# Patient Record
Sex: MALE | Race: WHITE | NOT HISPANIC OR LATINO | Employment: FULL TIME | URBAN - METROPOLITAN AREA
[De-identification: names, ages, dates, MRNs, and addresses within clinical notes are randomized per-mention and may not be internally consistent; named-entity substitution may affect disease eponyms.]

---

## 2017-01-12 ENCOUNTER — ALLIED HEALTH/NURSE VISIT (OUTPATIENT)
Dept: NURSING | Facility: CLINIC | Age: 52
End: 2017-01-12
Payer: COMMERCIAL

## 2017-01-12 DIAGNOSIS — E53.8 VITAMIN B12 DEFICIENCY (NON ANEMIC): Primary | ICD-10-CM

## 2017-01-12 PROCEDURE — 96372 THER/PROPH/DIAG INJ SC/IM: CPT

## 2017-01-12 PROCEDURE — 99207 ZZC NO CHARGE NURSE ONLY: CPT

## 2017-01-19 ENCOUNTER — TRANSFERRED RECORDS (OUTPATIENT)
Dept: HEALTH INFORMATION MANAGEMENT | Facility: CLINIC | Age: 52
End: 2017-01-19

## 2017-02-07 ENCOUNTER — ALLIED HEALTH/NURSE VISIT (OUTPATIENT)
Dept: NURSING | Facility: CLINIC | Age: 52
End: 2017-02-07
Payer: COMMERCIAL

## 2017-02-07 DIAGNOSIS — E53.8 VITAMIN B12 DEFICIENCY (NON ANEMIC): Primary | ICD-10-CM

## 2017-02-07 PROCEDURE — 96372 THER/PROPH/DIAG INJ SC/IM: CPT

## 2017-02-07 PROCEDURE — 99207 ZZC NO CHARGE NURSE ONLY: CPT

## 2017-03-08 ENCOUNTER — ALLIED HEALTH/NURSE VISIT (OUTPATIENT)
Dept: NURSING | Facility: CLINIC | Age: 52
End: 2017-03-08
Payer: COMMERCIAL

## 2017-03-08 DIAGNOSIS — E53.8 VITAMIN B12 DEFICIENCY (NON ANEMIC): Primary | ICD-10-CM

## 2017-03-08 PROCEDURE — 96372 THER/PROPH/DIAG INJ SC/IM: CPT

## 2017-03-08 NOTE — MR AVS SNAPSHOT
After Visit Summary   3/8/2017    Thomas Gonzales    MRN: [id number]           Patient Information     Date Of Birth          1965        Visit Information        Provider Department      3/8/2017 4:30 PM EA NURSE AB [facility]        Today's Diagnoses     Vitamin B12 deficiency (non anemic)    -  1       Follow-ups after your visit        Your next 10 appointments already scheduled     Mar 08, 2017  4:30 PM CST   Nurse Only with EA NURSE AB   [facility]    [address] [place] [address]  [address]  [place] [address] [phone]            Mar 09, 2017  7:30 AM CST   Sonalit Long with Anni Sage MD   [facility]    [address] [place] [address]  [address]  [place] [id number] [phone]            Apr 05, 2017  4:30 PM CDT   Nurse Only with EA NURSE AB   [facility] [place] ([facility])    [address] [place] [address]  [address]  [place] [id number] [phone]              Who to contact     If you have questions or need follow up information about today's clinic visit or your schedule please contact [facility] directly at [phone].  Normal or non-critical lab and imaging results will be communicated to you by FlipGivehart, letter or phone within 4 business days after the clinic has received the results. If you do not hear from us within 7 days, please contact the clinic through FlipGivehart or phone. If you have a critical or abnormal lab result, we will notify you by phone as soon as possible.  Submit refill requests through VeedMe or call your pharmacy and they will forward the refill request to us. Please allow 3 business days for your refill to be completed.          Additional Information About Your Visit        FlipGivehart Information     VeedMe gives you secure access to your electronic health record. If you see a primary care provider, you can also send  messages to your care team and make appointments. If you have questions, please call your primary care clinic.  If you do not have a primary care provider, please call [phone] and they will assist you.        Care EveryWhere ID     This is your Care EveryWhere ID. This could be used by other organizations to access your [facility] medical records  [id number]         Blood Pressure from Last 3 Encounters:   11/09/16 108/60   08/22/16 124/60   07/18/16 104/62    Weight from Last 3 Encounters:   11/09/16 202 lb (91.6 kg)   08/22/16 222 lb 6.4 oz (100.9 kg)   07/18/16 219 lb 9.6 oz (99.6 kg)              We Performed the Following     INJECTION INTRAMUSCULAR OR SUB-Q     VITAMIN B12 INJ /1000MCG        Primary Care Provider Office Phone # Fax #    Anni Sage [phone]       [address] [place] [address]        Thank you!     Thank you for choosing [facility]  for your care. Our goal is always to provide you with excellent care. Hearing back from our patients is one way we can continue to improve our services. Please take a few minutes to complete the written survey that you may receive in the mail after your visit with us. Thank you!             Your Updated Medication List - Protect others around you: Learn how to safely use, store and throw away your medicines at www.disposemymeds.org.          This list is accurate as of: 3/8/17  4:20 PM.  Always use your most recent med list.                   Brand Name Dispense Instructions for use    ACE NOT PRESCRIBED (INTENTIONAL)     0 each    ACE Inhibitor not prescribed due to Symptomatic hypotension not due to excessive diuresis       aspirin 81 MG tablet      Take 81 mg by mouth daily       blood glucose monitoring test strip    no brand specified    3 Box    Use to test blood sugars 3-4 times daily or as directed Uses Contour Next test strips       cyanocobalamin 1000 MCG/ML injection     VITAMIN B12    0.9 mL    Inject 1 mL (1,000 mcg) into the muscle every 30 days       fluticasone 50 MCG/ACT spray    FLONASE     Spray 2 sprays into both nostrils daily       LANTUS SOLOSTAR 100 UNIT/ML injection   Generic drug:  insulin glargine      Inject 22 Units Subcutaneous At Bedtime       LEVAQUIN PO      Take 500 mg by mouth daily X 14 days       LIPITOR 40 MG tablet   Generic drug:  atorvastatin      Take 40 mg by mouth daily       melatonin 5 MG tablet      Take 5 mg by mouth nightly as needed for sleep       Multi-vitamin Tabs tablet      Take 1 tablet by mouth daily       NITROSTAT SL      Place 0.4 mg under the tongue       PLAVIX 75 MG tablet   Generic drug:  clopidogrel      Take 75 mg by mouth daily       PROBIOTIC DAILY PO      Take by mouth daily       vitamin D 2000 UNITS Caps      Take 2,000 Units by mouth 2 times daily

## 2017-03-09 ENCOUNTER — OFFICE VISIT (OUTPATIENT)
Dept: PEDIATRICS | Facility: CLINIC | Age: 52
End: 2017-03-09
Payer: COMMERCIAL

## 2017-03-09 VITALS
HEIGHT: 73 IN | DIASTOLIC BLOOD PRESSURE: 58 MMHG | SYSTOLIC BLOOD PRESSURE: 118 MMHG | HEART RATE: 79 BPM | OXYGEN SATURATION: 100 % | WEIGHT: 208.06 LBS | TEMPERATURE: 97.6 F | BODY MASS INDEX: 27.57 KG/M2 | RESPIRATION RATE: 16 BRPM

## 2017-03-09 DIAGNOSIS — Z79.4 TYPE 2 DIABETES MELLITUS WITH DIABETIC POLYNEUROPATHY, WITH LONG-TERM CURRENT USE OF INSULIN (H): ICD-10-CM

## 2017-03-09 DIAGNOSIS — F33.0 MAJOR DEPRESSIVE DISORDER, RECURRENT EPISODE, MILD (H): Primary | ICD-10-CM

## 2017-03-09 DIAGNOSIS — E11.42 TYPE 2 DIABETES MELLITUS WITH DIABETIC POLYNEUROPATHY, WITH LONG-TERM CURRENT USE OF INSULIN (H): ICD-10-CM

## 2017-03-09 DIAGNOSIS — K52.839 MICROSCOPIC COLITIS, UNSPECIFIED MICROSCOPIC COLITIS TYPE: ICD-10-CM

## 2017-03-09 LAB
CREAT UR-MCNC: 98 MG/DL
HBA1C MFR BLD: 6.2 % (ref 4.3–6)
MICROALBUMIN UR-MCNC: 36 MG/L
MICROALBUMIN/CREAT UR: 36.72 MG/G CR (ref 0–17)

## 2017-03-09 PROCEDURE — 83036 HEMOGLOBIN GLYCOSYLATED A1C: CPT | Performed by: INTERNAL MEDICINE

## 2017-03-09 PROCEDURE — 36415 COLL VENOUS BLD VENIPUNCTURE: CPT | Performed by: INTERNAL MEDICINE

## 2017-03-09 PROCEDURE — 99214 OFFICE O/P EST MOD 30 MIN: CPT | Performed by: INTERNAL MEDICINE

## 2017-03-09 PROCEDURE — 82043 UR ALBUMIN QUANTITATIVE: CPT | Performed by: INTERNAL MEDICINE

## 2017-03-09 NOTE — MR AVS SNAPSHOT
After Visit Summary   3/9/2017    Thomas Gonzales    MRN: [id number]           Patient Information     Date Of Birth          1965        Visit Information        Provider Department      3/9/2017 7:30 AM Anni Sage MD [facility]        Today's Diagnoses     Major depressive disorder, recurrent episode, mild (H)    -  1    Type 2 diabetes mellitus with diabetic polyneuropathy, with long-term current use of insulin (H)          Care Instructions    Depression:  1. Start fluoxetine 20mg daily. OK to increase to 40mg after 2 weeks if tolerating well. Let me know if there are any side effects.  2. Follow-up with me in 4-6 weeks.  3. We can always refer for therapy.    Diabetes:  1. Lantus refilled for 25 units.   2. Let me know if you start to have pre-meal blood sugars of >150 and we can restart Novolog.    Let me know if there is anything else that I can do!        Follow-ups after your visit        Your next 10 appointments already scheduled     Apr 05, 2017  4:30 PM CDT   Nurse Only with EA NURSE AB   [facility] ([facility])    [address] [place] [address]  [address]  [facility] [id number] [phone]              Who to contact     If you have questions or need follow up information about today's clinic visit or your schedule please contact [facility] directly at [phone].  Normal or non-critical lab and imaging results will be communicated to you by MyChart, letter or phone within 4 business days after the clinic has received the results. If you do not hear from us within 7 days, please contact the clinic through MyChart or phone. If you have a critical or abnormal lab result, we will notify you by phone as soon as possible.  Submit refill requests through Zoobe or call your pharmacy and they will forward the refill request to us. Please allow 3 business days for your refill to be completed.          Additional Information  "About Your Visit        Medical Direct Clubhart Information     Bizily gives you secure access to your electronic health record. If you see a primary care provider, you can also send messages to your care team and make appointments. If you have questions, please call your primary care clinic.  If you do not have a primary care provider, please call [phone] and they will assist you.        Care EveryWhere ID     This is your Care EveryWhere ID. This could be used by other organizations to access your [facility] medical records  [id number]        Your Vitals Were     Pulse Temperature Respirations Height Pulse Oximetry BMI (Body Mass Index)    79 97.6  F (36.4  C) (Tympanic) 16 6' 0.5\" (1.842 m) 100% 27.83 kg/m2       Blood Pressure from Last 3 Encounters:   03/09/17 118/58   11/09/16 108/60   08/22/16 124/60    Weight from Last 3 Encounters:   03/09/17 208 lb 1 oz (94.4 kg)   11/09/16 202 lb (91.6 kg)   08/22/16 222 lb 6.4 oz (100.9 kg)              We Performed the Following     Albumin Random Urine Quantitative     Hemoglobin A1c          Today's Medication Changes          These changes are accurate as of: 3/9/17  8:02 AM.  If you have any questions, ask your nurse or doctor.               Start taking these medicines.        Dose/Directions    FLUoxetine 20 MG capsule   Commonly known as:  PROzac   Used for:  Major depressive disorder, recurrent episode, mild (H)   Started by:  Anni Sage MD        Dose:  20 mg   Take 1 capsule (20 mg) by mouth daily   Quantity:  90 capsule   Refills:  1         These medicines have changed or have updated prescriptions.        Dose/Directions    insulin glargine 100 UNIT/ML injection   Commonly known as:  LANTUS SOLOSTAR   This may have changed:  how much to take   Used for:  Type 2 diabetes mellitus with diabetic polyneuropathy, with long-term current use of insulin (H)   Changed by:  Anni Sage MD        Dose:  25 Units   Inject 25 Units Subcutaneous At Bedtime   Quantity:  " 12 mL   Refills:  3            Where to get your medicines      These medications were sent to [id number] - [place] - [address]  [address] [facility]  [address] [place] [address]     Phone:  [phone]     FLUoxetine 20 MG capsule    insulin glargine 100 UNIT/ML injection                Primary Care Provider Office Phone # Fax #    Anni Sage [phone]       [facility] [address] [place] [address]        Thank you!     Thank you for choosing [facility]  for your care. Our goal is always to provide you with excellent care. Hearing back from our patients is one way we can continue to improve our services. Please take a few minutes to complete the written survey that you may receive in the mail after your visit with us. Thank you!             Your Updated Medication List - Protect others around you: Learn how to safely use, store and throw away your medicines at www.disposemymeds.org.          This list is accurate as of: 3/9/17  8:02 AM.  Always use your most recent med list.                   Brand Name Dispense Instructions for use    ACE NOT PRESCRIBED (INTENTIONAL)     0 each    ACE Inhibitor not prescribed due to Symptomatic hypotension not due to excessive diuresis       aspirin 81 MG tablet      Take 81 mg by mouth daily       blood glucose monitoring test strip    no brand specified    3 Box    Use to test blood sugars 3-4 times daily or as directed Uses Contour Next test strips       cyanocobalamin 1000 MCG/ML injection    VITAMIN B12    0.9 mL    Inject 1 mL (1,000 mcg) into the muscle every 30 days       FLUoxetine 20 MG capsule    PROzac    90 capsule    Take 1 capsule (20 mg) by mouth daily       fluticasone 50 MCG/ACT spray    FLONASE     Spray 2 sprays into both nostrils daily       insulin glargine 100 UNIT/ML injection    LANTUS SOLOSTAR    12 mL    Inject 25 Units Subcutaneous At  Bedtime       LEVAQUIN PO      Take 500 mg by mouth daily Reported on 3/9/2017       LIPITOR 40 MG tablet   Generic drug:  atorvastatin      Take 40 mg by mouth daily       melatonin 5 MG tablet      Take 5 mg by mouth nightly as needed for sleep       Multi-vitamin Tabs tablet      Take 1 tablet by mouth daily       NITROSTAT SL      Place 0.4 mg under the tongue Reported on 3/9/2017       PLAVIX 75 MG tablet   Generic drug:  clopidogrel      Take 75 mg by mouth daily Reported on 3/9/2017       PROBIOTIC DAILY PO      Take by mouth daily       vitamin D 2000 UNITS Caps      Take 2,000 Units by mouth 2 times daily

## 2017-03-09 NOTE — PROGRESS NOTES
SUBJECTIVE:                                                    Thomas Gonzales is a 51 year old male who presents to clinic today for the following health issues:      Medication Followup of Lantus    Taking Medication as prescribed: yes    Side Effects:  None    Medication Helping Symptoms:  yes     Abnormal Mood Symptoms      Duration: x1 year intermittent     Description:  Depression: YES  Anxiety: no   Panic attacks: no      Accompanying signs and symptoms: see PHQ-9 and TIGIST scores    History (similar episodes/previous evaluation): Yes     Precipitating or alleviating factors: None    Therapies tried and outcome: was on Prozac (Fluoxetine)     Pt would also like to discuss start going back on Novolog.     Pradip comes in for follow-up and to discuss the above issues. He reports that for a while he was doing relatively well and was down to one abdominal drain, but subsequently an abscess re-developed and he had to be rehospitalized for management, including antibiotics and drainage.     He has been seeing GI, and was started on steroids for microscopic colitis. His diarrhea is now much improved on this, but he is noticing that his blood sugars have been higher, and has in fact restarted Lantus on his own. He is taking 25 units daily, no Novolog, and reports that his blood sugars are typically in the 140s fasting and 100-120s pre-meal. He is wondering if he needs to start Novolog at this time.    Of note, he reports that with all of these recent health issues, he is having a harder time managing the emotional ups and downs of chronic illness. He is feeling more down recently. He was previously on a low dose of fluoxetine, is interested in restarting this. No thoughts of hurting himself or others at this time.     Problem list and histories reviewed & adjusted, as indicated.  Additional history: as documented    Patient Active Problem List   Diagnosis     Abdominal abscess (H)     Abnormal liver function tests  "    Episodic mood disorder (H)     Celiac disease     Coronary arteriosclerosis in native artery     Type 2 diabetes mellitus with diabetic nephropathy (H)     Mild nonproliferative diabetic retinopathy (H)     Stricture of duodenum     Steatosis of liver     Ischemic enteritis (H)     Malignant melanoma (H)     Mast cell disease     Microcytic anemia     Multiple-type hyperlipidemia     Caloric malnutrition (H)     Body mass index (BMI) greater than 30 in adult     Vertigo     MS (multiple sclerosis) (H)     Diabetic polyneuropathy associated with type 2 diabetes mellitus (H)     Vitamin B12 deficiency (non anemic)     Past Surgical History   Procedure Laterality Date     Castro en y bowel  1/8/2016     for small bowel ischemia     Cholecystectomy  1/8/2016     Appendectomy  1/8/2016     Cataract iol, rt/lt       Penile implant         Social History   Substance Use Topics     Smoking status: Never Smoker     Smokeless tobacco: Not on file     Alcohol use Yes      Comment: occ     Family History   Problem Relation Age of Onset     Arrhythmia Mother      bradycardia- pacemaker     Coronary Artery Disease Father            Reviewed and updated as needed this visit by clinical staff  Tobacco  Allergies  Meds  Med Hx  Fam Hx  Soc Hx        ROS:  Constitutional, HEENT, cardiovascular, pulmonary, gi, psych and endo systems are negative, except as otherwise noted.    OBJECTIVE:                                                    /58 (BP Location: Right arm, Patient Position: Chair, Cuff Size: Adult Regular)  Pulse 79  Temp 97.6  F (36.4  C) (Tympanic)  Resp 16  Ht 6' 0.5\" (1.842 m)  Wt 208 lb 1 oz (94.4 kg)  SpO2 100%  BMI 27.83 kg/m2  Body mass index is 27.83 kg/(m^2).  GENERAL: healthy, alert and no distress  PSYCH: mentation appears normal, affect normal/bright  Diabetic foot exam: DP reduced bilaterally, PT reduced bilaterally, reduced sensation up to mid calf bilaterally, slightly worse on L and small no " erythematous or warm healing wound on L dorsum of foot.     Diagnostic Test Results:  Results for orders placed or performed in visit on 03/09/17   Hemoglobin A1c   Result Value Ref Range    Hemoglobin A1C 6.2 (H) 4.3 - 6.0 %   Albumin Random Urine Quantitative   Result Value Ref Range    Creatinine Urine 98 mg/dL    Albumin Urine mg/L 36 mg/L    Albumin Urine mg/g Cr 36.72 (H) 0 - 17 mg/g Cr        ASSESSMENT/PLAN:                                                      1. Major depressive disorder, recurrent episode, mild (H)  Recently worsened with chronic illness, will restart SSRI at low dose. Follow-up in 4-6 weeks to see if dose increase needed. Side effects, including black box warning reviewed. Discussed that CBT may also be useful.   - FLUoxetine (PROZAC) 20 MG capsule; Take 1 capsule (20 mg) by mouth daily  Dispense: 90 capsule; Refill: 1    2. Type 2 diabetes mellitus with diabetic polyneuropathy, with long-term current use of insulin (H)  Has been well controlled recently with diet; however, with recent steroid use for microscopic colitis has seen increase in blood glucoses. Has already restarted Lantus with good control of home BGs; will refill at current dose today. No indication for starting mealtime insulin unless control worsens.   - insulin glargine (LANTUS SOLOSTAR) 100 UNIT/ML injection; Inject 25 Units Subcutaneous At Bedtime  Dispense: 12 mL; Refill: 3  - Hemoglobin A1c  - Albumin Random Urine Quantitative    3. Abdominal abscess (H)  Following with [facility] for management.     Microscopic colitis:  Managed at [facility], currently on steroids.       Patient Instructions   Depression:  1. Start fluoxetine 20mg daily. OK to increase to 40mg after 2 weeks if tolerating well. Let me know if there are any side effects.  2. Follow-up with me in 4-6 weeks.  3. We can always refer for therapy.    Diabetes:  1. Lantus refilled for 25 units.   2. Let me know if you start to have pre-meal blood sugars of >150 and we  can restart Novolog.    Let me know if there is anything else that I can do!      Anni Serrano MD  [facility] [place]

## 2017-03-09 NOTE — PATIENT INSTRUCTIONS
Depression:  1. Start fluoxetine 20mg daily. OK to increase to 40mg after 2 weeks if tolerating well. Let me know if there are any side effects.  2. Follow-up with me in 4-6 weeks.  3. We can always refer for therapy.    Diabetes:  1. Lantus refilled for 25 units.   2. Let me know if you start to have pre-meal blood sugars of >150 and we can restart Novolog.    Let me know if there is anything else that I can do!

## 2017-03-09 NOTE — NURSING NOTE
"No chief complaint on file.      Initial /58 (BP Location: Right arm, Patient Position: Chair, Cuff Size: Adult Regular)  Pulse 79  Temp 97.6  F (36.4  C) (Tympanic)  Resp 16  Ht 6' 0.5\" (1.842 m)  Wt 208 lb 1 oz (94.4 kg)  SpO2 100%  BMI 27.83 kg/m2 Estimated body mass index is 27.83 kg/(m^2) as calculated from the following:    Height as of this encounter: 6' 0.5\" (1.842 m).    Weight as of this encounter: 208 lb 1 oz (94.4 kg).  Medication Reconciliation: complete     Meg Ann MA 3/9/2017 7:36 AM    "

## 2017-03-10 ASSESSMENT — PATIENT HEALTH QUESTIONNAIRE - PHQ9: SUM OF ALL RESPONSES TO PHQ QUESTIONS 1-9: 8

## 2017-03-15 PROBLEM — F33.0 MAJOR DEPRESSIVE DISORDER, RECURRENT EPISODE, MILD (H): Status: ACTIVE | Noted: 2017-03-09

## 2017-03-15 PROBLEM — F33.0 MAJOR DEPRESSIVE DISORDER, RECURRENT EPISODE, MILD (H): Status: ACTIVE | Noted: 2017-03-15

## 2017-03-20 ENCOUNTER — TRANSFERRED RECORDS (OUTPATIENT)
Dept: HEALTH INFORMATION MANAGEMENT | Facility: CLINIC | Age: 52
End: 2017-03-20

## 2017-04-06 ENCOUNTER — OFFICE VISIT (OUTPATIENT)
Dept: PEDIATRICS | Facility: CLINIC | Age: 52
End: 2017-04-06
Payer: COMMERCIAL

## 2017-04-06 VITALS
HEART RATE: 74 BPM | OXYGEN SATURATION: 99 % | SYSTOLIC BLOOD PRESSURE: 122 MMHG | RESPIRATION RATE: 16 BRPM | HEIGHT: 73 IN | WEIGHT: 207 LBS | DIASTOLIC BLOOD PRESSURE: 50 MMHG | TEMPERATURE: 97.7 F | BODY MASS INDEX: 27.43 KG/M2

## 2017-04-06 DIAGNOSIS — K52.839 MICROSCOPIC COLITIS, UNSPECIFIED MICROSCOPIC COLITIS TYPE: ICD-10-CM

## 2017-04-06 DIAGNOSIS — G35 MS (MULTIPLE SCLEROSIS) (H): ICD-10-CM

## 2017-04-06 DIAGNOSIS — Z79.4 TYPE 2 DIABETES MELLITUS WITH DIABETIC NEPHROPATHY, WITH LONG-TERM CURRENT USE OF INSULIN (H): ICD-10-CM

## 2017-04-06 DIAGNOSIS — E53.8 VITAMIN B12 DEFICIENCY (NON ANEMIC): ICD-10-CM

## 2017-04-06 DIAGNOSIS — F33.0 MAJOR DEPRESSIVE DISORDER, RECURRENT EPISODE, MILD (H): ICD-10-CM

## 2017-04-06 DIAGNOSIS — E11.21 TYPE 2 DIABETES MELLITUS WITH DIABETIC NEPHROPATHY, WITH LONG-TERM CURRENT USE OF INSULIN (H): ICD-10-CM

## 2017-04-06 PROCEDURE — 99213 OFFICE O/P EST LOW 20 MIN: CPT | Mod: 25 | Performed by: INTERNAL MEDICINE

## 2017-04-06 PROCEDURE — 96372 THER/PROPH/DIAG INJ SC/IM: CPT | Performed by: INTERNAL MEDICINE

## 2017-04-06 NOTE — PATIENT INSTRUCTIONS
1. Continue Prozac at current dose.    2. OK to adjust insulin as you are. Let me know if you need any assistance.    Keep me posted about how things are going. Follow-up in about 6 months.

## 2017-04-06 NOTE — PROGRESS NOTES
SUBJECTIVE:                                                    Thomas Gonzales is a 51 year old male who presents to clinic today for the following health issues:    1. Depression: Doing well on fluoxetine at current dose. No side effects. Has been more active, working out 4-6 days per week. Feels pretty tired in the evenings but overall feeling well. Thinks his dose is appropriate.    2. Type 2 DM: Taking insulin about 1/2 of the nights, will take this if his blood sugars are >140-150 usually. GI is gradually reducing his steroids, hoping to me off in the next few months.     3. Deconditioning: Has been going to physical therapy twice per week, working on balance issues and leg strength. Planning for another 3-4 weeks. Has also been going to the gym 4-6 times per week, working with a .    4. Abdominal abscesses: Still following at [facility] for this, re-positioned drains earlier this week, thinking about taking one of them out in a couple of weeks.     5. MS: Not taking anything at this point. Follows with neurology at [facility], will see later this spring/summer.     Problem list and histories reviewed & adjusted, as indicated.  Additional history: as documented    Patient Active Problem List   Diagnosis     Abdominal abscess (H)     Abnormal liver function tests     Celiac disease     Coronary arteriosclerosis in native artery     Type 2 diabetes mellitus with diabetic nephropathy (H)     Mild nonproliferative diabetic retinopathy (H)     Stricture of duodenum     Steatosis of liver     Ischemic enteritis (H)     Malignant melanoma (H)     Mast cell disease     Microcytic anemia     Multiple-type hyperlipidemia     Caloric malnutrition (H)     Body mass index (BMI) greater than 30 in adult     Vertigo     MS (multiple sclerosis) (H)     Diabetic polyneuropathy associated with type 2 diabetes mellitus (H)     Vitamin B12 deficiency (non anemic)     Major depressive disorder, recurrent episode, mild (H)  "    Past Surgical History:   Procedure Laterality Date     APPENDECTOMY  1/8/2016     CATARACT IOL, RT/LT       CHOLECYSTECTOMY  1/8/2016     penile implant       LEON EN Y BOWEL  1/8/2016    for small bowel ischemia       Social History   Substance Use Topics     Smoking status: Never Smoker     Smokeless tobacco: Not on file     Alcohol use Yes      Comment: occ     Family History   Problem Relation Age of Onset     Arrhythmia Mother      bradycardia- pacemaker     Coronary Artery Disease Father            Reviewed and updated as needed this visit by clinical staff  Tobacco  Allergies  Meds  Soc Hx        ROS:  Constitutional, HEENT, cardiovascular, pulmonary, gi and psych systems are negative, except as otherwise noted.    OBJECTIVE:                                                    /50 (BP Location: Left arm, Patient Position: Left side, Cuff Size: Adult Small)  Pulse 74  Temp 97.7  F (36.5  C) (Oral)  Resp 16  Ht 6' 0.5\" (1.842 m)  Wt 207 lb (93.9 kg)  SpO2 99%  BMI 27.69 kg/m2  Body mass index is 27.69 kg/(m^2).  GENERAL: healthy, alert and no distress  PSYCH: mentation appears normal, affect normal/bright, overall much brighter than previous visits.     Diagnostic Test Results:  none      ASSESSMENT/PLAN:                                                      1. Vitamin B12 deficiency (non anemic)  Due for injection today  - VITAMIN B12 INJ /1000MCG  - INJECTION INTRAMUSCULAR OR SUB-Q    2. Abdominal abscess (H)  Stable, still has 2 drains in place. Following at [facility].     3. Type 2 diabetes mellitus with diabetic nephropathy, with long-term current use of insulin (H)  Well controlled on recent HgbA1c. Patient taking insulin somewhat irregularly, but is quite savvy about dosing. Anticipate that recent spike in blood sugars due to steroids with microscopic colitis, and anticipate with his recent increased activity he may actually be able to come back off insulin as his steroids taper down/off. "     4. MS (multiple sclerosis) (H)  Follows with Neuro at [facility], not on medication at this point.     5. Major depressive disorder, recurrent episode, mild (H)  Much improved on fluoxetine. Will continue with current dosing. No side effects.     6. Microscopic colitis, unspecified microscopic colitis type  Follows with GI at [facility], on gradual taper.       Patient Instructions   1. Continue Prozac at current dose.    2. OK to adjust insulin as you are. Let me know if you need any assistance.    Keep me posted about how things are going. Follow-up in about 6 months.       Anni Serrano MD  [facility]

## 2017-04-06 NOTE — MR AVS SNAPSHOT
After Visit Summary   4/6/2017    Thomas Gonzales    MRN: [id number]           Patient Information     Date Of Birth          1965        Visit Information        Provider Department      4/6/2017 7:30 AM Anni Sage MD [facility]        Care Instructions    1. Continue Prozac at current dose.    2. OK to adjust insulin as you are. Let me know if you need any assistance.    Keep me posted about how things are going. Follow-up in about 6 months.         Follow-ups after your visit        Your next 10 appointments already scheduled     May 04, 2017  4:30 PM CDT   Nurse Only with EA NURSE AB   [facility]    [address]  [address]  [place] [address] [phone]              Who to contact     If you have questions or need follow up information about today's clinic visit or your schedule please contact [facility] directly at [phone].  Normal or non-critical lab and imaging results will be communicated to you by Qustreethart, letter or phone within 4 business days after the clinic has received the results. If you do not hear from us within 7 days, please contact the clinic through Kromatidt or phone. If you have a critical or abnormal lab result, we will notify you by phone as soon as possible.  Submit refill requests through Qlika or call your pharmacy and they will forward the refill request to us. Please allow 3 business days for your refill to be completed.          Additional Information About Your Visit        Qustreethart Information     Qlika gives you secure access to your electronic health record. If you see a primary care provider, you can also send messages to your care team and make appointments. If you have questions, please call your primary care clinic.  If you do not have a primary care provider, please call [phone] and they will assist you.        Care EveryWhere ID     This is your Care  "EveryWhere ID. This could be used by other organizations to access your [facility] medical records  [id number]        Your Vitals Were     Pulse Temperature Respirations Height Pulse Oximetry BMI (Body Mass Index)    74 97.7  F (36.5  C) (Oral) 16 6' 0.5\" (1.842 m) 99% 27.69 kg/m2       Blood Pressure from Last 3 Encounters:   04/06/17 122/50   03/09/17 118/58   11/09/16 108/60    Weight from Last 3 Encounters:   04/06/17 207 lb (93.9 kg)   03/09/17 208 lb 1 oz (94.4 kg)   11/09/16 202 lb (91.6 kg)              Today, you had the following     No orders found for display       Primary Care Provider Office Phone # Fax #    Anni Sage [phone]       [address] [place] [address]        Thank you!     Thank you for choosing [facility]  for your care. Our goal is always to provide you with excellent care. Hearing back from our patients is one way we can continue to improve our services. Please take a few minutes to complete the written survey that you may receive in the mail after your visit with us. Thank you!             Your Updated Medication List - Protect others around you: Learn how to safely use, store and throw away your medicines at www.disposemymeds.org.          This list is accurate as of: 4/6/17  7:57 AM.  Always use your most recent med list.                   Brand Name Dispense Instructions for use    ACE NOT PRESCRIBED (INTENTIONAL)     0 each    ACE Inhibitor not prescribed due to Symptomatic hypotension not due to excessive diuresis       aspirin 81 MG tablet      Take 81 mg by mouth daily       blood glucose monitoring test strip    no brand specified    3 Box    Use to test blood sugars 3-4 times daily or as directed Uses Contour Next test strips       cyanocobalamin 1000 MCG/ML injection    VITAMIN B12    0.9 mL    Inject 1 mL (1,000 mcg) into the muscle every 30 days       FLUoxetine 20 MG capsule    PROzac    90 capsule "    Take 1 capsule (20 mg) by mouth daily       fluticasone 50 MCG/ACT spray    FLONASE     Spray 2 sprays into both nostrils daily       insulin glargine 100 UNIT/ML injection    LANTUS SOLOSTAR    12 mL    Inject 25 Units Subcutaneous At Bedtime       LEVAQUIN PO      Take 500 mg by mouth daily Reported on 3/9/2017       LIPITOR 40 MG tablet   Generic drug:  atorvastatin      Take 40 mg by mouth daily       melatonin 5 MG tablet      Take 5 mg by mouth nightly as needed for sleep       Multi-vitamin Tabs tablet      Take 1 tablet by mouth daily       NITROSTAT SL      Place 0.4 mg under the tongue Reported on 3/9/2017       PLAVIX 75 MG tablet   Generic drug:  clopidogrel      Take 75 mg by mouth daily Reported on 3/9/2017       PROBIOTIC DAILY PO      Take by mouth daily       vitamin D 2000 UNITS Caps      Take 2,000 Units by mouth 2 times daily

## 2017-04-09 PROBLEM — K52.839 MICROSCOPIC COLITIS, UNSPECIFIED MICROSCOPIC COLITIS TYPE: Status: ACTIVE | Noted: 2017-04-09

## 2017-05-04 ENCOUNTER — ALLIED HEALTH/NURSE VISIT (OUTPATIENT)
Dept: NURSING | Facility: CLINIC | Age: 52
End: 2017-05-04
Payer: COMMERCIAL

## 2017-05-04 DIAGNOSIS — E53.8 VITAMIN B12 DEFICIENCY (NON ANEMIC): Primary | ICD-10-CM

## 2017-05-04 PROCEDURE — 96372 THER/PROPH/DIAG INJ SC/IM: CPT

## 2017-05-04 PROCEDURE — 99207 ZZC NO CHARGE NURSE ONLY: CPT

## 2017-05-04 NOTE — NURSING NOTE
"Chief Complaint   Patient presents with     Allied Health Visit     vitamin B12       Initial There were no vitals taken for this visit. Estimated body mass index is 27.69 kg/(m^2) as calculated from the following:    Height as of 4/6/17: 6' 0.5\" (1.842 m).    Weight as of 4/6/17: 207 lb (93.9 kg).  Medication Reconciliation: unable or not appropriate to perform   Sun Rivera LPN    "

## 2017-05-04 NOTE — MR AVS SNAPSHOT
After Visit Summary   5/4/2017    Thomas Gonzales    MRN: [id number]           Patient Information     Date Of Birth          1965        Visit Information        Provider Department      5/4/2017 4:30 PM NATALYA NURSE AB [facility]        Today's Diagnoses     Vitamin B12 deficiency (non anemic)    -  1       Follow-ups after your visit        Who to contact     If you have questions or need follow up information about today's clinic visit or your schedule please contact [facility] directly at [phone].  Normal or non-critical lab and imaging results will be communicated to you by MyChart, letter or phone within 4 business days after the clinic has received the results. If you do not hear from us within 7 days, please contact the clinic through One Parts Billt or phone. If you have a critical or abnormal lab result, we will notify you by phone as soon as possible.  Submit refill requests through ChaoWIFI or call your pharmacy and they will forward the refill request to us. Please allow 3 business days for your refill to be completed.          Additional Information About Your Visit        MyChart Information     ChaoWIFI gives you secure access to your electronic health record. If you see a primary care provider, you can also send messages to your care team and make appointments. If you have questions, please call your primary care clinic.  If you do not have a primary care provider, please call [phone] and they will assist you.        Care EveryWhere ID     This is your Care EveryWhere ID. This could be used by other organizations to access your [place] medical records  [id number]         Blood Pressure from Last 3 Encounters:   04/06/17 122/50   03/09/17 118/58   11/09/16 108/60    Weight from Last 3 Encounters:   04/06/17 207 lb (93.9 kg)   03/09/17 208 lb 1 oz (94.4 kg)   11/09/16 202 lb (91.6 kg)              We Performed the Following     INJECTION INTRAMUSCULAR OR  SUB-Q     VITAMIN B12 INJ /1000MCG        Primary Care Provider Office Phone # Fax #    Anni Sage [phone]       [facility]  [address] [place] [address]        Thank you!     Thank you for choosing [facility]  for your care. Our goal is always to provide you with excellent care. Hearing back from our patients is one way we can continue to improve our services. Please take a few minutes to complete the written survey that you may receive in the mail after your visit with us. Thank you!             Your Updated Medication List - Protect others around you: Learn how to safely use, store and throw away your medicines at www.disposemymeds.org.          This list is accurate as of: 5/4/17  5:00 PM.  Always use your most recent med list.                   Brand Name Dispense Instructions for use    ACE NOT PRESCRIBED (INTENTIONAL)     0 each    ACE Inhibitor not prescribed due to Symptomatic hypotension not due to excessive diuresis       aspirin 81 MG tablet      Take 81 mg by mouth daily       blood glucose monitoring test strip    no brand specified    3 Box    Use to test blood sugars 3-4 times daily or as directed Uses Contour Next test strips       cyanocobalamin 1000 MCG/ML injection    VITAMIN B12    0.9 mL    Inject 1 mL (1,000 mcg) into the muscle every 30 days       FLUoxetine 20 MG capsule    PROzac    90 capsule    Take 1 capsule (20 mg) by mouth daily       fluticasone 50 MCG/ACT spray    FLONASE     Spray 2 sprays into both nostrils daily       insulin glargine 100 UNIT/ML injection    LANTUS SOLOSTAR    12 mL    Inject 25 Units Subcutaneous At Bedtime       LEVAQUIN PO      Take 500 mg by mouth daily Reported on 3/9/2017       LIPITOR 40 MG tablet   Generic drug:  atorvastatin      Take 40 mg by mouth daily       melatonin 5 MG tablet      Take 5 mg by mouth nightly as needed for sleep       Multi-vitamin Tabs tablet      Take 1 tablet by  mouth daily       NITROSTAT SL      Place 0.4 mg under the tongue Reported on 3/9/2017       PLAVIX 75 MG tablet   Generic drug:  clopidogrel      Take 75 mg by mouth daily Reported on 3/9/2017       PROBIOTIC DAILY PO      Take by mouth daily       vitamin D 2000 UNITS Caps      Take 2,000 Units by mouth 2 times daily

## 2017-05-15 ENCOUNTER — MYC MEDICAL ADVICE (OUTPATIENT)
Dept: PEDIATRICS | Facility: CLINIC | Age: 52
End: 2017-05-15

## 2017-05-15 DIAGNOSIS — M54.2 NECK PAIN: ICD-10-CM

## 2017-05-15 DIAGNOSIS — M25.511 RIGHT SHOULDER PAIN: Primary | ICD-10-CM

## 2017-05-15 NOTE — TELEPHONE ENCOUNTER
Pt sent MC message back that he would like to see ortho for his pain. Placed referral & notified pt.    Paco, RN  Triage Nurse

## 2017-05-15 NOTE — TELEPHONE ENCOUNTER
See MC message from pt. LOV was on 04/06/17. Huddled with randa Hanna to place referral for PT, pt has already tried PT in the past for the same pain, can offer ortho referral(no need for an OV with pcp to receive referral).    Sent MC message to pt. Will await for his response.     Paco, RN  Triage Nurse

## 2017-05-19 ENCOUNTER — MYC MEDICAL ADVICE (OUTPATIENT)
Dept: PEDIATRICS | Facility: CLINIC | Age: 52
End: 2017-05-19

## 2017-05-19 DIAGNOSIS — I25.10 CORONARY ARTERY DISEASE INVOLVING NATIVE HEART, ANGINA PRESENCE UNSPECIFIED, UNSPECIFIED VESSEL OR LESION TYPE: ICD-10-CM

## 2017-05-19 DIAGNOSIS — Z92.89 HISTORY OF HYPERBARIC OXYGEN THERAPY: Primary | ICD-10-CM

## 2017-05-19 NOTE — TELEPHONE ENCOUNTER
Please review the MC message from pt & advise. LOV on 04/06/17. Should I ask him to send the request through E-visit?    Tommie OROSCO, RN  Triage Nurse

## 2017-06-21 ENCOUNTER — MYC MEDICAL ADVICE (OUTPATIENT)
Dept: PEDIATRICS | Facility: CLINIC | Age: 52
End: 2017-06-21

## 2017-06-21 DIAGNOSIS — Z98.84 S/P BARIATRIC SURGERY: Primary | ICD-10-CM

## 2017-06-21 NOTE — TELEPHONE ENCOUNTER
Please review the MC message from pt & advise. Thanks. LOV was on 04/06/17, recommended f/u in 6 months.     Tommie OROSCO, RN  Triage Nurse

## 2017-06-23 ENCOUNTER — MYC MEDICAL ADVICE (OUTPATIENT)
Dept: PEDIATRICS | Facility: CLINIC | Age: 52
End: 2017-06-23

## 2017-06-23 DIAGNOSIS — I25.10 CORONARY ARTERIOSCLEROSIS IN NATIVE ARTERY: Primary | ICD-10-CM

## 2017-06-23 RX ORDER — ATORVASTATIN CALCIUM 20 MG/1
20 TABLET, FILM COATED ORAL DAILY
Qty: 90 TABLET | Refills: 1 | Status: SHIPPED | OUTPATIENT
Start: 2017-06-23 | End: 2017-12-11

## 2017-06-23 NOTE — TELEPHONE ENCOUNTER
Pended med. Please add dx & sign rx. Thanks.    Notes from  message yesterday:  I agree, you do need annual labs following your bariatric surgery. Typically the ones I check are a CMP -- includes magnesium and phosphorus -- and CBC (you didn't mention these but I know you have them checked regularly for other reasons), copper, zinc, iron studies, ferritin, folate, PTHI, Vitamin B1 (thiamine) Vitamin B12 and vitamin D. I've ordered these, minus the CMP since you have this done elsewhere, along with your fasting lipids.     Typically I do not check selenium, vitamin A or vitamin C but I can order these if you would like.     I also think that it would probably be fine to decrease your atorvastatin to 20mg daily, but I probably wouldn't go lower than that. I would recommend checking your lipids 3 months after decreasing the dose of your statin just to make sure we didn't go too low. Does that sound reasonable to you? Just let me know where you would like me to send the new prescription to.     Paco, RN  Triage Nurse

## 2017-07-03 ENCOUNTER — OFFICE VISIT (OUTPATIENT)
Dept: PODIATRY | Facility: CLINIC | Age: 52
End: 2017-07-03
Payer: COMMERCIAL

## 2017-07-03 ENCOUNTER — OFFICE VISIT (OUTPATIENT)
Dept: PEDIATRICS | Facility: CLINIC | Age: 52
End: 2017-07-03
Payer: COMMERCIAL

## 2017-07-03 VITALS
HEART RATE: 71 BPM | BODY MASS INDEX: 26.65 KG/M2 | DIASTOLIC BLOOD PRESSURE: 60 MMHG | SYSTOLIC BLOOD PRESSURE: 110 MMHG | OXYGEN SATURATION: 100 % | TEMPERATURE: 97.9 F | HEIGHT: 73 IN | WEIGHT: 201.06 LBS

## 2017-07-03 VITALS
BODY MASS INDEX: 26.64 KG/M2 | HEIGHT: 73 IN | SYSTOLIC BLOOD PRESSURE: 110 MMHG | WEIGHT: 201 LBS | DIASTOLIC BLOOD PRESSURE: 60 MMHG

## 2017-07-03 DIAGNOSIS — S90.229A SUBUNGUAL HEMATOMA OF TOE, UNSPECIFIED LATERALITY, INITIAL ENCOUNTER: ICD-10-CM

## 2017-07-03 DIAGNOSIS — E11.42 PERIPHERAL SENSORY NEUROPATHY DUE TO TYPE 2 DIABETES MELLITUS (H): ICD-10-CM

## 2017-07-03 DIAGNOSIS — E11.21 TYPE 2 DIABETES MELLITUS WITH DIABETIC NEPHROPATHY (H): ICD-10-CM

## 2017-07-03 DIAGNOSIS — M79.89 LEFT LEG SWELLING: Primary | ICD-10-CM

## 2017-07-03 DIAGNOSIS — R60.0 EDEMA OF LEFT LOWER EXTREMITY: Primary | ICD-10-CM

## 2017-07-03 DIAGNOSIS — T14.8XXA SKIN ABRASION: ICD-10-CM

## 2017-07-03 DIAGNOSIS — Z98.84 S/P BARIATRIC SURGERY: ICD-10-CM

## 2017-07-03 PROBLEM — Z87.19 HISTORY OF ISCHEMIC BOWEL DISEASE: Status: ACTIVE | Noted: 2017-07-03

## 2017-07-03 PROBLEM — Z85.820 HISTORY OF MELANOMA: Status: ACTIVE | Noted: 2017-07-03

## 2017-07-03 LAB
CHOLEST SERPL-MCNC: 110 MG/DL
ERYTHROCYTE [DISTWIDTH] IN BLOOD BY AUTOMATED COUNT: 14.9 % (ref 10–15)
FERRITIN SERPL-MCNC: 193 NG/ML (ref 26–388)
FOLATE SERPL-MCNC: 57.2 NG/ML
HCT VFR BLD AUTO: 35.4 % (ref 40–53)
HDLC SERPL-MCNC: 61 MG/DL
HGB BLD-MCNC: 11.7 G/DL (ref 13.3–17.7)
IRON SATN MFR SERPL: 34 % (ref 15–46)
IRON SERPL-MCNC: 100 UG/DL (ref 35–180)
LDLC SERPL CALC-MCNC: 40 MG/DL
MAGNESIUM SERPL-MCNC: 1.9 MG/DL (ref 1.6–2.3)
MCH RBC QN AUTO: 29.3 PG (ref 26.5–33)
MCHC RBC AUTO-ENTMCNC: 33.1 G/DL (ref 31.5–36.5)
MCV RBC AUTO: 89 FL (ref 78–100)
NONHDLC SERPL-MCNC: 49 MG/DL
PHOSPHATE SERPL-MCNC: 2.5 MG/DL (ref 2.5–4.5)
PLATELET # BLD AUTO: 151 10E9/L (ref 150–450)
PTH-INTACT SERPL-MCNC: 81 PG/ML (ref 12–72)
RBC # BLD AUTO: 4 10E12/L (ref 4.4–5.9)
TIBC SERPL-MCNC: 292 UG/DL (ref 240–430)
TRIGL SERPL-MCNC: 44 MG/DL
VIT B12 SERPL-MCNC: 1283 PG/ML (ref 193–986)
WBC # BLD AUTO: 4.8 10E9/L (ref 4–11)

## 2017-07-03 PROCEDURE — 84630 ASSAY OF ZINC: CPT | Mod: 90 | Performed by: INTERNAL MEDICINE

## 2017-07-03 PROCEDURE — 84100 ASSAY OF PHOSPHORUS: CPT | Performed by: INTERNAL MEDICINE

## 2017-07-03 PROCEDURE — 82746 ASSAY OF FOLIC ACID SERUM: CPT | Performed by: INTERNAL MEDICINE

## 2017-07-03 PROCEDURE — 84425 ASSAY OF VITAMIN B-1: CPT | Mod: 90 | Performed by: INTERNAL MEDICINE

## 2017-07-03 PROCEDURE — 83970 ASSAY OF PARATHORMONE: CPT | Performed by: INTERNAL MEDICINE

## 2017-07-03 PROCEDURE — 82043 UR ALBUMIN QUANTITATIVE: CPT | Performed by: INTERNAL MEDICINE

## 2017-07-03 PROCEDURE — 82607 VITAMIN B-12: CPT | Performed by: INTERNAL MEDICINE

## 2017-07-03 PROCEDURE — 85027 COMPLETE CBC AUTOMATED: CPT | Performed by: INTERNAL MEDICINE

## 2017-07-03 PROCEDURE — 82525 ASSAY OF COPPER: CPT | Mod: 90 | Performed by: INTERNAL MEDICINE

## 2017-07-03 PROCEDURE — 82306 VITAMIN D 25 HYDROXY: CPT | Performed by: INTERNAL MEDICINE

## 2017-07-03 PROCEDURE — 99213 OFFICE O/P EST LOW 20 MIN: CPT | Performed by: INTERNAL MEDICINE

## 2017-07-03 PROCEDURE — 99203 OFFICE O/P NEW LOW 30 MIN: CPT | Performed by: PODIATRIST

## 2017-07-03 PROCEDURE — 83735 ASSAY OF MAGNESIUM: CPT | Performed by: INTERNAL MEDICINE

## 2017-07-03 PROCEDURE — 80061 LIPID PANEL: CPT | Performed by: INTERNAL MEDICINE

## 2017-07-03 PROCEDURE — 83540 ASSAY OF IRON: CPT | Performed by: INTERNAL MEDICINE

## 2017-07-03 PROCEDURE — 36415 COLL VENOUS BLD VENIPUNCTURE: CPT | Performed by: INTERNAL MEDICINE

## 2017-07-03 PROCEDURE — 99000 SPECIMEN HANDLING OFFICE-LAB: CPT | Performed by: INTERNAL MEDICINE

## 2017-07-03 PROCEDURE — 82728 ASSAY OF FERRITIN: CPT | Performed by: INTERNAL MEDICINE

## 2017-07-03 PROCEDURE — 83550 IRON BINDING TEST: CPT | Performed by: INTERNAL MEDICINE

## 2017-07-03 NOTE — Clinical Note
"Good morning  I saw Pradip on Monday for left lower extremity swelling and DMII foot check.  We reviewed our \"Diabetic Dos and Don'ts\" and instructed him on therapies for edema.  He'll follow up in 6 months of sooner with acute issues.  Thanks  Kadeem "

## 2017-07-03 NOTE — PROGRESS NOTES
"  SUBJECTIVE:                                                    Thomas Gonzales is a 51 year old male who presents to clinic today for the following health issues:      Musculoskeletal problem/pain      Duration: x1 week     Description  Location: L. Ankle     Intensity:  Moderate - severe     Accompanying signs and symptoms: swelling    History  Previous similar problem: NO   Previous evaluation:  NO     Precipitating or alleviating factors:  Trauma or overuse: \" Pt tripped x2 within last x2 weeks\"  Aggravating factors include: none    Therapies tried and outcome: elevating feet       Pradip comes in for evaluation for L lower leg swelling. He reports that this has been ongoing for just over a week now, and his leg was swollen all the way up to the knee. Following the onset of swelling, he actually tripped and scraped his L shin, but this occurred after the swelling started.    Interestingly, he was at [place] last week and underwent an US of the LLE which showed no blood clots, but he was told to follow-up with his PCP if symptoms were not improved. He denies any redness, warmth or shortness of breath. The R leg is not swollen. Swelling is improving over the past couple of days but not back to normal. He reports that the scrape is healing well, especially with recently starting hyperbaric oxygen treatments.     No other injuries that he can recall, he is active and mobile. Of note, he reports that his fistula is draining minimally and his surgeons at [place] are optimistic that this will close on its own.     Problem list and histories reviewed & adjusted, as indicated.  Additional history: as documented    Patient Active Problem List   Diagnosis     Abdominal abscess (H)     Abnormal liver function tests     Celiac disease     Coronary arteriosclerosis in native artery     Type 2 diabetes mellitus with diabetic nephropathy (H)     Mild nonproliferative diabetic retinopathy (H)     Stricture of duodenum     Steatosis " "of liver     Ischemic enteritis (H)     Malignant melanoma (H)     Mast cell disease     Microcytic anemia     Multiple-type hyperlipidemia     Caloric malnutrition (H)     Body mass index (BMI) greater than 30 in adult     Vertigo     MS (multiple sclerosis) (H)     Diabetic polyneuropathy associated with type 2 diabetes mellitus (H)     Vitamin B12 deficiency (non anemic)     Major depressive disorder, recurrent episode, mild (H)     Microscopic colitis, unspecified microscopic colitis type     Past Surgical History:   Procedure Laterality Date     APPENDECTOMY  1/8/2016     CATARACT IOL, RT/LT       CHOLECYSTECTOMY  1/8/2016     penile implant       LEON EN Y BOWEL  1/8/2016    for small bowel ischemia       Social History   Substance Use Topics     Smoking status: Never Smoker     Smokeless tobacco: Not on file     Alcohol use Yes      Comment: occ     Family History   Problem Relation Age of Onset     Arrhythmia Mother      bradycardia- pacemaker     Coronary Artery Disease Father            Reviewed and updated as needed this visit by clinical staff  Tobacco  Allergies  Meds  Med Hx  Fam Hx  Soc Hx        ROS:  Constitutional, cardiovascular, pulmonary, MSK systems are negative, except as otherwise noted.    OBJECTIVE:     /60 (BP Location: Right arm, Patient Position: Chair, Cuff Size: Adult Regular)  Pulse 71  Temp 97.9  F (36.6  C) (Oral)  Ht 6' 0.5\" (1.842 m)  Wt 201 lb 1 oz (91.2 kg)  SpO2 100%  BMI 26.89 kg/m2  Body mass index is 26.89 kg/(m^2).  GENERAL: healthy, alert and no distress  MS: L lower extremity with 1+ pitting edema at ankle extending to mid calf. No warmth, redness or pain. Two small superficial abrasions, one 3cm diameter, the other 1cmx2.5cm over L shin, no drainage and minimal surrounding erythema/granulation tissue    Diagnostic Test Results:  none     ASSESSMENT/PLAN:       1. Edema of left lower extremity  Unclear etiology. Recent US at [facility] negative for DVT, " which is quite reassuring. Patient has had regular lab work-up at [place], and has frequent abdominal imaging making compressive mass unlikely. No prior surgeries on L leg. Unclear what triggered swelling initially, but recent abrasion likely has made resolution of this slower. Reassuring that LLE edema is improving, and given patient's hx with symptomatic hypotension, reasonable to start with conservative measures including compressive device or elevation for now. Further work-up/repeat US indicated if swelling worsens or fails to resolve.     2. Skin abrasion  Appears to be healing well, no evidence of wound infection. Discussed that if he develops drainage, warmth or tenderness to return for cultures and abx (he has extensive hospital hx). Continue topical antimicrobials and wound dressings.       Patient Instructions   I'm not entirely sure what the swelling is due to, but I'm reassured that this is unlikely to be a blood clot (with your normal ultrasound last week), and it looks like that skin abrasion is healing well.    At this point, I think it's fine to try to elevate your Left foot to see if this helps. Wearing a compression stocking may also help.    If the swelling worsens, or the abrasion starts to look infected, please come back to be seen because we may need to re-image or consider antibiotics at that time.       Anni Serrano MD  [facility]

## 2017-07-03 NOTE — MR AVS SNAPSHOT
"              After Visit Summary   7/3/2017    Thomas Gonzales    MRN: [id number]           Patient Information     Date Of Birth          1965        Visit Information        Provider Department      7/3/2017 1:45 PM Kadeem Mohan DPM [facility]        Care Instructions      DR. MOHAN'S CLINIC LOCATIONS:   MONDAY - [place] TUESDAY - [place]   [address] [facility] [address]   [place] [address] [place] [address]   [phone]       THURSDAY AM - [place] THURSDAY PM - [facility]   [address]   [place] [address] [place], [place] [address] [phone]       FRIDAY AM - [place] SCHEDULE SURGERY: [phone] [address] APPOINTMENTS: [phone]   [place] [address] AFTER HOURS: [phone] FAX NUMBER: [phone]     Follow Up: 6 months    DIABETES AND YOUR FEET    Diabetes can result in several problems in the feet including ulcers (open sores) and amputations. Two of the most important reasons why people develop foot problems when they have diabetes is : 1. Neuropathy (loss of feeling)  2. Vascular disease (loss or decrease of blood flow).    Neuropathy is a term used to describe a loss of nerve function.  Patients with diabetes are at risk of developing neuropathy if their sugars continue to run high and are above the normal value. One theory for neuropathy is that the \"extra\" sugar in the body enters the nerves and is broken down. These by-products build up in the nerve causing it to swell and impairing nerve function. Often times, this can be prevented by controlling your sugars, dieting and exercise.    When a person develops neuropathy, they usually begin to feel numbness or tingling in their feet and sometime in their legs.  Other symptoms may include painful burning or hot feet, tingling or feeling like insects or ants are crawling on your feet or legs.  If the " diabetes is sever and the sugars run high for long periods of time, neuropathy can also occur in the hands.    Vascular disease is a term used to describe a loss or decrease in circulation (blood flow). There is a problem in getting blood and oxygen to areas that need it. Similar to neuropathy, sugars can build up in the walls of the arteries (blood vessels) and cause them to become swollen, thickened and hardened. This decreases the amount of blood that can go to an area that needs it. Though this is common in the legs of diabetic patients, it can also affect other arteries (blood vessels) in the body such as in the heart and eyes.    In the legs, vascular disease usually results in cramping. Patients who develop leg cramps after walking the same distance every time (i.e. One block, half a mile, ect.) need to let their doctors know so that their circulation may be checked. Cramps causing severe pain in the feet and/or legs while sleeping and the cramps go away when you stand or hang your legs off the side of the bed, may also be a sign of poor blood circulation.  Occasional cramping in cold weather or on rare occasions with activity may not be due to poor circulation, but you should inform your doctor.    PREVENTION OF THESE DISEASES  The key to prevention is good blood sugar control. Poor blood sugar control is a big reason many of these problems start. Physical activity (exercise) is a very good way to help decrease your blood sugars. Exercise can lower your blood sugar, blood pressure, and cholesterol. It also reduces your risk for heart disease and stroke, relieves stress, and strengthens your heart, muscles and bones.  In addition, regular activity helps insulin work better, improves your blood circulation, and keeps your joints flexible. If you're trying to lose weight, a combination of exercise and wise food choices can help you reach your target weight and maintain it.      PAIN MANAGEMENT  1.Blood Sugar  Control - Most important  2. Medications such as:  Amytriptylline, duloxetine, gabapentin, lyrica, tramadol  3. Nutritional therapy:  Vitamin B6 (100mg daily), Vitamin B12 (75mcg daily), Vitamin D 2000 IU daily), Alpha-Lipoic Acid (600-1800mg daily), Acetyl-L-Carnitine (500-1000mg TID, L-methyl folate (1500mcg daily)    ** Metformin can block Vitamin B6 and B12 so it is important to supplement**    FOOT CARE RECOMMENDATIONS   1. Wash your feet with lukewarm water and a mild soap and then dry them thoroughly, especially between the toes.     2. Examine your feet daily looking for cuts, corns, blisters, cracks, ect, especially after wearing new shoes. Make sure to look between your toes. If you cannot see the bottom of your feet, set a mirror on the floor and hold your foot over it, or ask a spouse, friend or family member to examine your feet for you. Contact your doctor immediately if new problems are noted or if sores are not healing.     3. Immediately apply moisturizer to the tops and bottoms of your feet, avoiding areas between the toes. Hand lotion (Intesive Care, Monique, Eucerin, Neutrogena, Curel, ect) is sufficient unless your doctor prescribes a medicated lotion. Apply sunscreen to your feet when going swimming outside.     4. Use clean comfortable shoes, wear white socks (if you have any bleeding or drainage, you will see it on white socks). Socks should not have thick seams or cut off the circulation around the leg. Break in new shoes slowly and rotate with older shoes until broken in. Check the inside of your shoes with your hand to look for areas of irritation or objects that may have fallen into your shoes.       5. Keep slippers by the side of your bed for use during the night.     6.  Shoes should be fitted by a professional and should not cause areas of irritation.  Check your feet regularly when wearing a new pair of shoes and replace them as needed.     7.  Talk to your doctor about proper  exercise. Exercise and stretching stimulate blood flow to your feet and maintain proper glucose levels.     8.  Monitor your blood glucose level as instructed by your doctor. Notify your doctor immediately if your blood sugar is abnormally high or low.    9. Cut your nails straight across, but then gently round any sharp edges with a cardboard nail file. If you have neuropathy, peripheral vascular disease or cannot see that well to trim your own toenails contact [facility] ([phone]) or [facility] ([phone]).      THINGS TO AVOID DOING   1.  Do not soak your feet if you have an open sore. Use only lukewarm water and always check the temperature with your hand as hot water can easily burn your feet.       2.  Never use a hot water bottle or heating pad on your feet. Also do not apply cold compresses to your feet. With decreased sensation, you could burn or freeze your feet.       3.  Do not apply any of these to your feet:    -  Over the counter medicine for corns or warts    -  Harsh chemicals like boric acid    -  Do not self-treat corns, cuts, blisters or infections. Always consult your doctor.       4.  Do not wear sandals, slippers or walk barefoot, especially on hot sand or concrete or other harsh surfaces.     5.  If you smoke, stop!!!            Body Mass Index (BMI)  Many things can cause foot and ankle problems. Foot structure, activity level, foot mechanics and injuries are common causes of pain.  One very important issue that often goes unmentioned, is body weight.  Extra weight can cause increased stress on muscles, ligaments, bones and tendons.  Sometimes just a few extra pounds is all it takes to put one over her/his threshold. Without reducing that stress, it can be difficult to alleviate pain. Some people are uncomfortable addressing this issue, but we feel it is important for you to think about it. As Foot &  Ankle specialists, our job is addressing the lower extremity problem and possible  "causes. Regarding extra body weight, we encourage patients to discuss diet and weight management plans with their primary care doctors. It is this team approach that gives you the best opportunity for pain relief and getting you back on your feet.            Follow-ups after your visit        Who to contact     If you have questions or need follow up information about today's clinic visit or your schedule please contact [facility] directly at [phone].  Normal or non-critical lab and imaging results will be communicated to you by [employer], letter or phone within 4 business days after the clinic has received the results. If you do not hear from us within 7 days, please contact the clinic through [employer] or phone. If you have a critical or abnormal lab result, we will notify you by phone as soon as possible.  Submit refill requests through [employer] or call your pharmacy and they will forward the refill request to us. Please allow 3 business days for your refill to be completed.          Additional Information About Your Visit        [employer] Information     [employer] gives you secure access to your electronic health record. If you see a primary care provider, you can also send messages to your care team and make appointments. If you have questions, please call your primary care clinic.  If you do not have a primary care provider, please call [phone] and they will assist you.        Care EveryWhere ID     This is your Care EveryWhere ID. This could be used by other organizations to access your [place] medical records  [id number]        Your Vitals Were     Height BMI (Body Mass Index)                6' 0.5\" (1.842 m) 26.89 kg/m2           Blood Pressure from Last 3 Encounters:   07/03/17 110/60   07/03/17 110/60   04/06/17 122/50    Weight from Last 3 Encounters:   07/03/17 201 lb (91.2 kg)   07/03/17 201 lb 1 oz (91.2 kg)   04/06/17 207 lb (93.9 kg)              Today, you had the following     No " orders found for display       Primary Care Provider Office Phone # Fax #    Anni Sage [phone]       [facility] [address] [place] [address] [place] [address]        Equal Access to Services     ABAD VALDOVINOS : Hadii aad ku boo Soellyali, waaxda luqadaha, qaybta kaalmada adeegyada, isael jensenn kolton lockhart laIsiahloulou de guzman. So [facility] [phone].    ATENCIÓN: Si habla español, tiene a marino disposición servicios gratuitos de asistencia lingüística. Llame al [phone].    We comply with applicable federal civil rights laws and [place] laws. We do not discriminate on the basis of race, color, national origin, age, disability sex, sexual orientation or gender identity.            Thank you!     Thank you for [facility]  for your [facility]. Our goal is always to provide you with excellent care. Hearing back from our patients is one way we can continue to improve our services. Please take a few minutes to complete the written survey that you may receive in the mail after your visit with us. Thank you!             Your Updated Medication List - Protect others around you: Learn how to safely use, store and throw away your medicines at www.disposemymeds.org.          This list is accurate as of: 7/3/17  2:10 PM.  Always use your most recent med list.                   Brand Name Dispense Instructions for use Diagnosis    ACE NOT PRESCRIBED (INTENTIONAL)     0 each    ACE Inhibitor not prescribed due to Symptomatic hypotension not due to excessive diuresis    Coronary arteriosclerosis in native artery       ACE/ARB NOT PRESCRIBED (INTENTIONAL)      Please choose reason not prescribed, below    Coronary artery disease involving native heart, angina presence unspecified, unspecified vessel or lesion type       aspirin 81 MG tablet      Take 81 mg by mouth daily        blood glucose monitoring test strip    no brand specified    3 Box    Use to test blood sugars 3-4 times  daily or as directed Uses Contour Next test strips    Type 2 diabetes mellitus with diabetic nephropathy (H)       cyanocobalamin 1000 MCG/ML injection    VITAMIN B12    0.9 mL    Inject 1 mL (1,000 mcg) into the muscle every 30 days    Vitamin B12 deficiency (non anemic)       FLUoxetine 20 MG capsule    PROzac    90 capsule    Take 1 capsule (20 mg) by mouth daily    Major depressive disorder, recurrent episode, mild (H)       fluticasone 50 MCG/ACT spray    FLONASE     Spray 2 sprays into both nostrils daily        insulin glargine 100 UNIT/ML injection    LANTUS SOLOSTAR    12 mL    Inject 25 Units Subcutaneous At Bedtime    Type 2 diabetes mellitus with diabetic polyneuropathy, with long-term current use of insulin (H)       * LIPITOR 40 MG tablet   Generic drug:  atorvastatin      Take 40 mg by mouth daily        * atorvastatin 20 MG tablet    LIPITOR    90 tablet    Take 1 tablet (20 mg) by mouth daily    Coronary arteriosclerosis in native artery       melatonin 5 MG tablet      Take 5 mg by mouth nightly as needed for sleep        Multi-vitamin Tabs tablet      Take 1 tablet by mouth daily        NITROSTAT SL      Place 0.4 mg under the tongue Reported on 3/9/2017        PROBIOTIC DAILY PO      Take by mouth daily        vitamin D 2000 UNITS Caps      Take 2,000 Units by mouth 2 times daily        * Notice:  This list has 2 medication(s) that are the same as other medications prescribed for you. Read the directions carefully, and ask your doctor or other care provider to review them with you.

## 2017-07-03 NOTE — MR AVS SNAPSHOT
After Visit Summary   7/3/2017    Thomas Gonzales    MRN: [id number]           Patient Information     Date Of Birth          1965        Visit Information        Provider Department      7/3/2017 10:50 AM Anni Sage MD [facility] [place]        Care Instructions    I'm not entirely sure what the swelling is due to, but I'm reassured that this is unlikely to be a blood clot (with your normal ultrasound last week), and it looks like that skin abrasion is healing well.    At this point, I think it's fine to try to elevate your Left foot to see if this helps. Wearing a compression stocking may also help.    If the swelling worsens, or the abrasion starts to look infected, please come back to be seen because we may need to re-image or consider antibiotics at that time.           Follow-ups after your visit        Your next 10 appointments already scheduled     Jul 03, 2017  1:15 PM CDT   Lab visit with [facility]   [facility]    [address] [place] [address]  [address]  [place] [address] [phone]           Please do not eat 10-12 hours before your appointment if you are coming in fasting for labs on lipids, cholesterol, or glucose (sugar). Does not apply to pregnant women.  Water with medications is okay. Do not drink coffee or other fluids.  If you have concerns about taking  your medications, please send a message by clicking on Secure Messaging, Message Your Care Team.            Jul 03, 2017  1:45 PM CDT   New Visit with Kadeem Carrion DPM   [facility] [place] [facility]    [address] [place] [address]  [address]  [facility] [id number] [phone]              Who to contact     If you have questions or need follow up information about today's clinic visit or your schedule please contact [facility] [place] directly at [phone].  Normal or non-critical lab and imaging results will be communicated to you by  "MyChart, letter or phone within 4 business days after the clinic has received the results. If you do not hear from us within 7 days, please contact the clinic through meevlhart or phone. If you have a critical or abnormal lab result, we will notify you by phone as soon as possible.  Submit refill requests through [employer] or call your pharmacy and they will forward the refill request to us. Please allow 3 business days for your refill to be completed.          Additional Information About Your Visit        [employer]hart Information     [employer] gives you secure access to your electronic health record. If you see a primary care provider, you can also send messages to your care team and make appointments. If you have questions, please call your primary care clinic.  If you do not have a primary care provider, please call [phone] and they will assist you.        Care EveryWhere ID     This is your Care EveryWhere ID. This could be used by other organizations to access your [place] medical records  [id number]        Your Vitals Were     Pulse Temperature Height Pulse Oximetry BMI (Body Mass Index)       71 97.9  F (36.6  C) (Oral) 6' 0.5\" (1.842 m) 100% 26.89 kg/m2        Blood Pressure from Last 3 Encounters:   07/03/17 110/60   04/06/17 122/50   03/09/17 118/58    Weight from Last 3 Encounters:   07/03/17 201 lb 1 oz (91.2 kg)   04/06/17 207 lb (93.9 kg)   03/09/17 208 lb 1 oz (94.4 kg)              Today, you had the following     No orders found for display       Primary Care Provider Office Phone # Fax #    Anni Sage [phone]       [facility] [address] [place] [address]        Equal Access to Services     [facility] AH: Hadii azra Hernandez, waaxda luqadaha, qaybta kaalmada adrianada, isael de guzman. So [facility] [phone].    ATENCIÓN: Si habla español, tiene a marino disposición servicios gratuitos de asistencia lingüística. Llame " al [phone].    We comply with applicable federal civil rights laws and [place] laws. We do not discriminate on the basis of race, color, national origin, age, disability sex, sexual orientation or gender identity.            Thank you!     Thank you for choosing [facility]  for your care. Our goal is always to provide you with excellent care. Hearing back from our patients is one way we can continue to improve our services. Please take a few minutes to complete the written survey that you may receive in the mail after your visit with us. Thank you!             Your Updated Medication List - Protect others around you: Learn how to safely use, store and throw away your medicines at www.disposemymeds.org.          This list is accurate as of: 7/3/17 11:19 AM.  Always use your most recent med list.                   Brand Name Dispense Instructions for use Diagnosis    ACE NOT PRESCRIBED (INTENTIONAL)     0 each    ACE Inhibitor not prescribed due to Symptomatic hypotension not due to excessive diuresis    Coronary arteriosclerosis in native artery       ACE/ARB NOT PRESCRIBED (INTENTIONAL)      Please choose reason not prescribed, below    Coronary artery disease involving native heart, angina presence unspecified, unspecified vessel or lesion type       aspirin 81 MG tablet      Take 81 mg by mouth daily        blood glucose monitoring test strip    no brand specified    3 Box    Use to test blood sugars 3-4 times daily or as directed Uses Contour Next test strips    Type 2 diabetes mellitus with diabetic nephropathy (H)       cyanocobalamin 1000 MCG/ML injection    VITAMIN B12    0.9 mL    Inject 1 mL (1,000 mcg) into the muscle every 30 days    Vitamin B12 deficiency (non anemic)       FLUoxetine 20 MG capsule    PROzac    90 capsule    Take 1 capsule (20 mg) by mouth daily    Major depressive disorder, recurrent episode, mild (H)       fluticasone 50 MCG/ACT spray    FLONASE     Spray 2 sprays  into both nostrils daily        insulin glargine 100 UNIT/ML injection    LANTUS SOLOSTAR    12 mL    Inject 25 Units Subcutaneous At Bedtime    Type 2 diabetes mellitus with diabetic polyneuropathy, with long-term current use of insulin (H)       * LIPITOR 40 MG tablet   Generic drug:  atorvastatin      Take 40 mg by mouth daily        * atorvastatin 20 MG tablet    LIPITOR    90 tablet    Take 1 tablet (20 mg) by mouth daily    Coronary arteriosclerosis in native artery       melatonin 5 MG tablet      Take 5 mg by mouth nightly as needed for sleep        Multi-vitamin Tabs tablet      Take 1 tablet by mouth daily        NITROSTAT SL      Place 0.4 mg under the tongue Reported on 3/9/2017        PROBIOTIC DAILY PO      Take by mouth daily        vitamin D 2000 UNITS Caps      Take 2,000 Units by mouth 2 times daily        * Notice:  This list has 2 medication(s) that are the same as other medications prescribed for you. Read the directions carefully, and ask your doctor or other care provider to review them with you.

## 2017-07-03 NOTE — PATIENT INSTRUCTIONS
"  DR. MOHAN'S CLINIC LOCATIONS:   MONDAY - [place]  TUESDAY - [place]   [address] [facility] [address] [facility] [address]   [place]IN[place] Babcock [address] [place] [address]   [phone]       THURSDAY AM - [place] THURSDAY PM - [facility]   [address]   [place] [address] [place] [address]   [phone]       FRIDAY AM - [place] SCHEDULE SURGERY: [phone] [address] APPOINTMENTS: [phone]   [place] [address] AFTER HOURS: [phone] FAX NUMBER: [phone]     Follow Up: 6 months    DIABETES AND YOUR FEET    Diabetes can result in several problems in the feet including ulcers (open sores) and amputations. Two of the most important reasons why people develop foot problems when they have diabetes is : 1. Neuropathy (loss of feeling)  2. Vascular disease (loss or decrease of blood flow).    Neuropathy is a term used to describe a loss of nerve function.  Patients with diabetes are at risk of developing neuropathy if their sugars continue to run high and are above the normal value. One theory for neuropathy is that the \"extra\" sugar in the body enters the nerves and is broken down. These by-products build up in the nerve causing it to swell and impairing nerve function. Often times, this can be prevented by controlling your sugars, dieting and exercise.    When a person develops neuropathy, they usually begin to feel numbness or tingling in their feet and sometime in their legs.  Other symptoms may include painful burning or hot feet, tingling or feeling like insects or ants are crawling on your feet or legs.  If the diabetes is sever and the sugars run high for long periods of time, neuropathy can also occur in the hands.    Vascular disease is a term used to describe a loss or decrease in circulation (blood flow). There is a problem in getting blood and oxygen to areas that need it. Similar to neuropathy, sugars can " build up in the walls of the arteries (blood vessels) and cause them to become swollen, thickened and hardened. This decreases the amount of blood that can go to an area that needs it. Though this is common in the legs of diabetic patients, it can also affect other arteries (blood vessels) in the body such as in the heart and eyes.    In the legs, vascular disease usually results in cramping. Patients who develop leg cramps after walking the same distance every time (i.e. One block, half a mile, ect.) need to let their doctors know so that their circulation may be checked. Cramps causing severe pain in the feet and/or legs while sleeping and the cramps go away when you stand or hang your legs off the side of the bed, may also be a sign of poor blood circulation.  Occasional cramping in cold weather or on rare occasions with activity may not be due to poor circulation, but you should inform your doctor.    PREVENTION OF THESE DISEASES  The key to prevention is good blood sugar control. Poor blood sugar control is a big reason many of these problems start. Physical activity (exercise) is a very good way to help decrease your blood sugars. Exercise can lower your blood sugar, blood pressure, and cholesterol. It also reduces your risk for heart disease and stroke, relieves stress, and strengthens your heart, muscles and bones.  In addition, regular activity helps insulin work better, improves your blood circulation, and keeps your joints flexible. If you're trying to lose weight, a combination of exercise and wise food choices can help you reach your target weight and maintain it.      PAIN MANAGEMENT  1.Blood Sugar Control - Most important  2. Medications such as:  Amytriptylline, duloxetine, gabapentin, lyrica, tramadol  3. Nutritional therapy:  Vitamin B6 (100mg daily), Vitamin B12 (75mcg daily), Vitamin D 2000 IU daily), Alpha-Lipoic Acid (600-1800mg daily), Acetyl-L-Carnitine (500-1000mg TID, L-methyl folate  (1500mcg daily)    ** Metformin can block Vitamin B6 and B12 so it is important to supplement**    FOOT CARE RECOMMENDATIONS   1. Wash your feet with lukewarm water and a mild soap and then dry them thoroughly, especially between the toes.     2. Examine your feet daily looking for cuts, corns, blisters, cracks, ect, especially after wearing new shoes. Make sure to look between your toes. If you cannot see the bottom of your feet, set a mirror on the floor and hold your foot over it, or ask a spouse, friend or family member to examine your feet for you. Contact your doctor immediately if new problems are noted or if sores are not healing.     3. Immediately apply moisturizer to the tops and bottoms of your feet, avoiding areas between the toes. Hand lotion (Intesive Care, Monique, Eucerin, Neutrogena, Curel, ect) is sufficient unless your doctor prescribes a medicated lotion. Apply sunscreen to your feet when going swimming outside.     4. Use clean comfortable shoes, wear white socks (if you have any bleeding or drainage, you will see it on white socks). Socks should not have thick seams or cut off the circulation around the leg. Break in new shoes slowly and rotate with older shoes until broken in. Check the inside of your shoes with your hand to look for areas of irritation or objects that may have fallen into your shoes.       5. Keep slippers by the side of your bed for use during the night.     6.  Shoes should be fitted by a professional and should not cause areas of irritation.  Check your feet regularly when wearing a new pair of shoes and replace them as needed.     7.  Talk to your doctor about proper exercise. Exercise and stretching stimulate blood flow to your feet and maintain proper glucose levels.     8.  Monitor your blood glucose level as instructed by your doctor. Notify your doctor immediately if your blood sugar is abnormally high or low.    9. Cut your nails straight across, but then gently  round any sharp edges with a cardboard nail file. If you have neuropathy, peripheral vascular disease or cannot see that well to trim your own toenails contact [facility] ([phone]) or [facility] ([phone]).      THINGS TO AVOID DOING   1.  Do not soak your feet if you have an open sore. Use only lukewarm water and always check the temperature with your hand as hot water can easily burn your feet.       2.  Never use a hot water bottle or heating pad on your feet. Also do not apply cold compresses to your feet. With decreased sensation, you could burn or freeze your feet.       3.  Do not apply any of these to your feet:    -  Over the counter medicine for corns or warts    -  Harsh chemicals like boric acid    -  Do not self-treat corns, cuts, blisters or infections. Always consult your doctor.       4.  Do not wear sandals, slippers or walk barefoot, especially on hot sand or concrete or other harsh surfaces.     5.  If you smoke, stop!!!            Body Mass Index (BMI)  Many things can cause foot and ankle problems. Foot structure, activity level, foot mechanics and injuries are common causes of pain.  One very important issue that often goes unmentioned, is body weight.  Extra weight can cause increased stress on muscles, ligaments, bones and tendons.  Sometimes just a few extra pounds is all it takes to put one over her/his threshold. Without reducing that stress, it can be difficult to alleviate pain. Some people are uncomfortable addressing this issue, but we feel it is important for you to think about it. As Foot &  Ankle specialists, our job is addressing the lower extremity problem and possible causes. Regarding extra body weight, we encourage patients to discuss diet and weight management plans with their primary care doctors. It is this team approach that gives you the best opportunity for pain relief and getting you back on your feet.

## 2017-07-03 NOTE — PROGRESS NOTES
"Foot & Ankle Surgery  July 3, 2017    CC: diabetic foot check    I was asked to see Thomas Gonzales regarding the chief complaint by:  none    HPI:  Pt is a 51 year old male who presents with above complaint.  Diabetic foot check, last A1c was 6.2.  Multiple areas on his feet he has questions about.  He also has swelling left lower extremity, had a US duplex scan at [facility] that was neg for clot.  Pain 4/10 daily, \"shooting pain\" outside of left foot without injury near 5th met base.    ROS:   Pos for CC.  The patient denies current nausea, vomiting, chills, fevers, belly pain, calf pain, chest pain or SOB.  Complete remainder of ROS is otherwise neg.    VITALS:    Vitals:    07/03/17 1355   BP: 110/60   Weight: 201 lb (91.2 kg)   Height: 6' 0.5\" (1.842 m)       PMH:  No past medical history on file.    SXHX:    Past Surgical History:   Procedure Laterality Date     APPENDECTOMY  1/8/2016     CATARACT IOL, RT/LT       CHOLECYSTECTOMY  1/8/2016     penile implant       LEON EN Y BOWEL  1/8/2016    for small bowel ischemia        MEDS:    Current Outpatient Prescriptions   Medication     atorvastatin (LIPITOR) 20 MG tablet     ACE/ARB NOT PRESCRIBED, INTENTIONAL,     insulin glargine (LANTUS SOLOSTAR) 100 UNIT/ML injection     FLUoxetine (PROZAC) 20 MG capsule     Probiotic Product (PROBIOTIC DAILY PO)     blood glucose monitoring (NO BRAND SPECIFIED) test strip     ACE NOT PRESCRIBED, INTENTIONAL,     cyanocobalamin (VITAMIN B12) 1000 MCG/ML injection     aspirin 81 MG tablet     multivitamin, therapeutic with minerals (MULTI-VITAMIN) TABS     melatonin 5 MG tablet     Cholecalciferol (VITAMIN D) 2000 UNITS CAPS     Nitroglycerin (NITROSTAT SL)     fluticasone (FLONASE) 50 MCG/ACT nasal spray     atorvastatin (LIPITOR) 40 MG tablet     No current facility-administered medications for this visit.        ALL:     Allergies   Allergen Reactions     Shellfish-Derived Products Anaphylaxis     Adhesive Tape      Paper " tape is okay     Gluten Meal      Celiac     Reglan [Metoclopramide] Hives       FMH:    Family History   Problem Relation Age of Onset     Arrhythmia Mother      bradycardia- pacemaker     Coronary Artery Disease Father        SocHx:    Social History     Social History     Marital status:      Spouse name: N/A     Number of children: N/A     Years of education: N/A     Occupational History     Not on file.     Social History Main Topics     Smoking status: Never Smoker     Smokeless tobacco: Not on file     Alcohol use Yes      Comment: occ     Drug use: No     Sexual activity: Yes     Partners: Female     Other Topics Concern     Not on file     Social History Narrative           EXAMINATION:  Gen:   No apparent distress  Neuro:   A&Ox3, no deficits  Psych:    Answering questions appropriately for age and situation with normal affect  Head:    NCAT  Eye:    Visual scanning without deficit  Ear:    Response to auditory stimuli wnl  Lung:    Non-labored breathing on RA noted  Abd:    NTND per patient report  Lymph:    Mild edema left lower extremity   Vasc:    Pulses palpable, CFT minimally delayed  Neuro:    Light touch sensation greatly diminished distally R>L; SW5.07 absent L foot to midfoot, and right lower extremity to distal 1/3 of the lower leg  Derm:    Subacute subungual hematoma 2nd toe bilateral; no paronychia or SOI and no skin pigment changes.  Drying blood blister plantar-lateral L5th met base, tender, but no SOI  MSK:    ROM, strength wnl without limitation, no pain on palpation noted.  Calf:    Neg for redness, swelling or tenderness    Assessment:  51 year old male with DMII, neuropathy; subungual hematoma 2nd toenail bilateral; drying blood blister 5th met base L foot      Plan:  Discussed etiologies and options  1.  DMII, neuropathy  -Regarding the patient's diabetes, we dispensed and discussed proper diabetic foot care.  This includes closely monitoring their blood sugars, closely  monitoring their blood pressures, and evaluating their feet at least once per day.  We also discussed potential problems associated with barefoot/sock ambulation and soaking their feet.       2.  Subungual hematoma 2nd toenail bilateral   -monitor, consider clipping/avulsion should this loosen or become infected.      3.  Drying blood blister L 5th met base  -comfortable accommodative shoe gear  -consider gel insert for padding to the area  -consider DMII shoes as next option    4.  Left lower extremity edema  -tensogrip compression sleeve  -compression stockings for long-term management  -elevate at/above hip level 30m TID       Follow up:  6 mo or sooner with acute issues      Patient's medical history was reviewed today    Body mass index is 26.89 kg/(m^2).  Weight management plan: Patient was referred to their PCP to discuss a diet and exercise plan.        Kadeem Carrion DPM   Podiatric Foot & Ankle Surgeon  [facility]  [phone]

## 2017-07-03 NOTE — NURSING NOTE
"Chief Complaint   Patient presents with     Musculoskeletal Problem       Initial /60 (BP Location: Right arm, Patient Position: Chair, Cuff Size: Adult Regular)  Pulse 71  Temp 97.9  F (36.6  C) (Oral)  Ht 6' 0.5\" (1.842 m)  Wt 201 lb 1 oz (91.2 kg)  SpO2 100%  BMI 26.89 kg/m2 Estimated body mass index is 26.89 kg/(m^2) as calculated from the following:    Height as of this encounter: 6' 0.5\" (1.842 m).    Weight as of this encounter: 201 lb 1 oz (91.2 kg).  Medication Reconciliation: complete     Meg Ann MA 7/3/2017 10:49 AM    "

## 2017-07-03 NOTE — PATIENT INSTRUCTIONS
I'm not entirely sure what the swelling is due to, but I'm reassured that this is unlikely to be a blood clot (with your normal ultrasound last week), and it looks like that skin abrasion is healing well.    At this point, I think it's fine to try to elevate your Left foot to see if this helps. Wearing a compression stocking may also help.    If the swelling worsens, or the abrasion starts to look infected, please come back to be seen because we may need to re-image or consider antibiotics at that time.

## 2017-07-03 NOTE — NURSING NOTE
"Chief Complaint   Patient presents with     Foot Problems     DM foot check, L ankle is swelling x 6 months       Initial /60  Ht 6' 0.5\" (1.842 m)  Wt 201 lb (91.2 kg)  BMI 26.89 kg/m2 Estimated body mass index is 26.89 kg/(m^2) as calculated from the following:    Height as of this encounter: 6' 0.5\" (1.842 m).    Weight as of this encounter: 201 lb (91.2 kg).  Medication Reconciliation: complete  "

## 2017-07-04 LAB
CREAT UR-MCNC: 88 MG/DL
MICROALBUMIN UR-MCNC: 65 MG/L
MICROALBUMIN/CREAT UR: 73.89 MG/G CR (ref 0–17)

## 2017-07-05 LAB
COPPER SERPL-MCNC: 123 UG/DL
DEPRECATED CALCIDIOL+CALCIFEROL SERPL-MC: 24 UG/L (ref 20–75)
ZINC SERPL-MCNC: 68 UG/ML

## 2017-07-06 ENCOUNTER — MYC MEDICAL ADVICE (OUTPATIENT)
Dept: PEDIATRICS | Facility: CLINIC | Age: 52
End: 2017-07-06

## 2017-07-06 LAB — VIT B1 BLD-MCNC: 109 UG/DL

## 2017-07-15 ENCOUNTER — TRANSFERRED RECORDS (OUTPATIENT)
Dept: HEALTH INFORMATION MANAGEMENT | Facility: CLINIC | Age: 52
End: 2017-07-15

## 2017-08-29 DIAGNOSIS — F33.0 MAJOR DEPRESSIVE DISORDER, RECURRENT EPISODE, MILD (H): ICD-10-CM

## 2017-08-29 NOTE — TELEPHONE ENCOUNTER
Medication is being filled for 1 time refill only due to: need PHQ9.    [facility]    Odette Turcios RN

## 2017-08-29 NOTE — TELEPHONE ENCOUNTER
FLUoxetine (PROZAC) 20 MG capsule  Last Written Prescription Date: 3/9/17  Last Fill Quantity: 90, # refills: 1  Last Office Visit with G primary care provider:  7/3/17        Last PHQ-9 score on record=   PHQ-9 SCORE 3/9/2017   Total Score 8

## 2017-12-11 DIAGNOSIS — I25.10 CORONARY ARTERIOSCLEROSIS IN NATIVE ARTERY: ICD-10-CM

## 2017-12-13 RX ORDER — ATORVASTATIN CALCIUM 20 MG/1
TABLET, FILM COATED ORAL
Qty: 90 TABLET | Refills: 1 | Status: SHIPPED | OUTPATIENT
Start: 2017-12-13 | End: 2018-01-22

## 2017-12-13 NOTE — TELEPHONE ENCOUNTER
Atorvastatin       Next 5 appointments (look out 90 days)     Jan 22, 2018  2:45 PM CST   Return Visit with Benita Cox MD   [facility]    [address] [place] [address] [phone]                   Lab Results   Component Value Date    CHOL 110 07/03/2017     Lab Results   Component Value Date    HDL 61 07/03/2017     Lab Results   Component Value Date    LDL 40 07/03/2017     Lab Results   Component Value Date    TRIG 44 07/03/2017     No results found for: CHOLHDLRATIO      Prescription approved per [facility].    Marietta MCFADDEN RN, BSN, PHN  [facility] RN

## 2017-12-15 ENCOUNTER — TRANSFERRED RECORDS (OUTPATIENT)
Dept: HEALTH INFORMATION MANAGEMENT | Facility: CLINIC | Age: 52
End: 2017-12-15

## 2018-01-05 ENCOUNTER — MYC MEDICAL ADVICE (OUTPATIENT)
Dept: PEDIATRICS | Facility: CLINIC | Age: 53
End: 2018-01-05

## 2018-01-05 DIAGNOSIS — I25.10 CORONARY ARTERIOSCLEROSIS IN NATIVE ARTERY: Primary | ICD-10-CM

## 2018-01-05 NOTE — TELEPHONE ENCOUNTER
Please review the [facility] message from pt. LOV on 0703/17. Discontinued fluoxetine from med list as per pt's request.     Pended aspirin 81 mg tabs, please review & sign if appropriate.     Tommie OROSCO RN  Triage Nurse

## 2018-01-16 ENCOUNTER — TRANSFERRED RECORDS (OUTPATIENT)
Dept: HEALTH INFORMATION MANAGEMENT | Facility: CLINIC | Age: 53
End: 2018-01-16

## 2018-01-18 ENCOUNTER — TRANSFERRED RECORDS (OUTPATIENT)
Dept: HEALTH INFORMATION MANAGEMENT | Facility: CLINIC | Age: 53
End: 2018-01-18

## 2018-01-22 ENCOUNTER — OFFICE VISIT (OUTPATIENT)
Dept: CARDIOLOGY | Facility: CLINIC | Age: 53
End: 2018-01-22
Payer: COMMERCIAL

## 2018-01-22 VITALS
HEIGHT: 73 IN | DIASTOLIC BLOOD PRESSURE: 68 MMHG | BODY MASS INDEX: 26.9 KG/M2 | WEIGHT: 203 LBS | SYSTOLIC BLOOD PRESSURE: 115 MMHG | HEART RATE: 76 BPM

## 2018-01-22 DIAGNOSIS — I25.10 CORONARY ARTERIOSCLEROSIS IN NATIVE ARTERY: ICD-10-CM

## 2018-01-22 PROCEDURE — 99214 OFFICE O/P EST MOD 30 MIN: CPT | Performed by: INTERNAL MEDICINE

## 2018-01-22 RX ORDER — ATORVASTATIN CALCIUM 40 MG/1
40 TABLET, FILM COATED ORAL DAILY
Qty: 90 TABLET | Refills: 3 | Status: SHIPPED | OUTPATIENT
Start: 2018-01-22 | End: 2018-10-11

## 2018-01-22 NOTE — MR AVS SNAPSHOT
After Visit Summary   1/22/2018    Thomas Gonzales    MRN: [id number]           Patient Information     Date Of Birth          1965        Visit Information        Provider Department      1/22/2018 2:45 PM Benita Cox MD [facility]        Today's Diagnoses     Coronary arteriosclerosis in native artery           Follow-ups after your visit        Additional Services     CARDIOLOGY EVAL ADULT REFERRAL                 Your next 10 appointments already scheduled     Jan 29, 2018  3:50 PM [facility] Physical Adult with Anni Sage MD   [facility]    [address] [place] [address]  [address]  [facility] [address]   [phone]              Future tests that were ordered for you today     Open Future Orders        Priority Expected Expires Ordered    Lipid Profile Routine 3/29/2018 1/22/2019 1/22/2018    CARDIOLOGY EVAL ADULT REFERRAL Routine 1/22/2019 1/22/2019 1/22/2018            Who to contact     If you have questions or need follow up information about today's clinic visit or your schedule please contact [facility] directly at [phone].  Normal or non-critical lab and imaging results will be communicated to you by MyChart, letter or phone within 4 business days after the clinic has received the results. If you do not hear from us within 7 days, please contact the clinic through meebeehart or phone. If you have a critical or abnormal lab result, we will notify you by phone as soon as possible.  Submit refill requests through [employer] or call your pharmacy and they will forward the refill request to us. Please allow 3 business days for your refill to be completed.          Additional Information About Your Visit        MyChart Information     [employer] gives you secure access to your electronic health record. If you see a primary care provider, you can also send  "messages to your care team and make appointments. If you have questions, please call your primary care clinic.  If you do not have a primary care provider, please call [phone] and they will assist you.        Care EveryWhere ID     This is your Care EveryWhere ID. This could be used by other organizations to access your [facility] medical records  [id number]        Your Vitals Were     Pulse Height BMI (Body Mass Index)             76 1.842 m (6' 0.5\") 27.15 kg/m2          Blood Pressure from Last 3 Encounters:   01/22/18 115/68   07/03/17 110/60   07/03/17 110/60    Weight from Last 3 Encounters:   01/22/18 92.1 kg (203 lb)   07/03/17 91.2 kg (201 lb)   07/03/17 91.2 kg (201 lb 1 oz)              We Performed the Following     CARDIOLOGY EVAL ADULT REFERRAL          Today's Medication Changes          These changes are accurate as of: 1/22/18  3:47 PM.  If you have any questions, ask your nurse or doctor.               These medicines have changed or have updated prescriptions.        Dose/Directions    atorvastatin 40 MG tablet   Commonly known as:  LIPITOR   This may have changed:  See the new instructions.   Used for:  Coronary arteriosclerosis in native artery   Changed by:  Benita Cox MD        Dose:  40 mg   Take 1 tablet (40 mg) by mouth daily   Quantity:  90 tablet   Refills:  3            Where to get your medicines      These medications were sent to [facility] [id number] [place] [address] [address] [address] [address] [facility] [address]     Phone:  [phone]     atorvastatin 40 MG tablet                Primary Care Provider Office Phone # Fax #    Anni Sage [phone] [address] [place] [address]        Equal Access to Services     ABAD VALDOVINOS AH: Lanie sorianoo Mary, waaxda luqadaha, qaybta kaalmada mumtaz, isael de guzman. So wasagar " [phone].    ATENCIÓN: Si junie barcenas, tiene a marino disposición servicios gratuitos de asistencia lingüística. Alejandro baird [phone].    We comply with applicable federal civil rights laws and [place] laws. We do not discriminate on the basis of race, color, national origin, age, disability, sex, sexual orientation, or gender identity.            Thank you!     Thank you for choosing [facility]  for your care. Our goal is always to provide you with excellent care. Hearing back from our patients is one way we can continue to improve our services. Please take a few minutes to complete the written survey that you may receive in the mail after your visit with us. Thank you!             Your Updated Medication List - Protect others around you: Learn how to safely use, store and throw away your medicines at www.disposemymeds.org.          This list is accurate as of: 1/22/18  3:47 PM.  Always use your most recent med list.                   Brand Name Dispense Instructions for use Diagnosis    ACE NOT PRESCRIBED (INTENTIONAL)     0 each    ACE Inhibitor not prescribed due to Symptomatic hypotension not due to excessive diuresis    Coronary arteriosclerosis in native artery       ACE/ARB/ARNI NOT PRESCRIBED (INTENTIONAL)      Please choose reason not prescribed, below    Coronary artery disease involving native heart, angina presence unspecified, unspecified vessel or lesion type       aspirin 81 MG tablet     90 tablet    Take 1 tablet (81 mg) by mouth daily    Coronary arteriosclerosis in native artery       atorvastatin 40 MG tablet    LIPITOR    90 tablet    Take 1 tablet (40 mg) by mouth daily    Coronary arteriosclerosis in native artery       blood glucose monitoring test strip    no brand specified    3 Box    Use to test blood sugars 3-4 times daily or as directed Uses Contour Next test strips    Type 2 diabetes mellitus with diabetic nephropathy (H)        cyanocobalamin 1000 MCG/ML injection    VITAMIN B12    0.9 mL    Inject 1 mL (1,000 mcg) into the muscle every 30 days    Vitamin B12 deficiency (non anemic)       fluticasone 50 MCG/ACT spray    FLONASE     Spray 2 sprays into both nostrils daily        insulin glargine 100 UNIT/ML injection    LANTUS SOLOSTAR    12 mL    Inject 25 Units Subcutaneous At Bedtime    Type 2 diabetes mellitus with diabetic polyneuropathy, with long-term current use of insulin (H)       melatonin 5 MG tablet      Take 5 mg by mouth nightly as needed for sleep        Multi-vitamin Tabs tablet      Take 1 tablet by mouth daily        NITROSTAT SL      Place 0.4 mg under the tongue Reported on 3/9/2017        PROBIOTIC DAILY PO      Take by mouth daily        vitamin D 2000 UNITS Caps      Take 2,000 Units by mouth 2 times daily

## 2018-01-22 NOTE — PROGRESS NOTES
Service Date: 01/22/2018      REASON FOR VISIT:  Followup visit.      HISTORY OF PRESENT ILLNESS:  Mr. Gonzales is a very pleasant, 52-year-old gentleman who comes in routine followup.  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  These were reportedly felt to represent myocarditis in the setting of acute renal insufficiency and small bowel enteritis.  In the setting of a new right bundle branch block, cardiac MRI was done that demonstrated an inferolateral scar consistent with prior infarct, and cardiac catheterization was ultimately undertaken describing diffuse multivessel disease with a chronic total occlusion of the left circumflex artery.      He underwent angiography and stenting with POBA of the inferolateral branch and ostial obtuse marginal and SAMIA placed into the distal circumflex artery.      Mr. Gonzales has never had chest pain or shortness of breath nor has he had any limitation prior to his presentation.        His course was very complicated, and a month later he returned to the hospital with intestinal ischemia felt related to plaque disruption and hypotension following his earlier hospitalization and underwent a modified Castro-en-Y resection and appendectomy.  He continued to have episodes of hypotension afterwards and was found to have 2 intraabdominal abscesses, for which he was placed on ciprofloxacin; however, with persistent symptoms he presented to [facility], where an opening was found in the Castro-en-Y closure with persistent intraabdominal bowel leaks.      Since that time, he has been doing wonderfully well.  He states that he has been working out without limitation.  He has no complaints of chest pain or shortness of breath, lightheadedness, presyncope, syncope, PND, orthopnea, palpitations or pedal edema.      I did note that his dose of  Lipitor had been cut to 20 mg by his Primary physician.  He states it was not related to any sort of intolerance; however, they simply felt that perhaps he did not need as high a dose.  I have discussed with the patient that I would prefer a high-intensity statin in the setting of his ongoing risk factors and known coronary artery disease, and he does verbalize understanding and agrees to increase to 40 mg.      He is also not on an ACE inhibitor, and there is a note from 2016 from his Primary physician that it is related to intolerance with hypotension.  This may have been in the setting of his ongoing bowel leaks and sepsis, and I do not know if he would feel comfortable trying to reinitiate it.  It would be recommended for reducing the rate of progression to proteinuria in the setting of his diabetes as well as for reduction in adverse cardiovascular outcomes.      ASSESSMENT AND PLAN:  A pleasant, 52-year-old gentleman with type 1 diabetes and comorbidities and known coronary artery disease as outlined above.  He is doing very well on his current medical regimen, and we will continue it with the exception of increasing the Lipitor as mentioned.  I have also ordered a fasting lipid panel for about 9 weeks out.  We have discussed the risk of myositis and symptoms for which to be aware, and he will let us know if he does have any concerns.      He does need followup labs, but states he is seeing Dr. Sage in a week's time.  I would be curious to see followup liver function tests, as his ALT was elevated in 2016; however, this may be related to his ongoing abdominal complaints.      I will defer to Dr. Sage whether she would consider retrial of a very low-dose ACE inhibitor or whether the hypotension that was being provoked may have been independent of his sepsis.      We will see him back here in a year's time or sooner as needed.  It is a pleasure being involved in his care.      Total time is 30 minutes,  25 in coordination of care and counseling.      cc:   Anni Sage MD   [address] [address]         NICK ABDI MD             D: 2018   T: 2018   MT: shirlene      Name:     ZACHARY SAN   MRN:      [id number]        Account:      [id number]   :      1965           Service Date: 2018      Document: [id number]

## 2018-01-22 NOTE — LETTER
1/22/2018      Anni Serrano MD  [address] [place] [address]      RE: Thomas BRIGGS Christian       Dear Colleague,    I had the pleasure of seeing Thomas Gonzales in the [facility].    Service Date: 01/22/2018      REASON FOR VISIT:  Followup visit.      HISTORY OF PRESENT ILLNESS:  Mr. Gonzales is a very pleasant, 52-year-old gentleman who comes in routine followup.  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  These were reportedly felt to represent myocarditis in the setting of acute renal insufficiency and small bowel enteritis.  In the setting of a new right bundle branch block, cardiac MRI was done that demonstrated an inferolateral scar consistent with prior infarct, and cardiac catheterization was ultimately undertaken describing diffuse multivessel disease with a chronic total occlusion of the left circumflex artery.      He underwent angiography and stenting with POBA of the inferolateral branch and ostial obtuse marginal and SAMIA placed into the distal circumflex artery.      Mr. Gonzales has never had chest pain or shortness of breath nor has he had any limitation prior to his presentation.        His course was very complicated, and a month later he returned to the hospital with intestinal ischemia felt related to plaque disruption and hypotension following his earlier hospitalization and underwent a modified Castro-en-Y resection and appendectomy.  He continued to have episodes of hypotension afterwards and was found to have 2 intraabdominal abscesses, for which he was placed on ciprofloxacin; however, with persistent symptoms he presented to [facility], where an opening was found in the Casrto-en-Y closure with persistent intraabdominal bowel leaks.      Since that time, he has been doing wonderfully well.   He states that he has been working out without limitation.  He has no complaints of chest pain or shortness of breath, lightheadedness, presyncope, syncope, PND, orthopnea, palpitations or pedal edema.      I did note that his dose of Lipitor had been cut to 20 mg by his Primary physician.  He states it was not related to any sort of intolerance; however, they simply felt that perhaps he did not need as high a dose.  I have discussed with the patient that I would prefer a high-intensity statin in the setting of his ongoing risk factors and known coronary artery disease, and he does verbalize understanding and agrees to increase to 40 mg.      He is also not on an ACE inhibitor, and there is a note from 2016 from his Primary physician that it is related to intolerance with hypotension.  This may have been in the setting of his ongoing bowel leaks and sepsis, and I do not know if he would feel comfortable trying to reinitiate it.  It would be recommended for reducing the rate of progression to proteinuria in the setting of his diabetes as well as for reduction in adverse cardiovascular outcomes.      ASSESSMENT AND PLAN:  A pleasant, 52-year-old gentleman with type 1 diabetes and comorbidities and known coronary artery disease as outlined above.  He is doing very well on his current medical regimen, and we will continue it with the exception of increasing the Lipitor as mentioned.  I have also ordered a fasting lipid panel for about 9 weeks out.  We have discussed the risk of myositis and symptoms for which to be aware, and he will let us know if he does have any concerns.      He does need followup labs, but states he is seeing Dr. Sage in a week's time.  I would be curious to see followup liver function tests, as his ALT was elevated in 2016; however, this may be related to his ongoing abdominal complaints.      I will defer to Dr. Sage whether she would consider retrial of a very low-dose ACE inhibitor or  whether the hypotension that was being provoked may have been independent of his sepsis.      We will see him back here in a year's time or sooner as needed.  It is a pleasure being involved in his care.      Total time is 30 minutes, 25 in coordination of care and counseling.      cc:   Anni Sage MD   [address] [address]         NICK ABDI MD             D: 2018   T: 2018   MT: shirlene      Name:     ZACHARY SAN   MRN:      [id number]        Account:      [id number]   :      1965           Service Date: 2018      Document: [id number]         Outpatient Encounter Prescriptions as of 2018   Medication Sig Dispense Refill     atorvastatin (LIPITOR) 40 MG tablet Take 1 tablet (40 mg) by mouth daily 90 tablet 3     aspirin 81 MG tablet Take 1 tablet (81 mg) by mouth daily 90 tablet 3     ACE/ARB NOT PRESCRIBED, INTENTIONAL, Please choose reason not prescribed, below       insulin glargine (LANTUS SOLOSTAR) 100 UNIT/ML injection Inject 25 Units Subcutaneous At Bedtime (Patient not taking: Reported on 2018) 12 mL 3     Probiotic Product (PROBIOTIC DAILY PO) Take by mouth daily       blood glucose monitoring (NO BRAND SPECIFIED) test strip Use to test blood sugars 3-4 times daily or as directed  Uses Contour Next test strips 3 Box 1     ACE NOT PRESCRIBED, INTENTIONAL, ACE Inhibitor not prescribed due to Symptomatic hypotension not due to excessive diuresis 0 each 0     cyanocobalamin (VITAMIN B12) 1000 MCG/ML injection Inject 1 mL (1,000 mcg) into the muscle every 30 days (Patient not taking: Reported on 2018) 0.9 mL 12     multivitamin, therapeutic with minerals (MULTI-VITAMIN) TABS Take 1 tablet by mouth daily       melatonin 5 MG tablet Take 5 mg by mouth nightly as needed for sleep       Cholecalciferol (VITAMIN D) 2000 UNITS CAPS Take 2,000 Units by mouth 2 times daily       Nitroglycerin  (NITROSTAT SL) Place 0.4 mg under the tongue Reported on 3/9/2017       fluticasone (FLONASE) 50 MCG/ACT nasal spray Spray 2 sprays into both nostrils daily       [DISCONTINUED] atorvastatin (LIPITOR) 20 MG tablet TAKE 1 TABLET(20 MG) BY MOUTH DAILY 90 tablet 1     [DISCONTINUED] atorvastatin (LIPITOR) 40 MG tablet Take 40 mg by mouth daily       No facility-administered encounter medications on file as of 1/22/2018.        Again, thank you for allowing me to participate in the care of your patient.      Sincerely,    Benita Cox MD     [facility]

## 2018-01-22 NOTE — PROGRESS NOTES
DIAGNOSES:      Encounter Diagnosis   Name Primary?     Coronary arteriosclerosis in native artery          HPI:  See [id number]        MEDICATIONS:    Current Outpatient Prescriptions   Medication Sig Dispense Refill     atorvastatin (LIPITOR) 40 MG tablet Take 1 tablet (40 mg) by mouth daily 90 tablet 3     aspirin 81 MG tablet Take 1 tablet (81 mg) by mouth daily 90 tablet 3     ACE/ARB NOT PRESCRIBED, INTENTIONAL, Please choose reason not prescribed, below       insulin glargine (LANTUS SOLOSTAR) 100 UNIT/ML injection Inject 25 Units Subcutaneous At Bedtime 12 mL 3     Probiotic Product (PROBIOTIC DAILY PO) Take by mouth daily       blood glucose monitoring (NO BRAND SPECIFIED) test strip Use to test blood sugars 3-4 times daily or as directed  Uses Contour Next test strips 3 Box 1     ACE NOT PRESCRIBED, INTENTIONAL, ACE Inhibitor not prescribed due to Symptomatic hypotension not due to excessive diuresis 0 each 0     cyanocobalamin (VITAMIN B12) 1000 MCG/ML injection Inject 1 mL (1,000 mcg) into the muscle every 30 days 0.9 mL 12     multivitamin, therapeutic with minerals (MULTI-VITAMIN) TABS Take 1 tablet by mouth daily       melatonin 5 MG tablet Take 5 mg by mouth nightly as needed for sleep       Cholecalciferol (VITAMIN D) 2000 UNITS CAPS Take 2,000 Units by mouth 2 times daily       Nitroglycerin (NITROSTAT SL) Place 0.4 mg under the tongue Reported on 3/9/2017       fluticasone (FLONASE) 50 MCG/ACT nasal spray Spray 2 sprays into both nostrils daily       [DISCONTINUED] atorvastatin (LIPITOR) 20 MG tablet TAKE 1 TABLET(20 MG) BY MOUTH DAILY 90 tablet 1     [DISCONTINUED] atorvastatin (LIPITOR) 40 MG tablet Take 40 mg by mouth daily           ALLERGIES     Allergies   Allergen Reactions     Shellfish-Derived Products Anaphylaxis     Adhesive Tape      Paper tape is okay     Gluten Meal      Celiac     Reglan [Metoclopramide] Hives       PAST MEDICAL HISTORY:  No past medical history on  "file.    PAST SURGICAL HISTORY:  Past Surgical History:   Procedure Laterality Date     APPENDECTOMY  1/8/2016     CATARACT IOL, RT/LT       CHOLECYSTECTOMY  1/8/2016     penile implant       LEON EN Y BOWEL  1/8/2016    for small bowel ischemia       FAMILY HISTORY:  Family History   Problem Relation Age of Onset     Arrhythmia Mother      bradycardia- pacemaker     Coronary Artery Disease Father        SOCIAL HISTORY:  Social History     Social History     Marital status:      Spouse name: N/A     Number of children: N/A     Years of education: N/A     Social History Main Topics     Smoking status: Never Smoker     Smokeless tobacco: None     Alcohol use Yes      Comment: occ     Drug use: No     Sexual activity: Yes     Partners: Female     Other Topics Concern     None     Social History Narrative       Review of Systems:  Skin:  Negative     Eyes:  Positive for glasses  ENT:  Negative    Respiratory:  Negative    Cardiovascular:  Negative    Gastroenterology: Negative    Genitourinary:  not assessed    Musculoskeletal:  Negative    Neurologic:  Negative    Psychiatric:  Negative    Heme/Lymph/Imm:  Negative    Endocrine:  Positive for diabetes    Physical Exam:  Vitals: /68  Pulse 76  Ht 1.842 m (6' 0.5\")  Wt 92.1 kg (203 lb)  BMI 27.15 kg/m2    Constitutional:  cooperative, alert and oriented, well developed, well nourished, in no acute distress        Skin:  warm and dry to the touch, no apparent skin lesions or masses noted        Head:  normocephalic, no masses or lesions        Eyes:  pupils equal and round, conjunctivae and lids unremarkable, sclera white, no xanthalasma, EOMS intact, no nystagmus        ENT:  no pallor or cyanosis, dentition good        Neck:  carotid pulses are full and equal bilaterally;JVP normal;no carotid bruit        Chest:  normal breath sounds, clear to auscultation, normal A-P diameter, normal symmetry, normal respiratory excursion, no use of accessory muscles   "      Cardiac: regular rhythm, normal S1/S2, no S3 or S4, apical impulse not displaced, no murmurs, gallops or rubs                  Abdomen:  abdomen soft;BS normoactive   2 drains remaining-    Vascular: pulses full and equal                                        Extremities and Back:  no edema;no deformities, clubbing, cyanosis, erythema observed              Neurological:  no gross motor deficits          ASSESSMENT AND PLAN:    See dictation    ORDERS AT TODAY'S VISIT:    Orders Placed This Encounter   Procedures     Lipid Profile     CARDIOLOGY EVAL ADULT REFERRAL           CC  Benita Cox MD  [address]  [place] [address]

## 2018-01-24 ENCOUNTER — TRANSFERRED RECORDS (OUTPATIENT)
Dept: HEALTH INFORMATION MANAGEMENT | Facility: CLINIC | Age: 53
End: 2018-01-24

## 2018-01-26 ENCOUNTER — TRANSFERRED RECORDS (OUTPATIENT)
Dept: HEALTH INFORMATION MANAGEMENT | Facility: CLINIC | Age: 53
End: 2018-01-26

## 2018-01-29 ENCOUNTER — OFFICE VISIT (OUTPATIENT)
Dept: PEDIATRICS | Facility: CLINIC | Age: 53
End: 2018-01-29
Payer: COMMERCIAL

## 2018-01-29 VITALS
TEMPERATURE: 96.4 F | HEIGHT: 73 IN | SYSTOLIC BLOOD PRESSURE: 118 MMHG | OXYGEN SATURATION: 100 % | HEART RATE: 74 BPM | DIASTOLIC BLOOD PRESSURE: 60 MMHG | BODY MASS INDEX: 27.18 KG/M2 | WEIGHT: 205.06 LBS

## 2018-01-29 DIAGNOSIS — I25.10 CORONARY ARTERIOSCLEROSIS IN NATIVE ARTERY: ICD-10-CM

## 2018-01-29 DIAGNOSIS — Z79.4 TYPE 2 DIABETES MELLITUS WITH DIABETIC NEPHROPATHY, WITH LONG-TERM CURRENT USE OF INSULIN (H): ICD-10-CM

## 2018-01-29 DIAGNOSIS — R79.89 ABNORMAL LIVER FUNCTION TESTS: ICD-10-CM

## 2018-01-29 DIAGNOSIS — G35 MS (MULTIPLE SCLEROSIS) (H): ICD-10-CM

## 2018-01-29 DIAGNOSIS — F33.0 MAJOR DEPRESSIVE DISORDER, RECURRENT EPISODE, MILD (H): ICD-10-CM

## 2018-01-29 DIAGNOSIS — K52.839 MICROSCOPIC COLITIS, UNSPECIFIED MICROSCOPIC COLITIS TYPE: ICD-10-CM

## 2018-01-29 DIAGNOSIS — Z00.00 ROUTINE GENERAL MEDICAL EXAMINATION AT A HEALTH CARE FACILITY: Primary | ICD-10-CM

## 2018-01-29 DIAGNOSIS — E11.21 TYPE 2 DIABETES MELLITUS WITH DIABETIC NEPHROPATHY, WITH LONG-TERM CURRENT USE OF INSULIN (H): ICD-10-CM

## 2018-01-29 PROCEDURE — 99396 PREV VISIT EST AGE 40-64: CPT | Performed by: INTERNAL MEDICINE

## 2018-01-29 NOTE — PROGRESS NOTES
"SUBJECTIVE:   CC: Thomas Gonzales is an 52 year old male who presents for preventative health visit.     Physical   Annual:     Getting at least 3 servings of Calcium per day::  Yes    Bi-annual eye exam::  Yes    Dental care twice a year::  Yes    Sleep apnea or symptoms of sleep apnea::  None    Diet::  Gluten-free/reduced and Other    Frequency of exercise::  4-5 days/week    Duration of exercise::  30-45 minutes    Taking medications regularly::  Yes    Medication side effects::  Not applicable    Additional concerns today::  No            Concerns  Abdominal abscesses: At this time, appear well treated. Has now had drains out for some time, no recurrences. Otherwise doing well. Following every 3-6 months at [facility].    CAD: Recently established with Cardiology, atorvastatin increased to 40mg. Seems to be tolerating well and without issues. Discussed possible retrial of his ACE-I; we had previously been hesitant due to hx of acute onset hypotension, although this had been more associated with his abscesses. Does note that he will feel lightheaded when standing, and orthostatic BP in his neuro clinic was 80s/60s when standing.     Multiple sclerosis: Told be neurology that this was \"quiescent\" and doesn't need further management at this time, will re-evaluate if symptoms recur.    Loose stools and gas: Following with [facility] for this, off steroids for microscopic colitis. 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     DM: Has been following blood sugars, which have been stable. Off insulin recently.    Mood: Well managed, off fluoxetine.         Today's PHQ-2 Score:   PHQ-2 ( 1999 [employer]) 3/9/2017   Q1: Little interest or pleasure in doing things 0   Q2: Feeling down, depressed or hopeless 1   PHQ-2 Score 1   Q1: Little interest or pleasure in doing things -   Q2: Feeling down, depressed or hopeless -   PHQ-2 Score -       Abuse: Current or Past(Physical, " Sexual or Emotional)- No  Do you feel safe in your environment - Yes    Social History   Substance Use Topics     Smoking status: Never Smoker     Smokeless tobacco: Not on file     Alcohol use Yes      Comment: occ     No flowsheet data found.    Last PSA: No results found for: PSA    Reviewed orders with patient. Reviewed health maintenance and updated orders accordingly - Yes  Patient Active Problem List   Diagnosis     Abnormal liver function tests     Celiac disease     Coronary arteriosclerosis in native artery     Type 2 diabetes mellitus with diabetic nephropathy (H)     Mild nonproliferative diabetic retinopathy (H)     Stricture of duodenum     Steatosis of liver     Mast cell disease     Microcytic anemia     Multiple-type hyperlipidemia     Body mass index (BMI) greater than 30 in adult     Vertigo     MS (multiple sclerosis) (H)     Diabetic polyneuropathy associated with type 2 diabetes mellitus (H)     Vitamin B12 deficiency (non anemic)     Major depressive disorder, recurrent episode, mild (H)     Microscopic colitis, unspecified microscopic colitis type     History of ischemic bowel disease     History of melanoma     History of abdominal abscess     Past Surgical History:   Procedure Laterality Date     APPENDECTOMY  1/8/2016     CATARACT IOL, RT/LT       CHOLECYSTECTOMY  1/8/2016     penile implant       LEON EN Y BOWEL  1/8/2016    for small bowel ischemia       Social History   Substance Use Topics     Smoking status: Never Smoker     Smokeless tobacco: Never Used     Alcohol use Yes      Comment: occ     Family History   Problem Relation Age of Onset     Arrhythmia Mother      bradycardia- pacemaker     Coronary Artery Disease Father            Reviewed and updated as needed this visit by clinical staff  Tobacco  Allergies  Med Hx  Fam Hx  Soc Hx            Review of Systems  C: NEGATIVE for fever, chills, change in weight  I: NEGATIVE for worrisome rashes, moles or lesions  E: NEGATIVE  "for vision changes or irritation  ENT: NEGATIVE for ear, mouth and throat problems  R: NEGATIVE for significant cough or SOB  CV: NEGATIVE for chest pain, palpitations or peripheral edema  GI: see above   male: negative for dysuria, hematuria, decreased urinary stream, erectile dysfunction, urethral discharge  M: NEGATIVE for significant arthralgias or myalgia  NEURO: see above  PSYCHIATRIC: see above    OBJECTIVE:   /60 (BP Location: Right arm, Patient Position: Chair, Cuff Size: Adult Large)  Pulse 74  Temp 96.4  F (35.8  C) (Tympanic)  Ht 6' 0.5\" (1.842 m)  Wt 205 lb 1 oz (93 kg)  SpO2 100%  BMI 27.43 kg/m2    Physical Exam  GENERAL: healthy, alert and no distress  EYES: Eyes grossly normal to inspection, PERRL and conjunctivae and sclerae normal  HENT: ear canals and TM's normal, nose and mouth without ulcers or lesions  NECK: no adenopathy, no asymmetry, masses, or scars and thyroid normal to palpation  RESP: lungs clear to auscultation - no rales, rhonchi or wheezes  CV: regular rate and rhythm, normal S1 S2, no S3 or S4, no murmur, click or rub, no peripheral edema and peripheral pulses strong  ABDOMEN: multiple surgical incision sites well healed.  MS: no gross musculoskeletal defects noted, no edema  SKIN: no suspicious lesions or rashes  NEURO: Normal strength and tone, mentation intact and speech normal  PSYCH: mentation appears normal, affect normal/bright    ASSESSMENT/PLAN:   1. Routine general medical examination at a health care facility  Counseling as below.     2. Type 2 diabetes mellitus with diabetic nephropathy, with long-term current use of insulin (H)  Previously well controlled, due for A1c today.   - Comprehensive metabolic panel (BMP + Alb, Alk Phos, ALT, AST, Total. Bili, TP); Future  - Hemoglobin A1c; Future    3. Abnormal liver function tests  Will recheck labs today. May be improving as abdominal abscesses are resolved.   - Comprehensive metabolic panel (BMP + Alb, Alk " "Phos, ALT, AST, Total. Bili, TP); Future    4. MS (multiple sclerosis) (H)  Follows with Neurology at [facility], per report this is now \"quiescent\".     5. Microscopic colitis, unspecified microscopic colitis type  Off steroids, still with gas and fructose/lactose intolerance. Follow up scheduled with GI.     6. Coronary arteriosclerosis in native artery  Seeing Cards, agree with increase in dose of statin. At this point, given his recent issue with orthostasis and lower standing BPs, will hold off on starting ACE-I at this time, but will plan to revisit in next 6 months or so.     7. Major depressive disorder, recurrent episode, mild (H)  Well managed, currently off medications. Has previously been more situational, and improved now that he is overall during well from a medical standpoint.       COUNSELING:   Reviewed preventive health counseling, as reflected in patient instructions  Special attention given to:        Regular exercise       Healthy diet/nutrition       Colon cancer screening       Prostate cancer screening         reports that he has never smoked. He does not have any smokeless tobacco history on file.    Estimated body mass index is 27.43 kg/(m^2) as calculated from the following:    Height as of this encounter: 6' 0.5\" (1.842 m).    Weight as of this encounter: 205 lb 1 oz (93 kg).   Weight management plan: Discussed healthy diet and exercise guidelines and patient will follow up in 12 months in clinic to re-evaluate.    Counseling Resources:  ATP IV Guidelines  Pooled Cohorts Equation Calculator  FRAX Risk Assessment  ICSI Preventive Guidelines  Dietary Guidelines for Americans, 2010  USDA's MyPlate  ASA Prophylaxis  Lung CA Screening    Anni Serrano MD  [facility]  Answers for HPI/ROS submitted by the patient on 1/29/2018   PHQ-2 Score: 0    "

## 2018-01-29 NOTE — MR AVS SNAPSHOT
After Visit Summary   1/29/2018    Thomas Gonzales    MRN: [id number]           Patient Information     Date Of Birth          1965        Visit Information        Provider Department      1/29/2018 3:50 PM Anni Sage MD [facility]        Today's Diagnoses     Routine general medical examination at a health care facility    -  1    Type 2 diabetes mellitus with diabetic nephropathy, with long-term current use of insulin (H)        Abnormal liver function tests          Care Instructions    We will check your liver enzymes and your hemoglobin A1c.    If everything looks good, plan to follow-up with me in 6 months!    Preventive Health Recommendations  Male Ages 50 - 64    Yearly exam:             See your health care provider every year in order to  o   Review health changes.   o   Discuss preventive care.    o   Review your medicines if your doctor has prescribed any.     Have a cholesterol test every 5 years, or more frequently if you are at risk for high cholesterol/heart disease.     Have a diabetes test (fasting glucose) every three years. If you are at risk for diabetes, you should have this test more often.     Have a colonoscopy at age 50, or have a yearly FIT test (stool test). These exams will check for colon cancer.      Talk with your health care provider about whether or not a prostate cancer screening test (PSA) is right for you.    You should be tested each year for STDs (sexually transmitted diseases), if you re at risk.     Shots: Get a flu shot each year. Get a tetanus shot every 10 years.     Nutrition:    Eat at least 5 servings of fruits and vegetables daily.     Eat whole-grain bread, whole-wheat pasta and brown rice instead of white grains and rice.     Talk to your provider about Calcium and Vitamin D.     Lifestyle    Exercise for at least 150 minutes a week (30 minutes a day, 5 days a week). This will help you control your weight and prevent disease.  "    Limit alcohol to one drink per day.     No smoking.     Wear sunscreen to prevent skin cancer.     See your dentist every six months for an exam and cleaning.     See your eye doctor every 1 to 2 years.            Follow-ups after your visit        Who to contact     If you have questions or need follow up information about today's clinic visit or your schedule please contact [facility] directly at [phone].  Normal or non-critical lab and imaging results will be communicated to you by Geostellarhart, letter or phone within 4 business days after the clinic has received the results. If you do not hear from us within 7 days, please contact the clinic through Get10t or phone. If you have a critical or abnormal lab result, we will notify you by phone as soon as possible.  Submit refill requests through FID3 or call your pharmacy and they will forward the refill request to us. Please allow 3 business days for your refill to be completed.          Additional Information About Your Visit        GeostellarharJDF Information     FID3 gives you secure access to your electronic health record. If you see a primary care provider, you can also send messages to your care team and make appointments. If you have questions, please call your primary care clinic.  If you do not have a primary care provider, please call [phone] and they will assist you.        Care EveryWhere ID     This is your Care EveryWhere ID. This could be used by other organizations to access your [place] medical records  [id number]        Your Vitals Were     Pulse Temperature Height Pulse Oximetry BMI (Body Mass Index)       74 96.4  F (35.8  C) (Tympanic) 6' 0.5\" (1.842 m) 100% 27.43 kg/m2        Blood Pressure from Last 3 Encounters:   01/29/18 118/60   01/22/18 115/68   07/03/17 110/60    Weight from Last 3 Encounters:   01/29/18 205 lb 1 oz (93 kg)   01/22/18 203 lb (92.1 kg)   07/03/17 201 lb (91.2 kg)              We Performed the " Following     Comprehensive metabolic panel (BMP + Alb, Alk Phos, ALT, AST, Total. Bili, TP)     Hemoglobin A1c        Primary Care Provider Office Phone # Fax #    Anni Sage [phone] [address] [place] [address]        Equal Access to Services     [facility]: Hadii aad ku hadasho Soomaali, waaxda luqadaha, qaybta kaalmada adeegyada, waxay idiin hayaan adeeg kharash la'aan ah. So [facility] [phone].    ATENCIÓN: Si habla español, tiene a marino disposición servicios gratuitos de asistencia lingüística. Llame al [phone].    We comply with applicable federal civil rights laws and [place] laws. We do not discriminate on the basis of race, color, national origin, age, disability, sex, sexual orientation, or gender identity.            Thank you!     Thank you for choosing [facility]  for your care. Our goal is always to provide you with excellent care. Hearing back from our patients is one way we can continue to improve our services. Please take a few minutes to complete the written survey that you may receive in the mail after your visit with us. Thank you!             Your Updated Medication List - Protect others around you: Learn how to safely use, store and throw away your medicines at www.disposemymeds.org.          This list is accurate as of 1/29/18  4:17 PM.  Always use your most recent med list.                   Brand Name Dispense Instructions for use Diagnosis    ACE NOT PRESCRIBED (INTENTIONAL)     0 each    ACE Inhibitor not prescribed due to Symptomatic hypotension not due to excessive diuresis    Coronary arteriosclerosis in native artery       ACE/ARB/ARNI NOT PRESCRIBED (INTENTIONAL)      Please choose reason not prescribed, below    Coronary artery disease involving native heart, angina presence unspecified, unspecified vessel or lesion type       aspirin 81 MG tablet     90 tablet    Take 1 tablet (81 mg) by mouth daily    Coronary  arteriosclerosis in native artery       atorvastatin 40 MG tablet    LIPITOR    90 tablet    Take 1 tablet (40 mg) by mouth daily    Coronary arteriosclerosis in native artery       blood glucose monitoring test strip    no brand specified    3 Box    Use to test blood sugars 3-4 times daily or as directed Uses Contour Next test strips    Type 2 diabetes mellitus with diabetic nephropathy (H)       cyanocobalamin 1000 MCG/ML injection    VITAMIN B12    0.9 mL    Inject 1 mL (1,000 mcg) into the muscle every 30 days    Vitamin B12 deficiency (non anemic)       fluticasone 50 MCG/ACT spray    FLONASE     Spray 2 sprays into both nostrils daily        insulin glargine 100 UNIT/ML injection    LANTUS SOLOSTAR    12 mL    Inject 25 Units Subcutaneous At Bedtime    Type 2 diabetes mellitus with diabetic polyneuropathy, with long-term current use of insulin (H)       melatonin 5 MG tablet      Take 5 mg by mouth nightly as needed for sleep        Multi-vitamin Tabs tablet      Take 1 tablet by mouth daily        NITROSTAT SL      Place 0.4 mg under the tongue Reported on 3/9/2017        PROBIOTIC DAILY PO      Take by mouth daily        vitamin D 2000 UNITS Caps      Take 2,000 Units by mouth 2 times daily

## 2018-01-29 NOTE — NURSING NOTE
"Chief Complaint   Patient presents with     Physical       Initial /60 (BP Location: Right arm, Patient Position: Chair, Cuff Size: Adult Large)  Pulse 74  Temp 96.4  F (35.8  C) (Tympanic)  Ht 6' 0.5\" (1.842 m)  Wt 205 lb 1 oz (93 kg)  SpO2 100%  BMI 27.43 kg/m2 Estimated body mass index is 27.43 kg/(m^2) as calculated from the following:    Height as of this encounter: 6' 0.5\" (1.842 m).    Weight as of this encounter: 205 lb 1 oz (93 kg).  Medication Reconciliation: complete     Meg Ann MA 1/29/2018 3:50 PM    "

## 2018-01-29 NOTE — PATIENT INSTRUCTIONS
We will check your liver enzymes and your hemoglobin A1c.    If everything looks good, plan to follow-up with me in 6 months!    Preventive Health Recommendations  Male Ages 50   64    Yearly exam:             See your health care provider every year in order to  o   Review health changes.   o   Discuss preventive care.    o   Review your medicines if your doctor has prescribed any.     Have a cholesterol test every 5 years, or more frequently if you are at risk for high cholesterol/heart disease.     Have a diabetes test (fasting glucose) every three years. If you are at risk for diabetes, you should have this test more often.     Have a colonoscopy at age 50, or have a yearly FIT test (stool test). These exams will check for colon cancer.      Talk with your health care provider about whether or not a prostate cancer screening test (PSA) is right for you.    You should be tested each year for STDs (sexually transmitted diseases), if you re at risk.     Shots: Get a flu shot each year. Get a tetanus shot every 10 years.     Nutrition:    Eat at least 5 servings of fruits and vegetables daily.     Eat whole-grain bread, whole-wheat pasta and brown rice instead of white grains and rice.     Talk to your provider about Calcium and Vitamin D.     Lifestyle    Exercise for at least 150 minutes a week (30 minutes a day, 5 days a week). This will help you control your weight and prevent disease.     Limit alcohol to one drink per day.     No smoking.     Wear sunscreen to prevent skin cancer.     See your dentist every six months for an exam and cleaning.     See your eye doctor every 1 to 2 years.

## 2018-01-31 PROBLEM — Z87.898 HISTORY OF ABDOMINAL ABSCESS: Status: ACTIVE | Noted: 2018-01-31

## 2018-03-05 ENCOUNTER — OFFICE VISIT (OUTPATIENT)
Dept: PODIATRY | Facility: CLINIC | Age: 53
End: 2018-03-05
Payer: COMMERCIAL

## 2018-03-05 VITALS — WEIGHT: 205 LBS | BODY MASS INDEX: 27.17 KG/M2 | HEIGHT: 73 IN | RESPIRATION RATE: 16 BRPM

## 2018-03-05 DIAGNOSIS — E11.40 TYPE 2 DIABETES MELLITUS WITH SENSORY NEUROPATHY (H): Primary | ICD-10-CM

## 2018-03-05 PROCEDURE — 99213 OFFICE O/P EST LOW 20 MIN: CPT | Performed by: PODIATRIST

## 2018-03-05 NOTE — PROGRESS NOTES
"Foot & Ankle Surgery   March 5, 2018    S:  Pt is seen today for evaluation of diabetic foot check. BS this morning was 89.  He's due for a new pair of DMII orthotics, not sure if his PCP put the order in. He has an appointment with [facility], just needs the order.  Neuropathy present without change.  He's been using a compression sleeve and elevating for edema control.  He has some discomfort at the plantar 5th met base bilateral.  .    Vitals:    03/05/18 0908   Resp: 16   Weight: 205 lb (93 kg)   Height: 6' 0.5\" (1.842 m)   '      ROS - Pos for CC.  Patient denies current nausea, vomiting, chills, fevers, belly pain, calf pain, chest pain or SOB.  Complete remainder of ROS it otherwise neg.      PE:  Gen:   No apparent distress  Eye:    Visual scanning without deficit  Ear:    Response to auditory stimuli wnl  Lung:    Non-labored breathing on RA noted  Abd:    NTND per patient report  Lymph:    Mild bilateral lower extremity edema R>L  Vasc:    Pulses palpable, CFT minimally delayed  Neuro:    SW5.07 absent foot bilateral, absent lower 1/2 of right leg, greatly diminished at left ankle  Derm:    Slight callusing L 5th toe, peeled off without underlying wound. Slight callusing plantar 5th met head bilateral.  Dried blood blister x 2 plantar L 5th met base, x 1 plantar R 5th met base without SOI, drainage or open wound  MSK:    Prominent 5th met base and 5th met head bilateral   Calf:    Neg for redness or tenderness    Assessment:  52 year old male with DMII with sensory neuropathy      Plan:  Discussed etiologies, anatomy and options  1.  DMII with sensory neuropathy  -Orthotic referral for extra-depth orthotics  -monitor callusing and dried blood blisters. No indication for wound cares or PO abx at this time.  Reviewed SOI that would warrant repeat trip into clinic  -diabetic foot exam today    Follow up:  1 year or sooner with acute issues      Body mass index is 27.42 kg/(m^2).  Weight management plan: Patient " was referred to their PCP to discuss a diet and exercise plan.         Kadeem Carrion DPM   Podiatric Foot & Ankle Surgeon  [facility]  [phone]

## 2018-03-05 NOTE — MR AVS SNAPSHOT
After Visit Summary   3/5/2018    Thomas Gonzales    MRN: [id number]           Patient Information     Date Of Birth          1965        Visit Information        Provider Department      3/5/2018 9:15 AM Kadeem Carrion DPM [facility]        Today's Diagnoses     Type 2 diabetes mellitus with sensory neuropathy (H)    -  1       Follow-ups after your visit        Additional Services     ORTHOTICS REFERRAL       Please be aware that coverage of these services is subject to the terms and limitations of your health insurance plan.  Call member services at your health plan with any benefit or coverage questions.      Please bring the following to your appointment:    >>   Any x-rays, CTs or MRIs which have been performed.  Contact the facility where they were done to arrange for  prior to your scheduled appointment.  Any new CT, MRI or other procedures ordered by your specialist must be performed at a [place] facility or coordinated by your clinic's referral office.    >>   List of current medications   >>   This referral request   >>   Any documents/labs given to you for this referral    ==This Referral PRINTS in the [facility] (ORTHOTICS & PROSTHETICS) Central scheduling office ==     The [facility] Central Scheduling staff will contact patient to arrange appointments. Central Scheduling Phone #:  [place], [place]  [phone]     Orthotics: Foot Orthotics - extra-depth custom orthotics for DMII with sensory neuropathy and hammertoes.                  Follow-up notes from your care team     Return in about 1 year (around 3/5/2019).      Who to contact     If you have questions or need follow up information about today's clinic visit or your schedule please contact [facility] directly at [phone].  Normal or non-critical lab and imaging results will be communicated to you by MyChart, letter or phone within 4 business days after the  "clinic has received the results. If you do not hear from us within 7 days, please contact the clinic through ClickandBuy or phone. If you have a critical or abnormal lab result, we will notify you by phone as soon as possible.  Submit refill requests through ClickandBuy or call your pharmacy and they will forward the refill request to us. Please allow 3 business days for your refill to be completed.          Additional Information About Your Visit        DDVTECHharAcopio Information     ClickandBuy gives you secure access to your electronic health record. If you see a primary care provider, you can also send messages to your care team and make appointments. If you have questions, please call your primary care clinic.  If you do not have a primary care provider, please call [phone] and they will assist you.        Care EveryWhere ID     This is your Care EveryWhere ID. This could be used by other organizations to access your [place] medical records  [id number]        Your Vitals Were     Respirations Height BMI (Body Mass Index)             16 6' 0.5\" (1.842 m) 27.42 kg/m2          Blood Pressure from Last 3 Encounters:   01/29/18 118/60   01/22/18 115/68   07/03/17 110/60    Weight from Last 3 Encounters:   03/05/18 205 lb (93 kg)   01/29/18 205 lb 1 oz (93 kg)   01/22/18 203 lb (92.1 kg)              We Performed the Following     ORTHOTICS REFERRAL        Primary Care Provider Office Phone # Fax #    Anni Sage [phone] [address] [place] [address]        Equal Access to Services     [facility]: Hadii aad ku hadasho Soomaali, waaxda luqadaha, qaybta kaalmada adeegyada, isael way'loulou de guzman. So [facility] [phone].    ATENCIÓN: Si habla español, tiene a marino disposición servicios gratuitos de asistencia lingüística. Llame al [phone].    We comply with applicable federal civil rights laws and [place] laws. We do not discriminate on the basis of race, " color, national origin, age, disability, sex, sexual orientation, or gender identity.            Thank you!     Thank you for choosing [facility]  for your care. Our goal is always to provide you with excellent care. Hearing back from our patients is one way we can continue to improve our services. Please take a few minutes to complete the written survey that you may receive in the mail after your visit with us. Thank you!             Your Updated Medication List - Protect others around you: Learn how to safely use, store and throw away your medicines at www.disposemymeds.org.          This list is accurate as of 3/5/18 11:35 AM.  Always use your most recent med list.                   Brand Name Dispense Instructions for use Diagnosis    ACE NOT PRESCRIBED (INTENTIONAL)     0 each    ACE Inhibitor not prescribed due to Symptomatic hypotension not due to excessive diuresis    Coronary arteriosclerosis in native artery       ACE/ARB/ARNI NOT PRESCRIBED (INTENTIONAL)      Please choose reason not prescribed, below    Coronary artery disease involving native heart, angina presence unspecified, unspecified vessel or lesion type       aspirin 81 MG tablet     90 tablet    Take 1 tablet (81 mg) by mouth daily    Coronary arteriosclerosis in native artery       atorvastatin 40 MG tablet    LIPITOR    90 tablet    Take 1 tablet (40 mg) by mouth daily    Coronary arteriosclerosis in native artery       blood glucose monitoring test strip    no brand specified    3 Box    Use to test blood sugars 3-4 times daily or as directed Uses Contour Next test strips    Type 2 diabetes mellitus with diabetic nephropathy (H)       cyanocobalamin 1000 MCG/ML injection    VITAMIN B12    0.9 mL    Inject 1 mL (1,000 mcg) into the muscle every 30 days    Vitamin B12 deficiency (non anemic)       fluticasone 50 MCG/ACT spray    FLONASE     Spray 2 sprays into both nostrils daily        insulin glargine 100 UNIT/ML injection     LANTUS SOLOSTAR    12 mL    Inject 25 Units Subcutaneous At Bedtime    Type 2 diabetes mellitus with diabetic polyneuropathy, with long-term current use of insulin (H)       melatonin 5 MG tablet      Take 5 mg by mouth nightly as needed for sleep        Multi-vitamin Tabs tablet      Take 1 tablet by mouth daily        NITROSTAT SL      Place 0.4 mg under the tongue Reported on 3/9/2017        order for DME     1 each    Equipment being ordered: custom orthotic inserts for shoes    Diabetic polyneuropathy associated with type 2 diabetes mellitus (H)       PROBIOTIC DAILY PO      Take by mouth daily        vitamin D 2000 UNITS Caps      Take 2,000 Units by mouth 2 times daily

## 2018-03-05 NOTE — NURSING NOTE
"Chief Complaint   Patient presents with     RECHECK     DM foot check, some pain in the feet       Initial Resp 16  Ht 6' 0.5\" (1.842 m)  Wt 205 lb (93 kg)  BMI 27.42 kg/m2 Estimated body mass index is 27.42 kg/(m^2) as calculated from the following:    Height as of this encounter: 6' 0.5\" (1.842 m).    Weight as of this encounter: 205 lb (93 kg).  Medication Reconciliation: complete    Delmi Retana CMA ([facility])  Podiatry / Foot & Ankle Surgery  [facility]    "

## 2018-04-10 DIAGNOSIS — I25.10 CORONARY ARTERIOSCLEROSIS IN NATIVE ARTERY: ICD-10-CM

## 2018-04-10 LAB
CHOLEST SERPL-MCNC: 106 MG/DL
HDLC SERPL-MCNC: 38 MG/DL
LDLC SERPL CALC-MCNC: 59 MG/DL
NONHDLC SERPL-MCNC: 68 MG/DL
TRIGL SERPL-MCNC: 47 MG/DL

## 2018-04-10 PROCEDURE — 36415 COLL VENOUS BLD VENIPUNCTURE: CPT | Performed by: INTERNAL MEDICINE

## 2018-04-10 PROCEDURE — 80061 LIPID PANEL: CPT | Performed by: INTERNAL MEDICINE

## 2018-04-16 ENCOUNTER — TRANSFERRED RECORDS (OUTPATIENT)
Dept: HEALTH INFORMATION MANAGEMENT | Facility: CLINIC | Age: 53
End: 2018-04-16

## 2018-04-24 ENCOUNTER — HOSPITAL ENCOUNTER (OUTPATIENT)
Dept: BONE DENSITY | Facility: CLINIC | Age: 53
Discharge: HOME | End: 2018-04-24
Payer: COMMERCIAL

## 2018-04-24 DIAGNOSIS — K90.0 CELIAC DISEASE: ICD-10-CM

## 2018-04-24 PROCEDURE — 77080 DXA BONE DENSITY AXIAL: CPT

## 2018-04-26 ENCOUNTER — TELEPHONE (OUTPATIENT)
Dept: PEDIATRICS | Facility: CLINIC | Age: 53
End: 2018-04-26

## 2018-04-26 ENCOUNTER — TRANSFERRED RECORDS (OUTPATIENT)
Dept: HEALTH INFORMATION MANAGEMENT | Facility: CLINIC | Age: 53
End: 2018-04-26

## 2018-04-26 NOTE — TELEPHONE ENCOUNTER
Patient called, to request blood work, regarding A1C, Vitamin deficiency, etc.    Due, to recent test Osteopenia.     Please order necessary blood work, etc.

## 2018-04-26 NOTE — TELEPHONE ENCOUNTER
Please call to clarify which labs patient would like and why. He does have CMP and HgbA1c ordered as future labs, as these were not drawn at his last visit.    He is not due for annual vitamin labs for gastric bypass until July 2018.    Anni Sage MD  Internal Medicine-Pediatrics

## 2018-04-26 NOTE — TELEPHONE ENCOUNTER
Patient just asking about the CMP and A1C.  No further questions.  Will call back if any other questions or concerns.  LITA Joshua RN

## 2018-05-01 DIAGNOSIS — E11.21 TYPE 2 DIABETES MELLITUS WITH DIABETIC NEPHROPATHY, WITH LONG-TERM CURRENT USE OF INSULIN (H): ICD-10-CM

## 2018-05-01 DIAGNOSIS — Z79.4 TYPE 2 DIABETES MELLITUS WITH DIABETIC NEPHROPATHY, WITH LONG-TERM CURRENT USE OF INSULIN (H): ICD-10-CM

## 2018-05-01 DIAGNOSIS — R79.89 ABNORMAL LIVER FUNCTION TESTS: ICD-10-CM

## 2018-05-01 LAB
ALBUMIN SERPL-MCNC: 3.4 G/DL (ref 3.4–5)
ALP SERPL-CCNC: 130 U/L (ref 40–150)
ALT SERPL W P-5'-P-CCNC: 67 U/L (ref 0–70)
ANION GAP SERPL CALCULATED.3IONS-SCNC: 6 MMOL/L (ref 3–14)
AST SERPL W P-5'-P-CCNC: 74 U/L (ref 0–45)
BILIRUB SERPL-MCNC: 0.7 MG/DL (ref 0.2–1.3)
BUN SERPL-MCNC: 18 MG/DL (ref 7–30)
CALCIUM SERPL-MCNC: 8.9 MG/DL (ref 8.5–10.1)
CHLORIDE SERPL-SCNC: 113 MMOL/L (ref 94–109)
CO2 SERPL-SCNC: 27 MMOL/L (ref 20–32)
CREAT SERPL-MCNC: 0.84 MG/DL (ref 0.66–1.25)
GFR SERPL CREATININE-BSD FRML MDRD: >90 ML/MIN/1.7M2
GLUCOSE SERPL-MCNC: 121 MG/DL (ref 70–99)
HBA1C MFR BLD: 5 % (ref 0–5.6)
POTASSIUM SERPL-SCNC: 4.7 MMOL/L (ref 3.4–5.3)
PROT SERPL-MCNC: 6.3 G/DL (ref 6.8–8.8)
SODIUM SERPL-SCNC: 146 MMOL/L (ref 133–144)

## 2018-05-01 PROCEDURE — 36415 COLL VENOUS BLD VENIPUNCTURE: CPT | Performed by: INTERNAL MEDICINE

## 2018-05-01 PROCEDURE — 80053 COMPREHEN METABOLIC PANEL: CPT | Performed by: INTERNAL MEDICINE

## 2018-05-01 PROCEDURE — 83036 HEMOGLOBIN GLYCOSYLATED A1C: CPT | Performed by: INTERNAL MEDICINE

## 2018-08-01 DIAGNOSIS — M25.531 RIGHT WRIST PAIN: Primary | ICD-10-CM

## 2018-08-02 ENCOUNTER — RADIANT APPOINTMENT (OUTPATIENT)
Dept: GENERAL RADIOLOGY | Facility: CLINIC | Age: 53
End: 2018-08-02
Payer: COMMERCIAL

## 2018-08-02 ENCOUNTER — OFFICE VISIT (OUTPATIENT)
Dept: ORTHOPEDICS | Facility: CLINIC | Age: 53
End: 2018-08-02
Payer: COMMERCIAL

## 2018-08-02 VITALS — RESPIRATION RATE: 16 BRPM | DIASTOLIC BLOOD PRESSURE: 65 MMHG | SYSTOLIC BLOOD PRESSURE: 134 MMHG | HEART RATE: 65 BPM

## 2018-08-02 DIAGNOSIS — M65.4 RADIAL STYLOID TENOSYNOVITIS: Primary | ICD-10-CM

## 2018-08-02 DIAGNOSIS — M25.531 RIGHT WRIST PAIN: ICD-10-CM

## 2018-08-02 NOTE — PROGRESS NOTES
Sports Medicine Clinic Visit    PCP: Anni Sage Christian is a 52 year old male who is seen  as self referral presenting with right wrist and thumb pain. The pt reports doing quite a bit of yard work over the past month.    Injury: None    Location of Pain: right wrist and thumb  Duration of Pain: 1 month  Rating of Pain: 5/10 at rest; 7/10 at worst  Pain is better with: Nothing  Pain is worse with: Active wrist extension and flexion, and active thumb flexion and extenstion  Additional Features: Swelling  Treatment so far consists of: Relax, tylenol  Prior History of related problems: MS, torn ligaments in left hand     There were no vitals taken for this visit.       PMH:  Active problem list:  Patient Active Problem List   Diagnosis     Abnormal liver function tests     Celiac disease     Coronary arteriosclerosis in native artery     Type 2 diabetes mellitus with diabetic nephropathy (H)     Mild nonproliferative diabetic retinopathy (H)     Stricture of duodenum     Steatosis of liver     Mast cell disease     Microcytic anemia     Multiple-type hyperlipidemia     Body mass index (BMI) greater than 30 in adult     Vertigo     MS (multiple sclerosis) (H)     Diabetic polyneuropathy associated with type 2 diabetes mellitus (H)     Vitamin B12 deficiency (non anemic)     Major depressive disorder, recurrent episode, mild (H)     Microscopic colitis, unspecified microscopic colitis type     History of ischemic bowel disease     History of melanoma     History of abdominal abscess       FH:  Family History   Problem Relation Age of Onset     Arrhythmia Mother      bradycardia- pacemaker     Coronary Artery Disease Father        SH:  Social History     Social History     Marital status:      Spouse name: N/A     Number of children: N/A     Years of education: N/A     Occupational History     Not on file.     Social History Main Topics     Smoking status: Never Smoker     Smokeless tobacco:  Never Used     Alcohol use Yes      Comment: occ     Drug use: No     Sexual activity: Yes     Partners: Female     Other Topics Concern     Not on file     Social History Narrative       MEDS:  See EMR, reviewed  ALL:  See EMR, reviewed    REVIEW OF SYSTEMS:  CONSTITUTIONAL:NEGATIVE for fever, chills, change in weight  INTEGUMENTARY/SKIN: NEGATIVE for worrisome rashes, moles or lesions  EYES: NEGATIVE for vision changes or irritation  ENT/MOUTH: NEGATIVE for ear, mouth and throat problems  RESP:NEGATIVE for significant cough or SOB  BREAST: NEGATIVE for masses, tenderness or discharge  CV: NEGATIVE for chest pain, palpitations or peripheral edema  GI: NEGATIVE for nausea, abdominal pain, heartburn, or change in bowel habits  :NEGATIVE for frequency, dysuria, or hematuria  :NEGATIVE for frequency, dysuria, or hematuria  NEURO: NEGATIVE for weakness, dizziness or paresthesias  ENDOCRINE: NEGATIVE for temperature intolerance, skin/hair changes  HEME/ALLERGY/IMMUNE: NEGATIVE for bleeding problems  PSYCHIATRIC: NEGATIVE for changes in mood or affect          Subjective: First dorsal compartment right-sided wrist discomfort after pulling weeds one month ago.  Left hand.  Job mainly spent at the computer.    Objective: Tender in the first dorsal compartment and a Finkelstein's test is positive.  There is no effusion at the wrist.  Nontender over the distal radius or distal ulna.  Nontender over the lunate or scaphoid.  Grasp strength is normal.  Capillary refill is normal distal pulses and sensation are intact appropriate in conversation and affect.    X-ray of the wrist shows no significant DJD.  There are some very small cystic changes at the distal ulna and the distal radius.  Good maintenance of the joint space.    Assessment de Quervain's tenosynovitis right wrist    Plan: Thumb spica brace for intermittent use over the next 3 weeks.  He can remove it for sleep and he can remove it for typing.  Localized ice  massage in the first dorsal compartment.  Avoid excessive gripping and grasping.  He knows a cortisone injection could be considered if the problem is not improved.  He will avoid nonsteroidal anti-inflammatories for this problem.  Follow-up if not improved.

## 2018-08-02 NOTE — MR AVS SNAPSHOT
After Visit Summary   8/2/2018    Thomas Gonzales    MRN: [id number]           Patient Information     Date Of Birth          1965        Visit Information        Provider Department      8/2/2018 7:00 AM Geovani Avilez MD [facility] Sports Medicine        Today's Diagnoses     Radial styloid tenosynovitis    -  1       Follow-ups after your visit        Who to contact     Please call your clinic at [phone] to:    Ask questions about your health    Make or cancel appointments    Discuss your medicines    Learn about your test results    Speak to your doctor            Additional Information About Your Visit        MyChart Information     [employer] gives you secure access to your electronic health record. If you see a primary care provider, you can also send messages to your care team and make appointments. If you have questions, please call your primary care clinic.  If you do not have a primary care provider, please call [phone] and they will assist you.      [employer] is an electronic gateway that provides easy, online access to your medical records. With [employer], you can request a clinic appointment, read your test results, renew a prescription or communicate with your care team.     To access your existing account, please contact your [facility] or call [phone] for assistance.        Care EveryWhere ID     This is your Care EveryWhere ID. This could be used by other organizations to access your [facility] medical records  [id number]        Your Vitals Were     Pulse Respirations                65 16           Blood Pressure from Last 3 Encounters:   08/02/18 134/65   01/29/18 118/60   01/22/18 115/68    Weight from Last 3 Encounters:   03/05/18 93 kg (205 lb)   01/29/18 93 kg (205 lb 1 oz)   01/22/18 92.1 kg (203 lb)              Today, you had the following     No orders found for display       Primary Care Provider Office Phone # Fax #     Anni Sage [phone] [address] [place] [address]        Equal Access to Services     ABAD VALDOVINOS : Hadii aad ku hadorquideaerik Hernandez, wamichaelda avelino, deyvita edithninakarla paristorriekarla, waxyancy corryin hayaan wellingtonjaki lockhart laezequielcathie gab. So [facility] [phone].    ATENCIÓN: Si habla español, tiene a marino disposición servicios gratuitos de asistencia lingüística. Llame al [phone].    We comply with applicable federal civil rights laws and [place] laws. We do not discriminate on the basis of race, color, national origin, age, disability, sex, sexual orientation, or gender identity.            Thank you!     Thank you for choosing [facility]  for your care. Our goal is always to provide you with excellent care. Hearing back from our patients is one way we can continue to improve our services. Please take a few minutes to complete the written survey that you may receive in the mail after your visit with us. Thank you!             Your Updated Medication List - Protect others around you: Learn how to safely use, store and throw away your medicines at www.disposemymeds.org.          This list is accurate as of 8/2/18 11:59 PM.  Always use your most recent med list.                   Brand Name Dispense Instructions for use Diagnosis    ACE NOT PRESCRIBED (INTENTIONAL)     0 each    ACE Inhibitor not prescribed due to Symptomatic hypotension not due to excessive diuresis    Coronary arteriosclerosis in native artery       ACE/ARB/ARNI NOT PRESCRIBED (INTENTIONAL)      Please choose reason not prescribed, below    Coronary artery disease involving native heart, angina presence unspecified, unspecified vessel or lesion type       aspirin 81 MG tablet     90 tablet    Take 1 tablet (81 mg) by mouth daily    Coronary arteriosclerosis in native artery       atorvastatin 40 MG tablet    LIPITOR    90 tablet    Take 1 tablet (40 mg) by mouth daily    Coronary arteriosclerosis in native  artery       blood glucose monitoring test strip    no brand specified    3 Box    Use to test blood sugars 3-4 times daily or as directed Uses Contour Next test strips    Type 2 diabetes mellitus with diabetic nephropathy (H)       cyanocobalamin 1000 MCG/ML injection    VITAMIN B12    0.9 mL    Inject 1 mL (1,000 mcg) into the muscle every 30 days    Vitamin B12 deficiency (non anemic)       fluticasone 50 MCG/ACT spray    FLONASE     Spray 2 sprays into both nostrils daily        insulin glargine 100 UNIT/ML injection    LANTUS SOLOSTAR    12 mL    Inject 25 Units Subcutaneous At Bedtime    Type 2 diabetes mellitus with diabetic polyneuropathy, with long-term current use of insulin (H)       melatonin 5 MG tablet      Take 5 mg by mouth nightly as needed for sleep        Multi-vitamin Tabs tablet      Take 1 tablet by mouth daily        NITROSTAT SL      Place 0.4 mg under the tongue Reported on 3/9/2017        order for DME     1 each    Equipment being ordered: custom orthotic inserts for shoes    Diabetic polyneuropathy associated with type 2 diabetes mellitus (H)       PROBIOTIC DAILY PO      Take by mouth daily        vitamin D 2000 units Caps      Take 2,000 Units by mouth 2 times daily

## 2018-08-02 NOTE — LETTER
8/2/2018      RE: Thomas Gonzales  [address] [place] [address]       Sports Medicine Clinic Visit    PCP: Anni Sage    Thomas Gonzales is a 52 year old male who is seen  as self referral presenting with right wrist and thumb pain. The pt reports doing quite a bit of yard work over the past month.    Injury: None    Location of Pain: right wrist and thumb  Duration of Pain: 1 month  Rating of Pain: 5/10 at rest; 7/10 at worst  Pain is better with: Nothing  Pain is worse with: Active wrist extension and flexion, and active thumb flexion and extenstion  Additional Features: Swelling  Treatment so far consists of: Relax, tylenol  Prior History of related problems: MS, torn ligaments in left hand     There were no vitals taken for this visit.       PMH:  Active problem list:  Patient Active Problem List   Diagnosis     Abnormal liver function tests     Celiac disease     Coronary arteriosclerosis in native artery     Type 2 diabetes mellitus with diabetic nephropathy (H)     Mild nonproliferative diabetic retinopathy (H)     Stricture of duodenum     Steatosis of liver     Mast cell disease     Microcytic anemia     Multiple-type hyperlipidemia     Body mass index (BMI) greater than 30 in adult     Vertigo     MS (multiple sclerosis) (H)     Diabetic polyneuropathy associated with type 2 diabetes mellitus (H)     Vitamin B12 deficiency (non anemic)     Major depressive disorder, recurrent episode, mild (H)     Microscopic colitis, unspecified microscopic colitis type     History of ischemic bowel disease     History of melanoma     History of abdominal abscess       FH:  Family History   Problem Relation Age of Onset     Arrhythmia Mother      bradycardia- pacemaker     Coronary Artery Disease Father        SH:  Social History     Social History     Marital status:      Spouse name: N/A     Number of children: N/A     Years of education: N/A     Occupational History     Not on file.      Social History Main Topics     Smoking status: Never Smoker     Smokeless tobacco: Never Used     Alcohol use Yes      Comment: occ     Drug use: No     Sexual activity: Yes     Partners: Female     Other Topics Concern     Not on file     Social History Narrative       MEDS:  See EMR, reviewed  ALL:  See EMR, reviewed    REVIEW OF SYSTEMS:  CONSTITUTIONAL:NEGATIVE for fever, chills, change in weight  INTEGUMENTARY/SKIN: NEGATIVE for worrisome rashes, moles or lesions  EYES: NEGATIVE for vision changes or irritation  ENT/MOUTH: NEGATIVE for ear, mouth and throat problems  RESP:NEGATIVE for significant cough or SOB  BREAST: NEGATIVE for masses, tenderness or discharge  CV: NEGATIVE for chest pain, palpitations or peripheral edema  GI: NEGATIVE for nausea, abdominal pain, heartburn, or change in bowel habits  :NEGATIVE for frequency, dysuria, or hematuria  :NEGATIVE for frequency, dysuria, or hematuria  NEURO: NEGATIVE for weakness, dizziness or paresthesias  ENDOCRINE: NEGATIVE for temperature intolerance, skin/hair changes  HEME/ALLERGY/IMMUNE: NEGATIVE for bleeding problems  PSYCHIATRIC: NEGATIVE for changes in mood or affect          Subjective: First dorsal compartment right-sided wrist discomfort after pulling weeds one month ago.  Left hand.  Job mainly spent at the computer.    Objective: Tender in the first dorsal compartment and a Finkelstein's test is positive.  There is no effusion at the wrist.  Nontender over the distal radius or distal ulna.  Nontender over the lunate or scaphoid.  Grasp strength is normal.  Capillary refill is normal distal pulses and sensation are intact appropriate in conversation and affect.    X-ray of the wrist shows no significant DJD.  There are some very small cystic changes at the distal ulna and the distal radius.  Good maintenance of the joint space.    Assessment de Quervain's tenosynovitis right wrist    Plan: Thumb spica brace for intermittent use over the next 3  weeks.  He can remove it for sleep and he can remove it for typing.  Localized ice massage in the first dorsal compartment.  Avoid excessive gripping and grasping.  He knows a cortisone injection could be considered if the problem is not improved.  He will avoid nonsteroidal anti-inflammatories for this problem.  Follow-up if not improved.    Geovani Avilez MD

## 2018-10-11 DIAGNOSIS — I25.10 CORONARY ARTERIOSCLEROSIS IN NATIVE ARTERY: ICD-10-CM

## 2018-10-12 NOTE — TELEPHONE ENCOUNTER
"Requested Prescriptions   Pending Prescriptions Disp Refills     aspirin 81 MG tablet  Last Written Prescription Date:  01/08/2018  Last Fill Quantity: 90,  # refills: 3   Last office visit: 1/29/2018 with prescribing provider:  DELMER   Future Office Visit:     90 tablet 3     Sig: Take 1 tablet (81 mg) by mouth daily    Analgesics (Non-Narcotic Tylenol and ASA Only) Passed    10/11/2018  4:44 PM       Passed - Recent (12 mo) or future (30 days) visit within the authorizing provider's specialty    Patient had office visit in the last 12 months or has a visit in the next 30 days with authorizing provider or within the authorizing provider's specialty.  See \"Patient Info\" tab in inbasket, or \"Choose Columns\" in Meds & Orders section of the refill encounter.           Passed - Patient is age 20 years or older    If ASA is flagged for ages under 20 years old. Forward to provider for confirmation Ryes Syndrome is not a concern.              atorvastatin (LIPITOR) 40 MG tablet  Last Written Prescription Date:  01/22/2018  Last Fill Quantity: 90,  # refills: 3   Last office visit: 1/29/2018 with prescribing provider:  DELMER   Future Office Visit:     90 tablet 3     Sig: Take 1 tablet (40 mg) by mouth daily    Statins Protocol Passed    10/11/2018  4:44 PM       Passed - LDL on file in past 12 months    Recent Labs   Lab Test  04/10/18   0743   LDL  59            Passed - No abnormal creatine kinase in past 12 months    No lab results found.            Passed - Recent (12 mo) or future (30 days) visit within the authorizing provider's specialty    Patient had office visit in the last 12 months or has a visit in the next 30 days with authorizing provider or within the authorizing provider's specialty.  See \"Patient Info\" tab in inbasket, or \"Choose Columns\" in Meds & Orders section of the refill encounter.           Passed - Patient is age 18 or older          "

## 2018-10-15 RX ORDER — ATORVASTATIN CALCIUM 40 MG/1
40 TABLET, FILM COATED ORAL DAILY
Qty: 90 TABLET | Refills: 1 | Status: SHIPPED | OUTPATIENT
Start: 2018-10-15 | End: 2019-03-26

## 2018-10-15 NOTE — TELEPHONE ENCOUNTER
Prescription approved per [facility] Refill Protocol.  Tianna Ferrer RN  Message handled by Nurse Triage.

## 2019-01-18 ENCOUNTER — TRANSFERRED RECORDS (OUTPATIENT)
Dept: HEALTH INFORMATION MANAGEMENT | Facility: CLINIC | Age: 54
End: 2019-01-18

## 2019-01-31 NOTE — PROGRESS NOTES
"  SUBJECTIVE:   Thomas Gonzales is a 53 year old male who presents to clinic today for the following health issues:      Depression and Anxiety Follow-Up    Status since last visit: { :659345::\"No change\"}    Other associated symptoms:{ :416620::\"None\"}    Complicating factors:     Significant life event: { :303965::\"No\"}     Current substance abuse: { :087607::\"None\"}    PHQ 3/9/2017   PHQ-9 Total Score 8   Q9: Suicide Ideation Not at all     No flowsheet data found.  {PROVIDER ONLY Complete follow-up questions for patients who report suicide ideation  (Optional):201722}  PHQ-9  English  PHQ-9   Any Language  TIGIST-7  Suicide Assessment Five-step Evaluation and Treatment (SAFE-T)    Amount of exercise or physical activity: {Exercise frequency days per week:170699}    Problems taking medications regularly: {Med Problems:188367::\"No\"}    Medication side effects: {CHRONIC MED SIDE EFFECTS:788952::\"none\"}    Diet: { :556930}        {additional problems for provider to add:650106}    Problem list and histories reviewed & adjusted, as indicated.  Additional history: {NONE - AS DOCUMENTED:180674::\"as documented\"}    {HIST REVIEW/ LINKS 2:542227}    Reviewed and updated as needed this visit by clinical staff       Reviewed and updated as needed this visit by Provider         {PROVIDER CHARTING PREFERENCE:313849}  "

## 2019-02-01 ENCOUNTER — OFFICE VISIT (OUTPATIENT)
Dept: PEDIATRICS | Facility: CLINIC | Age: 54
End: 2019-02-01
Payer: COMMERCIAL

## 2019-02-01 VITALS
DIASTOLIC BLOOD PRESSURE: 68 MMHG | SYSTOLIC BLOOD PRESSURE: 128 MMHG | WEIGHT: 209.7 LBS | BODY MASS INDEX: 27.79 KG/M2 | TEMPERATURE: 97.3 F | HEART RATE: 66 BPM | HEIGHT: 73 IN | OXYGEN SATURATION: 99 %

## 2019-02-01 DIAGNOSIS — K76.0 STEATOSIS OF LIVER: ICD-10-CM

## 2019-02-01 DIAGNOSIS — F33.0 MAJOR DEPRESSIVE DISORDER, RECURRENT EPISODE, MILD (H): ICD-10-CM

## 2019-02-01 DIAGNOSIS — K90.0 CELIAC DISEASE: ICD-10-CM

## 2019-02-01 DIAGNOSIS — H65.92 OME (OTITIS MEDIA WITH EFFUSION), LEFT: ICD-10-CM

## 2019-02-01 DIAGNOSIS — G35 MS (MULTIPLE SCLEROSIS) (H): ICD-10-CM

## 2019-02-01 DIAGNOSIS — Z02.9 ADMINISTRATIVE ENCOUNTER: Primary | ICD-10-CM

## 2019-02-01 DIAGNOSIS — E11.40 TYPE 2 DIABETES MELLITUS WITH SENSORY NEUROPATHY (H): ICD-10-CM

## 2019-02-01 DIAGNOSIS — I95.1 ORTHOSTATIC HYPOTENSION: ICD-10-CM

## 2019-02-01 DIAGNOSIS — E53.8 VITAMIN B12 DEFICIENCY (NON ANEMIC): ICD-10-CM

## 2019-02-01 LAB
ERYTHROCYTE [DISTWIDTH] IN BLOOD BY AUTOMATED COUNT: 15.1 % (ref 10–15)
HBA1C MFR BLD: 5.4 % (ref 0–5.6)
HCT VFR BLD AUTO: 35.6 % (ref 40–53)
HGB BLD-MCNC: 11.9 G/DL (ref 13.3–17.7)
MCH RBC QN AUTO: 28.8 PG (ref 26.5–33)
MCHC RBC AUTO-ENTMCNC: 33.4 G/DL (ref 31.5–36.5)
MCV RBC AUTO: 86 FL (ref 78–100)
PLATELET # BLD AUTO: 135 10E9/L (ref 150–450)
RBC # BLD AUTO: 4.13 10E12/L (ref 4.4–5.9)
VIT B12 SERPL-MCNC: 992 PG/ML (ref 193–986)
WBC # BLD AUTO: 4.1 10E9/L (ref 4–11)

## 2019-02-01 PROCEDURE — 85027 COMPLETE CBC AUTOMATED: CPT | Performed by: INTERNAL MEDICINE

## 2019-02-01 PROCEDURE — 36415 COLL VENOUS BLD VENIPUNCTURE: CPT | Performed by: INTERNAL MEDICINE

## 2019-02-01 PROCEDURE — 82607 VITAMIN B-12: CPT | Performed by: INTERNAL MEDICINE

## 2019-02-01 PROCEDURE — 82306 VITAMIN D 25 HYDROXY: CPT | Performed by: INTERNAL MEDICINE

## 2019-02-01 PROCEDURE — 99214 OFFICE O/P EST MOD 30 MIN: CPT | Performed by: INTERNAL MEDICINE

## 2019-02-01 PROCEDURE — 80053 COMPREHEN METABOLIC PANEL: CPT | Performed by: INTERNAL MEDICINE

## 2019-02-01 PROCEDURE — 83036 HEMOGLOBIN GLYCOSYLATED A1C: CPT | Performed by: INTERNAL MEDICINE

## 2019-02-01 ASSESSMENT — ANXIETY QUESTIONNAIRES
6. BECOMING EASILY ANNOYED OR IRRITABLE: SEVERAL DAYS
5. BEING SO RESTLESS THAT IT IS HARD TO SIT STILL: NOT AT ALL
IF YOU CHECKED OFF ANY PROBLEMS ON THIS QUESTIONNAIRE, HOW DIFFICULT HAVE THESE PROBLEMS MADE IT FOR YOU TO DO YOUR WORK, TAKE CARE OF THINGS AT HOME, OR GET ALONG WITH OTHER PEOPLE: NOT DIFFICULT AT ALL
1. FEELING NERVOUS, ANXIOUS, OR ON EDGE: SEVERAL DAYS
2. NOT BEING ABLE TO STOP OR CONTROL WORRYING: SEVERAL DAYS
GAD7 TOTAL SCORE: 4
3. WORRYING TOO MUCH ABOUT DIFFERENT THINGS: NOT AT ALL
7. FEELING AFRAID AS IF SOMETHING AWFUL MIGHT HAPPEN: SEVERAL DAYS

## 2019-02-01 ASSESSMENT — MIFFLIN-ST. JEOR: SCORE: 1847.13

## 2019-02-01 ASSESSMENT — PATIENT HEALTH QUESTIONNAIRE - PHQ9
SUM OF ALL RESPONSES TO PHQ QUESTIONS 1-9: 1
5. POOR APPETITE OR OVEREATING: NOT AT ALL

## 2019-02-01 NOTE — PATIENT INSTRUCTIONS
Completed paper work  See below for info regarding middle ear effusion and eustachian tube dysfunction - try nasal flonase  Will get basic labs today including A1C - I will contact you with results  Follow-up with Dr. Sage for physical in 6 months    Patient Education     Earache, No Infection (Adult)  Earaches can happen without an infection. This occurs when air and fluid build up behind the eardrum causing a feeling of fullness and discomfort and reduced hearing. This is called otitis media with effusion (OME) or serous otitis media. It means there is fluid in the middle ear. It is not the same as acute otitis media, which is typically from infection.  OME can happen when you have a cold if congestion blocks the passage that drains the middle ear. This passage is called the eustachian tube. OME may also occur with nasal allergies or after a bacterial middle ear infection.    The pain or discomfort may come and go. You may hear clicking or popping sounds when you chew or swallow. You may feel that your balance is off. Or you may hear ringing in the ear.  It often takes from several weeks up to 3 months for the fluid to clear on its own. Oral pain relievers and ear drops help if there is pain. Decongestants and antihistamines sometimes help. Antibiotics don't help since there is no infection. Your doctor may prescribe a nasal spray to help reduce swelling in the nose and eustachian tube. This can allow the ear to drain.  If your OME doesn't improve after 3 months, surgery may be used to drain the fluid and insert a small tube in the eardrum to allow continued drainage.  Because the middle ear fluid can become infected, it is important to watch for signs of an ear infection which may develop later. These signs include increased ear pain, fever, or drainage from the ear.  Home care  The following guidelines will help you care for yourself at home:    You may use over-the-counter medicine as directed to control  pain, unless another medicine was prescribed. If you have chronic liver or kidney disease or ever had a stomach ulcer or GI bleeding, talk with your doctor before using these medicines. Aspirin should never be used in anyone under 18 years of age who is ill with a fever. It may cause severe liver damage.    You may use over-the-counter decongestants such as phenylephrine or pseudoephedrine. But they are not always helpful. Don't use nasal spray decongestants more than 3 days. Longer use can make congestion worse. Prescription nasal sprays from your doctor don't typically have those restrictions.    Antihistamines may help if you are also having allergy symptoms.    You may use medicines such as guaifenesin to thin mucus and promote drainage.  Follow-up care  Follow up with your healthcare provider or as advised if you are not feeling better after 3 days.  When to seek medical advice  Call your healthcare provider right away if any of the following occur:    Your ear pain gets worse or does not start to improve     Fever of 100.4 F (38 C) or higher, or as directed by your healthcare provider    Fluid or blood draining from the ear    Headache or sinus pain    Stiff neck    Unusual drowsiness or confusion  Date Last Reviewed: 10/1/2016    7265-4319 [employer] [address] [place] [address], [place] [address]. All rights reserved. This information is not intended as a substitute for professional medical care. Always follow your healthcare professional's instructions.

## 2019-02-01 NOTE — LETTER
My Depression Action Plan  Name: Thomas Gonzales   Date of Birth 1965  Date: 2/1/2019    My doctor: Anni Sage   My clinic: [address]  [address]  [facility] [address] [phone]          GREEN    ZONE   Good Control    What it looks like:     Things are going generally well. You have normal up s and down s. You may even feel depressed from time to time, but bad moods usually last less than a day.   What you need to do:  1. Continue to care for yourself (see self care plan)  2. Check your depression survival kit and update it as needed  3. Follow your physician s recommendations including any medication.  4. Do not stop taking medication unless you consult with your physician first.           YELLOW         ZONE Getting Worse    What it looks like:     Depression is starting to interfere with your life.     It may be hard to get out of bed; you may be starting to isolate yourself from others.    Symptoms of depression are starting to last most all day and this has happened for several days.     You may have suicidal thoughts but they are not constant.   What you need to do:     1. Call your care team, your response to treatment will improve if you keep your care team informed of your progress. Yellow periods are signs an adjustment may need to be made.     2. Continue your self-care, even if you have to fake it!    3. Talk to someone in your support network    4. Open up your depression survival kit           RED    ZONE Medical Alert - Get Help    What it looks like:     Depression is seriously interfering with your life.     You may experience these or other symptoms: You can t get out of bed most days, can t work or engage in other necessary activities, you have trouble taking care of basic hygiene, or basic responsibilities, thoughts of suicide or death that will not go away, self-injurious behavior.     What you need to do:  1. Call your  care team and request a same-day appointment. If they are not available (weekends or after hours) call your local crisis line, emergency room or 911.            Depression Self Care Plan / Survival Kit    Self-Care for Depression  Here s the deal. Your body and mind are really not as separate as most people think.  What you do and think affects how you feel and how you feel influences what you do and think. This means if you do things that people who feel good do, it will help you feel better.  Sometimes this is all it takes.  There is also a place for medication and therapy depending on how severe your depression is, so be sure to consult with your medical provider and/ or Behavioral Health Consultant if your symptoms are worsening or not improving.     In order to better manage my stress, I will:    Exercise  Get some form of exercise, every day. This will help reduce pain and release endorphins, the  feel good  chemicals in your brain. This is almost as good as taking antidepressants!  This is not the same as joining a gym and then never going! (they count on that by the way ) It can be as simple as just going for a walk or doing some gardening, anything that will get you moving.      Hygiene   Maintain good hygiene (Get out of bed in the morning, Make your bed, Brush your teeth, Take a shower, and Get dressed like you were going to work, even if you are unemployed).  If your clothes don't fit try to get ones that do.    Diet  I will strive to eat foods that are good for me, drink plenty of water, and avoid excessive sugar, caffeine, alcohol, and other mood-altering substances.  Some foods that are helpful in depression are: complex carbohydrates, B vitamins, flaxseed, fish or fish oil, fresh fruits and vegetables.    Psychotherapy  I agree to participate in Individual Therapy (if recommended).    Medication  If prescribed medications, I agree to take them.  Missing doses can result in serious side effects.  I  understand that drinking alcohol, or other illicit drug use, may cause potential side effects.  I will not stop my medication abruptly without first discussing it with my provider.    Staying Connected With Others  I will stay in touch with my friends, family members, and my primary care provider/team.    Use your imagination  Be creative.  We all have a creative side; it doesn t matter if it s oil painting, sand castles, or mud pies! This will also kick up the endorphins.    Witness Beauty  (AKA stop and smell the roses) Take a look outside, even in mid-winter. Notice colors, textures. Watch the squirrels and birds.     Service to others  Be of service to others.  There is always someone else in need.  By helping others we can  get out of ourselves  and remember the really important things.  This also provides opportunities for practicing all the other parts of the program.    Humor  Laugh and be silly!  Adjust your TV habits for less news and crime-drama and more comedy.    Control your stress  Try breathing deep, massage therapy, biofeedback, and meditation. Find time to relax each day.     My support system    Clinic Contact:  Phone number:    Contact 1:  Phone number:    Contact 2:  Phone number:    Jew/:  Phone number:    Therapist:  Phone number:    Local crisis center:    Phone number:    Other community support:  Phone number:

## 2019-02-01 NOTE — PROGRESS NOTES
SUBJECTIVE:   Thomas Gonzales is a 53 year old male who presents to clinic today for the following health issues:    Patient is here today to go over forms for disabled parking, for MS and orthostatic hypotension      #GI  Long and complicated GI history that started with intestinal ischemia s/p cardiac angiogram s/p Run-Y surgery complicated by strictures and multiple abdominal abscesses with prolonged recovery course.  Now with celiac disease, lactose, and fructose intolerance as well as intestinal absorption issues.  Symptoms have been stable - no abdominal pains.  Occasional diarrhea, but overall good.  Is on a specialized diet and following this closely.    # Left ear fullness after URI around New Years - has been like this ever since.  Feels like it wants to pop, but never does.  No itching.    #Diabetes - diet controlled after significant weight loss after GI surgeries    #MS - Quiescent - no neurological symptoms since last visit    #History of orthostatic hypotension - stable, no syncope episodes    Problem list and histories reviewed & adjusted, as indicated.  Additional history: as documented    Patient Active Problem List   Diagnosis     Abnormal liver function tests     Celiac disease     Coronary arteriosclerosis in native artery     Type 2 diabetes mellitus with diabetic nephropathy (H)     Mild nonproliferative diabetic retinopathy (H)     Stricture of duodenum     Steatosis of liver     Mast cell disease     Microcytic anemia     Multiple-type hyperlipidemia     Body mass index (BMI) greater than 30 in adult     Vertigo     MS (multiple sclerosis) (H)     Diabetic polyneuropathy associated with type 2 diabetes mellitus (H)     Vitamin B12 deficiency (non anemic)     Major depressive disorder, recurrent episode, mild (H)     Microscopic colitis, unspecified microscopic colitis type     History of ischemic bowel disease     History of melanoma     History of abdominal abscess     Past Surgical  History:   Procedure Laterality Date     APPENDECTOMY  1/8/2016     CATARACT IOL, RT/LT       CHOLECYSTECTOMY  1/8/2016     penile implant       LEON EN Y BOWEL  1/8/2016    for small bowel ischemia       Social History     Tobacco Use     Smoking status: Never Smoker     Smokeless tobacco: Never Used   Substance Use Topics     Alcohol use: Yes     Comment: occ     Family History   Problem Relation Age of Onset     Arrhythmia Mother         bradycardia- pacemaker     Coronary Artery Disease Father          Current Outpatient Medications   Medication Sig Dispense Refill     ACE NOT PRESCRIBED, INTENTIONAL, ACE Inhibitor not prescribed due to Symptomatic hypotension not due to excessive diuresis 0 each 0     ACE/ARB NOT PRESCRIBED, INTENTIONAL, Please choose reason not prescribed, below       aspirin 81 MG tablet Take 1 tablet (81 mg) by mouth daily 90 tablet 1     atorvastatin (LIPITOR) 40 MG tablet Take 1 tablet (40 mg) by mouth daily 90 tablet 1     Cholecalciferol (VITAMIN D) 2000 UNITS CAPS Take 2,000 Units by mouth 2 times daily       cyanocobalamin (VITAMIN B12) 1000 MCG/ML injection Inject 1 mL (1,000 mcg) into the muscle every 30 days 0.9 mL 12     fluticasone (FLONASE) 50 MCG/ACT nasal spray Spray 2 sprays into both nostrils daily       multivitamin, therapeutic with minerals (MULTI-VITAMIN) TABS Take 1 tablet by mouth daily       order for DME Equipment being ordered: custom orthotic inserts for shoes 1 each 1     Probiotic Product (PROBIOTIC DAILY PO) Take by mouth daily       Allergies   Allergen Reactions     Shellfish-Derived Products Anaphylaxis     Adhesive Tape      Paper tape is okay     Fructose      Gluten Meal      Celiac     Lactose      Reglan [Metoclopramide] Hives       Reviewed and updated as needed this visit by clinical staff       Reviewed and updated as needed this visit by Provider         ROS:  All other systems on a 10-point review are negative, unless otherwise noted in  "HPI      OBJECTIVE:     /68   Pulse 66   Temp 97.3  F (36.3  C) (Tympanic)   Ht 1.842 m (6' 0.5\")   Wt 95.1 kg (209 lb 11.2 oz)   SpO2 99%   BMI 28.05 kg/m    Body mass index is 28.05 kg/m .  GENERAL: healthy, alert and no distress  EYES: Eyes grossly normal to inspection, PERRL and conjunctivae and sclerae normal  HENT: normal cephalic/atraumatic, right ear: normal: no effusions, no erythema, normal landmarks, left ear: TM immobile on insufflation, clear effusion and erythematous, nose and mouth without ulcers or lesions, oropharynx clear and oral mucous membranes moist  NECK: no adenopathy, no asymmetry, masses, or scars and thyroid normal to palpation  RESP: lungs clear to auscultation - no rales, rhonchi or wheezes  CV: regular rate and rhythm, normal S1 S2, no S3 or S4, no murmur, click or rub, no peripheral edema and peripheral pulses strong  ABDOMEN: soft, nontender, no hepatosplenomegaly, no masses and bowel sounds normal  MS: no gross musculoskeletal defects noted, no edema    Diagnostic Test Results:  none     ASSESSMENT/PLAN:         1. Administrative encounter  Filled out parking disability form today for orthostatic hypotension and MS.    2. Celiac disease  Clinically stable and has been following strict diet.  Will check basic labs as well as vitamin deficiencies.  - CBC with platelets  - Vitamin D deficiency screening  - Vitamin B12    3. Steatosis of liver  Will recheck CMP.  - Comprehensive metabolic panel (BMP + Alb, Alk Phos, ALT, AST, Total. Bili, TP)    4. Vitamin B12 deficiency (non anemic)  Discontinued vitamin B injections - will recheck levels today due to absorption issues.    5. MS (multiple sclerosis) (H)  Quiescent, but does fatigue easily.    6. Type 2 diabetes mellitus with sensory neuropathy (H)  Stable, due for Q6 A1C    7. Major depressive disorder, recurrent episode, mild (H)  Stable, managed without medications.    9. OME (otitis media with effusion), left  Provided " educational materials and recommend nasal steroid spray.  Reassurance and expectant management.  See PI.        Patient Instructions   Completed paper work  See below for info regarding middle ear effusion and eustachian tube dysfunction - try nasal flonase  Will get basic labs today including A1C - I will contact you with results  Follow-up with Dr. Sage for physical in 6 months    Patient Education     Earache, No Infection (Adult)  Earaches can happen without an infection. This occurs when air and fluid build up behind the eardrum causing a feeling of fullness and discomfort and reduced hearing. This is called otitis media with effusion (OME) or serous otitis media. It means there is fluid in the middle ear. It is not the same as acute otitis media, which is typically from infection.  OME can happen when you have a cold if congestion blocks the passage that drains the middle ear. This passage is called the eustachian tube. OME may also occur with nasal allergies or after a bacterial middle ear infection.    The pain or discomfort may come and go. You may hear clicking or popping sounds when you chew or swallow. You may feel that your balance is off. Or you may hear ringing in the ear.  It often takes from several weeks up to 3 months for the fluid to clear on its own. Oral pain relievers and ear drops help if there is pain. Decongestants and antihistamines sometimes help. Antibiotics don't help since there is no infection. Your doctor may prescribe a nasal spray to help reduce swelling in the nose and eustachian tube. This can allow the ear to drain.  If your OME doesn't improve after 3 months, surgery may be used to drain the fluid and insert a small tube in the eardrum to allow continued drainage.  Because the middle ear fluid can become infected, it is important to watch for signs of an ear infection which may develop later. These signs include increased ear pain, fever, or drainage from the ear.  Home  care  The following guidelines will help you care for yourself at home:    You may use over-the-counter medicine as directed to control pain, unless another medicine was prescribed. If you have chronic liver or kidney disease or ever had a stomach ulcer or GI bleeding, talk with your doctor before using these medicines. Aspirin should never be used in anyone under 18 years of age who is ill with a fever. It may cause severe liver damage.    You may use over-the-counter decongestants such as phenylephrine or pseudoephedrine. But they are not always helpful. Don't use nasal spray decongestants more than 3 days. Longer use can make congestion worse. Prescription nasal sprays from your doctor don't typically have those restrictions.    Antihistamines may help if you are also having allergy symptoms.    You may use medicines such as guaifenesin to thin mucus and promote drainage.  Follow-up care  Follow up with your healthcare provider or as advised if you are not feeling better after 3 days.  When to seek medical advice  Call your healthcare provider right away if any of the following occur:    Your ear pain gets worse or does not start to improve     Fever of 100.4 F (38 C) or higher, or as directed by your healthcare provider    Fluid or blood draining from the ear    Headache or sinus pain    Stiff neck    Unusual drowsiness or confusion  Date Last Reviewed: 10/1/2016    1271-6453 [employer]. [address] [place] [address], [place] [address]. All rights reserved. This information is not intended as a substitute for professional medical care. Always follow your healthcare professional's instructions.               Joey Palumbo MD  [facility]

## 2019-02-02 LAB
ALBUMIN SERPL-MCNC: 3.2 G/DL (ref 3.4–5)
ALP SERPL-CCNC: 151 U/L (ref 40–150)
ALT SERPL W P-5'-P-CCNC: 82 U/L (ref 0–70)
ANION GAP SERPL CALCULATED.3IONS-SCNC: 4 MMOL/L (ref 3–14)
AST SERPL W P-5'-P-CCNC: 61 U/L (ref 0–45)
BILIRUB SERPL-MCNC: 0.5 MG/DL (ref 0.2–1.3)
BUN SERPL-MCNC: 17 MG/DL (ref 7–30)
CALCIUM SERPL-MCNC: 8.8 MG/DL (ref 8.5–10.1)
CHLORIDE SERPL-SCNC: 111 MMOL/L (ref 94–109)
CO2 SERPL-SCNC: 28 MMOL/L (ref 20–32)
CREAT SERPL-MCNC: 0.87 MG/DL (ref 0.66–1.25)
GFR SERPL CREATININE-BSD FRML MDRD: >90 ML/MIN/{1.73_M2}
GLUCOSE SERPL-MCNC: 209 MG/DL (ref 70–99)
POTASSIUM SERPL-SCNC: 4.8 MMOL/L (ref 3.4–5.3)
PROT SERPL-MCNC: 6.5 G/DL (ref 6.8–8.8)
SODIUM SERPL-SCNC: 143 MMOL/L (ref 133–144)

## 2019-02-02 ASSESSMENT — ANXIETY QUESTIONNAIRES: GAD7 TOTAL SCORE: 4

## 2019-02-04 LAB — DEPRECATED CALCIDIOL+CALCIFEROL SERPL-MC: 20 UG/L (ref 20–75)

## 2019-04-22 ENCOUNTER — TRANSFERRED RECORDS (OUTPATIENT)
Dept: HEALTH INFORMATION MANAGEMENT | Facility: CLINIC | Age: 54
End: 2019-04-22

## 2019-09-09 NOTE — NURSING NOTE
"Chief Complaint   Patient presents with     Allied Health Visit     Vitamin B12       Initial There were no vitals taken for this visit. Estimated body mass index is 27.01 kg/(m^2) as calculated from the following:    Height as of 11/9/16: 6' 0.5\" (1.842 m).    Weight as of 11/9/16: 202 lb (91.627 kg).  BP completed using cuff size: NA (Not Taken)  Sun Rivera LPN    " stretcher

## 2019-09-16 ENCOUNTER — TELEPHONE (OUTPATIENT)
Dept: PEDIATRICS | Facility: CLINIC | Age: 54
End: 2019-09-16

## 2019-09-16 NOTE — TELEPHONE ENCOUNTER
Panel Management Review      Patient has the following on his problem list:     Depression / Dysthymia review    Measure:  Needs PHQ-9 score of 4 or less during index window.  Administer PHQ-9 and if score is 5 or more, send encounter to provider for next steps.    5 - 7 month window range:     PHQ-9 SCORE 3/9/2017 2/1/2019   PHQ-9 Total Score 8 1       If PHQ-9 recheck is 5 or more, route to provider for next steps.    Patient is due for:  PHQ9    Diabetes    ASA: Passed    Last A1C  Lab Results   Component Value Date    A1C 5.4 02/01/2019    A1C 5.0 05/01/2018    A1C 6.2 03/09/2017    A1C 5.3 08/22/2016     A1C tested: Passed    Last LDL:    Lab Results   Component Value Date    CHOL 106 04/10/2018     Lab Results   Component Value Date    HDL 38 04/10/2018     Lab Results   Component Value Date    LDL 59 04/10/2018     Lab Results   Component Value Date    TRIG 47 04/10/2018     No results found for: CHOLHDLRATIO  Lab Results   Component Value Date    NHDL 68 04/10/2018       Is the patient on a Statin? YES             Is the patient on Aspirin? YES    Medications     HMG CoA Reductase Inhibitors     atorvastatin (LIPITOR) 40 MG tablet       Salicylates     aspirin (ASA) 81 MG EC tablet             Last three blood pressure readings:  BP Readings from Last 3 Encounters:   02/01/19 128/68   08/02/18 134/65   01/29/18 118/60       Date of last diabetes office visit: 01/19/2018     Tobacco History:     History   Smoking Status     Never Smoker   Smokeless Tobacco     Never Used           Composite cancer screening  Chart review shows that this patient is due/due soon for the following None  Summary:    Patient is due/failing the following:   Diabetic eye exam, diabetic foot exam, A1C, LDL, PHQ9 and PHYSICAL    Action needed:   Patient needs office visit for physical and diabetic f/u.    Type of outreach:    Sent [employer] message.    Questions for provider review:    None                                                                                                                                     ALDAIR SANCHEZ MA on 9/16/2019 at 2:39 PM

## 2019-10-02 ENCOUNTER — HEALTH MAINTENANCE LETTER (OUTPATIENT)
Age: 54
End: 2019-10-02

## 2020-06-22 ENCOUNTER — OFFICE VISIT (OUTPATIENT)
Dept: PEDIATRICS | Facility: CLINIC | Age: 55
End: 2020-06-22
Payer: COMMERCIAL

## 2020-06-22 VITALS
SYSTOLIC BLOOD PRESSURE: 120 MMHG | HEIGHT: 73 IN | HEART RATE: 60 BPM | DIASTOLIC BLOOD PRESSURE: 62 MMHG | WEIGHT: 231.6 LBS | BODY MASS INDEX: 30.69 KG/M2 | RESPIRATION RATE: 16 BRPM | TEMPERATURE: 97.4 F

## 2020-06-22 DIAGNOSIS — E53.8 VITAMIN B12 DEFICIENCY (NON ANEMIC): ICD-10-CM

## 2020-06-22 DIAGNOSIS — H43.11 VITREOUS HEMORRHAGE OF RIGHT EYE (H): ICD-10-CM

## 2020-06-22 DIAGNOSIS — Z01.818 PREOP GENERAL PHYSICAL EXAM: Primary | ICD-10-CM

## 2020-06-22 DIAGNOSIS — Z98.84 S/P BARIATRIC SURGERY: ICD-10-CM

## 2020-06-22 DIAGNOSIS — E11.40 TYPE 2 DIABETES MELLITUS WITH SENSORY NEUROPATHY (H): ICD-10-CM

## 2020-06-22 LAB
ERYTHROCYTE [DISTWIDTH] IN BLOOD BY AUTOMATED COUNT: 13.7 % (ref 10–15)
HBA1C MFR BLD: 5.5 % (ref 0–5.6)
HCT VFR BLD AUTO: 40.6 % (ref 40–53)
HGB BLD-MCNC: 13.8 G/DL (ref 13.3–17.7)
MCH RBC QN AUTO: 28.4 PG (ref 26.5–33)
MCHC RBC AUTO-ENTMCNC: 34 G/DL (ref 31.5–36.5)
MCV RBC AUTO: 84 FL (ref 78–100)
PLATELET # BLD AUTO: 148 10E9/L (ref 150–450)
RBC # BLD AUTO: 4.86 10E12/L (ref 4.4–5.9)
VIT B12 SERPL-MCNC: 869 PG/ML (ref 193–986)
WBC # BLD AUTO: 4.2 10E9/L (ref 4–11)

## 2020-06-22 PROCEDURE — 82306 VITAMIN D 25 HYDROXY: CPT | Performed by: INTERNAL MEDICINE

## 2020-06-22 PROCEDURE — 93000 ELECTROCARDIOGRAM COMPLETE: CPT | Performed by: INTERNAL MEDICINE

## 2020-06-22 PROCEDURE — 80061 LIPID PANEL: CPT | Performed by: INTERNAL MEDICINE

## 2020-06-22 PROCEDURE — 36415 COLL VENOUS BLD VENIPUNCTURE: CPT | Performed by: INTERNAL MEDICINE

## 2020-06-22 PROCEDURE — 83036 HEMOGLOBIN GLYCOSYLATED A1C: CPT | Performed by: INTERNAL MEDICINE

## 2020-06-22 PROCEDURE — 84443 ASSAY THYROID STIM HORMONE: CPT | Performed by: INTERNAL MEDICINE

## 2020-06-22 PROCEDURE — 84630 ASSAY OF ZINC: CPT | Mod: 90 | Performed by: INTERNAL MEDICINE

## 2020-06-22 PROCEDURE — 84425 ASSAY OF VITAMIN B-1: CPT | Mod: 90 | Performed by: INTERNAL MEDICINE

## 2020-06-22 PROCEDURE — 83735 ASSAY OF MAGNESIUM: CPT | Performed by: INTERNAL MEDICINE

## 2020-06-22 PROCEDURE — 99214 OFFICE O/P EST MOD 30 MIN: CPT | Performed by: INTERNAL MEDICINE

## 2020-06-22 PROCEDURE — 99000 SPECIMEN HANDLING OFFICE-LAB: CPT | Performed by: INTERNAL MEDICINE

## 2020-06-22 PROCEDURE — 80053 COMPREHEN METABOLIC PANEL: CPT | Performed by: INTERNAL MEDICINE

## 2020-06-22 PROCEDURE — 84207 ASSAY OF VITAMIN B-6: CPT | Mod: 90 | Performed by: INTERNAL MEDICINE

## 2020-06-22 PROCEDURE — 85027 COMPLETE CBC AUTOMATED: CPT | Performed by: INTERNAL MEDICINE

## 2020-06-22 PROCEDURE — 84100 ASSAY OF PHOSPHORUS: CPT | Performed by: INTERNAL MEDICINE

## 2020-06-22 PROCEDURE — 82728 ASSAY OF FERRITIN: CPT | Performed by: INTERNAL MEDICINE

## 2020-06-22 PROCEDURE — 82607 VITAMIN B-12: CPT | Performed by: INTERNAL MEDICINE

## 2020-06-22 PROCEDURE — 82043 UR ALBUMIN QUANTITATIVE: CPT | Performed by: INTERNAL MEDICINE

## 2020-06-22 ASSESSMENT — MIFFLIN-ST. JEOR: SCORE: 1941.47

## 2020-06-22 NOTE — PATIENT INSTRUCTIONS
Labs today for diabetes follow-up and bariatric surgery follow-up.     Before Your Surgery      Call your surgeon if there is any change in your health. This includes signs of a cold or flu (such as a sore throat, runny nose, cough, rash or fever).    Do not smoke, drink alcohol or take over the counter medicine (unless your surgeon or primary care doctor tells you to) for the 24 hours before and after surgery.    If you take prescribed drugs: Follow your doctor s orders about which medicines to take and which to stop until after surgery.    Eating and drinking prior to surgery: follow the instructions from your surgeon    Take a shower or bath the night before surgery. Use the soap your surgeon gave you to gently clean your skin. If you do not have soap from your surgeon, use your regular soap. Do not shave or scrub the surgery site.  Wear clean pajamas and have clean sheets on your bed.

## 2020-06-23 LAB
ALBUMIN SERPL-MCNC: 3.6 G/DL (ref 3.4–5)
ALP SERPL-CCNC: 132 U/L (ref 40–150)
ALT SERPL W P-5'-P-CCNC: 80 U/L (ref 0–70)
ANION GAP SERPL CALCULATED.3IONS-SCNC: 3 MMOL/L (ref 3–14)
AST SERPL W P-5'-P-CCNC: 57 U/L (ref 0–45)
BILIRUB SERPL-MCNC: 0.9 MG/DL (ref 0.2–1.3)
BUN SERPL-MCNC: 22 MG/DL (ref 7–30)
CALCIUM SERPL-MCNC: 8.9 MG/DL (ref 8.5–10.1)
CHLORIDE SERPL-SCNC: 111 MMOL/L (ref 94–109)
CHOLEST SERPL-MCNC: 112 MG/DL
CO2 SERPL-SCNC: 28 MMOL/L (ref 20–32)
CREAT SERPL-MCNC: 1 MG/DL (ref 0.66–1.25)
CREAT UR-MCNC: 188 MG/DL
DEPRECATED CALCIDIOL+CALCIFEROL SERPL-MC: 20 UG/L (ref 20–75)
FERRITIN SERPL-MCNC: 70 NG/ML (ref 26–388)
GFR SERPL CREATININE-BSD FRML MDRD: 85 ML/MIN/{1.73_M2}
GLUCOSE SERPL-MCNC: 129 MG/DL (ref 70–99)
HDLC SERPL-MCNC: 47 MG/DL
LDLC SERPL CALC-MCNC: 55 MG/DL
MAGNESIUM SERPL-MCNC: 1.9 MG/DL (ref 1.6–2.3)
MICROALBUMIN UR-MCNC: 305 MG/L
MICROALBUMIN/CREAT UR: 162.23 MG/G CR (ref 0–17)
NONHDLC SERPL-MCNC: 65 MG/DL
PHOSPHATE SERPL-MCNC: 3.7 MG/DL (ref 2.5–4.5)
POTASSIUM SERPL-SCNC: 4.9 MMOL/L (ref 3.4–5.3)
PROT SERPL-MCNC: 7.3 G/DL (ref 6.8–8.8)
SODIUM SERPL-SCNC: 142 MMOL/L (ref 133–144)
TRIGL SERPL-MCNC: 48 MG/DL
TSH SERPL DL<=0.005 MIU/L-ACNC: 1.84 MU/L (ref 0.4–4)

## 2020-06-24 LAB
VIT B1 BLD-MCNC: 89 NMOL/L (ref 70–180)
VIT B6 SERPL-MCNC: 221.7 NMOL/L (ref 20–125)
ZINC SERPL-MCNC: 74.7 UG/DL (ref 60–120)

## 2020-10-27 ENCOUNTER — TELEPHONE (OUTPATIENT)
Dept: PEDIATRICS | Facility: CLINIC | Age: 55
End: 2020-10-27

## 2020-10-27 NOTE — TELEPHONE ENCOUNTER
Patient Quality Outreach      Summary:    Patient is due/failing the following:   Eye Exam    Type of outreach:    Sent [employer] message.    Questions for provider review:    None                                                                                   **Start Working phrase here:**       Patient has the following on his problem list/HM:     Diabetes    Last A1C:  Lab Results   Component Value Date    A1C 5.5 06/22/2020    A1C 5.4 02/01/2019       Last LDL:    Lab Results   Component Value Date    LDL 55 06/22/2020       Is the patient on a Statin? Yes          Is the patient on Aspirin? Yes    Medications     HMG CoA Reductase Inhibitors     atorvastatin (LIPITOR) 40 MG tablet       Salicylates     ASPIRIN ADULT LOW STRENGTH 81 MG EC tablet             Last three blood pressure readings:  BP Readings from Last 3 Encounters:   06/22/20 120/62   02/01/19 128/68   08/02/18 134/65            Tobacco Use      Smoking status: Never Smoker      Smokeless tobacco: Never Used                                                      Erika Del Castillo LPN       Chart routed to Care Team.

## 2020-12-08 ENCOUNTER — TRANSFERRED RECORDS (OUTPATIENT)
Dept: HEALTH INFORMATION MANAGEMENT | Facility: CLINIC | Age: 55
End: 2020-12-08

## 2021-01-15 ENCOUNTER — HEALTH MAINTENANCE LETTER (OUTPATIENT)
Age: 56
End: 2021-01-15

## 2021-01-16 ENCOUNTER — OFFICE VISIT (OUTPATIENT)
Dept: URGENT CARE | Facility: URGENT CARE | Age: 56
End: 2021-01-16
Payer: COMMERCIAL

## 2021-01-16 ENCOUNTER — ANCILLARY PROCEDURE (OUTPATIENT)
Dept: GENERAL RADIOLOGY | Facility: CLINIC | Age: 56
End: 2021-01-16
Attending: FAMILY MEDICINE
Payer: COMMERCIAL

## 2021-01-16 ENCOUNTER — APPOINTMENT (OUTPATIENT)
Dept: CT IMAGING | Facility: CLINIC | Age: 56
DRG: 638 | End: 2021-01-16
Attending: PHYSICIAN ASSISTANT
Payer: COMMERCIAL

## 2021-01-16 ENCOUNTER — HOSPITAL ENCOUNTER (INPATIENT)
Facility: CLINIC | Age: 56
LOS: 5 days | Discharge: HOME | DRG: 638 | End: 2021-01-21
Attending: INTERNAL MEDICINE | Admitting: INTERNAL MEDICINE
Payer: COMMERCIAL

## 2021-01-16 VITALS
SYSTOLIC BLOOD PRESSURE: 120 MMHG | DIASTOLIC BLOOD PRESSURE: 58 MMHG | TEMPERATURE: 98.4 F | HEART RATE: 82 BPM | OXYGEN SATURATION: 100 %

## 2021-01-16 DIAGNOSIS — M86.9 OSTEOMYELITIS OF RIGHT FOOT, UNSPECIFIED TYPE (H): Primary | ICD-10-CM

## 2021-01-16 DIAGNOSIS — L08.9 INFECTION OF TOE: ICD-10-CM

## 2021-01-16 DIAGNOSIS — Z11.52 ENCOUNTER FOR SCREENING LABORATORY TESTING FOR SEVERE ACUTE RESPIRATORY SYNDROME CORONAVIRUS 2 (SARS-COV-2): ICD-10-CM

## 2021-01-16 DIAGNOSIS — L97.529 DIABETIC ULCER OF TOE OF LEFT FOOT ASSOCIATED WITH TYPE 2 DIABETES MELLITUS, UNSPECIFIED ULCER STAGE (H): ICD-10-CM

## 2021-01-16 DIAGNOSIS — L03.116 CELLULITIS OF LEFT LOWER EXTREMITY: ICD-10-CM

## 2021-01-16 DIAGNOSIS — L03.115 CELLULITIS OF RIGHT FOOT: ICD-10-CM

## 2021-01-16 DIAGNOSIS — M86.271 SUBACUTE OSTEOMYELITIS OF RIGHT FOOT (H): Primary | ICD-10-CM

## 2021-01-16 DIAGNOSIS — E11.621 DIABETIC ULCER OF TOE OF LEFT FOOT ASSOCIATED WITH TYPE 2 DIABETES MELLITUS, UNSPECIFIED ULCER STAGE (H): ICD-10-CM

## 2021-01-16 LAB
ABO + RH BLD: NORMAL
ABO + RH BLD: NORMAL
ALBUMIN SERPL-MCNC: 3.4 G/DL (ref 3.4–5)
ALP SERPL-CCNC: 117 U/L (ref 40–150)
ALT SERPL W P-5'-P-CCNC: 50 U/L (ref 0–70)
ANION GAP SERPL CALCULATED.3IONS-SCNC: 6 MMOL/L (ref 3–14)
AST SERPL W P-5'-P-CCNC: 36 U/L (ref 0–45)
BASOPHILS # BLD AUTO: 0 10E9/L (ref 0–0.2)
BASOPHILS NFR BLD AUTO: 0.4 %
BILIRUB SERPL-MCNC: 0.6 MG/DL (ref 0.2–1.3)
BLD GP AB SCN SERPL QL: NORMAL
BLOOD BANK CMNT PATIENT-IMP: NORMAL
BUN SERPL-MCNC: 28 MG/DL (ref 7–30)
CALCIUM SERPL-MCNC: 8.6 MG/DL (ref 8.5–10.1)
CHLORIDE SERPL-SCNC: 116 MMOL/L (ref 94–109)
CO2 SERPL-SCNC: 21 MMOL/L (ref 20–32)
CREAT SERPL-MCNC: 1.1 MG/DL (ref 0.66–1.25)
CRP SERPL-MCNC: 67 MG/L (ref 0–8)
DIFFERENTIAL METHOD BLD: ABNORMAL
EOSINOPHIL # BLD AUTO: 0.1 10E9/L (ref 0–0.7)
EOSINOPHIL NFR BLD AUTO: 1 %
ERYTHROCYTE [DISTWIDTH] IN BLOOD BY AUTOMATED COUNT: 13.2 % (ref 10–15)
ERYTHROCYTE [SEDIMENTATION RATE] IN BLOOD BY WESTERGREN METHOD: 14 MM/H (ref 0–20)
GFR SERPL CREATININE-BSD FRML MDRD: 76 ML/MIN/{1.73_M2}
GLUCOSE SERPL-MCNC: 121 MG/DL (ref 70–99)
HCT VFR BLD AUTO: 36.2 % (ref 40–53)
HGB BLD-MCNC: 11.9 G/DL (ref 13.3–17.7)
IMM GRANULOCYTES # BLD: 0 10E9/L (ref 0–0.4)
IMM GRANULOCYTES NFR BLD: 0.1 %
INR PPP: 1.18 (ref 0.86–1.14)
LABORATORY COMMENT REPORT: NORMAL
LACTATE BLD-SCNC: 0.8 MMOL/L (ref 0.7–2)
LYMPHOCYTES # BLD AUTO: 1 10E9/L (ref 0.8–5.3)
LYMPHOCYTES NFR BLD AUTO: 13.9 %
MCH RBC QN AUTO: 27.7 PG (ref 26.5–33)
MCHC RBC AUTO-ENTMCNC: 32.9 G/DL (ref 31.5–36.5)
MCV RBC AUTO: 84 FL (ref 78–100)
MONOCYTES # BLD AUTO: 0.4 10E9/L (ref 0–1.3)
MONOCYTES NFR BLD AUTO: 5.5 %
NEUTROPHILS # BLD AUTO: 5.5 10E9/L (ref 1.6–8.3)
NEUTROPHILS NFR BLD AUTO: 79.1 %
NRBC # BLD AUTO: 0 10*3/UL
NRBC BLD AUTO-RTO: 0 /100
PLATELET # BLD AUTO: 138 10E9/L (ref 150–450)
POTASSIUM SERPL-SCNC: 4 MMOL/L (ref 3.4–5.3)
PROCALCITONIN SERPL-MCNC: <0.05 NG/ML
PROT SERPL-MCNC: 7 G/DL (ref 6.8–8.8)
RBC # BLD AUTO: 4.3 10E12/L (ref 4.4–5.9)
SARS-COV-2 RNA RESP QL NAA+PROBE: NEGATIVE
SODIUM SERPL-SCNC: 142 MMOL/L (ref 133–144)
SPECIMEN EXP DATE BLD: NORMAL
SPECIMEN SOURCE: NORMAL
WBC # BLD AUTO: 6.9 10E9/L (ref 4–11)

## 2021-01-16 PROCEDURE — 85610 PROTHROMBIN TIME: CPT | Performed by: INTERNAL MEDICINE

## 2021-01-16 PROCEDURE — 73702 CT LWR EXTREMITY W/O&W/DYE: CPT | Mod: 26 | Performed by: RADIOLOGY

## 2021-01-16 PROCEDURE — U0003 INFECTIOUS AGENT DETECTION BY NUCLEIC ACID (DNA OR RNA); SEVERE ACUTE RESPIRATORY SYNDROME CORONAVIRUS 2 (SARS-COV-2) (CORONAVIRUS DISEASE [COVID-19]), AMPLIFIED PROBE TECHNIQUE, MAKING USE OF HIGH THROUGHPUT TECHNOLOGIES AS DESCRIBED BY CMS-2020-01-R: HCPCS | Performed by: INTERNAL MEDICINE

## 2021-01-16 PROCEDURE — C9803 HOPD COVID-19 SPEC COLLECT: HCPCS

## 2021-01-16 PROCEDURE — 85025 COMPLETE CBC W/AUTO DIFF WBC: CPT | Performed by: PHYSICIAN ASSISTANT

## 2021-01-16 PROCEDURE — 87186 SC STD MICRODIL/AGAR DIL: CPT | Performed by: INTERNAL MEDICINE

## 2021-01-16 PROCEDURE — 73660 X-RAY EXAM OF TOE(S): CPT | Mod: RT | Performed by: RADIOLOGY

## 2021-01-16 PROCEDURE — U0005 INFEC AGEN DETEC AMPLI PROBE: HCPCS | Performed by: INTERNAL MEDICINE

## 2021-01-16 PROCEDURE — 86140 C-REACTIVE PROTEIN: CPT | Performed by: INTERNAL MEDICINE

## 2021-01-16 PROCEDURE — 96367 TX/PROPH/DG ADDL SEQ IV INF: CPT

## 2021-01-16 PROCEDURE — 83605 ASSAY OF LACTIC ACID: CPT | Performed by: INTERNAL MEDICINE

## 2021-01-16 PROCEDURE — 250N000011 HC RX IP 250 OP 636: Performed by: INTERNAL MEDICINE

## 2021-01-16 PROCEDURE — 86901 BLOOD TYPING SEROLOGIC RH(D): CPT | Performed by: INTERNAL MEDICINE

## 2021-01-16 PROCEDURE — 99223 1ST HOSP IP/OBS HIGH 75: CPT | Mod: AI | Performed by: INTERNAL MEDICINE

## 2021-01-16 PROCEDURE — 87070 CULTURE OTHR SPECIMN AEROBIC: CPT | Performed by: INTERNAL MEDICINE

## 2021-01-16 PROCEDURE — 73702 CT LWR EXTREMITY W/O&W/DYE: CPT | Mod: RT

## 2021-01-16 PROCEDURE — 96365 THER/PROPH/DIAG IV INF INIT: CPT

## 2021-01-16 PROCEDURE — 85025 COMPLETE CBC W/AUTO DIFF WBC: CPT | Performed by: INTERNAL MEDICINE

## 2021-01-16 PROCEDURE — 99285 EMERGENCY DEPT VISIT HI MDM: CPT | Performed by: INTERNAL MEDICINE

## 2021-01-16 PROCEDURE — 86850 RBC ANTIBODY SCREEN: CPT | Performed by: INTERNAL MEDICINE

## 2021-01-16 PROCEDURE — 85652 RBC SED RATE AUTOMATED: CPT | Performed by: INTERNAL MEDICINE

## 2021-01-16 PROCEDURE — 99285 EMERGENCY DEPT VISIT HI MDM: CPT | Mod: 25

## 2021-01-16 PROCEDURE — 87077 CULTURE AEROBIC IDENTIFY: CPT | Performed by: INTERNAL MEDICINE

## 2021-01-16 PROCEDURE — 258N000003 HC RX IP 258 OP 636: Performed by: INTERNAL MEDICINE

## 2021-01-16 PROCEDURE — 120N000011 HC R&B TRANSPLANT UMMC

## 2021-01-16 PROCEDURE — 86900 BLOOD TYPING SEROLOGIC ABO: CPT | Performed by: INTERNAL MEDICINE

## 2021-01-16 PROCEDURE — 99207 PR APP CREDIT; MD BILLING SHARED VISIT: CPT | Performed by: PHYSICIAN ASSISTANT

## 2021-01-16 PROCEDURE — 250N000011 HC RX IP 250 OP 636: Performed by: STUDENT IN AN ORGANIZED HEALTH CARE EDUCATION/TRAINING PROGRAM

## 2021-01-16 PROCEDURE — 80053 COMPREHEN METABOLIC PANEL: CPT | Performed by: INTERNAL MEDICINE

## 2021-01-16 PROCEDURE — 99214 OFFICE O/P EST MOD 30 MIN: CPT | Performed by: FAMILY MEDICINE

## 2021-01-16 PROCEDURE — 84145 PROCALCITONIN (PCT): CPT | Performed by: INTERNAL MEDICINE

## 2021-01-16 PROCEDURE — 87040 BLOOD CULTURE FOR BACTERIA: CPT | Performed by: INTERNAL MEDICINE

## 2021-01-16 RX ORDER — ASPIRIN 81 MG/1
81 TABLET, CHEWABLE ORAL DAILY
Status: DISCONTINUED | OUTPATIENT
Start: 2021-01-17 | End: 2021-01-21 | Stop reason: HOSPADM

## 2021-01-16 RX ORDER — PIPERACILLIN SODIUM, TAZOBACTAM SODIUM 3; .375 G/15ML; G/15ML
3.38 INJECTION, POWDER, LYOPHILIZED, FOR SOLUTION INTRAVENOUS ONCE
Status: COMPLETED | OUTPATIENT
Start: 2021-01-16 | End: 2021-01-16

## 2021-01-16 RX ORDER — ATORVASTATIN CALCIUM 40 MG/1
40 TABLET, FILM COATED ORAL DAILY
Status: DISCONTINUED | OUTPATIENT
Start: 2021-01-17 | End: 2021-01-21 | Stop reason: HOSPADM

## 2021-01-16 RX ORDER — IOPAMIDOL 755 MG/ML
135 INJECTION, SOLUTION INTRAVASCULAR ONCE
Status: COMPLETED | OUTPATIENT
Start: 2021-01-16 | End: 2021-01-16

## 2021-01-16 RX ORDER — AMOXICILLIN 250 MG
1 CAPSULE ORAL 2 TIMES DAILY
Status: DISCONTINUED | OUTPATIENT
Start: 2021-01-16 | End: 2021-01-16

## 2021-01-16 RX ORDER — NICOTINE POLACRILEX 4 MG
15-30 LOZENGE BUCCAL
Status: DISCONTINUED | OUTPATIENT
Start: 2021-01-16 | End: 2021-01-21 | Stop reason: HOSPADM

## 2021-01-16 RX ORDER — ONDANSETRON 2 MG/ML
4 INJECTION INTRAMUSCULAR; INTRAVENOUS EVERY 6 HOURS PRN
Status: DISCONTINUED | OUTPATIENT
Start: 2021-01-16 | End: 2021-01-21 | Stop reason: HOSPADM

## 2021-01-16 RX ORDER — DEXTROSE MONOHYDRATE 25 G/50ML
25-50 INJECTION, SOLUTION INTRAVENOUS
Status: DISCONTINUED | OUTPATIENT
Start: 2021-01-16 | End: 2021-01-21 | Stop reason: HOSPADM

## 2021-01-16 RX ORDER — LIDOCAINE 40 MG/G
CREAM TOPICAL
Status: DISCONTINUED | OUTPATIENT
Start: 2021-01-16 | End: 2021-01-21 | Stop reason: HOSPADM

## 2021-01-16 RX ORDER — AMOXICILLIN 250 MG
2 CAPSULE ORAL 2 TIMES DAILY
Status: DISCONTINUED | OUTPATIENT
Start: 2021-01-16 | End: 2021-01-16

## 2021-01-16 RX ORDER — POLYETHYLENE GLYCOL 3350 17 G/17G
17 POWDER, FOR SOLUTION ORAL DAILY PRN
Status: DISCONTINUED | OUTPATIENT
Start: 2021-01-16 | End: 2021-01-21 | Stop reason: HOSPADM

## 2021-01-16 RX ORDER — ACETAMINOPHEN 325 MG/1
650 TABLET ORAL EVERY 4 HOURS PRN
Status: DISCONTINUED | OUTPATIENT
Start: 2021-01-16 | End: 2021-01-21 | Stop reason: HOSPADM

## 2021-01-16 RX ORDER — ONDANSETRON 4 MG/1
4 TABLET, ORALLY DISINTEGRATING ORAL EVERY 6 HOURS PRN
Status: DISCONTINUED | OUTPATIENT
Start: 2021-01-16 | End: 2021-01-21 | Stop reason: HOSPADM

## 2021-01-16 RX ADMIN — IOPAMIDOL 135 ML: 755 INJECTION, SOLUTION INTRAVENOUS at 21:14

## 2021-01-16 RX ADMIN — PIPERACILLIN SODIUM AND TAZOBACTAM SODIUM 3.38 G: 3; .375 INJECTION, POWDER, LYOPHILIZED, FOR SOLUTION INTRAVENOUS at 16:01

## 2021-01-16 RX ADMIN — VANCOMYCIN HYDROCHLORIDE 2000 MG: 1 INJECTION, POWDER, LYOPHILIZED, FOR SOLUTION INTRAVENOUS at 17:20

## 2021-01-16 ASSESSMENT — ENCOUNTER SYMPTOMS
NECK STIFFNESS: 0
FEVER: 0
CONFUSION: 0
ABDOMINAL PAIN: 0
HEADACHES: 0
DIFFICULTY URINATING: 0
ARTHRALGIAS: 0
WOUND: 1
COLOR CHANGE: 1
EYE REDNESS: 0
SHORTNESS OF BREATH: 0

## 2021-01-16 ASSESSMENT — ACTIVITIES OF DAILY LIVING (ADL): ADLS_ACUITY_SCORE: 17

## 2021-01-16 ASSESSMENT — MIFFLIN-ST. JEOR
SCORE: 1903.13
SCORE: 1979.33

## 2021-01-16 NOTE — H&P
[facility]     History and Physical - Hospitalist Service, Gold Night       Date of Admission:  1/16/2021    Assessment & Plan   Thomas Gonzales is a 55 year old male admitted on 1/16/2021. He has a past medical history of CAD s/p stenting, HLD, MS, DM type II c/b peripheral neuropathy, intestinal ischemia, gastric bypass c/b strictures and multiple abdominal abscesses, and celiac disease who is admitted with right great toe cellulitis and possible osteomyelitis.    Right Great Toe Cellulitis with possible Osteomyelitis. Injured right big toe 6 weeks PTA (kicked a wall). Developed localized wound, now with new onset of redness x 24 hours PTA. Evaluated at urgent care where XR revealed possible early osteomyelitis. Subsequently presented to ED for further evaluation. WBC normal at 6.9, CRP 67.0, ESR 14. Started on IV vancomycin and zosyn for broad coverage. Patient remains afebrile and hemodynamically stable.  - Continue IV vancomycin, switch zosyn to cefepime  - CT of toe ordered (patient unable to have MRI due to penile prosthesis)  - Orthopedics consulted, appreciate assistance (did not discuss)  - Follow wound culture, BCx  - Trend CBC, BMP, CRP, ESR    DM Type II c/b Peripheral Neuropathy. HgbA1C 5.5% on 6/2020. No longer on medication, diet controlled.  - Spot check BG  - Hypoglycemia protocol    Chronic Normocytic Anemia. Baseline Hgb ~11-12. Hx of vitamin B12 deficiency 2/2 prior gastric bypass. No s/s of bleeding on exam.  - Trend CBC    Hx of CAD, HLD. S/p stenting of left circumflex in 2015.  - Continue ASA 81 mg QD  - Continue atorvastatin 40 mg every day    MS. Felt to be mild. Symptoms primarily consist of fatigue, temperature dysregulation.    Diet: Moderate CHO diet  DVT Prophylaxis: Pneumatic Compression Devices  Martínez Catheter: not present  Code Status: DNR/DNI         Disposition Plan   Expected discharge: 2 - 3 days, recommended to prior living  "arrangement once antibiotic plan established and ortho evaluation completed.  Entered: Edwige Love PA-C 01/16/2021, 6:05 PM     The patient's care was discussed with the Attending Physician, Dr. Park.    Edwige Love PA-C  [facility]   Contact information available via [facility] Paging/Directory  Please see sign in/sign out for up to date coverage information    ______________________________________________________________________    Chief Complaint   Right Toe Wound    History is obtained from the patient and EMR.    History of Present Illness   Thomas Gonzales is a 55 year old male admitted on 1/16/2021. He has a past medical history of CAD s/p stenting, HLD, MS, DM type II c/b peripheral neuropathy, celiac disease, and gastric bypass c/b vitamin B12 deficiency who is admitted with right toe osteomyelitis.    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 He subsequently developed a \"blood blister\" on his right big toe which gradually crusted over and \"fell off\". He has been soaking his toe in warm water and applying neosporin daily. He has not had any pain at any point, although notes he has chronic neuropathy in both feet with minimal sensation in his toes. This morning he noted increased redness around his toe which was new. He also notes his toenail has become loose. Presented to urgent care where x-ray of right toe revealed cortical ill definition of great toe distal phalangeal tuft likely representing early osteomyelitis. Recommended patient present to ED for further evaluation. Labs in ED note normal WBC, elevated CRP of 67, ESR 14. Patient was started on IV vancomycin and zosyn and admitted for further management.     Currently patient is feeling well, denies any symptoms. Appetite has been normal, no nausea/vomiting. Struggles with chronic loose stools related to " prior gastric bypass. Has some chronic lower extremity edema, more prominent on the right. Otherwise no fevers, chills, shortness of breath, chest pain, abdominal pain, urinary symptoms.    Review of Systems    The 10 point Review of Systems is negative other than noted in the HPI.    Past Medical History    I have reviewed this patient's medical history and updated it with pertinent information if needed.   Past Medical History:   Diagnosis Date     CAD (coronary artery disease)     S/p stenting of left circumflex     Diabetes mellitus (H)      Multiple sclerosis (H)      Peripheral neuropathy        Past Surgical History   I have reviewed this patient's surgical history and updated it with pertinent information if needed.  Past Surgical History:   Procedure Laterality Date     APPENDECTOMY  1/8/2016     CATARACT IOL, RT/LT       CHOLECYSTECTOMY  1/8/2016     penile implant       LEON EN Y BOWEL  1/8/2016    for small bowel ischemia     Social History   I have reviewed this patient's social history and updated it with pertinent information if needed.  Social History     Tobacco Use     Smoking status: Never Smoker     Smokeless tobacco: Never Used   Substance Use Topics     Alcohol use: Yes     Comment: occ     Drug use: No     Family History   I have reviewed this patient's family history and updated it with pertinent information if needed.  Family History   Problem Relation Age of Onset     Arrhythmia Mother         bradycardia- pacemaker     Coronary Artery Disease Father      Prior to Admission Medications   Prior to Admission Medications   Prescriptions Last Dose Informant Patient Reported? Taking?   ACE NOT PRESCRIBED, INTENTIONAL,   No No   Sig: ACE Inhibitor not prescribed due to Symptomatic hypotension not due to excessive diuresis   ACE/ARB NOT PRESCRIBED, INTENTIONAL,   No No   Sig: Please choose reason not prescribed, below   ASPIRIN ADULT LOW STRENGTH 81 MG EC tablet   No No   Sig: Take 1 tablet (81  mg) by mouth daily   Cholecalciferol (VITAMIN D) 2000 UNITS CAPS   Yes No   Sig: Take 2,000 Units by mouth 2 times daily   Probiotic Product (PROBIOTIC DAILY PO)   Yes No   Sig: Take by mouth daily   atorvastatin (LIPITOR) 40 MG tablet   No No   Sig: Take 1 tablet (40 mg) by mouth daily   bismuth subsalicylate 262 MG TABS   Yes No   Sig: Take 4 tablets by mouth   multivitamin, therapeutic with minerals (MULTI-VITAMIN) TABS   Yes No   Sig: Take 1 tablet by mouth daily   order for DME   No No   Sig: Equipment being ordered: custom orthotic inserts for shoes      Facility-Administered Medications: None     Allergies   Allergies   Allergen Reactions     Shellfish-Derived Products Anaphylaxis     Adhesive Tape      Paper tape is okay     Fructose      Gluten Meal      Celiac     Lactose      Reglan [Metoclopramide] Hives     Physical Exam   Vital Signs: Temp: 99.5  F (37.5  C) Temp src: Oral BP: 136/80 Pulse: 84   Resp: 16 SpO2: 98 % O2 Device: None (Room air)    Weight: 219 lbs 0 oz    General Appearance: Well appearing male sitting in bed, in no apparent distress.  Eyes: Anicteric sclera. Conjunctiva clear.  HEENT: NC/AT. Moist mucous membranes.  Respiratory: Respiratory effort normal on room air. Lungs CTAB without rales, rhonchi, or wheezing.  Cardiovascular: RRR, S1/S2. No murmurs evident.  GI: Abdomen soft, non-distended, non-tender. Bowel sounds normoactive.  Skin: Circular nickel-sized ulceration along plantar aspect of right big toe with moderate surrounding erythema extending proximally towards ankle. No active drainage from wound, no tenderness to palpation. See photo below.  Musculoskeletal/Extremities:  1-2+ pitting edema in RLE, 1+ pitting edema in LLE.  Neurologic: A&O x 3. Passive movement of all extremities, no focal deficits.  Psychiatric: Pleasant affect. Responds appropriately to all questions.      Photo from urgent care earlier today      Data   Data reviewed today: I reviewed all medications, new  labs and imaging results over the last 24 hours. I personally reviewed no images or EKG's today.    Recent Labs   Lab 01/16/21  1713 01/16/21  1544   WBC 6.9  --    HGB 11.9*  --    MCV 84  --    *  --    INR  --  1.18*   NA  --  142   POTASSIUM  --  4.0   CHLORIDE  --  116*   CO2  --  21   BUN  --  28   CR  --  1.10   ANIONGAP  --  6   DIONICIO  --  8.6   GLC  --  121*   ALBUMIN  --  3.4   PROTTOTAL  --  7.0   BILITOTAL  --  0.6   ALKPHOS  --  117   ALT  --  50   AST  --  36

## 2021-01-16 NOTE — PHARMACY-VANCOMYCIN DOSING SERVICE
Pharmacy Vancomycin Initial Note  Date of Service 2021  Patient's  1965  55 year old, male    Indication: Skin and Soft Tissue Infection w/concern for osteo    Current estimated CrCl = Estimated Creatinine Clearance: 96.6 mL/min (based on SCr of 1.1 mg/dL).    Creatinine for last 3 days  2021:  3:44 PM Creatinine 1.10 mg/dL    Recent Vancomycin Level(s) for last 3 days  No results found for requested labs within last 72 hours.      Vancomycin IV Administrations (past 72 hours)      No vancomycin orders with administrations in past 72 hours.                Nephrotoxins and other renal medications (From now, onward)    Start     Dose/Rate Route Frequency Ordered Stop    21 0500  vancomycin (VANCOCIN) 1,750 mg in sodium chloride 0.9 % 500 mL intermittent infusion      1,750 mg  over 2 Hours Intravenous EVERY 12 HOURS 21 1631      21 1555  vancomycin (VANCOCIN) 2,000 mg in sodium chloride 0.9 % 500 mL intermittent infusion      2,000 mg  over 2 Hours Intravenous ONCE 21 1553            Contrast Orders - past 72 hours (72h ago, onward)    None                Plan:  1.  Give vancomycin 2000mg IV x1 now, then start vancomycin 1750mg IV q12 hours  2.  Goal Trough Level: 15-20 mg/L   3.  Pharmacy will check trough levels as appropriate in 1-3 Days.    4. Serum creatinine levels will be ordered daily for the first week of therapy and at least twice weekly for subsequent weeks.    5. Spring method utilized to dose vancomycin therapy: Method 2    Juanpablo Conway, PharmD, BCPS

## 2021-01-16 NOTE — ED PROVIDER NOTES
[place] EMERGENCY DEPARTMENT ([facility])  January 16, 2021  History     Chief Complaint   Patient presents with     Toe Pain     HPI  Thomas Gonzales is a 55 year old male with a PMH of MS, DM2, celiac disease, duodenal stricture, diabetic nephropathy, diabetic retinopathy S/P vitreoretinal surgery, HLD, coronary arteriosclerosis, mast cell disease, microcytic anemia, obesity S/P Castro-en-Y gastric bypass, h/o ischemic bowel disease, S/P cholecystectomy, S/P appendectomy and recently diagnosed osteomyelitis of right foot who presents to the ED today complaining of a toe injury. Patient reports he kicked a wall about 6 weeks ago when he got a blood blister on the tip of his right big toe.  He states that over the past 6 weeks the skin has dried up and fallen off.  He states that he has been soaking the foot and using Neosporin.  He states that it is more red, swollen, and painful and before, and that the toenail is now loose.  Patient states he does not feel sick, and denies fever, abnormal blood sugars, or any recent antibiotic use.  He was seen previously at [facility] where he had an x-ray taken which is outlined below.    XR TOE RIGHT G/E 2 VIEWS 1/16/2021 2:11 PM   HISTORY: right tow swollen, erythematous; Infection of toe  IMPRESSION: Cortical ill definition of the great toe distal phalangeal  tuft. This could represent early osteomyelitis, particularly if there  is an adjacent wound or sinus. Small calcific fragment in the medial  proximal corner of the great toe proximal phalanx which may represent  age-indeterminate fracture. Similar finding is noted in the medial  aspect of the distal phalangeal base as well suspicious for  age-indeterminate fracture. Suspected erosion at the lateral base of  the proximal phalanx without associated joint space narrowing. Gout  could have this appearance. Forefoot small vessel others carotid  calcification is noted.    I have reviewed the  Medications, Allergies, Past Medical and Surgical History, and Social History in the [employer] system.  PAST MEDICAL HISTORY: History reviewed. No pertinent past medical history.    PAST SURGICAL HISTORY:   Past Surgical History:   Procedure Laterality Date     APPENDECTOMY  1/8/2016     CATARACT IOL, RT/LT       CHOLECYSTECTOMY  1/8/2016     penile implant       LEON EN Y BOWEL  1/8/2016    for small bowel ischemia       Past medical history, past surgical history, medications, and allergies were reviewed with the patient. Additional pertinent items: None    FAMILY HISTORY:   Family History   Problem Relation Age of Onset     Arrhythmia Mother         bradycardia- pacemaker     Coronary Artery Disease Father        SOCIAL HISTORY:   Social History     Tobacco Use     Smoking status: Never Smoker     Smokeless tobacco: Never Used   Substance Use Topics     Alcohol use: Yes     Comment: occ     Social history was reviewed with the patient. Additional pertinent items: None      Patient's Medications   New Prescriptions    No medications on file   Previous Medications    ACE NOT PRESCRIBED, INTENTIONAL,    ACE Inhibitor not prescribed due to Symptomatic hypotension not due to excessive diuresis    ACE/ARB NOT PRESCRIBED, INTENTIONAL,    Please choose reason not prescribed, below    ASPIRIN ADULT LOW STRENGTH 81 MG EC TABLET    Take 1 tablet (81 mg) by mouth daily    ATORVASTATIN (LIPITOR) 40 MG TABLET    Take 1 tablet (40 mg) by mouth daily    BISMUTH SUBSALICYLATE 262 MG TABS    Take 4 tablets by mouth    CHOLECALCIFEROL (VITAMIN D) 2000 UNITS CAPS    Take 2,000 Units by mouth 2 times daily    MULTIVITAMIN, THERAPEUTIC WITH MINERALS (MULTI-VITAMIN) TABS    Take 1 tablet by mouth daily    ORDER FOR DME    Equipment being ordered: custom orthotic inserts for shoes    PROBIOTIC PRODUCT (PROBIOTIC DAILY PO)    Take by mouth daily   Modified Medications    No medications on file   Discontinued Medications    No medications on  "file          Allergies   Allergen Reactions     Shellfish-Derived Products Anaphylaxis     Adhesive Tape      Paper tape is okay     Fructose      Gluten Meal      Celiac     Lactose      Reglan [Metoclopramide] Hives        Review of Systems   Constitutional: Negative for fever.   HENT: Negative for congestion.    Eyes: Negative for redness.   Respiratory: Negative for shortness of breath.    Cardiovascular: Negative for chest pain.   Gastrointestinal: Negative for abdominal pain.   Genitourinary: Negative for difficulty urinating.   Musculoskeletal: Negative for arthralgias and neck stiffness.   Skin: Positive for color change (Right great toe redness and swelling) and wound (Right great toe).   Neurological: Negative for headaches.   Psychiatric/Behavioral: Negative for confusion.   All other systems reviewed and are negative.    A complete review of systems was performed with pertinent positives and negatives noted in the HPI, and all other systems negative.    Physical Exam   BP: 136/80  Pulse: 84  Temp: 99.5  F (37.5  C)  Resp: 16  Height: 188 cm (6' 2\")  Weight: 99.3 kg (219 lb)  SpO2: 98 %      Physical Exam  Constitutional:       General: He is not in acute distress.     Appearance: He is not diaphoretic.   HENT:      Head: Atraumatic.   Eyes:      General: No scleral icterus.     Pupils: Pupils are equal, round, and reactive to light.   Neck:      Musculoskeletal: Neck supple.   Cardiovascular:      Rate and Rhythm: Normal rate and regular rhythm.      Heart sounds: Normal heart sounds. No murmur. No friction rub. No gallop.    Pulmonary:      Effort: Pulmonary effort is normal. No respiratory distress.      Breath sounds: Normal breath sounds. No stridor. No wheezing, rhonchi or rales.   Chest:      Chest wall: No tenderness.   Abdominal:      General: Abdomen is flat. Bowel sounds are normal. There is no distension.      Palpations: Abdomen is soft. There is no mass.      Tenderness: There is no " abdominal tenderness. There is no right CVA tenderness, left CVA tenderness, guarding or rebound.      Hernia: No hernia is present.   Musculoskeletal:      Left foot: Decreased range of motion. Normal capillary refill. Tenderness and swelling present. No bony tenderness, crepitus, deformity or laceration.        Feet:    Skin:     General: Skin is warm.      Findings: No rash.   Neurological:      General: No focal deficit present.         ED Course   3:39 PM  The patient was seen and examined by Xiao Hale MD in Room VTA.        Procedures            Results for orders placed or performed during the hospital encounter of 01/16/21 (from the past 24 hour(s))   Comprehensive metabolic panel   Result Value Ref Range    Sodium 142 133 - 144 mmol/L    Potassium 4.0 3.4 - 5.3 mmol/L    Chloride 116 (H) 94 - 109 mmol/L    Carbon Dioxide 21 20 - 32 mmol/L    Anion Gap 6 3 - 14 mmol/L    Glucose 121 (H) 70 - 99 mg/dL    Urea Nitrogen 28 7 - 30 mg/dL    Creatinine 1.10 0.66 - 1.25 mg/dL    GFR Estimate 76 >60 mL/min/[1.73_m2]    GFR Estimate If Black 88 >60 mL/min/[1.73_m2]    Calcium 8.6 8.5 - 10.1 mg/dL    Bilirubin Total 0.6 0.2 - 1.3 mg/dL    Albumin 3.4 3.4 - 5.0 g/dL    Protein Total 7.0 6.8 - 8.8 g/dL    Alkaline Phosphatase 117 40 - 150 U/L    ALT 50 0 - 70 U/L    AST 36 0 - 45 U/L   Lactic acid whole blood   Result Value Ref Range    Lactic Acid 0.8 0.7 - 2.0 mmol/L   INR   Result Value Ref Range    INR 1.18 (H) 0.86 - 1.14   ABO/Rh type and screen   Result Value Ref Range    ABO PENDING     Antibody Screen PENDING     Test Valid Only At          [facility]    Specimen Expires 01/19/2021    CRP inflammation   Result Value Ref Range    CRP Inflammation 67.0 (H) 0.0 - 8.0 mg/L   Erythrocyte sedimentation rate auto   Result Value Ref Range    Sed Rate 14 0 - 20 mm/h     Medications   vancomycin (VANCOCIN) 2,000 mg in sodium chloride 0.9 % 500 mL intermittent infusion  (has no administration in time range)   vancomycin (VANCOCIN) 1,750 mg in sodium chloride 0.9 % 500 mL intermittent infusion (has no administration in time range)   piperacillin-tazobactam (ZOSYN) 3.375 g vial to attach to  mL bag (0 g Intravenous Stopped 1/16/21 8160)             Assessments & Plan (with Medical Decision Making)    Acute cellulitis with diabetic foot ulcer, XR at [facility] suspicious for osteomyelitis, sent to ED, no fever or suggestive for sepsis, wound and blood cx done-start zosyn and vanco, D/W Medicine-admit. Labs still pending.    DM-apparently glu at home not too bad.         I have reviewed the nursing notes.    I have reviewed the findings, diagnosis, plan and need for follow up with the patient.    New Prescriptions    No medications on file       Final diagnoses:   Diabetic ulcer of toe of left foot associated with type 2 diabetes mellitus, unspecified ulcer stage (H)   Cellulitis of left lower extremity   I, Cristobal Lopez, am serving as a trained medical scribe to document services personally performed by Xiao Hale Md, MD, based on the provider's statements to me.     IXiao Md, MD, was physically present and have reviewed and verified the accuracy of this note documented by Cristobal Lopez.      1/16/2021   [facility] EMERGENCY DEPARTMENT     Xiao Hale MD  01/16/21 4211

## 2021-01-16 NOTE — PROGRESS NOTES
Assessment & Plan       ICD-10-CM    1. Osteomyelitis of right foot, unspecified type (H)  M86.9    2. Infection of toe  L08.9 XR Toe Right G/E 2 Views   3. 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  Past medical history significant for type 2 diabetes mellitus with neuropathy.  Physical examination remarkable for swelling, erythematous, warmth right great toe and forefoot skin along with ulcerative ulcer involving right great toe plantar surface.  X-ray findings consistent with right foot proximal and distal phalangeal fracture along with distal toe bone erosion, awaiting formal report.  Differentials discussed in detail including right foot/toe cellulitis, cannot rule out osteomyelitis completely.  Needs further evaluation to delineate a specific diagnosis considering risk factors including type 2 diabetes mellitus with neuropathy.  Suggested to go ER for further evaluation and management.  Patient understood and in agreement with above plan.  All questions answered.  - XR Toe Right G/E 2 Views; Future    Cellulitis of right foot  As above       30 minutes spent on the date of the encounter doing chart review, review of test results, interpretation of tests, patient visit and documentation       Isaias Fierro MD  [facility] Guzman is a 55 year old who presents to clinic today for the following health issues     HPI       Concern -   Onset: 2 months  Description: Right big toe- Notice swelling and pus. 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 pt is just treating from home only  Intensity: moderate  Progression of Symptoms:  worsening  Accompanying Signs & Symptoms: no fever, chills, cough, sob  Previous history of similar problem: DM type II  Therapies  tried and outcome: neosporin       Review of Systems   Constitutional, HEENT, cardiovascular, pulmonary, gi and gu systems are negative, except as otherwise noted.      Objective    /58   Pulse 82   Temp 98.4  F (36.9  C) (Tympanic)   SpO2 100%   There is no height or weight on file to calculate BMI.  Physical Exam   GENERAL: alert and no distress  NECK: no adenopathy, no asymmetry, masses  RESP: lungs clear to auscultation - no rales, rhonchi or wheezes  CV: regular rate and rhythm, normal S1 S2, no S3 or S4, no murmur, click or rub, no peripheral edema and peripheral pulses strong  MS: right foot, great toe erythematous, tender and warmth on palpation, open ulcerative wound involving right great toe plantar surface as shown below, pedal pulses 3+, sensation to touch and pressure intact  SKIN: as above  NEURO: Normal strength and tone, mentation intact and speech normal                ----- Ambulatory Services Attestations for Billing on Time -----

## 2021-01-16 NOTE — ED NOTES
[facility]    ED Nurse to Floor Handoff     Thomas Gonzales is a 55 year old male who speaks English and lives with family members,  in a home  They arrived in the ED by car from home    ED Chief Complaint: Toe Pain    ED Dx;   Final diagnoses:   Diabetic ulcer of toe of left foot associated with type 2 diabetes mellitus, unspecified ulcer stage (H)   Cellulitis of left lower extremity         Needed?: No    Allergies:   Allergies   Allergen Reactions     Shellfish-Derived Products Anaphylaxis     Adhesive Tape      Paper tape is okay     Fructose      Gluten Meal      Celiac     Lactose      Reglan [Metoclopramide] Hives   .  Past Medical Hx: No past medical history on file.   Baseline Mental status: WDL  Current Mental Status changes: at basesline    Infection present or suspected this encounter: yes skin/wound/contact  Sepsis suspected: No  Isolation type: No active isolations  Patient tested for COVID 19 prior to admission: YES     Activity level - Baseline/Home:  Independent  Activity Level - Current:   Independent    Bariatric equipment needed?: No    In the ED these meds were given:   Medications   piperacillin-tazobactam (ZOSYN) 3.375 g vial to attach to  mL bag (3.375 g Intravenous New Bag 1/16/21 1601)   vancomycin (VANCOCIN) 2,000 mg in sodium chloride 0.9 % 500 mL intermittent infusion (has no administration in time range)   vancomycin place clancy - receiving intermittent dosing (has no administration in time range)       Drips running?  No    Home pump  No    Current LDAs  Peripheral IV 01/16/21 Right Lower forearm (Active)   Site Assessment WDL 01/16/21 1543   Number of days: 0       Labs results: Labs Ordered and Resulted from Time of ED Arrival Up to the Time of Departure from the ED - No data to display    Imaging Studies:   Recent Results (from the past 24 hour(s))   XR Toe Right G/E 2 Views    Narrative    XR TOE RIGHT G/E 2 VIEWS  "1/16/2021 2:11 PM     HISTORY: right tow swollen, erythematous; Infection of toe      Impression    IMPRESSION: Cortical ill definition of the great toe distal phalangeal  tuft. This could represent early osteomyelitis, particularly if there  is an adjacent wound or sinus. Small calcific fragment in the medial  proximal corner of the great toe proximal phalanx which may represent  age-indeterminate fracture. Similar finding is noted in the medial  aspect of the distal phalangeal base as well suspicious for  age-indeterminate fracture. Suspected erosion at the lateral base of  the proximal phalanx without associated joint space narrowing. Gout  could have this appearance. Forefoot small vessel others carotid  calcification is noted.    HALEY NASCIMENTO MD       Recent vital signs:   /80   Pulse 84   Temp 99.5  F (37.5  C) (Oral)   Resp 16   Ht 1.88 m (6' 2\")   Wt 99.3 kg (219 lb)   SpO2 98%   BMI 28.12 kg/m      [place] Coma Scale Score: 15 (01/16/21 1522)       Cardiac Rhythm: Normal Sinus  Pt needs tele? See epic orders  Skin/wound Issues: wound on right foot great toe    Code Status: not on file    Pain control: good    Nausea control: good    Abnormal labs/tests/findings requiring intervention: see epic    Family present during ED course? No   Family Comments/Social Situation comments: n/a    Tasks needing completion: None    Benita Iglesias RN  [facility]  [phone] [place] [facility]  [phone] [facility] ED      "

## 2021-01-16 NOTE — ED TRIAGE NOTES
Triage Assessment & Note:    There were no vitals taken for this visit.    Patient presents with: PT c/o right great toe injury 6 weeks ago now toe is red with drainage and pt is concerned for infection. PT ambulating without difficulty.     Home Treatments/Remedies: Neosporin     Febrile / Afebrile? Afebrile     Duration of C/o: 6 weeks     Louis Yin RN  January 16, 2021

## 2021-01-17 LAB
ANION GAP SERPL CALCULATED.3IONS-SCNC: 8 MMOL/L (ref 3–14)
BUN SERPL-MCNC: 18 MG/DL (ref 7–30)
CALCIUM SERPL-MCNC: 8.5 MG/DL (ref 8.5–10.1)
CHLORIDE SERPL-SCNC: 114 MMOL/L (ref 94–109)
CO2 SERPL-SCNC: 23 MMOL/L (ref 20–32)
CREAT SERPL-MCNC: 0.86 MG/DL (ref 0.66–1.25)
CRP SERPL-MCNC: 51 MG/L (ref 0–8)
ERYTHROCYTE [DISTWIDTH] IN BLOOD BY AUTOMATED COUNT: 13.3 % (ref 10–15)
ERYTHROCYTE [SEDIMENTATION RATE] IN BLOOD BY WESTERGREN METHOD: 16 MM/H (ref 0–20)
GFR SERPL CREATININE-BSD FRML MDRD: >90 ML/MIN/{1.73_M2}
GLUCOSE BLDC GLUCOMTR-MCNC: 99 MG/DL (ref 70–99)
GLUCOSE SERPL-MCNC: 101 MG/DL (ref 70–99)
HBA1C MFR BLD: 5.7 % (ref 0–5.6)
HCT VFR BLD AUTO: 35 % (ref 40–53)
HGB BLD-MCNC: 11.5 G/DL (ref 13.3–17.7)
MCH RBC QN AUTO: 27.3 PG (ref 26.5–33)
MCHC RBC AUTO-ENTMCNC: 32.9 G/DL (ref 31.5–36.5)
MCV RBC AUTO: 83 FL (ref 78–100)
PLATELET # BLD AUTO: 130 10E9/L (ref 150–450)
POTASSIUM SERPL-SCNC: 3.6 MMOL/L (ref 3.4–5.3)
RBC # BLD AUTO: 4.21 10E12/L (ref 4.4–5.9)
SODIUM SERPL-SCNC: 144 MMOL/L (ref 133–144)
WBC # BLD AUTO: 4.9 10E9/L (ref 4–11)

## 2021-01-17 PROCEDURE — 36415 COLL VENOUS BLD VENIPUNCTURE: CPT | Performed by: PHYSICIAN ASSISTANT

## 2021-01-17 PROCEDURE — 250N000011 HC RX IP 250 OP 636: Performed by: INTERNAL MEDICINE

## 2021-01-17 PROCEDURE — 258N000003 HC RX IP 258 OP 636: Performed by: PHYSICIAN ASSISTANT

## 2021-01-17 PROCEDURE — 250N000013 HC RX MED GY IP 250 OP 250 PS 637: Performed by: INTERNAL MEDICINE

## 2021-01-17 PROCEDURE — 82306 VITAMIN D 25 HYDROXY: CPT | Performed by: STUDENT IN AN ORGANIZED HEALTH CARE EDUCATION/TRAINING PROGRAM

## 2021-01-17 PROCEDURE — 250N000011 HC RX IP 250 OP 636: Performed by: PHYSICIAN ASSISTANT

## 2021-01-17 PROCEDURE — 85652 RBC SED RATE AUTOMATED: CPT | Performed by: PHYSICIAN ASSISTANT

## 2021-01-17 PROCEDURE — 86140 C-REACTIVE PROTEIN: CPT | Performed by: PHYSICIAN ASSISTANT

## 2021-01-17 PROCEDURE — 999N001017 HC STATISTIC GLUCOSE BY METER IP

## 2021-01-17 PROCEDURE — 250N000013 HC RX MED GY IP 250 OP 250 PS 637: Performed by: PHYSICIAN ASSISTANT

## 2021-01-17 PROCEDURE — 83036 HEMOGLOBIN GLYCOSYLATED A1C: CPT | Performed by: STUDENT IN AN ORGANIZED HEALTH CARE EDUCATION/TRAINING PROGRAM

## 2021-01-17 PROCEDURE — 85027 COMPLETE CBC AUTOMATED: CPT | Performed by: PHYSICIAN ASSISTANT

## 2021-01-17 PROCEDURE — 120N000011 HC R&B TRANSPLANT UMMC

## 2021-01-17 PROCEDURE — 36415 COLL VENOUS BLD VENIPUNCTURE: CPT | Performed by: STUDENT IN AN ORGANIZED HEALTH CARE EDUCATION/TRAINING PROGRAM

## 2021-01-17 PROCEDURE — 80048 BASIC METABOLIC PNL TOTAL CA: CPT | Performed by: PHYSICIAN ASSISTANT

## 2021-01-17 PROCEDURE — 99233 SBSQ HOSP IP/OBS HIGH 50: CPT | Performed by: INTERNAL MEDICINE

## 2021-01-17 PROCEDURE — 84134 ASSAY OF PREALBUMIN: CPT | Performed by: STUDENT IN AN ORGANIZED HEALTH CARE EDUCATION/TRAINING PROGRAM

## 2021-01-17 RX ORDER — MULTIPLE VITAMINS W/ MINERALS TAB 9MG-400MCG
1 TAB ORAL DAILY
Status: DISCONTINUED | OUTPATIENT
Start: 2021-01-17 | End: 2021-01-21 | Stop reason: HOSPADM

## 2021-01-17 RX ADMIN — ATORVASTATIN CALCIUM 40 MG: 40 TABLET, FILM COATED ORAL at 09:04

## 2021-01-17 RX ADMIN — MULTIPLE VITAMINS W/ MINERALS TAB 1 TABLET: TAB at 17:11

## 2021-01-17 RX ADMIN — VANCOMYCIN HYDROCHLORIDE 1750 MG: 10 INJECTION, POWDER, LYOPHILIZED, FOR SOLUTION INTRAVENOUS at 18:20

## 2021-01-17 RX ADMIN — CEFEPIME HYDROCHLORIDE 2 G: 2 INJECTION, POWDER, FOR SOLUTION INTRAVENOUS at 13:20

## 2021-01-17 RX ADMIN — ASPIRIN 81 MG CHEWABLE TABLET 81 MG: 81 TABLET CHEWABLE at 09:06

## 2021-01-17 RX ADMIN — VANCOMYCIN HYDROCHLORIDE 1750 MG: 10 INJECTION, POWDER, LYOPHILIZED, FOR SOLUTION INTRAVENOUS at 04:35

## 2021-01-17 RX ADMIN — CEFEPIME HYDROCHLORIDE 2 G: 2 INJECTION, POWDER, FOR SOLUTION INTRAVENOUS at 00:38

## 2021-01-17 ASSESSMENT — ACTIVITIES OF DAILY LIVING (ADL)
ADLS_ACUITY_SCORE: 16
ADLS_ACUITY_SCORE: 15
ADLS_ACUITY_SCORE: 16

## 2021-01-17 NOTE — PROGRESS NOTES
CLINICAL NUTRITION SERVICES - ASSESSMENT NOTE     Nutrition Prescription    RECOMMENDATIONS FOR MDs/PROVIDERS TO ORDER:  -Would recommend checking fat-soluble vitamin labs: ADEK and Zinc in the setting of potential malabsorption, given h/o chronic diarrhea and h/o gastric bypass surgery.   - If pt remains hospitalized > 72 hrs, recommend obtaining calorie counts to help quantify po adequacy  .  Malnutrition Status:    Unable to determine due to unable to complete all parameters of nutrition assessment at this time.      Recommendations already ordered by Registered Dietitian (RD):  - Entered prn order for ONS   - Order daily MVI with mineral supplement  - Change diet to High Consistent CHO diet     Future/Additional Recommendations:  - Monitor po tolerance/intakes, trial of ONS, wt status and need for further diet education/intervention.     REASON FOR ASSESSMENT  Thomas Gonzales is a/an 55 year old male assessed by the dietitian for Provider Order -  evaluate for malnutrition and recommend as appropriate.    NUTRITION HISTORY  Per H&P, reported that has had a normal appetite, no c/o N/V, but struggles with chronic loose stools r/t prior gastric bypass.  Per visit with pt this afternoon, reports he follows a gluten free and fodmap diet and is consuming 3 meals per day (Breakfast consists of a [place] granola bar, Lunch consists of a sandwich with ham or turkey on a gluten free bread and dinner varies). Also snacks on dark chocolates.     CURRENT NUTRITION ORDERS  Diet: Moderate Consistent Carbohydrate (mapped to 1600 to 1900 calories, 4-6 carb choices per meal) which falls short of meeting pt's estimated nutritional needs.  Intake/Tolerance: Good appetite reported and consuming 100% of his meals.    LABS  Hemoglobin A1C:  5.7 (today); within acceptable limits for BS management  Vitamin B1: 89 (WNL) on 6/22/20  Vitamin B12:  869 WNL on 6/22/20  Vitamin B6:  221.7 (High) on 6/22/20  Vitamin D 20 (low end  "normal range) on 6/22/20  Zinc: 74.7 kg (WNL) on 6/22/20    MEDICATIONS  Medications reviewed    GI:  No stool output recorded yet this admission    ANTHROPOMETRICS  Height: 188 cm (6' 2\")  Most Recent Weight: 107 kg (235 lb 12.8 oz) and initial wt of 99.3 kg (219 lbs - lowest wt this admission on 1/16, although question if reported wt per pt)  IBW: 86 kg  BMI: Overweight BMI 25-29.9  Weight History: Pt reports his UBW is around 220 lbs (100 kg). Commented that he has gained wt over past couple months d/t inactivity.  Wt Readings from Last 10 Encounters:   01/16/21 107 kg (235 lb 12.8 oz)   06/22/20 105.1 kg (231 lb 9.6 oz)   02/01/19 95.1 kg (209 lb 11.2 oz)   03/05/18 93 kg (205 lb)   01/29/18 93 kg (205 lb 1 oz)   01/22/18 92.1 kg (203 lb)   07/03/17 91.2 kg (201 lb)   07/03/17 91.2 kg (201 lb 1 oz)   04/06/17 93.9 kg (207 lb)   03/09/17 94.4 kg (208 lb 1 oz)     Dosing Weight: 99 kg (based on lowest wt of 99.3 kg on 1/17)    ASSESSED NUTRITION NEEDS  Estimated Energy Needs:2475- 2970 kcals/day (25 - 30 kcals/kg)  Justification: Maintenance  Estimated Protein Needs: 120-150 grams protein/day (1.2 - 1.5 grams of pro/kg)  Justification: Wound healing  Estimated Fluid Needs: (1 mL/kcal)   Justification: Maintenance    PHYSICAL FINDINGS  See malnutrition section below.  Non-healing wound - right great toe ulcer    MALNUTRITION (Limited d/t pt wearing face mask and street clothing)  % Intake: No decreased intake noted  % Weight Loss: None noted  Subcutaneous Fat Loss: Unable to assess  Muscle Loss: Temporal: Mild  Fluid Accumulation/Edema: Does not meet criteria  Malnutrition Diagnosis: Unable to determine due to unable to complete all parameters of nutrition assessment at this time.    NUTRITION DIAGNOSIS  Increased nutrient needs (protein-calorie) related to concern for malabsorption seconday to h/o chronic diarrhea, gastric bypass surgery and non healing wound as evidenced by current diet order falls short of " meeting pt's estimated nutritional needs and consult received regarding concern for malnutrition.    INTERVENTIONS  Implementation  Nutrition Education: Provided education on good sources of high protein foods, including ONS and a list of the protein content of foods on the hospital menu to use as a guide when ordering meals.  Medical food supplement therapy - provided samples of Beneprotein powder and Fiber packets to trial for acceptance.    Goals  1. Patient to consume >75% of nutritionally adequate meal trays TID, or the equivalent with supplements/snacks  2. Consumes at least 1-2 high protein foods at each meal, plus utilizes Beneprotein powder at each meal as well.     Monitoring/Evaluation  Progress toward goals will be monitored and evaluated per protocol.    Meka Boyle RD,LD  [facility] pager [phone]

## 2021-01-17 NOTE — PLAN OF CARE
"BP (!) 150/71 (BP Location: Right arm)   Pulse 78   Temp 98.5  F (36.9  C) (Oral)   Resp 16   Ht 1.88 m (6' 2\")   Wt 107 kg (235 lb 12.8 oz)   SpO2 96%   BMI 30.27 kg/m      Patient hypertensive on RA, afebrile. No complaints of pain or nausea. Tolerating moderate CHO diet with good appetite. PIV infusing IV antibiotics. Voids spontaneously w/o difficulty. No BM overnight. Right great toe wound. Patient is up independently. Potential plan for MRI of right toe wound.   Will continue with POC and notify MD with changes or concerns.    "

## 2021-01-17 NOTE — PROGRESS NOTES
"  Physician Attestation   I, Dwight Grullon MD, was present with the medical student who participated in the service and in the documentation of the note.  I have verified the history and personally performed the physical exam and medical decision making.  I agree with the assessment and plan of care as documented in the note.      I personally reviewed vital signs, medications, labs and imaging.    {Key findings; - history reviewed. Worsening toe infection  BP (!) 145/68   Pulse 76   Temp 97.8  F (36.6  C)   Resp 16   Ht 1.88 m (6' 2\")   Wt 107 kg (235 lb 12.8 oz)   SpO2 98%   BMI 30.27 kg/m    Gen- pleasant lying in bed  HEENT- NAD, SEVERINO  Neck- supple, no JVD elevation, no thyromegaly  CVS- I+II+ no m/r/g  RS- CTABS  Abdo- soft, no tenderness . No g/r/r  Ext- trace edema   CNS- no focal signs           CT reviewed    Imp: Diabetic toe infection with osteo   - Ct IV Abx for now. A/w C/s and than may consider ID review  - appreciate ortho review. TAMANNA, TCO2 as o/p and podiatry review    Dwight Grullon MD, MD  Date of Service (when I saw the patient): 01/17/21    [facility]     Progress Note - Gold 8 Service        Date of Admission:  1/16/2021    Assessment & Plan       Thomas Gonzales is a 55 year old male admitted on 1/16/2021. He has a past medical history of CAD s/p stenting, HLD, MS, DM type II c/b peripheral neuropathy, intestinal ischemia, gastric bypass c/b strictures and multiple abdominal abscesses, and celiac disease who is admitted with right great toe cellulitis and possible osteomyelitis.    Right Great Toe Cellulitis with possible Osteomyelitis. Injured right big toe 6 weeks PTA (kicked a wall down while doing home renovation). Started on IV vancomycin and zosyn (1/16) in the ED. Patient remains afebrile and hemodynamically stable. Pt has sensation and movement in toe. Erythematous to base of great toe without drainage. CT imaging (as MRI can't " be done due to penile prosthesis) 1/16 showing osteomyelitis in distil phalanx or R great toe as well as nondisplaced intra-articular fracture of distil phalayx R great toe. Wound culture showing probable staph aureus (1/16). Other blood cultures negative as of 1/17.  -Orthopedic consult recs:   - No surgical plans (rec local wound care)   - Vit D, prealbumin, AbA1C   - ABIs, TCO2   - Follow up in outpt podiatry clinic  -Consult Nutrition and ID  - Continue IV vancomycin (1750 mg IV q12h), switch zosyn to cefepime (2g IV q12h)   - Follow wound culture, BCx  - Trend CBC, BMP, CRP     DM Type II c/b Peripheral Neuropathy.  No longer on medication, diet controlled. A1C: 5.7% (1/17). Pt follows with podiatry for outpatient DM peripheral neuropathy care.  - Spot check BG  - Hypoglycemia protocol     Chronic Normocytic Anemia. Baseline Hgb ~11-12. Hx of vitamin B12 deficiency 2/2 prior gastric bypass. No s/s of bleeding on exam. Asymptomatic.   - Trend CBC     Hx of CAD, HLD. S/p stenting of left circumflex in 2015. Asymptomatic.  - Continue ASA 81 mg QD  - Continue atorvastatin 40 mg every day     MS. Felt to be mild. Symptoms primarily consist of fatigue, temperature dysregulation.          Diet: Moderate Consistent CHO Diet    DVT Prophylaxis: Pneumatic Compression Devices  Martínez Catheter: not present  Code Status: No CPR- Do NOT Intubate           Disposition Plan   Expected discharge: 2 - 3 days, recommended to prior living arrangement once antibiotic plan established and pending cultures.  Entered: Andrea Durant 01/17/2021, 1:52 PM       The patient's care was discussed with the Attending Physician, Dr. Grullon.    Andrea Durant  Medical Student  [address]   Please see sign in/sign out for up to date coverage information  ______________________________________________________________________    Interval History   Pt doing very well this morning  with decreased pain at his great R toe. Denies any fever, increased in the R distil leg/ foot/ toe swelling, nausea, vomiting, chest pain, SOB, fever, or chills. He does have some bloating and daily diarrhea, but this is normal for him as a result of extensive GI surgery history.     Data reviewed today: I reviewed all medications, new labs and imaging results over the last 24 hours. I personally reviewed the CT. image(s) showing as noted.    Physical Exam   Vital Signs: Temp: 97.8  F (36.6  C) Temp src: Oral BP: (!) 145/68 Pulse: 76   Resp: 16 SpO2: 98 % O2 Device: None (Room air)    Weight: 235 lbs 12.8 oz  Constitutional: awake, alert, cooperative, no apparent distress, and appears stated age  Eyes: Lids and lashes normal, pupils equal, round and reactive to light, extra ocular muscles intact, sclera clear, conjunctiva normal  Respiratory: No increased work of breathing, good air exchange, clear to auscultation bilaterally, no crackles or wheezing  Cardiovascular: Normal apical impulse, regular rate and rhythm, normal S1 and S2, no S3 or S4, and no murmur noted  Skin: Erythema and swelling of great right toe to base of toe. Toenail intact, but loose. No drainage from 1 cm diameter callous-covered wound on tip of toe. Moderately tender  Musculoskeletal: Movable toes and feet- able to walk. Moderately limited movement of great R toe DP joint, but no other joints affected. Increased edema of R foot up to distil R knee without erythema (with exception of R toe as mentioned above)  Neurologic: Awake, alert, oriented to name, place and time.  Cranial nerves II-XII are grossly intact. Movement of extremities grossly intact. Sensory is intact.  Neuropsychiatric: General: normal, calm and normal eye contact. conversational    Data     Blood culture x2 (1/16): No growth to date  Wound culture (1/16): moderate growth of probable staph aureus    CT Right Great Toe (1/16/21)  IMPRESSION:  1.  Ill-defined cortical destruction  of the distal tuft of the distal phalanx of the right great toe most compatible with osteomyelitis given underlying clinical history.  2.  No other areas suspicious for osteomyelitis. If there are other areas suspicious for osteomyelitis consider MRI which is a much more sensitive evaluation  3.  Marked soft tissue swelling involving the right great toe with diffuse subcutaneous edema elsewhere throughout the right foot. No evidence for peripherally enhancing fluid collection or gas within the soft tissues.  4.  Oblique nondisplaced intra-articular fracture involving the plantar aspect of the proximal portion of the distal phalanx right great toe.  5.  Symmetric marginal erosions involving the articular margin of the proximal phalanx of the right first MTP joint suspicious for gout.    Component      Latest Ref Rng & Units 1/17/2021 1/17/2021           5:53 AM  7:21 AM   Sodium      133 - 144 mmol/L 144    Potassium      3.4 - 5.3 mmol/L 3.6    Chloride      94 - 109 mmol/L 114 (H)    Carbon Dioxide      20 - 32 mmol/L 23    Anion Gap      3 - 14 mmol/L 8    Glucose      70 - 99 mg/dL 101 (H) 99   Urea Nitrogen      7 - 30 mg/dL 18    Creatinine      0.66 - 1.25 mg/dL 0.86    GFR Estimate      >60 mL/min/1.73:m2 >90    GFR Estimate If Black      >60 mL/min/1.73:m2 >90    Calcium      8.5 - 10.1 mg/dL 8.5    WBC      4.0 - 11.0 10e9/L 4.9    RBC Count      4.4 - 5.9 10e12/L 4.21 (L)    Hemoglobin      13.3 - 17.7 g/dL 11.5 (L)    Hematocrit      40.0 - 53.0 % 35.0 (L)    MCV      78 - 100 fl 83    MCH      26.5 - 33.0 pg 27.3    MCHC      31.5 - 36.5 g/dL 32.9    RDW      10.0 - 15.0 % 13.3    Platelet Count      150 - 450 10e9/L 130 (L)    CRP Inflammation      0.0 - 8.0 mg/L 51.0 (H)    Sed Rate      0 - 20 mm/h 16

## 2021-01-17 NOTE — CONSULTS
[facility] Orthopedic Consultation    Thomas Gonzales MRN# [id number]   Age: 55 year old YOB: 1965   Date of Admission:  1/16/2021    Reason for consult: Diabetic foot wound/osteomyelitis   Requesting physician: Dwight Grullon MD          Impression and Recommendation:   Impression:  Thomas Gonzales is a 55 year old male with PMH CAD s/p stenting, HLD, MS, intestinal ischemia, gastric bypass c/b strictures and multiple abdominal abscesses, celiac disease, and DM2 with peripheral neuropathy who has been admitted for evaulation and management of a new diabetic ulcer (Celis stage 1) involving the left great toe. Patient is currently not septic with 0/4 SIRS.     Recomendations:  - No plan for surgical intervention, recommend local wound cares  - Labs: Vitamin D, prealbumin, HbA1c  - Vascular: ABIs, TCO2  - Consults: Nutrition, Infectious disease  - Follow-up in podiatry clinic outpatient    Activity: WBAT RLE in postop shoe. ROM as tolerated.   Splint: Postop shoe for ambulation  Wound Cares/Dressing: Per WOCN team. Daily dressing changes and wound flushing by nursing. Keep wound clean/dry.   DVT PPx: Per primary  Antibiotic: Recommend broad spectrum IV antibiotic for infected ulcer; once clinical picture improves, transition to oral antibiotics.   Labs: Follow inflammatory labs; repeat WBC, CRP q 48 hours until trending down. Obtain baseline current A1C to evaluate glucose management. Albumin/pre-albumin for eval for protein deficiency. Vitamin D deficiency lab.   Imaging: Plain films and CT of R foot obtained. No need for further imaging.  Vascular workup: Recommend ABIs, TCO2. If < 0.8, recommend vascular consultation to eval for potential benefit of lower extremity revascularization.   PT/OT: Work on strengthening, gait training, mobility, and ADLs  Consults: Consider infectious disease consult to aid in antibiotic selection/duration. Vascular surgery as above. Recommend nutrition  consult to eval for nutritional deficiency and consideration of protein/vitamin/mineral supplementation to improve wound healing potential.   Dispo: Per Primary team. No current plans for OR.  Follow Up: Outpatient follow up with Podiatry team in 1-2 weeks after discharge         Chief Complaint:   Right great toe ulcer         History of Present Illness:   This patient is a 55 year old male with PMH CAD s/p stenting, HLD, MS, intestinal ischemia, gastric bypass c/b strictures and multiple abdominal abscesses, celiac disease and DMII with peripheral neuropathy who presents with new right great foot ulcer after he kicked a wall approximate 6 weeks ago and sustained a blister on the distal aspect of the right great toe.  Patient endorsed pain in the toe at that time.  His toe was actually feeling better, but in the last few days he endorsed redness and increased pain in the great toe.  The erythema extended to the foot and he presented to the hospital.  He denies any drainage from the wound.  He does have some pain with ambulation and tenderness to palpation distally.  He denies fever, chills, malaise, chest pain, shortness of breath or cough.    Glycemic Control:  Patient reports good compliance with DM medications. Most recent Hgb A1C on 6/2020 was 5.5.    Wound History:  No previous ulcers in the right foot.  The present ulcer was secondary to an acute trauma sustained approximately 6 weeks ago.     History obtained from patient interview and chart review.        Past Medical History:     Past Medical History:   Diagnosis Date     CAD (coronary artery disease)     S/p stenting of left circumflex     Diabetes mellitus (H)      Multiple sclerosis (H)      Peripheral neuropathy              Past Surgical History:     Past Surgical History:   Procedure Laterality Date     APPENDECTOMY  1/8/2016     CATARACT IOL, RT/LT       CHOLECYSTECTOMY  1/8/2016     penile implant       LEON EN Y BOWEL  1/8/2016    for small bowel  ischemia             Social History:     Social History     Socioeconomic History     Marital status:      Spouse name: None     Number of children: None     Years of education: None     Highest education level: None   Occupational History     None   Social Needs     Financial resource strain: None     Food insecurity     Worry: None     Inability: None     Transportation needs     Medical: None     Non-medical: None   Tobacco Use     Smoking status: Never Smoker     Smokeless tobacco: Never Used   Substance and Sexual Activity     Alcohol use: Yes     Comment: occ     Drug use: No     Sexual activity: Yes     Partners: Female   Lifestyle     Physical activity     Days per week: None     Minutes per session: None     Stress: None   Relationships     Social connections     Talks on phone: None     Gets together: None     Attends [employer] service: None     Active member of club or organization: None     Attends meetings of clubs or organizations: None     Relationship status: None     Intimate partner violence     Fear of current or ex partner: None     Emotionally abused: None     Physically abused: None     Forced sexual activity: None   Other Topics Concern     Parent/sibling w/ CABG, MI or angioplasty before 65F 55M? Not Asked   Social History Narrative     None             Family History:   No family history of anesthesia, bleeding or clotting complications.           Allergies:     Allergies   Allergen Reactions     Shellfish-Derived Products Anaphylaxis     Adhesive Tape      Paper tape is okay     Fructose      Gluten Meal      Celiac     Lactose      Reglan [Metoclopramide] Hives             Medications:   Medication reviewed with patient and in chart.  Anticoagulation: ASA 81 mg  Antibiotics: Vanc/Zosyn          Review of Systems:   10 point review of systems negative other than stated in the HPI.          Physical Exam:     BP (!) 145/68   Pulse 76   Temp 97.8  F (36.6  C)   Resp 16   Ht 1.88 m  "(6' 2\")   Wt 107 kg (235 lb 12.8 oz)   SpO2 98%   BMI 30.27 kg/m    General: awake, alert, cooperative, no apparent distress, appears stated age  HEENT: normocephalic, atraumatic, PERRL, EOMI, no scleral icterus, MMM  Respiratory: breathing non-labored, no wheezing  Cardiovascular: peripheral pulses 2+ and symmetric, capillary refill < 2sec, skin wwp  Skin: no rashes or lesions  Neurological: A&Ox3, CN II-XII grossly intact  Musculoskeletal:  RLE: No gross deformity.  Diffuse swelling about the forefoot.  There is a foot ulcer located right great toe the distal and medial aspect superficially approximately 8 mm x 8 mm. Would classify as Celis 1. No purulent drainage.  Erythema about the great toe extending into the forefoot. No fluctuance or expressible purulence/drainage.  Probing wound reveals no significant cavities or tracts. Negative probe-to-bone. Sensation diminished in stocking pattern distribution in BLE which normal sensation return at level of the mid leg. 5/5 SLR. Fires TA/Gastroc/EHL/FHL with 5/5 strength. Dorsalis pedis and posterior tibial arteries are palpable.          Imaging:   Review of XR R foot films from 1/16/2021 demonstrate decreased density at the distal aspect of the distal phalanx of the right great toe consistent with prior injury versus possible osteomyelitis.  Evidence of prior intra-articular fracture at the plantar medial aspect of the distal phalanx with out significant callus formation.    CT of the right foot from 1/16/2021 demonstrates intra-articular fracture in the plantar aspect of the distal phalanx without significant displacement in appropriate position.  Similar fracture on the medial aspect of the proximal phalanx MTP joint.  Fortifying cortical structure into most distal aspect of distal phalanx with overlying soft tissue swelling consistent with possible prior injury versus osteomyelitis.    Unable to obtain MRI 2/2 penile prosthesis          Laboratory date: "   CBC:  Lab Results   Component Value Date    WBC 4.9 01/17/2021    HGB 11.5 (L) 01/17/2021     (L) 01/17/2021       BMP:  Lab Results   Component Value Date     01/17/2021    POTASSIUM 3.6 01/17/2021    CHLORIDE 114 (H) 01/17/2021    CO2 23 01/17/2021    BUN 18 01/17/2021    CR 0.86 01/17/2021    ANIONGAP 8 01/17/2021    DIONICIO 8.5 01/17/2021     (H) 01/17/2021       Inflammatory Markers:  Lab Results   Component Value Date    WBC 4.9 01/17/2021    CRP 51.0 (H) 01/17/2021    SED 16 01/17/2021       Cultures:  Recent Labs   Lab 01/16/21  1713 01/16/21  1544   CULT No growth after 8 hours No growth after 10 hours  PENDING       Primo Boggs MD   Orthopedic Surgery Resident, PGY-4  [phone]    Attestation:  This patient will be discussed with Dr Fulton

## 2021-01-17 NOTE — PLAN OF CARE
"BP (!) 145/68   Pulse 76   Temp 97.8  F (36.6  C)   Resp 16   Ht 1.88 m (6' 2\")   Wt 107 kg (235 lb 12.8 oz)   SpO2 98%   BMI 30.27 kg/m      0730 - 1930  Reason for admission: Right toe cellulitis.  Neuro: A&Ox4.   Cardiac: SR. VSS.   Respiratory: Sating 98% on RA.  GI/: Adequate urine output.   Diet/appetite: Tolerating regular diet. Eating well.  Activity:  Independent up to chair and bathroom.  Pain: Yani pain.  Skin: No new deficits noted.  LDA's:PIV   Plan: Continue with POC. Notify primary team with changes.  "

## 2021-01-18 ENCOUNTER — APPOINTMENT (OUTPATIENT)
Dept: ULTRASOUND IMAGING | Facility: CLINIC | Age: 56
DRG: 638 | End: 2021-01-18
Attending: INTERNAL MEDICINE
Payer: COMMERCIAL

## 2021-01-18 LAB
ANION GAP SERPL CALCULATED.3IONS-SCNC: 3 MMOL/L (ref 3–14)
BUN SERPL-MCNC: 13 MG/DL (ref 7–30)
CALCIUM SERPL-MCNC: 8.7 MG/DL (ref 8.5–10.1)
CHLORIDE SERPL-SCNC: 117 MMOL/L (ref 94–109)
CO2 SERPL-SCNC: 24 MMOL/L (ref 20–32)
CREAT SERPL-MCNC: 0.8 MG/DL (ref 0.66–1.25)
CRP SERPL-MCNC: 26 MG/L (ref 0–8)
DEPRECATED CALCIDIOL+CALCIFEROL SERPL-MC: 11 UG/L (ref 20–75)
ERYTHROCYTE [DISTWIDTH] IN BLOOD BY AUTOMATED COUNT: 13.1 % (ref 10–15)
GFR SERPL CREATININE-BSD FRML MDRD: >90 ML/MIN/{1.73_M2}
GLUCOSE BLDC GLUCOMTR-MCNC: 109 MG/DL (ref 70–99)
GLUCOSE BLDC GLUCOMTR-MCNC: 113 MG/DL (ref 70–99)
GLUCOSE SERPL-MCNC: 130 MG/DL (ref 70–99)
HCT VFR BLD AUTO: 35.7 % (ref 40–53)
HGB BLD-MCNC: 11.9 G/DL (ref 13.3–17.7)
MCH RBC QN AUTO: 28.1 PG (ref 26.5–33)
MCHC RBC AUTO-ENTMCNC: 33.3 G/DL (ref 31.5–36.5)
MCV RBC AUTO: 84 FL (ref 78–100)
PLATELET # BLD AUTO: 139 10E9/L (ref 150–450)
POTASSIUM SERPL-SCNC: 4.4 MMOL/L (ref 3.4–5.3)
PREALB SERPL IA-MCNC: 12 MG/DL (ref 15–45)
RBC # BLD AUTO: 4.24 10E12/L (ref 4.4–5.9)
SODIUM SERPL-SCNC: 144 MMOL/L (ref 133–144)
VANCOMYCIN SERPL-MCNC: 17.9 MG/L
WBC # BLD AUTO: 4.6 10E9/L (ref 4–11)

## 2021-01-18 PROCEDURE — 36415 COLL VENOUS BLD VENIPUNCTURE: CPT | Performed by: INTERNAL MEDICINE

## 2021-01-18 PROCEDURE — 93922 UPR/L XTREMITY ART 2 LEVELS: CPT

## 2021-01-18 PROCEDURE — 120N000011 HC R&B TRANSPLANT UMMC

## 2021-01-18 PROCEDURE — 250N000011 HC RX IP 250 OP 636: Performed by: INTERNAL MEDICINE

## 2021-01-18 PROCEDURE — 250N000013 HC RX MED GY IP 250 OP 250 PS 637: Performed by: INTERNAL MEDICINE

## 2021-01-18 PROCEDURE — 99222 1ST HOSP IP/OBS MODERATE 55: CPT | Performed by: INTERNAL MEDICINE

## 2021-01-18 PROCEDURE — G0463 HOSPITAL OUTPT CLINIC VISIT: HCPCS

## 2021-01-18 PROCEDURE — 258N000003 HC RX IP 258 OP 636: Performed by: PHYSICIAN ASSISTANT

## 2021-01-18 PROCEDURE — 250N000013 HC RX MED GY IP 250 OP 250 PS 637: Performed by: PHYSICIAN ASSISTANT

## 2021-01-18 PROCEDURE — 250N000011 HC RX IP 250 OP 636: Performed by: PHYSICIAN ASSISTANT

## 2021-01-18 PROCEDURE — 80048 BASIC METABOLIC PNL TOTAL CA: CPT | Performed by: INTERNAL MEDICINE

## 2021-01-18 PROCEDURE — 80202 ASSAY OF VANCOMYCIN: CPT | Performed by: INTERNAL MEDICINE

## 2021-01-18 PROCEDURE — 86140 C-REACTIVE PROTEIN: CPT | Performed by: INTERNAL MEDICINE

## 2021-01-18 PROCEDURE — 93922 UPR/L XTREMITY ART 2 LEVELS: CPT | Mod: 26 | Performed by: RADIOLOGY

## 2021-01-18 PROCEDURE — 85027 COMPLETE CBC AUTOMATED: CPT | Performed by: INTERNAL MEDICINE

## 2021-01-18 PROCEDURE — 99233 SBSQ HOSP IP/OBS HIGH 50: CPT | Performed by: INTERNAL MEDICINE

## 2021-01-18 PROCEDURE — 999N001017 HC STATISTIC GLUCOSE BY METER IP

## 2021-01-18 RX ORDER — BISMUTH SUBSALICYLATE 262 MG/1
262 TABLET, CHEWABLE ORAL
Status: DISCONTINUED | OUTPATIENT
Start: 2021-01-18 | End: 2021-01-18

## 2021-01-18 RX ADMIN — VANCOMYCIN HYDROCHLORIDE 1750 MG: 10 INJECTION, POWDER, LYOPHILIZED, FOR SOLUTION INTRAVENOUS at 05:18

## 2021-01-18 RX ADMIN — CEFEPIME HYDROCHLORIDE 2 G: 2 INJECTION, POWDER, FOR SOLUTION INTRAVENOUS at 01:26

## 2021-01-18 RX ADMIN — MULTIPLE VITAMINS W/ MINERALS TAB 1 TABLET: TAB at 08:19

## 2021-01-18 RX ADMIN — CEFEPIME HYDROCHLORIDE 2 G: 2 INJECTION, POWDER, FOR SOLUTION INTRAVENOUS at 12:27

## 2021-01-18 RX ADMIN — ASPIRIN 81 MG CHEWABLE TABLET 81 MG: 81 TABLET CHEWABLE at 08:19

## 2021-01-18 RX ADMIN — VANCOMYCIN HYDROCHLORIDE 1750 MG: 10 INJECTION, POWDER, LYOPHILIZED, FOR SOLUTION INTRAVENOUS at 18:23

## 2021-01-18 RX ADMIN — ATORVASTATIN CALCIUM 40 MG: 40 TABLET, FILM COATED ORAL at 08:19

## 2021-01-18 ASSESSMENT — ACTIVITIES OF DAILY LIVING (ADL)
ADLS_ACUITY_SCORE: 16

## 2021-01-18 NOTE — PROGRESS NOTES
Brief Orthopedic Note    Discussed patient with podiatry staff, who recommended outpatient follow up in podiatry clinic. Reviewed TAMANNA data, which demonstrates peripheral vascular disease (Right TAMANNA: Non compressible, TBI: 0.72; and Left TAMANNA: Non compressible, TBI: 0.85). Recommend vascular surgery consult given vascular disease. Recommend against aggressive debridement or surgical biopsy due to patient's poor healing potential with PVD and diabetes. May consider IR guided biopsy if bone biopsy desired. Continue with local wound cares per WOCN.    Orthopedics will continue to follow peripherally at this time.     Primo Boggs MD  Orthopaedic Surgery, PGY4  [phone]

## 2021-01-18 NOTE — CONSULTS
"[facility]SULT[facility]     Patient:  Thomas Gonzales   Date of birth 1965, Medical record number [id number]  Date of Visit:  01/18/2021  Date of Admission: 1/16/2021  Consult Requester:Dwight Grullon MD          Assessment and Recommendations:   ASSESSMENT:  1. Diabetic foot ulcer   2. Osteomyelitis of right great toe  3. Cellulitis  4. Type II DM with peripheral neuropathy  5. S/p Castro-en-Y gastric bypass      DISCUSSION:   Thomas Gonzales is a 55 year old male with history significant for CAD s/p stenting, HLD, MS, type II DM c/b peripheral neuropathy, celiac disease, Castro-en-Y gastric bypass c/b strictures and intra-abdominal abscess (2016), who initially presented to urgent care on 1/16/21 with right great toe ulceration and CT c/w osteomyelitis. Reported injuring toe about 6 weeks ago, developed a \"blood blister\" which later unroofed, noted purulent drainage starting around 2-3 days before presentation, then significant erythema and swelling on day of presentation. No fluid collection on imaging, though was non-contrast. Started on vancomycin and Zosyn, then changed to vancomycin and cefepime. Superficial wound cultures with S.aureus x2 strains and S.epi. Unclear utility of this sample as it was a superficial swab and these organisms are part of normal skin augusto. Recommend obtaining deep tissue or bone culture in order to narrow antibiotics as anticipate that patient will need 6 weeks of therapy for acute osteomyelitis. For now, will follow susceptibilities on superficial cultures to determine if coverage for resistant gram positive organisms is needed.       RECOMMENDATION:  1. Continue Vancomycin  2. Continue Cefepime  3. Follow superficial cx's for susceptibilities  4. Recommend obtaining deeper tissue or bone culture if able to help narrow abx    Thank you for this consult. ID will continue to follow.     Patient was discussed with Dr. Mejias.     Mis Alejo, " JUSTIN  Infectious Disease  Pager # [phone]  ________________________________________________________________    Consult Question: osteomyelitis.  Admission Diagnosis: Cellulitis of left lower extremity [L03.116]  Diabetic ulcer of toe of left foot associated with type 2 diabetes mellitus, unspecified ulcer stage (H) [E11.621, L97.529]         History of Present Illness:     Thomas Gonzales is a 55 year old male with history significant for CAD s/p stenting, HLD, MS, type II DM c/b peripheral neuropathy, celiac disease, Castro-en-Y gastric bypass c/b strictures and intra-abdominal abscess (2016), who initially presented to urgent care on 1/16/21 with right great toe ulceration and CT c/w osteomyelitis.    Mr. Gonzales reports that he was working on home renovation projects about 6 weeks ago and stubbed his toe kicking down a wall. He then developed a blood blister and was taking care to keep the area clean, using daily foot soaks, and applying neosporin to the site. During the last week he noted some pus draining from the wound. On Saturday 1/16 the toe became more red and swollen with swelling extending to dorsum of the foot. Redness has essentially resolved and swelling is gradually improving. He has not had any systemic symptoms, though does note some fatigue, which he attributes to being back at work full time after the holidays. No previous injuries or ulcerations to right foot. Denies fever, chills, rigors, sweats, nausea, vomiting, weakness, diarrhea.    Antimicrobials:  Current:  - Cefepime (start 1/17)  - Vancomycin (start 1/16)    Previous:  -Zosyn (1/16)         Review of Systems:   CONSTITUTIONAL:  No fevers or chills  EYES: negative for icterus  ENT:  negative for sore throat  RESPIRATORY:  negative for cough and dyspnea  CARDIOVASCULAR:  negative for chest pain, dyspnea  GASTROINTESTINAL:  negative for nausea, vomiting, diarrhea  GENITOURINARY:  negative for urinary symptoms  MUSCULOSKELETAL:  No myalgias,  arthralgias  INTEGUMENT:  Redness and swelling of right great toe. negative for rash and pruritus  NEURO:  Negative for weakness         Past Medical History:     Past Medical History:   Diagnosis Date     CAD (coronary artery disease)     S/p stenting of left circumflex     Diabetes mellitus (H)      Multiple sclerosis (H)      Peripheral neuropathy             Past Surgical History:     Past Surgical History:   Procedure Laterality Date     APPENDECTOMY  1/8/2016     CATARACT IOL, RT/LT       CHOLECYSTECTOMY  1/8/2016     penile implant       LEON EN Y BOWEL  1/8/2016    for small bowel ischemia            Family History:   Reviewed and non-contributory.   Family History   Problem Relation Age of Onset     Arrhythmia Mother         bradycardia- pacemaker     Coronary Artery Disease Father             Social History:     Social History     Tobacco Use     Smoking status: Never Smoker     Smokeless tobacco: Never Used   Substance Use Topics     Alcohol use: Yes     Comment: occ     History   Sexual Activity     Sexual activity: Yes     Partners: Female            Current Medications:       aspirin  81 mg Oral Daily     atorvastatin  40 mg Oral Daily     bismuth subsalicylate  262 mg Oral 4x Daily AC & HS     ceFEPIme (MAXIPIME) IV  2 g Intravenous Q12H     multivitamin w/minerals  1 tablet Oral Daily     sodium chloride (PF)  3 mL Intracatheter Q8H     vancomycin (VANCOCIN) IV  1,750 mg Intravenous Q12H            Allergies:     Allergies   Allergen Reactions     Shellfish-Derived Products Anaphylaxis     Adhesive Tape      Paper tape is okay     Fructose      Gluten Meal      Celiac     Lactose      Reglan [Metoclopramide] Hives            Physical Exam:   Vitals were reviewed  Patient Vitals for the past 24 hrs:   BP Temp Temp src Pulse Resp SpO2   01/18/21 0752 139/75 98  F (36.7  C) Oral 62 16 96 %   01/17/21 2317 (!) 163/80 98.6  F (37  C) Oral 64 16 97 %   01/17/21 1615 (!) 150/74 98.4  F (36.9  C) Oral 77 16  98 %       Physical Examination:  GENERAL:  Awake, alert, oriented, and in NAD. Sitting up in bed eating lunch.  HEENT:  Head is normocephalic, atraumatic   EYES:  Eyes have anicteric sclerae without conjunctival injection.    ENT:  Oropharynx is moist without exudates or ulcers.   NECK:  Supple.   LUNGS:  Clear to auscultation bilateral.   CARDIOVASCULAR:  Regular rate, +S1/S2, with no murmurs, gallops or rubs.  ABDOMEN:  nondistended, +bowel sounds  SKIN:  No acute rashes.  Line(s) are in place without any surrounding erythema or exudate.  EXTREMITIES: RLE with 1-2+ nonpitting edema of foot, LLE no edema. R great toe with resolved erythema, ulceration on distal toe- see WOC RN photos 1/18/2021.   NEUROLOGIC:  Grossly nonfocal. Active x4 extremities         Laboratory Data:     Inflammatory Markers    Recent Labs   Lab Test 01/18/21  0546 01/17/21  0553 01/16/21  1544   SED  --  16 14   CRP 26.0* 51.0* 67.0*       Hematology Studies    Recent Labs   Lab Test 01/18/21  0546 01/17/21  0553 01/16/21  1713 06/22/20  1106 02/01/19  1402 07/03/17  1126   WBC 4.6 4.9 6.9 4.2 4.1 4.8   ANEU  --   --  5.5  --   --   --    AEOS  --   --  0.1  --   --   --    HGB 11.9* 11.5* 11.9* 13.8 11.9* 11.7*   MCV 84 83 84 84 86 89   * 130* 138* 148* 135* 151       Metabolic Studies     Recent Labs   Lab Test 01/18/21  0546 01/17/21  0553 01/16/21  1544 06/22/20  1106 02/01/19  1402    144 142 142 143   POTASSIUM 4.4 3.6 4.0 4.9 4.8   CHLORIDE 117* 114* 116* 111* 111*   CO2 24 23 21 28 28   BUN 13 18 28 22 17   CR 0.80 0.86 1.10 1.00 0.87   GFRESTIMATED >90 >90 76 85 >90       Hepatic Studies    Recent Labs   Lab Test 01/16/21  1544 06/22/20  1106 02/01/19  1402 05/01/18  0819 11/21/16 08/22/16  0820   BILITOTAL 0.6 0.9 0.5 0.7  --  0.5   ALKPHOS 117 132 151* 130  --  280*   ALBUMIN 3.4 3.6 3.2* 3.4  --  3.2*   AST 36 57* 61* 74*  --  159*   ALT 50 80* 82* 67 88* 180*       Microbiology:  Culture Micro   Date Value Ref  Range Status   01/16/2021 No growth after 2 days  Preliminary   01/16/2021 Moderate growth  Staphylococcus aureus   (A)  Preliminary   01/16/2021 (A)  Preliminary    Moderate growth  Strain 2  Staphylococcus aureus  Susceptibility testing in progress     01/16/2021 (A)  Preliminary    Moderate growth  Staphylococcus epidermidis  Susceptibility testing not routinely done     01/16/2021 Culture in progress  Preliminary   01/16/2021 No growth after 2 days  Preliminary       Urine Studies  No lab results found.    Vancomycin Levels  No lab results found.    Invalid input(s): VANCO    Hepatitis B Testing No lab results found.  Hepatitis C Testing   No results found for: HCVAB, HQTG, HCGENO, HCPCR, HQTRNA, HEPRNA  Respiratory Virus Testing    No results found for: RS, FLUAG          Imaging:     CT Toe right w/o contrast (1/16/21)  IMPRESSION:  1.  Ill-defined cortical destruction of the distal tuft of the distal phalanx of the right great toe most compatible with osteomyelitis given underlying clinical history.  2.  No other areas suspicious for osteomyelitis. If there are other areas suspicious for osteomyelitis consider MRI which is a much more sensitive evaluation.  3.  Marked soft tissue swelling involving the right great toe with diffuse subcutaneous edema elsewhere throughout the right foot. No evidence for peripherally enhancing fluid collection or gas within the soft tissues.  4.  Oblique nondisplaced intra-articular fracture involving the plantar aspect of the proximal portion of the distal phalanx right great toe.  5.  Symmetric marginal erosions involving the articular margin of the proximal phalanx of the right first MTP joint suspicious for gout.  6.  Remainder of findings as detailed above.

## 2021-01-18 NOTE — PROGRESS NOTES
Physician Attestation   I, Dwight Grullon MD, was present with the medical student who participated in the service and in the documentation of the note.  I have verified the history and personally performed the physical exam and medical decision making.  I agree with the assessment and plan of care as documented in the note.      I personally reviewed vital signs, medications and labs.    {Key findings; feels better, redness is decreasing    BMP  Recent Labs   Lab 01/18/21  0546 01/17/21  0553 01/16/21  1544    144 142   POTASSIUM 4.4 3.6 4.0   CHLORIDE 117* 114* 116*   DIONICIO 8.7 8.5 8.6   CO2 24 23 21   BUN 13 18 28   CR 0.80 0.86 1.10   * 101* 121*     CBC  Recent Labs   Lab 01/18/21  0546 01/17/21  0553 01/16/21  1713   WBC 4.6 4.9 6.9   RBC 4.24* 4.21* 4.30*   HGB 11.9* 11.5* 11.9*   HCT 35.7* 35.0* 36.2*   MCV 84 83 84   MCH 28.1 27.3 27.7   MCHC 33.3 32.9 32.9   RDW 13.1 13.3 13.2   * 130* 138*     INR  Recent Labs   Lab 01/16/21  1544   INR 1.18*     LFTs  Recent Labs   Lab 01/16/21  1544   ALKPHOS 117   AST 36   ALT 50   BILITOTAL 0.6   PROTTOTAL 7.0   ALBUMIN 3.4      Inflammatory Markers    Recent Labs   Lab Test 01/18/21  0546 01/17/21  0553 01/16/21  1544   SED  --  16 14   CRP 26.0* 51.0* 67.0*     A/P: Agree with the note. Ct IV Abx as per ID and follow c/s.   - follow TAMANNA (TCO2 as o/p). Podiatry follow up    Dwight Grullon MD, MD  Date of Service (when I saw the patient): 01/18/21    [facility]     Progress Note - Gold 8 Service        Date of Admission:  1/16/2021    Assessment & Plan       Thomas Gonzales is a 55 year old male admitted on 1/16/2021. He has a past medical history of CAD s/p stenting, HLD, MS, DM type II c/b peripheral neuropathy, intestinal ischemia, gastric bypass c/b strictures and multiple abdominal abscesses, and celiac disease who is admitted with right great toe cellulitis and possible osteomyelitis.    Right  Great Toe Cellulitis with possible Osteomyelitis. Injured right big toe 6 weeks PTA (kicked a wall down while doing home renovation). Started on IV vancomycin and zosyn (1/16) in the ED. CT imaging (as MRI can't be done due to penile prosthesis) 1/16 showing osteomyelitis in distil phalanx or R great toe as well as nondisplaced intra-articular fracture of distil phalayx R great toe. Wound culture showing staph aureus (sensitivities pending) and staph epidermidis (1/18). Other blood cultures negative as of 1/18. US TAMANNA doppler came back showing noncompressible, bilateral ABIs and normal TBI (results of TBI can be false due to noncompressible arteries).  -Orthopedic consult recs:   - No surgical plans (rec local wound care)   - Follow up in outpt podiatry clinic  -ID recs:   -continue vanc (1750 mg IV q12h) and cefepime (2g IV q12 h) (1/18 is day 3 of total abx)   - follow cxs   - deeper tissue or bone cx  - Follow wound culture, BCx  - Trend CBC, BMP, CRP     DM Type II c/b Peripheral Neuropathy.  No longer on medication, diet controlled. A1C: 5.7% (1/17). Pt follows with podiatry for outpatient DM peripheral neuropathy care. Gluc (1/18): 113-130  - Spot check BG   - Hypoglycemia protocol     Chronic Normocytic Anemia. Baseline Hgb ~11-12. Hx of vitamin B12 deficiency 2/2 prior gastric bypass. No s/s of bleeding on exam. Asymptomatic.   - Trend CBC     Hx of CAD, HLD. S/p stenting of left circumflex in 2015. Asymptomatic. TAMANNA on 1/18 showing noncompressible arteries bilaterally.  - Continue ASA 81 mg QD  - Continue atorvastatin 40 mg every day     MS. Felt to be mild. Symptoms primarily consist of fatigue, temperature dysregulation.    Vitamin D deficiency. 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 and has been taking a multivitamin at home. Vit D 3 yr ago: 24, 1 yr ago: 20. Vit D  (1/17): 11.       Diet: Moderate Consistent CHO Diet  Snacks/Supplements Adult: Other; per pt's request; With Meals    DVT Prophylaxis: Pneumatic Compression Devices  Martínez Catheter: not present  Code Status: No CPR- Do NOT Intubate           Disposition Plan   Expected discharge: 2 - 3 days, recommended to prior living arrangement once antibiotic plan established and pending cultures.  Entered: Andrea Durant 01/18/2021, 1:30 PM       The patient's care was discussed with the Attending Physician, Dr. Grullon.    Andrea Durant  Medical Student  [address]   Please see sign in/sign out for up to date coverage information  ______________________________________________________________________    Interval History   Pt doing very well this morning with decreased pain at his great R toe. Denies any fever, increased in the R distil leg/ foot/ toe swelling, nausea, vomiting, chest pain, SOB, fever, or chills.     He was able to have a bowel movement yesterday that was not diarrhea (no blood). Pt is unable to take the hospital bismuth subsalicylate (pepto bis) as it contains mannitol which causes more diarrhea. PTA he was taking Kaopectate.    Data reviewed today: I reviewed all medications, new labs and imaging results over the last 24 hours. I personally reviewed the US image(s) showing noncompressible vessels as noted below.    Physical Exam   Vital Signs: Temp: 98.2  F (36.8  C) Temp src: Oral BP: (!) 164/79 Pulse: 64   Resp: 16 SpO2: 99 % O2 Device: None (Room air)    Weight: 235 lbs 12.8 oz  Constitutional: awake, alert, cooperative, no apparent distress, and appears stated age  Eyes: Lids and lashes normal, pupils equal, round and reactive to light, extra ocular muscles intact, sclera clear, conjunctiva normal  Respiratory: No increased work of breathing, good air exchange, clear to auscultation bilaterally, no crackles or  wheezing  Cardiovascular: Normal apical impulse, regular rate and rhythm, normal S1 and S2, no S3 or S4, and no murmur noted  Skin: stable/ lessened erythema and swelling of great right toe to base of toe. Toenail intact. No drainage from 1 cm diameter callous-covered wound on tip of toe. Mildly tender. Wound care removed some of the dried skin in the area.  Musculoskeletal: Movable toes and feet- able to walk. Moderately limited movement of great R toe DP joint, but no other joints affected. Stable edema of R foot up to distil R knee without erythema (with exception of R toe as mentioned above)  Neurologic: Awake, alert, oriented to name, place and time.  Cranial nerves II-XII are grossly intact. Movement of extremities grossly intact. Sensory is intact.  Neuropsychiatric: General: normal, calm and normal eye contact. conversational    Data                                                                      IMPRESSION:  1. RIGHT:       A. Resting TAMANNA is non compressible.       B. Noncompressible arteries may falsely elevate the TBI. TBI is  normal, 0.72.  2. LEFT:       A. Resting TAMANNA is non compressible.       B. Noncompressible arteries may falsely elevate the TBI. TBI is  normal, 0.85.     Guidelines:  TAMANNA Diagnostic Criteria (Based on criteria published in Circulation  2011; 124: 3491-4591):    > 1.4: Non compressible    1.00 - 1.40: Normal    0.91 - 0.99: Borderline    At or below 0.90: Abnormal     TAMANNA Diagnostic Criteria (Based on ACC/AHA guideline 2008):    >/=1.3 - non compressible vessels    1.00  -1.29 - Normal    0.91 - 0.99 - Borderline    0.41 - 0.90 - Mild to moderate PAD    0.00 - 0.40 - Severe PAD    Blood culture x2 (collected 1/16): No growth to date  Wound culture (collected 1/16): moderate growth staph aureus and staph epidermidis    Component      Latest Ref Rng & Units 1/17/2021 1/18/2021   Sodium      133 - 144 mmol/L 144 144   Potassium      3.4 - 5.3 mmol/L 3.6 4.4   Chloride      94 -  109 mmol/L 114 (H) 117 (H)   Carbon Dioxide      20 - 32 mmol/L 23 24   Anion Gap      3 - 14 mmol/L 8 3   Glucose      70 - 99 mg/dL 101 (H) 130 (H)   Urea Nitrogen      7 - 30 mg/dL 18 13   Creatinine      0.66 - 1.25 mg/dL 0.86 0.80   GFR Estimate      >60 mL/min/1.73:m2 >90 >90   GFR Estimate If Black      >60 mL/min/1.73:m2 >90 >90   Calcium      8.5 - 10.1 mg/dL 8.5 8.7   WBC      4.0 - 11.0 10e9/L 4.9 4.6   RBC Count      4.4 - 5.9 10e12/L 4.21 (L) 4.24 (L)   Hemoglobin      13.3 - 17.7 g/dL 11.5 (L) 11.9 (L)   Hematocrit      40.0 - 53.0 % 35.0 (L) 35.7 (L)   MCV      78 - 100 fl 83 84   MCH      26.5 - 33.0 pg 27.3 28.1   MCHC      31.5 - 36.5 g/dL 32.9 33.3   RDW      10.0 - 15.0 % 13.3 13.1   Platelet Count      150 - 450 10e9/L 130 (L) 139 (L)   CRP Inflammation      0.0 - 8.0 mg/L 51.0 (H) 26.0 (H)   Vitamin D Deficiency screening      20 - 75 ug/L 11 (L)

## 2021-01-18 NOTE — CONSULTS
Care Management Initial Consult    General Information  Assessment completed with: Patient,    Type of CM/SW Visit: Offer D/C Planning    Primary Care Provider verified and updated as needed: Yes   Readmission within the last 30 days:        Reason for Consult: discharge planning  Advance Care Planning:            Communication Assessment  Patient's communication style: spoken language (English or Bilingual)    Hearing Difficulty or Deaf: no   Wear Glasses or Blind: yes    Cognitive  Cognitive/Neuro/Behavioral: WDL                      Living Environment:   People in home: spouse     Current living Arrangements: house      Able to return to prior arrangements:         Family/Social Support:  Care provided by: self  Provides care for: no one  Marital Status:   Wife          Description of Support System:           Current Resources:   Skilled Home Care Services:    Community Resources: None  Equipment currently used at home: none  Supplies currently used at home: None    Employment/Financial:  Employment Status:          Financial Concerns: insurance, none           Lifestyle & Psychosocial Needs:        Socioeconomic History     Marital status:      Spouse name: Not on file     Number of children: Not on file     Years of education: Not on file     Highest education level: Not on file     Tobacco Use     Smoking status: Never Smoker     Smokeless tobacco: Never Used   Substance and Sexual Activity     Alcohol use: Yes     Comment: occ     Drug use: No     Sexual activity: Yes     Partners: Female       Functional Status:  Prior to admission patient needed assistance:              Mental Health Status:          Chemical Dependency Status:                Values/Beliefs:  Spiritual, Cultural Beliefs, Yazidism Practices, Values that affect care:                 Additional Information:  This writer called and spoke with Thomas regarding discharge planning. Thomas is aware that he may need IV antibiotics  once cultures of his wound is completed. He has needed IV antibiotics in the past but does not recall the name of the agency he used. This writer explained the referral process to [facility] for benefit check. Thomas agrees to this plan.     [facility] will continue to follow for final plan of care.     Anni Gilmore RN  Float RN Care Coordinator  Pager [phone] (covering pager)     To get in touch with the Weekend & Holiday on call RN Care Coordinator:  Pager:  [phone] / Care Coordinator job code/pager [phone]

## 2021-01-18 NOTE — PLAN OF CARE
"2390-7450  BP (!) 163/80 (BP Location: Right arm)   Pulse 64   Temp 98.6  F (37  C) (Oral)   Resp 16   Ht 1.88 m (6' 2\")   Wt 107 kg (235 lb 12.8 oz)   SpO2 97%   BMI 30.27 kg/m      Neuro: A&Ox4. Very pleasant.   Cardiac: Afebrile. Hypertensive but does not meet parameter to notify provider.      Respiratory: Stable on RA. LS clear with diminished with bases.   Pain and nausea: Denied pain and  nausea.  GI/: Adequate urine output. LBM 1/17/21.  Diet/appetite: Moderate consistent carb diet.   Activity:  Independent up to chair and bathroom.  Skin: No new deficits noted.  LDA's: R PIV infusing Vanco currently.     Plan: Continue with POC. Notify primary team with changes.  "

## 2021-01-18 NOTE — CONSULTS
[facility] Nurse Inpatient Wound Assessment   Reason for consultation: Evaluate and treat  Right great toe wound    Assessment  Right great toe wound due to Diabetic Ulcer  Status: initial assessment    Treatment Plan  Right great toe wounds: Every other day   Cleanse with microklenz and pat dry  Apply dab of Iodosorb to wound bed  Cut small square of adaptic to fit size of wound.   Cover with small primipore.     Orders Written  Recommended provider order: None, at this time (ortho consulted and seen - rec podiatry outpatient at discharge)  [facility] Nurse follow-up plan:weekly  Nursing to notify the Provider(s) and re-consult the [facility] Nurse if wound(s) deteriorates or new skin concern.    Patient History  According to provider note(s):  Thomas Gonzales is a 55 year old male with PMH CAD s/p stenting, HLD, MS, intestinal ischemia, gastric bypass c/b strictures and multiple abdominal abscesses, celiac disease, and DM2 with peripheral neuropathy who has been admitted for evaulation and management of a new diabetic ulcer (Celis stage 1) involving the left great toe. Patient is currently not septic with 0/4 SIRS.    Objective Data  Containment of urine/stool: Continent of bladder and Continent of bowel    Active Diet Order  Orders Placed This Encounter      Moderate Consistent CHO Diet      Output:   I/O last 3 completed shifts:  In: 2800 [P.O.:2800]  Out: 1500 [Urine:1500]    Risk Assessment:   Sensory Perception: 3-->slightly limited  Moisture: 4-->rarely moist  Activity: 3-->walks occasionally  Mobility: 3-->slightly limited  Nutrition: 3-->adequate  Friction and Shear: 3-->no apparent problem  Peng Score: 19                          Labs:   Recent Labs   Lab 01/18/21  0546 01/17/21  0915 01/16/21  1544 01/16/21  1544   ALBUMIN  --   --   --  3.4   PREALB  --  12*  --   --    HGB 11.9*  --    < >  --    INR  --   --   --  1.18*   WBC 4.6  --    < >  --    A1C  --  5.7*  --   --    CRP 26.0*  --    < > 67.0*    < > = values  in this interval not displayed.       Physical Exam  Areas of skin assessed: focused Right great toe    Wound Location:  Right great toe      Date of last photo 1/18/21  Wound History: Pt kicked drywall which left bruise/wound and turned into diabetic foot ulcer.  He states he used foot soaks and neosporin at home until swelling and redness brought him into hospital.  Noted skin peeling around wound area revealing intact skin.  No drainage, no fluctuance    Wound Base: 100 % dry drainage     Palpation of the wound bed: firm      Drainage: none     Description of drainage: dried     Measurements (length x width x depth, in cm) 1.3 x 1.3 x 0 cm   Periwound skin: intact and dry/scaly      Color: normal and consistent with surrounding tissue      Temperature: normal   Odor: none  Pain: denies , none    Interventions  Visual inspection and assessment completed   Wound Care Rationale Provide selective debridement (autolysis) of nonviable tissue  and Provide protection   Wound Care: done per plan of care  Supplies: ordered: iodosorb  Current off-loading measures: Pillows  Current support surface: Standard  Atmos Air mattress  Education provided to: importance of repositioning, plan of care and wound progress  Discussed plan of care with Patient, Nurse and Physician    Yulissa Marr RN CWOCN

## 2021-01-19 LAB
ANION GAP SERPL CALCULATED.3IONS-SCNC: 4 MMOL/L (ref 3–14)
BUN SERPL-MCNC: 11 MG/DL (ref 7–30)
CALCIUM SERPL-MCNC: 8.3 MG/DL (ref 8.5–10.1)
CHLORIDE SERPL-SCNC: 115 MMOL/L (ref 94–109)
CO2 SERPL-SCNC: 24 MMOL/L (ref 20–32)
CREAT SERPL-MCNC: 0.77 MG/DL (ref 0.66–1.25)
CRP SERPL-MCNC: 15 MG/L (ref 0–8)
ERYTHROCYTE [DISTWIDTH] IN BLOOD BY AUTOMATED COUNT: 13 % (ref 10–15)
GFR SERPL CREATININE-BSD FRML MDRD: >90 ML/MIN/{1.73_M2}
GLUCOSE BLDC GLUCOMTR-MCNC: 105 MG/DL (ref 70–99)
GLUCOSE BLDC GLUCOMTR-MCNC: 91 MG/DL (ref 70–99)
GLUCOSE SERPL-MCNC: 142 MG/DL (ref 70–99)
HCT VFR BLD AUTO: 35.6 % (ref 40–53)
HGB BLD-MCNC: 11.7 G/DL (ref 13.3–17.7)
MCH RBC QN AUTO: 27.3 PG (ref 26.5–33)
MCHC RBC AUTO-ENTMCNC: 32.9 G/DL (ref 31.5–36.5)
MCV RBC AUTO: 83 FL (ref 78–100)
PLATELET # BLD AUTO: 140 10E9/L (ref 150–450)
POTASSIUM SERPL-SCNC: 4 MMOL/L (ref 3.4–5.3)
RBC # BLD AUTO: 4.29 10E12/L (ref 4.4–5.9)
SODIUM SERPL-SCNC: 143 MMOL/L (ref 133–144)
WBC # BLD AUTO: 4.9 10E9/L (ref 4–11)

## 2021-01-19 PROCEDURE — 99232 SBSQ HOSP IP/OBS MODERATE 35: CPT | Performed by: STUDENT IN AN ORGANIZED HEALTH CARE EDUCATION/TRAINING PROGRAM

## 2021-01-19 PROCEDURE — 250N000011 HC RX IP 250 OP 636: Performed by: PHYSICIAN ASSISTANT

## 2021-01-19 PROCEDURE — 250N000011 HC RX IP 250 OP 636: Performed by: INTERNAL MEDICINE

## 2021-01-19 PROCEDURE — 120N000011 HC R&B TRANSPLANT UMMC

## 2021-01-19 PROCEDURE — 80048 BASIC METABOLIC PNL TOTAL CA: CPT | Performed by: INTERNAL MEDICINE

## 2021-01-19 PROCEDURE — 250N000013 HC RX MED GY IP 250 OP 250 PS 637: Performed by: INTERNAL MEDICINE

## 2021-01-19 PROCEDURE — 86140 C-REACTIVE PROTEIN: CPT | Performed by: INTERNAL MEDICINE

## 2021-01-19 PROCEDURE — 36415 COLL VENOUS BLD VENIPUNCTURE: CPT | Performed by: INTERNAL MEDICINE

## 2021-01-19 PROCEDURE — 250N000013 HC RX MED GY IP 250 OP 250 PS 637: Performed by: STUDENT IN AN ORGANIZED HEALTH CARE EDUCATION/TRAINING PROGRAM

## 2021-01-19 PROCEDURE — 250N000013 HC RX MED GY IP 250 OP 250 PS 637: Performed by: PHYSICIAN ASSISTANT

## 2021-01-19 PROCEDURE — 258N000003 HC RX IP 258 OP 636: Performed by: PHYSICIAN ASSISTANT

## 2021-01-19 PROCEDURE — 99233 SBSQ HOSP IP/OBS HIGH 50: CPT | Mod: GC | Performed by: STUDENT IN AN ORGANIZED HEALTH CARE EDUCATION/TRAINING PROGRAM

## 2021-01-19 PROCEDURE — 999N001017 HC STATISTIC GLUCOSE BY METER IP

## 2021-01-19 PROCEDURE — 85027 COMPLETE CBC AUTOMATED: CPT | Performed by: INTERNAL MEDICINE

## 2021-01-19 RX ORDER — CHOLECALCIFEROL (VITAMIN D3) 1250 MCG
1250 CAPSULE ORAL
Status: DISCONTINUED | OUTPATIENT
Start: 2021-01-19 | End: 2021-01-21 | Stop reason: HOSPADM

## 2021-01-19 RX ORDER — SULFAMETHOXAZOLE/TRIMETHOPRIM 800-160 MG
1 TABLET ORAL 2 TIMES DAILY
Status: DISCONTINUED | OUTPATIENT
Start: 2021-01-19 | End: 2021-01-19

## 2021-01-19 RX ORDER — SULFAMETHOXAZOLE/TRIMETHOPRIM 800-160 MG
2 TABLET ORAL 2 TIMES DAILY
Status: DISCONTINUED | OUTPATIENT
Start: 2021-01-19 | End: 2021-01-21 | Stop reason: HOSPADM

## 2021-01-19 RX ADMIN — MULTIPLE VITAMINS W/ MINERALS TAB 1 TABLET: TAB at 08:02

## 2021-01-19 RX ADMIN — CIPROFLOXACIN HYDROCHLORIDE 750 MG: 500 TABLET, FILM COATED ORAL at 20:08

## 2021-01-19 RX ADMIN — VANCOMYCIN HYDROCHLORIDE 1750 MG: 10 INJECTION, POWDER, LYOPHILIZED, FOR SOLUTION INTRAVENOUS at 05:58

## 2021-01-19 RX ADMIN — ATORVASTATIN CALCIUM 40 MG: 40 TABLET, FILM COATED ORAL at 08:02

## 2021-01-19 RX ADMIN — SULFAMETHOXAZOLE AND TRIMETHOPRIM 2 TABLET: 800; 160 TABLET ORAL at 20:08

## 2021-01-19 RX ADMIN — ASPIRIN 81 MG CHEWABLE TABLET 81 MG: 81 TABLET CHEWABLE at 08:02

## 2021-01-19 RX ADMIN — CHOLECALCIFEROL CAP 1.25 MG (50000 UNIT) 1250 MCG: 1.25 CAP at 11:25

## 2021-01-19 RX ADMIN — CEFEPIME HYDROCHLORIDE 2 G: 2 INJECTION, POWDER, FOR SOLUTION INTRAVENOUS at 01:06

## 2021-01-19 ASSESSMENT — ACTIVITIES OF DAILY LIVING (ADL)
ADLS_ACUITY_SCORE: 16

## 2021-01-19 ASSESSMENT — MIFFLIN-ST. JEOR: SCORE: 1989.31

## 2021-01-19 NOTE — PLAN OF CARE
Shift: 11:00pm - 7:00am  Current condition: VSS on RA  Neuro: WDL  Cardio: WDL  Respiratory: WDL  GI/: Voiding spontaneously, Last BM 1/19  Skin: Rt Great toe red, swollen, and primapore dry and intact  Diet: Moderate CHO diet  Labs: No Labs  BG: BG 91 at 6am  LDA: Rt PIV at TKO  Mobility: Up ad tamia  Pain: Reports no pain  PRN medications: No PRN's given  Plan of Care: Will continue to monitor and notify the team of any changes.

## 2021-01-19 NOTE — PHARMACY-VANCOMYCIN DOSING SERVICE
Pharmacy Vancomycin Note  Date of Service 2021  Patient's  1965   55 year old, male    Indication: Skin and Soft Tissue Infection, possible osteo  Goal Trough Level: 15-20 mg/L  Day of Therapy: 3  Current Vancomycin regimen:  1750 mg IV q12h    Current estimated CrCl = Estimated Creatinine Clearance: 137.5 mL/min (based on SCr of 0.8 mg/dL).    Creatinine for last 3 days  2021:  3:44 PM Creatinine 1.10 mg/dL  2021:  5:53 AM Creatinine 0.86 mg/dL  2021:  5:46 AM Creatinine 0.80 mg/dL    Recent Vancomycin Levels (past 3 days)  2021:  5:14 PM Vancomycin Level 17.9 mg/L    Vancomycin IV Administrations (past 72 hours)                   vancomycin (VANCOCIN) 1,750 mg in sodium chloride 0.9 % 500 mL intermittent infusion (mg) 1,750 mg New Bag 21 1823     1,750 mg New Bag  0518     1,750 mg New Bag 21 1820     1,750 mg New Bag  0435    vancomycin (VANCOCIN) 2,000 mg in sodium chloride 0.9 % 500 mL intermittent infusion (mg) 2,000 mg New Bag 21 1720                Nephrotoxins and other renal medications (From now, onward)    Start     Dose/Rate Route Frequency Ordered Stop    21 0520  vancomycin (VANCOCIN) 1,750 mg in sodium chloride 0.9 % 500 mL intermittent infusion      1,750 mg  over 2 Hours Intravenous EVERY 12 HOURS 21 1631               Contrast Orders - past 72 hours (72h ago, onward)    Start     Dose/Rate Route Frequency Ordered Stop    21 2030  iopamidol (ISOVUE-370) solution 135 mL      135 mL Intravenous ONCE 21          Interpretation of levels and current regimen:  Trough level is  Therapeutic    Has serum creatinine changed > 50% in last 72 hours: No    Urine output:  good urine output    Renal Function: Stable    Plan:  1.  Continue Current Dose of vancomycin 1750mg IV q12h  2.  Pharmacy will check trough levels as appropriate in 3-5 Days.    3. Serum creatinine levels will be ordered daily for the first  week of therapy and at least twice weekly for subsequent weeks.      Jolynn Duvall RPH        .

## 2021-01-19 NOTE — PLAN OF CARE
"  BP (!) 147/76 (BP Location: Right arm)   Pulse 62   Temp 98.4  F (36.9  C) (Oral)   Resp 16   Ht 1.88 m (6' 2\")   Wt 107 kg (235 lb 12.8 oz)   SpO2 97%   BMI 30.27 kg/m      Time: 0865-3662.  Reason for admission: Right great toe cellulitis and possible osteomyelitis.     VS: VSS on RA with O2 sats in high 90s. Afebrile.   Activity: Up independently, steady on feet. Calls appropriately.   Neuros: A&Ox4. Neuros intact. Denies pain. RLE US completed.   Cardiac: HTN with -160s/70s. HR stable in 60-70s. Denies chest pain.   Respiratory: LS clear but diminished. Denies SOB or TAN. Stable on RA. No cough noted.   GI/: Voiding without difficulty into bedside urinal. No BM on this shift, LBM 1/17. +BS, +gas. Denies nausea or vomiting.   Diet: Moderate CHO diet, tolerating well. BG stable at 113, checks ordered for BID.   Skin: Right great toe wound, CDI, CA. WOC consulted, wound care completed, orders placed.   Lines: Right PIV infusing TKO with IV abx Vanco and Cefepime. Vanco level checked this evening.   Labs: WDL. Vanco level drawn, results pending.     New changes this shift/Plan: VSS on RA, afebrile. Up independently, A&Ox4, denies pain. Voiding well, -BM, tolerating diet, denies nausea. Right great toe dressing CDI, WOC completed wound cares and placed ordered. RLE US completed. Receiving IV abx Cefepime and Vanco via right PIV. Infectious disease consult placed as well as SW consult.   Will continue to monitor & follow POC.   "

## 2021-01-19 NOTE — PLAN OF CARE
0700 to 1530:VSS,pt. is alert and oriented x 4,up independently, denies pain and nausea,LS clear,BS+,reported adequate urinary output and BM this morning,right foot/right great toe swollen and toe is red covered with clean and dry dressing.pt. switched to oral antibiotics today, possible discharge home today.Will continue to monitor.

## 2021-01-19 NOTE — PROGRESS NOTES
"ID progress note:    RECOMMENDATION:  1. Stop Vanc  2. Stop Cefepime  3. Start Bactrim (double strength: 800/160 per tab - two tablets, twice a day)   4. Repeat CRP tomorrow    ASSESSMENT:  1. Diabetic foot ulcer   2. Osteomyelitis of right great toe  3. Cellulitis  4. Type II DM with peripheral neuropathy  5. S/p Castro-en-Y gastric bypass        DISCUSSION:   Thomas Gonzales is a 55 year old male with history significant for CAD s/p stenting, HLD, MS, type II DM c/b peripheral neuropathy, celiac disease, Castro-en-Y gastric bypass c/b strictures and intra-abdominal abscess (2016), who initially presented to urgent care on 1/16/21 with right great toe ulceration and CT c/w osteomyelitis. Reported injuring toe about 6 weeks ago, developed a \"blood blister\" which later unroofed, noted purulent drainage starting around 2-3 days before presentation, then significant erythema and swelling on day of presentation. No fluid collection on imaging, though was non-contrast. Started on vancomycin and Zosyn, then changed to vancomycin and cefepime. Superficial wound cultures with S.aureus x2 strains and S.epi. Unclear utility of this sample as it was a superficial swab and these organisms are part of normal skin augusto.     Unfortunately, deeper wound cultures were not able to be obtained, however patient did improve on regimen of vancomycin and cefepime, and given physiologic substrate would recommend transition to Bactrim for MRSA and gram-positive coverage as well as some gram-negative coverage (of note given patient's significant improvement without anaerobic coverage suspect anaerobic coverage not needed at this time). If patient continues to improve on Bactrim could likely discharge on it however if clinical worsening or deterioration would rebroaden back to vancomycin and cefepime.    Recommendations communicated to primary team.  The patient was seen and discussed with staff attending physician, Dr. Webb.    Andrea" "[facility]  Internal Medicine and Pediatrics, PGY-2  P: [id number]    -------------------------------------    Interval:    -Creatinine stable 0.77  -CRP downtrending from 67 on 1-16 to 15 on 1-19  -White blood cell count stable 4.9 from 4.6  -Thrombocytopenia stable at 140    -Afebrile with T-max of 37  C  -Hypertensive, with other vital signs stable    -Superficial cultures of the wound susceptibility still pending    Vital signs:  Temp: 98.4  F (36.9  C) Temp src: Oral BP: (!) 147/81 Pulse: 67   Resp: 18 SpO2: 97 % O2 Device: None (Room air)   Height: 188 cm (6' 2\") Weight: 108 kg (238 lb)  Estimated body mass index is 30.56 kg/m  as calculated from the following:    Height as of this encounter: 1.88 m (6' 2\").    Weight as of this encounter: 108 kg (238 lb).    Physical Exam  Constitutional:       General: He is not in acute distress.     Appearance: Normal appearance. He is not ill-appearing or diaphoretic.   HENT:      Head: Normocephalic and atraumatic.      Nose: No congestion or rhinorrhea.   Eyes:      General: No scleral icterus.     Conjunctiva/sclera: Conjunctivae normal.   Cardiovascular:      Rate and Rhythm: Normal rate and regular rhythm.      Heart sounds: No friction rub. No gallop.    Pulmonary:      Effort: Pulmonary effort is normal. No respiratory distress.      Breath sounds: No wheezing or rales.   Abdominal:      General: Abdomen is flat.      Palpations: Abdomen is soft.      Tenderness: There is no abdominal tenderness. There is no guarding.   Musculoskeletal:      Comments: Right lower extremity:  First digit of right foot with warmth and erythema extending proximally terminating just prior to MTP joint.  No fluctuance or induration appreciated.  Small bandage covering dated 1/18 over distal apical aspect of tarsal.  Nontender to palpation, however sensation to light touch greatly diminished in lower extremities.   Skin:     General: Skin is warm and dry.      Capillary Refill: Capillary refill " takes less than 2 seconds.   Neurological:      Mental Status: He is alert.   Psychiatric:         Mood and Affect: Mood normal.         Thought Content: Thought content normal.         Judgment: Judgment normal.       Micro  -1/16 superficial wound culture aerobic-staph aureus  -1/16 blood culture NGTD

## 2021-01-19 NOTE — PROGRESS NOTES
ID Interval update:    Superficial cultures now positive for Pseudomonas     Rec:  - Cont bactrim as prior  - Add on Cipro 750 mg Q12 hrs

## 2021-01-19 NOTE — CONSULTS
Patient is a 55 year old male with diabetic foot ulcer with osteomyelitis of the right great toe. ID team requesting IR bone biopsy to focus antimicrobial regimen.     IR declines bone biopsy, as chance of low-no benefit is high. Expect direct wound approach to result in contamination of biopsy tract with known microbes. Alternative tract through non-involved tissue has a high chance of low-no diagnostic yield.    Case discussed with ID team (Dr. Coughlin).    Coy Issa PA-C  Interventional Radiology  [phone] pgr.

## 2021-01-19 NOTE — PROGRESS NOTES
Care Management Follow Up    Length of Stay (days): 3    Expected Discharge Date: 01/19/21     Concerns to be Addressed: care coordination/care conferences, discharge planning     Patient plan of care discussed at interdisciplinary rounds: Yes    Anticipated Discharge Disposition: Home Infusion     Anticipated Discharge Services: None  Anticipated Discharge DME: None    Patient/family educated on Medicare website which has current facility and service quality ratings: yes  Education Provided on the Discharge Plan:    Patient/Family in Agreement with the Plan: yes    Referrals Placed by CM/SW: Internal Clinic Care Coordination, Home Infusion    Additional Information:  Final discharge plan pending final antibiotic plan.      [facility] benefit check completed: Patient has coverage for IV abx through his [employer] plan. Deductible and OOP does not apply so coverage will be at 100%.    1300:  Pt transitioned to oral antibiotics and will discharge home today.  No further needs noted.     Geraldine Longo RN

## 2021-01-19 NOTE — PROGRESS NOTES
[facility]     Progress Note - Gold 8 Service        Date of Admission:  1/16/2021    Assessment & Plan       Thomas Gonzales is a 55 year old male admitted on 1/16/2021. He has a past medical history of CAD s/p stenting, HLD, MS, DM type II c/b peripheral neuropathy, intestinal ischemia, gastric bypass c/b strictures and multiple abdominal abscesses, and celiac disease who is admitted with right great toe cellulitis and possible osteomyelitis. Evaluated by ortho and IR who felt a biopsy was not feasible. After discussion with ID plan to treat with bactrim and levofloxacin.     Right Great Toe Cellulitis with possible Osteomyelitis  Injured right big toe 6 weeks PTA (kicked a wall down while doing home renovation). Started on IV vancomycin and zosyn (1/16) in the ED. CT imaging (as MRI can't be done due to penile prosthesis) 1/16 showing osteomyelitis in distil phalanx or R great toe as well as nondisplaced intra-articular fracture of distil phalayx R great toe. Wound culture showing staph aureus (sensitivities pending), staph epidermidis, and probable pseudomonas (1/19). IR and ortho do not believe that this wound would be a good candidate for bone culture.  - Ortho consulted   - Follow up in outpt podiatry clinic  -ID recs:   -switch from vanc and cefepime to Bactrim DS x2 BID oral to continue on Discharge   - Add Cipro 750mg Q12h (for pseudomonas coverage)   - follow cxs  - Follow wound culture, BCx  - Trend CBC, BMP, CRP     DM Type II c/b Peripheral Neuropathy  No longer on medication, diet controlled. A1C: 5.7% (1/17). Pt follows with podiatry for outpatient DM peripheral neuropathy care. Gluc (1/19): 130s-40s  - Spot check BG   - Hypoglycemia protocol     Chronic Normocytic Anemia  Baseline Hgb ~11-12. Hx of vitamin B12 deficiency 2/2 prior gastric bypass. No s/s of bleeding on exam. Asymptomatic.   - Trend CBC     Hx of CAD, HLD  S/p stenting of left  circumflex in 2015. Asymptomatic. TAMANNA on 1/18 showing noncompressible arteries bilaterally.  - Continue ASA 81 mg QD  - Continue atorvastatin 40 mg every day     MS  Felt to be mild. Symptoms primarily consist of fatigue, temperature dysregulation.    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 and has been taking a multivitamin at home. Vit D 3 yr ago: 24, 1 yr ago: 20. Vit D (1/17): 11. Pt at risk for osteomalacia with low Vit D.   -50,000 U bolus q1week for 6 weeks       Diet: Moderate Consistent CHO Diet  Snacks/Supplements Adult: Other; per pt's request; With Meals    DVT Prophylaxis: Pneumatic Compression Devices  Martínez Catheter: not present  Code Status: No CPR- Do NOT Intubate           Disposition Plan   Expected discharge: Tomorrow, recommended to prior living arrangement once antibiotic plan established and pending cultures.  Entered: Tyler Agosto DO 01/19/2021, 6:40 PM       The patient's care was discussed with the Attending Physician, Dr. Agosto.    Tyler Agosto DO  [address]   Please see sign in/sign out for up to date coverage information  ______________________________________________________________________    Interval History   Pt doing very well this morning with decreased pain at his great R toe. Denies any fever, increased in the R distil leg/ foot/ toe swelling, nausea, vomiting, chest pain, SOB, fever, or chills.     He has no concerns and is excited to get home soon. Four point ROS completed and otherwise negative unless listed above     Data reviewed today: I reviewed all medications, new labs and imaging results over the last 24 hours. I personally reviewed the US image(s) showing noncompressible vessels as noted below.    Physical Exam   Vital Signs: Temp: 98.2  F (36.8   C) Temp src: Oral BP: (!) 148/83 Pulse: 79   Resp: 18 SpO2: 94 % O2 Device: None (Room air)    Weight: 238 lbs 0 oz  Constitutional: awake, alert, cooperative, no apparent distress, and appears stated age  Eyes: Lids and lashes normal, pupils equal, round and reactive to light, extra ocular muscles intact, sclera clear, conjunctiva normal  Respiratory: No increased work of breathing, good air exchange, clear to auscultation bilaterally, no crackles or wheezing  Cardiovascular: Normal apical impulse, regular rate and rhythm, normal S1 and S2, no S3 or S4, and no murmur noted  Skin: stable/ lessened erythema and swelling of great right toe to base of toe. Toenail intact. No drainage from 1 cm diameter callous-covered wound on tip of toe. Mildly tender.  Musculoskeletal: Movable toes and feet- able to walk. Moderately limited movement of great R toe DP joint, but no other joints affected. Stable edema of R foot up to distil R knee without erythema (with exception of R toe as mentioned above)  Neurologic: Awake, alert, oriented to name, place and time.  Cranial nerves II-XII are grossly intact. Movement of extremities grossly intact. Sensory is intact.  Neuropsychiatric: General: normal, calm and normal eye contact. conversational    Data       Blood culture x2 (collected 1/16): No growth to date  Wound culture (collected 1/16): moderate growth staph aureus and staph epidermidis and probably pseudomonas    Component      Latest Ref Rng & Units 1/18/2021 1/19/2021   Sodium      133 - 144 mmol/L 144 143   Potassium      3.4 - 5.3 mmol/L 4.4 4.0   Chloride      94 - 109 mmol/L 117 (H) 115 (H)   Carbon Dioxide      20 - 32 mmol/L 24 24   Anion Gap      3 - 14 mmol/L 3 4   Glucose      70 - 99 mg/dL 130 (H) 142 (H)   Urea Nitrogen      7 - 30 mg/dL 13 11   Creatinine      0.66 - 1.25 mg/dL 0.80 0.77   GFR Estimate      >60 mL/min/1.73:m2 >90 >90   GFR Estimate If Black      >60 mL/min/1.73:m2 >90 >90   Calcium      8.5 -  10.1 mg/dL 8.7 8.3 (L)   WBC      4.0 - 11.0 10e9/L 4.6 4.9   RBC Count      4.4 - 5.9 10e12/L 4.24 (L) 4.29 (L)   Hemoglobin      13.3 - 17.7 g/dL 11.9 (L) 11.7 (L)   Hematocrit      40.0 - 53.0 % 35.7 (L) 35.6 (L)   MCV      78 - 100 fl 84 83   MCH      26.5 - 33.0 pg 28.1 27.3   MCHC      31.5 - 36.5 g/dL 33.3 32.9   RDW      10.0 - 15.0 % 13.1 13.0   Platelet Count      150 - 450 10e9/L 139 (L) 140 (L)   CRP Inflammation      0.0 - 8.0 mg/L 26.0 (H) 15.0 (H)

## 2021-01-20 LAB
ANION GAP SERPL CALCULATED.3IONS-SCNC: 6 MMOL/L (ref 3–14)
BUN SERPL-MCNC: 17 MG/DL (ref 7–30)
CALCIUM SERPL-MCNC: 8.3 MG/DL (ref 8.5–10.1)
CHLORIDE SERPL-SCNC: 114 MMOL/L (ref 94–109)
CO2 SERPL-SCNC: 25 MMOL/L (ref 20–32)
CREAT SERPL-MCNC: 0.9 MG/DL (ref 0.66–1.25)
CRP SERPL-MCNC: 12 MG/L (ref 0–8)
ERYTHROCYTE [DISTWIDTH] IN BLOOD BY AUTOMATED COUNT: 12.9 % (ref 10–15)
GFR SERPL CREATININE-BSD FRML MDRD: >90 ML/MIN/{1.73_M2}
GLUCOSE SERPL-MCNC: 86 MG/DL (ref 70–99)
HCT VFR BLD AUTO: 32.8 % (ref 40–53)
HGB BLD-MCNC: 11.2 G/DL (ref 13.3–17.7)
MCH RBC QN AUTO: 27.8 PG (ref 26.5–33)
MCHC RBC AUTO-ENTMCNC: 34.1 G/DL (ref 31.5–36.5)
MCV RBC AUTO: 81 FL (ref 78–100)
PLATELET # BLD AUTO: 129 10E9/L (ref 150–450)
POTASSIUM SERPL-SCNC: 3.8 MMOL/L (ref 3.4–5.3)
RBC # BLD AUTO: 4.03 10E12/L (ref 4.4–5.9)
SODIUM SERPL-SCNC: 145 MMOL/L (ref 133–144)
WBC # BLD AUTO: 4.2 10E9/L (ref 4–11)

## 2021-01-20 PROCEDURE — 250N000013 HC RX MED GY IP 250 OP 250 PS 637: Performed by: INTERNAL MEDICINE

## 2021-01-20 PROCEDURE — 99207 PR CDG-MDM COMPONENT: MEETS MODERATE - UP CODED: CPT | Performed by: STUDENT IN AN ORGANIZED HEALTH CARE EDUCATION/TRAINING PROGRAM

## 2021-01-20 PROCEDURE — 85027 COMPLETE CBC AUTOMATED: CPT | Performed by: STUDENT IN AN ORGANIZED HEALTH CARE EDUCATION/TRAINING PROGRAM

## 2021-01-20 PROCEDURE — 99232 SBSQ HOSP IP/OBS MODERATE 35: CPT | Mod: GC | Performed by: STUDENT IN AN ORGANIZED HEALTH CARE EDUCATION/TRAINING PROGRAM

## 2021-01-20 PROCEDURE — 99232 SBSQ HOSP IP/OBS MODERATE 35: CPT | Performed by: STUDENT IN AN ORGANIZED HEALTH CARE EDUCATION/TRAINING PROGRAM

## 2021-01-20 PROCEDURE — 120N000011 HC R&B TRANSPLANT UMMC

## 2021-01-20 PROCEDURE — 80048 BASIC METABOLIC PNL TOTAL CA: CPT | Performed by: STUDENT IN AN ORGANIZED HEALTH CARE EDUCATION/TRAINING PROGRAM

## 2021-01-20 PROCEDURE — 250N000013 HC RX MED GY IP 250 OP 250 PS 637: Performed by: STUDENT IN AN ORGANIZED HEALTH CARE EDUCATION/TRAINING PROGRAM

## 2021-01-20 PROCEDURE — 86140 C-REACTIVE PROTEIN: CPT | Performed by: STUDENT IN AN ORGANIZED HEALTH CARE EDUCATION/TRAINING PROGRAM

## 2021-01-20 PROCEDURE — 36415 COLL VENOUS BLD VENIPUNCTURE: CPT | Performed by: STUDENT IN AN ORGANIZED HEALTH CARE EDUCATION/TRAINING PROGRAM

## 2021-01-20 PROCEDURE — 250N000013 HC RX MED GY IP 250 OP 250 PS 637: Performed by: PHYSICIAN ASSISTANT

## 2021-01-20 RX ADMIN — SULFAMETHOXAZOLE AND TRIMETHOPRIM 2 TABLET: 800; 160 TABLET ORAL at 08:12

## 2021-01-20 RX ADMIN — CIPROFLOXACIN HYDROCHLORIDE 750 MG: 500 TABLET, FILM COATED ORAL at 19:53

## 2021-01-20 RX ADMIN — SULFAMETHOXAZOLE AND TRIMETHOPRIM 2 TABLET: 800; 160 TABLET ORAL at 19:52

## 2021-01-20 RX ADMIN — MULTIPLE VITAMINS W/ MINERALS TAB 1 TABLET: TAB at 08:12

## 2021-01-20 RX ADMIN — CIPROFLOXACIN HYDROCHLORIDE 750 MG: 500 TABLET, FILM COATED ORAL at 08:12

## 2021-01-20 RX ADMIN — ATORVASTATIN CALCIUM 40 MG: 40 TABLET, FILM COATED ORAL at 08:12

## 2021-01-20 RX ADMIN — ASPIRIN 81 MG CHEWABLE TABLET 81 MG: 81 TABLET CHEWABLE at 08:12

## 2021-01-20 ASSESSMENT — ACTIVITIES OF DAILY LIVING (ADL)
ADLS_ACUITY_SCORE: 16

## 2021-01-20 ASSESSMENT — MIFFLIN-ST. JEOR: SCORE: 1993.85

## 2021-01-20 NOTE — PLAN OF CARE
Vitals: stable room air.   Neuro : oriented x 4.   Blood glucose: twice daily, morning labs and bedtime. No sliding scale   Pain/nausea: denies  Diet: CHO, good appetite.   Lines: PIV RUE saline locked.   : voiding well, not saving.   GI: x 1 today.   Drains: NA  Skin: RLE wound CDI, was changed yesterday, due changed tomorrow ( every other day).   Mobility: up ad tamia.   Plan : antibiotic was changed to bactrim, will monitor for improvement.   Continue to monitor.

## 2021-01-20 NOTE — PLAN OF CARE
"BP (!) 144/75 (BP Location: Right arm)   Pulse 64   Temp 98.1  F (36.7  C) (Oral)   Resp 16   Ht 1.88 m (6' 2\")   Wt 108.4 kg (239 lb)   SpO2 97%   BMI 30.69 kg/m    REASON FOR ADMIT: cellulitis of r. Big toe     VS: VSS on room air  NEURO: calm, cooperative and pleasant   BG/LABS: 2 sugar checks per day  Pain/Nausea: denies pain and nausea   Diet: consistent carb   LDA/IVFs: PIV saline locked   GI/: voids adequately and no BM overnight  Skin: R. Big toe wound dressing to be changed this AM   Mobility: up independently and ambulates in halls   Plan: potential discharge today     Will continue to monitor and update team with any changes    "

## 2021-01-20 NOTE — PROGRESS NOTES
[facility]     Progress Note - Gold 8 Service        Date of Admission:  1/16/2021    Assessment & Plan       Thomas Gonzales is a 55 year old male admitted on 1/16/2021. He has a past medical history of CAD s/p stenting, HLD, MS, DM type II c/b peripheral neuropathy, intestinal ischemia, gastric bypass c/b strictures and multiple abdominal abscesses, and celiac disease who is admitted with right great toe cellulitis and possible osteomyelitis. Evaluated by ortho and IR who felt a biopsy was not feasible. After discussion with ID plan to treat with bactrim and levofloxacin.     Right Great Toe Cellulitis with possible Osteomyelitis  Injured right big toe 6 weeks PTA (kicked a wall down while doing home renovation). Started on IV vancomycin and zosyn (1/16) in the ED. CT imaging (as MRI can't be done due to penile prosthesis) 1/16 showing osteomyelitis in distil phalanx or R great toe as well as nondisplaced intra-articular fracture of distil phalayx R great toe. Wound culture showing staph aureus (sensitivities pending), staph epidermidis, and probable pseudomonas along with Acinetobacter. IR and ortho do not believe that this wound would be a good candidate for bone culture. CRP continues to trend down and pt remains afebrile.  - Ortho consulted   - Follow up in outpt podiatry clinic  -ID recs:    - Bactrim DS x2 BID oral to continue on Discharge   - Add Cipro 750mg Q12h (for pseudomonas coverage)   - follow cxs   -Wait for sensitivities on newly grown acinetobacter  - Follow wound culture, BCx  - Trend CBC, BMP, CRP     DM Type II c/b Peripheral Neuropathy  No longer on medication, diet controlled. A1C: 5.7% (1/17). Pt follows with podiatry for outpatient DM peripheral neuropathy care. Gluc (1/19): 130s-40s  - Spot check BG   - Hypoglycemia protocol     Chronic Normocytic Anemia  Baseline Hgb ~11-12. Hx of vitamin B12 deficiency 2/2 prior gastric bypass. No s/s of  bleeding on exam. Asymptomatic.   - Trend CBC     Hx of CAD, HLD  S/p stenting of left circumflex in 2015. Asymptomatic. TAMANNA on 1/18 showing noncompressible arteries bilaterally.  - Continue ASA 81 mg QD  - Continue atorvastatin 40 mg every day     MS  Felt to be mild. Symptoms primarily consist of fatigue, temperature dysregulation.    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 and has been taking a multivitamin at home. Vit D 3 yr ago: 24, 1 yr ago: 20. Vit D (1/17): 11. Pt at risk for osteomalacia with low Vit D.   -50,000 U bolus q1week for 6 weeks       Diet: Moderate Consistent CHO Diet  Snacks/Supplements Adult: Other; per pt's request; With Meals    DVT Prophylaxis: Pneumatic Compression Devices  Martínez Catheter: not present  Code Status: No CPR- Do NOT Intubate           Disposition Plan   Expected discharge: Tomorrow, recommended to prior living arrangement once antibiotic plan established and pending cultures.  Entered: Andrea Durant 01/20/2021, 4:43 PM       The patient's care was discussed with the Attending Physician, Dr. Agosto.    DO Daniele Estrella [address] [place] [address]   Please see sign in/sign out for up to date coverage information  ______________________________________________________________________    Interval History   Pt doing very well this morning with decreased pain at his great R toe. Denies any fever, increased in the R distil leg/ foot/ toe swelling, nausea, vomiting, chest pain, SOB, fever, or chills.     He has no concerns and is excited to get home soon. Given new culture findings, he is agreeable to wait until sensitivities come back so that he can be on a good abx regimen given his history of medical complications.    Four point ROS completed and otherwise negative unless  listed above     Data reviewed today: I reviewed all medications, new labs and imaging results over the last 24 hours.    Physical Exam   Vital Signs: Temp: 97.9  F (36.6  C) Temp src: Oral BP: (!) 140/75 Pulse: 62   Resp: 18 SpO2: 96 % O2 Device: None (Room air)    Weight: 239 lbs 0 oz  Constitutional: awake, alert, cooperative, no apparent distress, and appears stated age  Eyes: Lids and lashes normal, pupils equal, round and reactive to light, extra ocular muscles intact, sclera clear, conjunctiva normal  Respiratory: No increased work of breathing, good air exchange, clear to auscultation bilaterally, no crackles or wheezing  Cardiovascular: Normal apical impulse, regular rate and rhythm, normal S1 and S2, no S3 or S4, and no murmur noted  Skin: stable/ lessened erythema and swelling of great right toe to base of toe. Toenail intact. No drainage from 1 cm diameter callous-covered wound on tip of toe. Mildly tender.  Musculoskeletal: Movable toes and feet- able to walk. Moderately limited movement of great R toe DP joint, but no other joints affected. Stable edema of R foot up to distil R knee without erythema (with exception of R toe as mentioned above)  Neurologic: Awake, alert, oriented to name, place and time.  Cranial nerves II-XII are grossly intact. Movement of extremities grossly intact. Sensory is intact.  Neuropsychiatric: General: normal, calm and normal eye contact. conversational    Data       Blood culture x2 (collected 1/16): No growth to date  Wound culture (collected 1/16): moderate growth staph aureus and staph epidermidis, pseudomonas, and acinetobacter      Component      Latest Ref Rng & Units 1/19/2021 1/20/2021   Sodium      133 - 144 mmol/L 143 145 (H)   Potassium      3.4 - 5.3 mmol/L 4.0 3.8   Chloride      94 - 109 mmol/L 115 (H) 114 (H)   Carbon Dioxide      20 - 32 mmol/L 24 25   Anion Gap      3 - 14 mmol/L 4 6   Glucose      70 - 99 mg/dL 142 (H) 86   Urea Nitrogen      7 - 30 mg/dL  11 17   Creatinine      0.66 - 1.25 mg/dL 0.77 0.90   GFR Estimate      >60 mL/min/1.73:m2 >90 >90   GFR Estimate If Black      >60 mL/min/1.73:m2 >90 >90   Calcium      8.5 - 10.1 mg/dL 8.3 (L) 8.3 (L)   WBC      4.0 - 11.0 10e9/L 4.9 4.2   RBC Count      4.4 - 5.9 10e12/L 4.29 (L) 4.03 (L)   Hemoglobin      13.3 - 17.7 g/dL 11.7 (L) 11.2 (L)   Hematocrit      40.0 - 53.0 % 35.6 (L) 32.8 (L)   MCV      78 - 100 fl 83 81   MCH      26.5 - 33.0 pg 27.3 27.8   MCHC      31.5 - 36.5 g/dL 32.9 34.1   RDW      10.0 - 15.0 % 13.0 12.9   Platelet Count      150 - 450 10e9/L 140 (L) 129 (L)   CRP Inflammation      0.0 - 8.0 mg/L 15.0 (H) 12.0 (H)

## 2021-01-20 NOTE — PLAN OF CARE
Vitals: stable room air.   Neuro : oriented x 4.   Blood glucose: twice daily, morning labs and bedtime. No sliding scale.T2DM.   Pain/nausea: denies  Diet: CHO, good appetite.   Lines: PIV RUE saline locked.   : voiding well, not saving.   GI: No BM, last BM 1/19.  Drains: NA  Skin: RLE wound CDI. changed today, due change 1/22. ( every other day).   Mobility: up ad tamia.   Plan : antibiotic was changed to bactrim and cipro PO, will monitor for improvement.   Continue to monitor.

## 2021-01-20 NOTE — PROGRESS NOTES
ID progress note:    Recommendations:  -Continue Bactrim 2 tablets double strength twice daily  -Continue ciprofloxacin 750 mg twice a day  -Follow PsA sensitivities  -Follow Acinetobacter sensitivities  -Anticipate a prolonged course of antibiotics (4 to 6 weeks)      Assessment:  -Osteomyelitis/ Diabetic foot wound, polymicrobial     Discussion:  With superficial wound culture only to guide antibiotic selection it is difficult to comment on the significance of Acinetobacter (non-Baumannii spp. subtype) which can be a colonizer.  Acinetobacter empiric therapy requires high-dose broad-spectrum antibiotics (for example: cefepime 2 g every 8 hours) which, of note, while this patient did receive cefepime, he was never dosed at that high of dosing.   Given his continued clinical improvement, as well as improvement in biochemical laboratory data, okay to await culture sensitivities at this time.  Hopeful culture will be sensitive to fluoroquinolones and could continue ciprofloxacin is already on. If not, would consider IV therapy.     The patient was seen and discussed with staff attending physician, Dr. Webb.  Andrea Coughlin  Internal Medicine and Pediatrics, PGY-2  P: [id number]      Attestation:    I have reviewed today's vital signs, medications, labs and imaging.  Floor time: 25 minutes, Face-to-face time: 10 minutes, Total time: 35 minutes    Thomas Gonzales was seen in the hospital by Claudia Webb MD on 01/20/2021, with Dr. Coughlin. I reviewed the history & exam. Assessment and plan were jointly made.  I agree with and have edited the note and plan of care.      Claudia Webb MD.  ID Staff  [id number]        Interval update  -Acinetobacter with light growth on superficial culture, sensitivities pending  -Pseudomonas with light growth on superficial culture, sensitivities in process  -CRP continues to downtrend from 15->12    Subjective:  No fever sweats or chills  Tolerating p.o. well  Continues to have  "formed stools  No foot pain  Walk around the unit multiple times yesterday  Thinks relatively stable appearance of right big toe at this time    Objective:  Vital signs:  Temp: 98.3  F (36.8  C) Temp src: Oral BP: 135/68 Pulse: 68   Resp: 18 SpO2: 99 % O2 Device: None (Room air)   Height: 188 cm (6' 2\") Weight: 108.4 kg (239 lb)      General: Well-nourished adult male resting on room air no apparent distress  HEENT: Normocephalic atraumatic, no scleral icterus, no conjunctivitis or injection, mucous membranes moist  Pulmonary: On room air, no increased work of breathing  Cardiovascular: Appears warm and well-perfused trace pedal edema  MSK: Right great toe with erythema extending to MTP nontender no fluctuance or induration appreciated, relatively stable appearance compared to examination on 1-19    Superficial Culture 1/16:  PsA  Staph A  Staph A  Staph Epi  Acinetobacter      "

## 2021-01-20 NOTE — PLAN OF CARE
"/68 (BP Location: Right arm)   Pulse 68   Temp 98.3  F (36.8  C) (Oral)   Resp 18   Ht 1.88 m (6' 2\")   Wt 108.4 kg (239 lb)   SpO2 99%   BMI 30.69 kg/m       REASON FOR ADMIT: cellulitis of r. Big toe     VS: AVSS on room air.   NEURO: A&O x4. Calm and cooperative.   BG/LABS: BG 86   Pain/Nausea: denies pain and nausea   Diet: consistent carb, denies nausea.   LDA/IVFs: PIV saline locked   GI/: voids adequately-not saving. No BM this shift.   Skin: R. Big toe wound dressing to be changed this AM  Plan: potential discharge today-awaiting for culture sensitivity   Mobility: up ad tamia and ambulates in halls     Will continue to monitor and update team with any changes    "

## 2021-01-21 ENCOUNTER — PATIENT OUTREACH (OUTPATIENT)
Dept: CARE COORDINATION | Facility: CLINIC | Age: 56
End: 2021-01-21

## 2021-01-21 VITALS
BODY MASS INDEX: 30.67 KG/M2 | HEIGHT: 74 IN | DIASTOLIC BLOOD PRESSURE: 76 MMHG | SYSTOLIC BLOOD PRESSURE: 138 MMHG | RESPIRATION RATE: 16 BRPM | WEIGHT: 239 LBS | HEART RATE: 62 BPM | OXYGEN SATURATION: 97 % | TEMPERATURE: 98 F

## 2021-01-21 LAB
BACTERIA SPEC CULT: ABNORMAL
Lab: ABNORMAL
SPECIMEN SOURCE: ABNORMAL

## 2021-01-21 PROCEDURE — 250N000013 HC RX MED GY IP 250 OP 250 PS 637: Performed by: STUDENT IN AN ORGANIZED HEALTH CARE EDUCATION/TRAINING PROGRAM

## 2021-01-21 PROCEDURE — 250N000013 HC RX MED GY IP 250 OP 250 PS 637: Performed by: INTERNAL MEDICINE

## 2021-01-21 PROCEDURE — 250N000013 HC RX MED GY IP 250 OP 250 PS 637: Performed by: PHYSICIAN ASSISTANT

## 2021-01-21 PROCEDURE — 99232 SBSQ HOSP IP/OBS MODERATE 35: CPT | Mod: GC | Performed by: STUDENT IN AN ORGANIZED HEALTH CARE EDUCATION/TRAINING PROGRAM

## 2021-01-21 PROCEDURE — 99239 HOSP IP/OBS DSCHRG MGMT >30: CPT | Performed by: STUDENT IN AN ORGANIZED HEALTH CARE EDUCATION/TRAINING PROGRAM

## 2021-01-21 RX ORDER — SULFAMETHOXAZOLE/TRIMETHOPRIM 800-160 MG
2 TABLET ORAL 2 TIMES DAILY
Qty: 84 TABLET | Refills: 1 | Status: SHIPPED | OUTPATIENT
Start: 2021-01-21 | End: 2021-03-05

## 2021-01-21 RX ORDER — CIPROFLOXACIN 750 MG/1
750 TABLET, FILM COATED ORAL EVERY 12 HOURS
Qty: 84 TABLET | Refills: 0 | Status: SHIPPED | OUTPATIENT
Start: 2021-01-21 | End: 2021-02-17

## 2021-01-21 RX ADMIN — SULFAMETHOXAZOLE AND TRIMETHOPRIM 2 TABLET: 800; 160 TABLET ORAL at 08:14

## 2021-01-21 RX ADMIN — ATORVASTATIN CALCIUM 40 MG: 40 TABLET, FILM COATED ORAL at 08:14

## 2021-01-21 RX ADMIN — MULTIPLE VITAMINS W/ MINERALS TAB 1 TABLET: TAB at 08:14

## 2021-01-21 RX ADMIN — CIPROFLOXACIN HYDROCHLORIDE 750 MG: 500 TABLET, FILM COATED ORAL at 08:14

## 2021-01-21 RX ADMIN — ASPIRIN 81 MG CHEWABLE TABLET 81 MG: 81 TABLET CHEWABLE at 08:14

## 2021-01-21 ASSESSMENT — ACTIVITIES OF DAILY LIVING (ADL)
ADLS_ACUITY_SCORE: 15

## 2021-01-21 NOTE — PROGRESS NOTES
"ID progress note:    RECOMMENDATION:  1. Continue Bactrim (double strength: 800/160 per tab- two tablets, PO, q12 hours) + ciprofloxacin (750mg, PO, q12 hours) to complete a 6 week course for osteomyelitis (End date: 2/27/21)  2. ID follow-up is scheduled for 2/17/21 with Dr. Lewis.     ASSESSMENT:  1. Diabetic foot ulcer   2. Osteomyelitis of right great toe  3. Cellulitis  4. Type II DM with peripheral neuropathy  5. S/p Castro-en-Y gastric bypass        DISCUSSION:   Thomas Gonzales is a 55 year old male with history significant for CAD s/p stenting, HLD, MS, type II DM c/b peripheral neuropathy, celiac disease, Castro-en-Y gastric bypass c/b strictures and intra-abdominal abscess (2016), who initially presented to urgent care on 1/16/21 with right great toe ulceration and CT c/w osteomyelitis. Reported injuring toe about 6 weeks ago, developed a \"blood blister\" which later unroofed, noted purulent drainage starting around 2-3 days before presentation, then significant erythema and swelling on day of presentation. No fluid collection on imaging, though was non-contrast. Started on vancomycin and Zosyn, then changed to vancomycin and cefepime. Superficial wound cultures with MSSA, S.epi, Pseudomonas (Pan-S to typical pseudomonas antibiotics including FQs) and Acinetobacter (Pan-S, S-TMP/SMX, S-Ciprofloxacin). Unclear utility of this sample as it was a superficial swab but will cover these organisms and general skin augusto with discharge antibiotic regimen.     Unfortunately, deeper wound cultures were not able to be obtained, We rrecommend transition to Bactrim for MRSA and gram-positive coverage plus ciprofloxacin for Pseudomonas and gram negative coverage. Given patient's significant improvement in CRP without anaerobic coverage suspect anaerobic coverage is not needed at this time.     Samuel Moya MD  ID Fellow, PGY-5  [phone]  -------------------------------------    Interval:     - Patient " "reports feeling \"great\" and ready to go home. No f/c. No new toe pain.    -Afebrile  -Hypertensive, with other vital signs stable    -Acinetobacter and PsA from wound swab are both S-ciprofloxacin    Vital signs:  Temp: 98  F (36.7  C) Temp src: Oral BP: 138/76 Pulse: 62   Resp: 16 SpO2: 97 % O2 Device: None (Room air)   Height: 188 cm (6' 2\") Weight: 108.4 kg (239 lb)  Estimated body mass index is 30.69 kg/m  as calculated from the following:    Height as of this encounter: 1.88 m (6' 2\").    Weight as of this encounter: 108.4 kg (239 lb).    Physical Exam  Constitutional:       General: He is not in acute distress.     Appearance: Normal appearance. He is not ill-appearing or diaphoretic.   HENT:      Head: Normocephalic and atraumatic.      Nose: No congestion or rhinorrhea.   Eyes:      General: No scleral icterus.     Conjunctiva/sclera: Conjunctivae normal.   Cardiovascular:      Rate and Rhythm: Normal rate and regular rhythm.      Heart sounds: No friction rub. No gallop.    Pulmonary:      Effort: Pulmonary effort is normal. No respiratory distress.      Breath sounds: No wheezing or rales.   Abdominal:      General: Abdomen is flat.      Palpations: Abdomen is soft.      Tenderness: There is no abdominal tenderness. There is no guarding.   Musculoskeletal:      Comments: Right lower extremity:  First digit of right foot with warmth and erythema extending proximally terminating just prior to MTP joint.  No fluctuance or induration appreciated.  Small bandage covering dated 1/18 over distal apical aspect of tarsal.  Nontender to palpation, however sensation to light touch greatly diminished in lower extremities.   Skin:     General: Skin is warm and dry.      Capillary Refill: Capillary refill takes less than 2 seconds.   Neurological:      Mental Status: He is alert.   Psychiatric:         Mood and Affect: Mood normal.         Thought Content: Thought content normal.         Judgment: Judgment normal. "       QTc = 404 (6/2020)    Micro  -1/16 superficial wound culture aerobic-staph aureus (MSSA), S. Epi, PsA (Pan-S to typical Pseudomonas ABX including S-cipro), Aceintobacter spp (non-baumannii) (Pan-S including S-cipro, S-TMP/SMX)  -1/16 blood culture NGTD

## 2021-01-21 NOTE — PLAN OF CARE
[id number] - [id number]    56 y/o M admitted on 1/16/20 w/right great toe cellulitis & suspected osteomyelitis. Past medical hx of DM2, CAD s/p stenting, hyperlipidemia, peripheral neuropathy, intestinal ischemia, gastric bypass complicated by strictures & abdominal abscesses, MS, celiac disease.      Vitals:Temp: 98.2  F (36.8  C) Temp src: Oral BP: 129/66 Pulse: 60   Resp: 18 SpO2: 96 % O2 Device: None (Room air)    Pain/Nausea: Denies both pain & nausea.   Diet: Moderate consistent carb.   BG orders:   LDA: PIV, saline locked.   GI: WDL.   : WDL.   Skin: UTV R great toe, dressing clean, dry, intact & changed in PM on 1/20/21.   Neuro: A&Ox4.   Mobility: Independent.   Plan: Discharge to home on PO antibiotics. F/u w/outpatient podiatry.     Of note this shift: Patient ambulated in sanchez several times. No changes to gait noted. He reported no pain.

## 2021-01-21 NOTE — DISCHARGE SUMMARY
[facility]   Hospitalist Discharge Summary      Date of Admission:  1/16/2021  Date of Discharge:  1/21/2021  Discharging Provider: Tyler Agosto DO  Discharge Team: Hospitalist Service, Gold 8    Discharge Diagnoses      Right Great Toe Cellulitis with possible Osteomyelitis  DM Type II c/b Peripheral Neuropathy  Chronic Normocytic Anemia  Hx of CAD, HLD  MS  Vitamin D deficiency    Follow-ups Needed After Discharge     Pt is to be seen for follow up on 1/28 and 2/17. These visits have been scheduled.      Unresulted Labs Ordered in the Past 30 Days of this Admission     Date and Time Order Name Status Description    1/16/2021 1714 Blood culture Preliminary     1/16/2021 1546 Blood Culture ONE site Preliminary       These results will be followed up by ID clinic    Discharge Disposition   Discharged to home  Condition at discharge: Stable    Hospital Course     Thomas Gonzales is a 55 year old male admitted on 1/16/2021. He has a past medical history of CAD s/p stenting, HLD, MS, DM type II c/b peripheral neuropathy, intestinal ischemia, gastric bypass c/b strictures and multiple abdominal abscesses, and celiac disease who is admitted with right great toe cellulitis and possible osteomyelitis. Evaluated by ortho and IR who felt a biopsy was not feasible. After discussion with ID plan to treat with bactrim and Ciprofloxacin.      Right Great Toe Cellulitis with possible Osteomyelitis  Injured right big toe 6 weeks PTA (kicked a wall down while doing home renovation). Started on IV vancomycin and zosyn (1/16) in the ED. CT imaging (as MRI can't be done due to penile prosthesis) 1/16 showing osteomyelitis in distil phalanx or R great toe as well as nondisplaced intra-articular fracture of distil phalayx R great toe. Wound culture showing staph aureus (sensitivities pending), staph epidermidis, and probable pseudomonas along with Acinetobacter (sensitive to current  flouroquinolones). IR and ortho do not believe that this wound would be a good candidate for bone culture. Pt remained afebrile for remainder of hospital stay and CRP continued to trend down. Expect about a 4-6 week course of abx as to be determined at outpatient follow ups.  - Ortho consulted              - Follow up in outpt podiatry clinic  -ID recs:               - Bactrim DS x2 BID oral to continue on Discharge              - Add Cipro 750mg Q12h (for pseudomonas coverage)              - follow cxs   -Follow up outpatient              - Follow wound culture, BCx     DM Type II c/b Peripheral Neuropathy  No longer on medication, diet controlled. A1C: 5.7% (1/17). Pt follows with podiatry for outpatient DM peripheral neuropathy care. Gluc (1/19): 130s-40s     Chronic Normocytic Anemia  Baseline Hgb ~11-12. Hx of vitamin B12 deficiency 2/2 prior gastric bypass. No s/s of bleeding on exam. Walking without assistance or symptoms multiple times per day.     Hx of CAD, HLD  S/p stenting of left circumflex in 2015. Asymptomatic. TAMANNA on 1/18 showing noncompressible arteries bilaterally.  - Continue ASA 81 mg QD  - Continue atorvastatin 40 mg every day     MS  Felt to be mild. Symptoms primarily consist of fatigue, temperature dysregulation. Managed outpatient.     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 and has been taking a multivitamin at home. Vit D 3 yr ago: 24, 1 yr ago: 20. Vit D (1/17): 11. Pt at risk for osteomalacia with low Vit D.   -50,000 U bolus q1week for 6 weeks    Consultations This Hospital Stay   PHARMACY TO DOSE [facility]  ORTHOPAEDIC SURGERY ADULT/PEDS IP CONSULT  NUTRITION SERVICES ADULT IP CONSULT  MEDICATION HISTORY IP PHARMACY CONSULT  WOUND OSTOMY CONTINENCE NURSE  IP CONSULT  INFECTIOUS DISEASE GENERAL ADULT IP CONSULT  CARE MANAGEMENT /  SOCIAL WORK IP CONSULT  INTERVENTIONAL RADIOLOGY ADULT/PEDS IP CONSULT  PHYSICAL THERAPY ADULT IP CONSULT  INFECTIOUS DISEASE GENERAL ADULT IP CONSULT    Code Status   No CPR- Do NOT Intubate    Time Spent on this Encounter   I, Tyler Agosto DO, personally saw the patient today and spent greater than 30 minutes discharging this patient.       Tyler Agosto DO  ANA LUISA [facility] [address] [address]  Phone: [phone]  ______________________________________________________________________    Physical Exam   Vital Signs: Temp: 98  F (36.7  C) Temp src: Oral BP: 138/76 Pulse: 62   Resp: 16 SpO2: 97 % O2 Device: None (Room air)    Weight: 239 lbs 0 oz  General Appearance: Conversational, alert/ oriented, stable  Respiratory: Clear lungs bilaterally to auscultation  Cardiovascular: Normal s1/2, normal r/r, without murmur or rub  GI:  Abdomen soft, nontender  Skin: R great toe wrapped well. Stable/ slightly lessened edema on R foot- nontender.  Other: Pt is conversational and without any movement difficulties.        Primary Care Physician   Anni Serrano    Discharge Orders      Reason for your hospital stay    You were in the hospital with osteomyelitis which is an infection of your bone     Follow Up and recommended labs and tests    Infectious disease will call you to schedule a follow up appointment     Activity    Your activity upon discharge: activity as tolerated     Discharge Instructions    You are taking bactrim for your infection two tabs twice a day   You are taking levofloxacin for your infection 1 tab twice a day   Please call ID clinic or your PCP if your toe begins to get worse     No CPR- Do NOT Intubate     Diet    Follow this diet upon discharge: Orders Placed This Encounter      Snacks/Supplements Adult: Other; per pt's request; With Meals      Moderate Consistent CHO Diet       Significant Results and Procedures   Most Recent 6 Bacteria Isolates From  Any Culture (See EPIC Reports for Culture Details):  Recent Labs   Lab Test 01/16/21  1713 01/16/21  1544   CULT No growth after 5 days Moderate growth  Staphylococcus aureus  *  Moderate growth  Strain 2  Staphylococcus aureus  *  Moderate growth  Staphylococcus epidermidis  Susceptibility testing not routinely done  *  Light growth  Acinetobacter species, not baumannii  Identification obtained by MALDI-TOF mass spectrometry research use only database. Test   characteristics determined and verified by the Infectious Diseases Diagnostic Laboratory   ([facility]) [place], [place].  *  Light growth  Pseudomonas aeruginosa  *  No growth after 5 days     Most Recent Hemoglobin A1c:  Recent Labs   Lab Test 01/17/21  0915   A1C 5.7*     Most Recent ESR & CRP:  Recent Labs   Lab Test 01/20/21  0550 01/17/21  0553 01/17/21  0553   SED  --   --  16   CRP 12.0*   < > 51.0*    < > = values in this interval not displayed.     Most Recent Anemia Panel:  Recent Labs   Lab Test 01/20/21  0550 06/22/20  1106 06/22/20  1106 07/03/17  1128 07/03/17  1128 07/03/17  1126   WBC 4.2   < > 4.2   < >  --  4.8   HGB 11.2*   < > 13.8   < >  --  11.7*   HCT 32.8*   < > 40.6   < >  --  35.4*   MCV 81   < > 84   < >  --  89   *   < > 148*   < >  --  151   IRON  --   --   --   --   --  100   IRONSAT  --   --   --   --   --  34   FEB  --   --   --   --   --  292   ADE  --   --  70  --   --  193   B12  --   --  869   < >  --  1,283*   FOLIC  --   --   --   --  57.2  --     < > = values in this interval not displayed.   ,   Results for orders placed or performed during the hospital encounter of 01/16/21   CT Toe Right w/o & w Contrast    Narrative    EXAM: CT TOE RIGHT W/O AND W CONTRAST  LOCATION: [facility]  DATE/TIME: 1/16/2021 8:39 PM    INDICATION: Right great toe wound for 6 weeks with x-rays concerning for possible osteomyelitis. History of injury to the right toe with increasing right great toe redness, swelling and  pain.  COMPARISON: Right great toe radiographs 01/16/2021.  TECHNIQUE: Noncontrast. Axial, sagittal and coronal thin-section reconstruction. Dose reduction techniques were used.   CONTRAST: 135 mL [id number]-[id number].    FINDINGS:   Osseous structures/joint space: Ill-defined cortical destruction of the distal tuft of the distal phalanx of the right great toe most compatible with osteomyelitis (series 4, image 36 and series 5, image 29). No other areas suspicious for osteomyelitis.   Oblique intra-articular nondisplaced fracture involving the plantar aspect of the lateral portion of the distal phalanx of the right great toe (series 5, image 24). Symmetric appearing erosive process involving the articular margins of the proximal   phalanx of the right great toe at the right first MTP joint where there are areas of ill-defined small calcific densities with small erosions along the margin suspicious for the possibility of underlying gout. Sesamoids intact. Metatarsals intact.   Remaining visualized phalanges intact.    Muscles/soft tissues: Diffuse soft tissue thickening and edema throughout the right great toe. Postcontrast imaging demonstrates no evidence for peripherally enhancing fluid collection or abscess. No significant soft tissue gas. Marked subcutaneous edema   involving the visualized aspects of the right and left lower extremities.      Impression    IMPRESSION:  1.  Ill-defined cortical destruction of the distal tuft of the distal phalanx of the right great toe most compatible with osteomyelitis given underlying clinical history.    2.  No other areas suspicious for osteomyelitis. If there are other areas suspicious for osteomyelitis consider MRI which is a much more sensitive evaluation.    3.  Marked soft tissue swelling involving the right great toe with diffuse subcutaneous edema elsewhere throughout the right foot. No evidence for peripherally enhancing fluid collection or gas within the soft tissues.    4.   Oblique nondisplaced intra-articular fracture involving the plantar aspect of the proximal portion of the distal phalanx right great toe.    5.  Symmetric marginal erosions involving the articular margin of the proximal phalanx of the right first MTP joint suspicious for gout.    6.  Remainder of findings as detailed above.     US TAMANNA Doppler No Exercise    Narrative    RESTING TAMANNA, TBI, AND FIRST DIGIT PPG 1/18/2021 11:41 AM    CLINICAL HISTORY: RIGHT toe diabetic ulcer. Evaluate TAMANNA.     COMPARISONS: None available    REFERRING PROVIDER: INO ESCALONA    TECHNIQUE: Bilateral TAMANNA, TBI, and first digit PPG obtained.    FINDINGS:  RIGHT:       Brachial: 114 mmHg       Ankle (PT): > 254 mmHg - non compressible        Ankle (DP): > 254 mmHg - non compressible        Digit: 104 mmHg         TAMANNA: Non compressible       TBI: 0.72         1st Digit PPG: Normal    LEFT:       Brachial: 114 mmHg       Ankle (PT): > 254 mmHg - non compressible        Ankle (DP): 229 mmHg        Digit: 123 mmHg         TAMANNA: Non compressible       TBI: 0.85         1st Digit PPG: Normal      Impression    IMPRESSION:  1. RIGHT:       A. Resting TAMANNA is non compressible.       B. Noncompressible arteries may falsely elevate the TBI. TBI is  normal, 0.72.    2. LEFT:       A. Resting TAMANNA is non compressible.       B. Noncompressible arteries may falsely elevate the TBI. TBI is  normal, 0.85.    Guidelines:    TAMANNA Diagnostic Criteria (Based on criteria published in Circulation  2011; 124: 1786-0180):    > 1.4: Non compressible    1.00 - 1.40: Normal    0.91 - 0.99: Borderline    At or below 0.90: Abnormal    TAMANNA Diagnostic Criteria (Based on ACC/AHA guideline 2008):    >/=1.3 - non compressible vessels    1.00  -1.29 - Normal    0.91 - 0.99 - Borderline    0.41 - 0.90 - Mild to moderate PAD    0.00 - 0.40 - Severe PAD    CLAUDIA MCMILLAN MD       Discharge Medications   Discharge Medication List as of 1/21/2021  9:49 AM      START taking these  medications    Details   ciprofloxacin (CIPRO) 750 MG tablet Take 1 tablet (750 mg) by mouth every 12 hours, Disp-84 tablet, R-0, E-Prescribe      sulfamethoxazole-trimethoprim (BACTRIM DS) 800-160 MG tablet Take 2 tablets by mouth 2 times daily, Disp-84 tablet, R-1, E-Prescribe         CONTINUE these medications which have NOT CHANGED    Details   !! ACE NOT PRESCRIBED, INTENTIONAL, Disp-0 each, R-0, No Print Out      !! ACE/ARB NOT PRESCRIBED, INTENTIONAL, Reason ACE/ARB was Not Prescribed: Symptomatic hypotension not due to excessive diuresisNo Print Out      ASPIRIN ADULT LOW STRENGTH 81 MG EC tablet Take 1 tablet (81 mg) by mouth daily, Disp-90 tablet, R-0, E-Prescribe      atorvastatin (LIPITOR) 40 MG tablet Take 1 tablet (40 mg) by mouth daily, Disp-90 tablet, R-0, E-Prescribe      bismuth subsalicylate 262 MG TABS Take 4-5 tablets by mouth daily , Historical      Cholecalciferol (VITAMIN D) 2000 UNITS CAPS Take 2,000 Units by mouth daily , Historical      multivitamin, therapeutic with minerals (MULTI-VITAMIN) TABS Take 1 tablet by mouth daily, Historical      order for DME Equipment being ordered: custom orthotic inserts for shoesDisp-1 each, R-1, Local Print      Probiotic Product (PROBIOTIC DAILY) CAPS Take 1 capsule by mouth daily , Historical       !! - Potential duplicate medications found. Please discuss with provider.        Allergies   Allergies   Allergen Reactions     Shellfish-Derived Products Anaphylaxis     Adhesive Tape      Paper tape is okay     Fructose      Gluten Meal      Celiac     Lactose      Reglan [Metoclopramide] Hives

## 2021-01-21 NOTE — PLAN OF CARE
"/76   Pulse 62   Temp 98  F (36.7  C)   Resp 16   Ht 1.88 m (6' 2\")   Wt 108.4 kg (239 lb)   SpO2 97%   BMI 30.69 kg/m    4900-3239  Pt left with orders to go home. PIV was removed. Vitally stable, AVS was given to patient and questions were answered.  "

## 2021-01-22 LAB
BACTERIA SPEC CULT: NO GROWTH
BACTERIA SPEC CULT: NO GROWTH
SPECIMEN SOURCE: NORMAL
SPECIMEN SOURCE: NORMAL

## 2021-01-22 NOTE — PROGRESS NOTES
[facility] Health: Post-Discharge Note  SITUATION                                                      Admission:    Admission Date: 01/16/21   Reason for Admission: Right Great Toe Cellulitis with possible Osteomyelitis  Discharge:   Discharge Date: 01/21/21  Discharge Diagnosis: Right Great Toe Cellulitis with possible Osteomyelitis    BACKGROUND                                                      Thomas Gonzales is a 55 year old male admitted on 1/16/2021. He has a past medical history of CAD s/p stenting, HLD, MS, DM type II c/b peripheral neuropathy, intestinal ischemia, gastric bypass c/b strictures and multiple abdominal abscesses, and celiac disease who is admitted with right great toe cellulitis and possible osteomyelitis. Evaluated by ortho and IR who felt a biopsy was not feasible. After discussion with ID plan to treat with bactrim and Ciprofloxacin.     ASSESSMENT      Discharge Assessment  Patient reports symptoms are: Improved  Does the patient have all of their medications?: Yes  Does patient know what their new medications are for?: Yes  Does patient have a follow-up appointment scheduled?: Yes  Does patient have any other questions or concerns?: No    Post-op  Did the patient have surgery or a procedure: No  Fever: No  Chills: No  Eating & Drinking: eating and drinking without complaints/concerns  Bowel Function: normal  Urinary Status: voiding without complaint/concerns      PLAN                                                      Outpatient Plan:    - Follow up in outpt podiatry clinic  -ID recs:               - Bactrim DS x2 BID oral to continue on Discharge              - Add Cipro 750mg Q12h (for pseudomonas coverage)              - follow cxs              -Follow up outpatient              - Follow wound culture, BCx    Future Appointments   Date Time Provider Department Center   1/28/2021  4:00 PM Norm West DPM [facility]   2/17/2021  1:30 PM Debbie  MD [facility]           Una Connolly

## 2021-01-28 ENCOUNTER — OFFICE VISIT (OUTPATIENT)
Dept: WOUND CARE | Facility: CLINIC | Age: 56
End: 2021-01-28
Payer: COMMERCIAL

## 2021-01-28 VITALS — WEIGHT: 239 LBS | BODY MASS INDEX: 30.67 KG/M2 | HEIGHT: 74 IN

## 2021-01-28 DIAGNOSIS — L97.512 RIGHT FOOT ULCER, WITH FAT LAYER EXPOSED (H): Primary | ICD-10-CM

## 2021-01-28 DIAGNOSIS — E11.42 DIABETIC POLYNEUROPATHY ASSOCIATED WITH TYPE 2 DIABETES MELLITUS (H): ICD-10-CM

## 2021-01-28 PROCEDURE — 99203 OFFICE O/P NEW LOW 30 MIN: CPT | Performed by: PODIATRIST

## 2021-01-28 ASSESSMENT — PAIN SCALES - GENERAL: PAINLEVEL: NO PAIN (0)

## 2021-01-28 ASSESSMENT — MIFFLIN-ST. JEOR: SCORE: 1993.85

## 2021-01-28 NOTE — NURSING NOTE
"Chief Complaint   Patient presents with     WOUND CARE     right great tow ulceration       Vitals:    01/28/21 1542   Weight: 239 lb   Height: 6' 2\"       Body mass index is 30.69 kg/m .    Rani Wiggins MA    "

## 2021-01-28 NOTE — PROGRESS NOTES
Date of Service: 1/28/2021    Chief Complaint:   Chief Complaint   Patient presents with     WOUND CARE     right great tow ulceration        HPI: Thomas is a 55 year old male who presents today for further evaluation of right foot wound with osteomyelitis. He was recently in [facility] for this toe. He relates that he kicked a wall about 2 months ago.  The wound started after this. Ont the day that we went to the ED, he noticed redness and swelling to the toe. During his time in house, bone bx was requested, but was not performed. He was discharged on Bactrim DS x 2 BID and Cipro 750 Q12. MRI cannot be performed 2/2 penile prosthesis. ABIs were performed while in house. He is a diabetic and has MS. He is not on MS meds. Relates no pain to the toe. He does think that the nail on the right hallux is going to fall off.     Review of Systems: No n/v/d/f/c/ns/sob/cp    PMH:   Past Medical History:   Diagnosis Date     CAD (coronary artery disease)     S/p stenting of left circumflex     Diabetes mellitus (H)      Multiple sclerosis (H)      Peripheral neuropathy        PSxH:   Past Surgical History:   Procedure Laterality Date     APPENDECTOMY  1/8/2016     CATARACT IOL, RT/LT       CHOLECYSTECTOMY  1/8/2016     penile implant       LEON EN Y BOWEL  1/8/2016    for small bowel ischemia       Allergies: Shellfish-derived products, Adhesive tape, Fructose, Gluten meal, Lactose, and Reglan [metoclopramide]    SH:   Social History     Socioeconomic History     Marital status:      Spouse name: Not on file     Number of children: Not on file     Years of education: Not on file     Highest education level: Not on file   Occupational History     Not on file   Social Needs     Financial resource strain: Not on file     Food insecurity     Worry: Not on file     Inability: Not on file     Transportation needs     Medical: Not on file     Non-medical: Not on file   Tobacco Use     Smoking status: Never Smoker     Smokeless  "tobacco: Never Used   Substance and Sexual Activity     Alcohol use: Yes     Comment: occ     Drug use: No     Sexual activity: Yes     Partners: Female   Lifestyle     Physical activity     Days per week: Not on file     Minutes per session: Not on file     Stress: Not on file   Relationships     Social connections     Talks on phone: Not on file     Gets together: Not on file     Attends Restorationist service: Not on file     Active member of club or organization: Not on file     Attends meetings of clubs or organizations: Not on file     Relationship status: Not on file     Intimate partner violence     Fear of current or ex partner: Not on file     Emotionally abused: Not on file     Physically abused: Not on file     Forced sexual activity: Not on file   Other Topics Concern     Parent/sibling w/ CABG, MI or angioplasty before 65F 55M? Not Asked   Social History Narrative     Not on file       FH:   Family History   Problem Relation Age of Onset     Arrhythmia Mother         bradycardia- pacemaker     Coronary Artery Disease Father        Objective:  Data Unavailable Data Unavailable Data Unavailable Data Unavailable 6' 2\" 239 lbs 0 oz  CRP Inflammation   Date Value Ref Range Status   01/20/2021 12.0 (H) 0.0 - 8.0 mg/L Final     Lab Results   Component Value Date    WBC 4.2 01/20/2021     Lab Results   Component Value Date    RBC 4.03 01/20/2021     Lab Results   Component Value Date    HGB 11.2 01/20/2021     Lab Results   Component Value Date    HCT 32.8 01/20/2021     No components found for: MCT  Lab Results   Component Value Date    MCV 81 01/20/2021     Lab Results   Component Value Date    MCH 27.8 01/20/2021     Lab Results   Component Value Date    MCHC 34.1 01/20/2021     Lab Results   Component Value Date    RDW 12.9 01/20/2021     Lab Results   Component Value Date     01/20/2021     Last Comprehensive Metabolic Panel:  Sodium   Date Value Ref Range Status   01/20/2021 145 (H) 133 - 144 mmol/L " Final     Potassium   Date Value Ref Range Status   01/20/2021 3.8 3.4 - 5.3 mmol/L Final     Chloride   Date Value Ref Range Status   01/20/2021 114 (H) 94 - 109 mmol/L Final     Carbon Dioxide   Date Value Ref Range Status   01/20/2021 25 20 - 32 mmol/L Final     Anion Gap   Date Value Ref Range Status   01/20/2021 6 3 - 14 mmol/L Final     Glucose   Date Value Ref Range Status   01/20/2021 86 70 - 99 mg/dL Final     Urea Nitrogen   Date Value Ref Range Status   01/20/2021 17 7 - 30 mg/dL Final     Creatinine   Date Value Ref Range Status   01/20/2021 0.90 0.66 - 1.25 mg/dL Final     GFR Estimate   Date Value Ref Range Status   01/20/2021 >90 >60 mL/min/[1.73_m2] Final     Comment:     Non  GFR Calc  Starting 12/18/2018, serum creatinine based estimated GFR (eGFR) will be   calculated using the Chronic Kidney Disease Epidemiology Collaboration   (CKD-EPI) equation.       Calcium   Date Value Ref Range Status   01/20/2021 8.3 (L) 8.5 - 10.1 mg/dL Final     Hemoglobin A1C   Date Value Ref Range Status   01/17/2021 5.7 (H) 0 - 5.6 % Final     Comment:     Normal <5.7% Prediabetes 5.7-6.4%  Diabetes 6.5% or higher - adopted from ADA   consensus guidelines.     06/22/2020 5.5 0 - 5.6 % Final     Comment:     Normal <5.7% Prediabetes 5.7-6.4%  Diabetes 6.5% or higher - adopted from ADA   consensus guidelines.     02/01/2019 5.4 0 - 5.6 % Final     Comment:     Normal <5.7% Prediabetes 5.7-6.4%  Diabetes 6.5% or higher - adopted from ADA   consensus guidelines.           PT pulses are not palpable 2/2 pitting edema Faint sound heard with doppler. DP pulses are 2/4 bilaterally. CRT is instant. Positive pedal hair.   Gross sensation is diminished bilaterally. Protective sensation is intact as demonstrated with a 5.07G monofilament.  Equinus is noted bilaterally. No pain with active or passive ROM of the ankle, MTJ, 1st ray, or halluces bilaterally,. Hallus malleus noted to the right. Right hallux nail is  thickened, dystrophic, and lytic at the distal 1/2.      A diabetic pessure wound is noted at right  distal hallux measuring 1.7cm x 1.7cm x 0.1cm.    Celis Classification: 2    Wound base: Yellow/Slough    Edges: slightly hyperkeratotic    Drainage: small/serous    Odor: no    Undermining: no    Bone Exposure: No    Clinical Signs of Infection: No    After obtaining patient consent, the wound was irrigated with copious amounts of saline.     Barriers to Healing: Neuropathy, hallux malleus.     Treatment Plan: Iodofoam and Optifoam. DH2 shoe.     Pt Ability to Follow Plan: Unknown.       TAMANNA IMPRESSION:  1. RIGHT:       A. Resting TAMANNA is non compressible.       B. Noncompressible arteries may falsely elevate the TBI. TBI is  normal, 0.72.     2. LEFT:       A. Resting TAMANNA is non compressible.       B. Noncompressible arteries may falsely elevate the TBI. TBI is  normal, 0.85.     Guidelines:     TAMANNA Diagnostic Criteria (Based on criteria published in Circulation  2011; 124: 8227-2569):    > 1.4: Non compressible    1.00 - 1.40: Normal    0.91 - 0.99: Borderline    At or below 0.90: Abnormal     TAMANNA Diagnostic Criteria (Based on ACC/AHA guideline 2008):    >/=1.3 - non compressible vessels    1.00  -1.29 - Normal    0.91 - 0.99 - Borderline    0.41 - 0.90 - Mild to moderate PAD    0.00 - 0.40 - Severe PAD     CLAUDIA MCMILLAN MD    IMPRESSION:  1.  Ill-defined cortical destruction of the distal tuft of the distal phalanx of the right great toe most compatible with osteomyelitis given underlying clinical history.     2.  No other areas suspicious for osteomyelitis. If there are other areas suspicious for osteomyelitis consider MRI which is a much more sensitive evaluation.     3.  Marked soft tissue swelling involving the right great toe with diffuse subcutaneous edema elsewhere throughout the right foot. No evidence for peripherally enhancing fluid collection or gas within the soft tissues.     4.  Oblique nondisplaced  intra-articular fracture involving the plantar aspect of the proximal portion of the distal phalanx right great toe.     5.  Symmetric marginal erosions involving the articular margin of the proximal phalanx of the right first MTP joint suspicious for gout.     6.  Remainder of findings as detailed above.       Assessment: Pradip is a 54 YO with diabetes and neuropathy. He has an ulceration on the right distal hallux. He is currently on 6 weeks of abx. I discussed with him that we will need to offload the ulcer, along with good dressing changes. I discussed with him possible sequela that could occur from the ulceration, including amputation if we cannot get good source control.     Plan:  - Pt seen and evaluated.  - Previous XR and CT images were reviewed.  - Reviewed hospital course.   - Start dressings as above.   - We did not have a large DH2 shoe for him. I wrote an order for him to get one at the orthotics lab.   - Cont abx as directed by ID.  - See again in 1 week.   I spent 40 minutes on the DOS with patient care, record/image review, and documentation.

## 2021-01-28 NOTE — LETTER
1/28/2021       RE: Thomas Gonzales  [address] [place] [address]     Dear Colleague,    Thank you for referring your patient, Thomas Gonzales, to the [facility]. Please see a copy of my visit note below.    Date of Service: 1/28/2021    Chief Complaint:   Chief Complaint   Patient presents with     WOUND CARE     right great tow ulceration        HPI: Thomas is a 55 year old male who presents today for further evaluation of right foot wound with osteomyelitis. He was recently in [facility] for this toe. He relates that he kicked a wall about 2 months ago.  The wound started after this. Ont the day that we went to the ED, he noticed redness and swelling to the toe. During his time in house, bone bx was requested, but was not performed. He was discharged on Bactrim DS x 2 BID and Cipro 750 Q12. MRI cannot be performed 2/2 penile prosthesis. ABIs were performed while in house. He is a diabetic and has MS. He is not on MS meds. Relates no pain to the toe. He does think that the nail on the right hallux is going to fall off.     Review of Systems: No n/v/d/f/c/ns/sob/cp    PMH:   Past Medical History:   Diagnosis Date     CAD (coronary artery disease)     S/p stenting of left circumflex     Diabetes mellitus (H)      Multiple sclerosis (H)      Peripheral neuropathy        PSxH:   Past Surgical History:   Procedure Laterality Date     APPENDECTOMY  1/8/2016     CATARACT IOL, RT/LT       CHOLECYSTECTOMY  1/8/2016     penile implant       LEON EN Y BOWEL  1/8/2016    for small bowel ischemia       Allergies: Shellfish-derived products, Adhesive tape, Fructose, Gluten meal, Lactose, and Reglan [metoclopramide]    SH:   Social History     Socioeconomic History     Marital status:      Spouse name: Not on file     Number of children: Not on file     Years of education: Not on file     Highest education level: Not on file  "  Occupational History     Not on file   Social Needs     Financial resource strain: Not on file     Food insecurity     Worry: Not on file     Inability: Not on file     Transportation needs     Medical: Not on file     Non-medical: Not on file   Tobacco Use     Smoking status: Never Smoker     Smokeless tobacco: Never Used   Substance and Sexual Activity     Alcohol use: Yes     Comment: occ     Drug use: No     Sexual activity: Yes     Partners: Female   Lifestyle     Physical activity     Days per week: Not on file     Minutes per session: Not on file     Stress: Not on file   Relationships     Social connections     Talks on phone: Not on file     Gets together: Not on file     Attends Sikh service: Not on file     Active member of club or organization: Not on file     Attends meetings of clubs or organizations: Not on file     Relationship status: Not on file     Intimate partner violence     Fear of current or ex partner: Not on file     Emotionally abused: Not on file     Physically abused: Not on file     Forced sexual activity: Not on file   Other Topics Concern     Parent/sibling w/ CABG, MI or angioplasty before 65F 55M? Not Asked   Social History Narrative     Not on file       FH:   Family History   Problem Relation Age of Onset     Arrhythmia Mother         bradycardia- pacemaker     Coronary Artery Disease Father        Objective:  Data Unavailable Data Unavailable Data Unavailable Data Unavailable 6' 2\" 239 lbs 0 oz  CRP Inflammation   Date Value Ref Range Status   01/20/2021 12.0 (H) 0.0 - 8.0 mg/L Final     Lab Results   Component Value Date    WBC 4.2 01/20/2021     Lab Results   Component Value Date    RBC 4.03 01/20/2021     Lab Results   Component Value Date    HGB 11.2 01/20/2021     Lab Results   Component Value Date    HCT 32.8 01/20/2021     No components found for: MCT  Lab Results   Component Value Date    MCV 81 01/20/2021     Lab Results   Component Value Date    MCH 27.8 " 01/20/2021     Lab Results   Component Value Date    MCHC 34.1 01/20/2021     Lab Results   Component Value Date    RDW 12.9 01/20/2021     Lab Results   Component Value Date     01/20/2021     Last Comprehensive Metabolic Panel:  Sodium   Date Value Ref Range Status   01/20/2021 145 (H) 133 - 144 mmol/L Final     Potassium   Date Value Ref Range Status   01/20/2021 3.8 3.4 - 5.3 mmol/L Final     Chloride   Date Value Ref Range Status   01/20/2021 114 (H) 94 - 109 mmol/L Final     Carbon Dioxide   Date Value Ref Range Status   01/20/2021 25 20 - 32 mmol/L Final     Anion Gap   Date Value Ref Range Status   01/20/2021 6 3 - 14 mmol/L Final     Glucose   Date Value Ref Range Status   01/20/2021 86 70 - 99 mg/dL Final     Urea Nitrogen   Date Value Ref Range Status   01/20/2021 17 7 - 30 mg/dL Final     Creatinine   Date Value Ref Range Status   01/20/2021 0.90 0.66 - 1.25 mg/dL Final     GFR Estimate   Date Value Ref Range Status   01/20/2021 >90 >60 mL/min/[1.73_m2] Final     Comment:     Non  GFR Calc  Starting 12/18/2018, serum creatinine based estimated GFR (eGFR) will be   calculated using the Chronic Kidney Disease Epidemiology Collaboration   (CKD-EPI) equation.       Calcium   Date Value Ref Range Status   01/20/2021 8.3 (L) 8.5 - 10.1 mg/dL Final     Hemoglobin A1C   Date Value Ref Range Status   01/17/2021 5.7 (H) 0 - 5.6 % Final     Comment:     Normal <5.7% Prediabetes 5.7-6.4%  Diabetes 6.5% or higher - adopted from ADA   consensus guidelines.     06/22/2020 5.5 0 - 5.6 % Final     Comment:     Normal <5.7% Prediabetes 5.7-6.4%  Diabetes 6.5% or higher - adopted from ADA   consensus guidelines.     02/01/2019 5.4 0 - 5.6 % Final     Comment:     Normal <5.7% Prediabetes 5.7-6.4%  Diabetes 6.5% or higher - adopted from ADA   consensus guidelines.           PT pulses are not palpable 2/2 pitting edema Faint sound heard with doppler. DP pulses are 2/4 bilaterally. CRT is instant.  Positive pedal hair.   Gross sensation is diminished bilaterally. Protective sensation is intact as demonstrated with a 5.07G monofilament.  Equinus is noted bilaterally. No pain with active or passive ROM of the ankle, MTJ, 1st ray, or halluces bilaterally,. Hallus malleus noted to the right. Right hallux nail is thickened, dystrophic, and lytic at the distal 1/2.      A diabetic pessure wound is noted at right  distal hallux measuring 1.7cm x 1.7cm x 0.1cm.    Celis Classification: 2    Wound base: Yellow/Slough    Edges: slightly hyperkeratotic    Drainage: small/serous    Odor: no    Undermining: no    Bone Exposure: No    Clinical Signs of Infection: No    After obtaining patient consent, the wound was irrigated with copious amounts of saline.     Barriers to Healing: Neuropathy, hallux malleus.     Treatment Plan: Iodofoam and Optifoam. DH2 shoe.     Pt Ability to Follow Plan: Unknown.       TAMANNA IMPRESSION:  1. RIGHT:       A. Resting TAMANNA is non compressible.       B. Noncompressible arteries may falsely elevate the TBI. TBI is  normal, 0.72.     2. LEFT:       A. Resting TAMANNA is non compressible.       B. Noncompressible arteries may falsely elevate the TBI. TBI is  normal, 0.85.     Guidelines:     TAMANNA Diagnostic Criteria (Based on criteria published in Circulation  2011; 124: 6633-9303):    > 1.4: Non compressible    1.00 - 1.40: Normal    0.91 - 0.99: Borderline    At or below 0.90: Abnormal     TAMANNA Diagnostic Criteria (Based on ACC/AHA guideline 2008):    >/=1.3 - non compressible vessels    1.00  -1.29 - Normal    0.91 - 0.99 - Borderline    0.41 - 0.90 - Mild to moderate PAD    0.00 - 0.40 - Severe PAD     CLAUDIA MCMILLAN MD    IMPRESSION:  1.  Ill-defined cortical destruction of the distal tuft of the distal phalanx of the right great toe most compatible with osteomyelitis given underlying clinical history.     2.  No other areas suspicious for osteomyelitis. If there are other areas suspicious for  osteomyelitis consider MRI which is a much more sensitive evaluation.     3.  Marked soft tissue swelling involving the right great toe with diffuse subcutaneous edema elsewhere throughout the right foot. No evidence for peripherally enhancing fluid collection or gas within the soft tissues.     4.  Oblique nondisplaced intra-articular fracture involving the plantar aspect of the proximal portion of the distal phalanx right great toe.     5.  Symmetric marginal erosions involving the articular margin of the proximal phalanx of the right first MTP joint suspicious for gout.     6.  Remainder of findings as detailed above.       Assessment: Pradip is a 54 YO with diabetes and neuropathy. He has an ulceration on the right distal hallux. He is currently on 6 weeks of abx. I discussed with him that we will need to offload the ulcer, along with good dressing changes. I discussed with him possible sequela that could occur from the ulceration, including amputation if we cannot get good source control.     Plan:  - Pt seen and evaluated.  - Previous XR and CT images were reviewed.  - Reviewed hospital course.   - Start dressings as above.   - We did not have a large DH2 shoe for him. I wrote an order for him to get one at the orthotics lab.   - Cont abx as directed by ID.  - See again in 1 week.   I spent 40 minutes on the DOS with patient care, record/image review, and documentation.                Again, thank you for allowing me to participate in the care of your patient.      Sincerely,    Norm West DPM

## 2021-02-04 ENCOUNTER — OFFICE VISIT (OUTPATIENT)
Dept: WOUND CARE | Facility: CLINIC | Age: 56
End: 2021-02-04
Payer: COMMERCIAL

## 2021-02-04 ENCOUNTER — ANCILLARY PROCEDURE (OUTPATIENT)
Dept: GENERAL RADIOLOGY | Facility: CLINIC | Age: 56
End: 2021-02-04
Attending: PODIATRIST
Payer: COMMERCIAL

## 2021-02-04 VITALS — HEIGHT: 74 IN | WEIGHT: 239 LBS | BODY MASS INDEX: 30.67 KG/M2

## 2021-02-04 DIAGNOSIS — E11.42 DIABETIC POLYNEUROPATHY ASSOCIATED WITH TYPE 2 DIABETES MELLITUS (H): ICD-10-CM

## 2021-02-04 DIAGNOSIS — L97.512 RIGHT FOOT ULCER, WITH FAT LAYER EXPOSED (H): ICD-10-CM

## 2021-02-04 DIAGNOSIS — L97.512 RIGHT FOOT ULCER, WITH FAT LAYER EXPOSED (H): Primary | ICD-10-CM

## 2021-02-04 LAB — RADIOLOGIST FLAGS: ABNORMAL

## 2021-02-04 PROCEDURE — 99213 OFFICE O/P EST LOW 20 MIN: CPT | Performed by: PODIATRIST

## 2021-02-04 PROCEDURE — 73660 X-RAY EXAM OF TOE(S): CPT | Mod: RT | Performed by: RADIOLOGY

## 2021-02-04 RX ORDER — SULFAMETHOXAZOLE/TRIMETHOPRIM 800-160 MG
2 TABLET ORAL 2 TIMES DAILY
Qty: 28 TABLET | Refills: 0 | Status: SHIPPED | OUTPATIENT
Start: 2021-02-04 | End: 2021-02-17

## 2021-02-04 ASSESSMENT — PAIN SCALES - GENERAL: PAINLEVEL: NO PAIN (0)

## 2021-02-04 ASSESSMENT — MIFFLIN-ST. JEOR: SCORE: 1993.85

## 2021-02-04 NOTE — PROGRESS NOTES
Date of Service: 1/28/2021    Chief Complaint:   Chief Complaint   Patient presents with     WOUND CARE     Pt here for wound check        HPI: Thomas is a 55 year old male who presents today for further evaluation of right foot wound with osteomyelitis. He has been using the foams on the toe. No pain. He is seeing ID 2/17/21. Taking Bactrim and Cipro. He did go to the orthotic lab and get the DH2 shoe.     Review of Systems: No n/v/d/f/c/ns/sob/cp    PMH:   Past Medical History:   Diagnosis Date     CAD (coronary artery disease)     S/p stenting of left circumflex     Diabetes mellitus (H)      Multiple sclerosis (H)      Peripheral neuropathy        PSxH:   Past Surgical History:   Procedure Laterality Date     APPENDECTOMY  1/8/2016     CATARACT IOL, RT/LT       CHOLECYSTECTOMY  1/8/2016     penile implant       LEON EN Y BOWEL  1/8/2016    for small bowel ischemia       Allergies: Shellfish-derived products, Adhesive tape, Fructose, Gluten meal, Lactose, and Reglan [metoclopramide]    SH:   Social History     Socioeconomic History     Marital status:      Spouse name: Not on file     Number of children: Not on file     Years of education: Not on file     Highest education level: Not on file   Occupational History     Not on file   Social Needs     Financial resource strain: Not on file     Food insecurity     Worry: Not on file     Inability: Not on file     Transportation needs     Medical: Not on file     Non-medical: Not on file   Tobacco Use     Smoking status: Never Smoker     Smokeless tobacco: Never Used   Substance and Sexual Activity     Alcohol use: Yes     Comment: occ     Drug use: No     Sexual activity: Yes     Partners: Female   Lifestyle     Physical activity     Days per week: Not on file     Minutes per session: Not on file     Stress: Not on file   Relationships     Social connections     Talks on phone: Not on file     Gets together: Not on file     Attends Hoahaoism service: Not on  "file     Active member of club or organization: Not on file     Attends meetings of clubs or organizations: Not on file     Relationship status: Not on file     Intimate partner violence     Fear of current or ex partner: Not on file     Emotionally abused: Not on file     Physically abused: Not on file     Forced sexual activity: Not on file   Other Topics Concern     Parent/sibling w/ CABG, MI or angioplasty before 65F 55M? Not Asked   Social History Narrative     Not on file       FH:   Family History   Problem Relation Age of Onset     Arrhythmia Mother         bradycardia- pacemaker     Coronary Artery Disease Father        Objective:  Data Unavailable Data Unavailable Data Unavailable Data Unavailable 6' 2\" 239 lbs 0 oz  CRP Inflammation   Date Value Ref Range Status   01/20/2021 12.0 (H) 0.0 - 8.0 mg/L Final     Lab Results   Component Value Date    WBC 4.2 01/20/2021     Lab Results   Component Value Date    RBC 4.03 01/20/2021     Lab Results   Component Value Date    HGB 11.2 01/20/2021     Lab Results   Component Value Date    HCT 32.8 01/20/2021     No components found for: MCT  Lab Results   Component Value Date    MCV 81 01/20/2021     Lab Results   Component Value Date    MCH 27.8 01/20/2021     Lab Results   Component Value Date    MCHC 34.1 01/20/2021     Lab Results   Component Value Date    RDW 12.9 01/20/2021     Lab Results   Component Value Date     01/20/2021     Last Comprehensive Metabolic Panel:  Sodium   Date Value Ref Range Status   01/20/2021 145 (H) 133 - 144 mmol/L Final     Potassium   Date Value Ref Range Status   01/20/2021 3.8 3.4 - 5.3 mmol/L Final     Chloride   Date Value Ref Range Status   01/20/2021 114 (H) 94 - 109 mmol/L Final     Carbon Dioxide   Date Value Ref Range Status   01/20/2021 25 20 - 32 mmol/L Final     Anion Gap   Date Value Ref Range Status   01/20/2021 6 3 - 14 mmol/L Final     Glucose   Date Value Ref Range Status   01/20/2021 86 70 - 99 mg/dL Final "     Urea Nitrogen   Date Value Ref Range Status   01/20/2021 17 7 - 30 mg/dL Final     Creatinine   Date Value Ref Range Status   01/20/2021 0.90 0.66 - 1.25 mg/dL Final     GFR Estimate   Date Value Ref Range Status   01/20/2021 >90 >60 mL/min/[1.73_m2] Final     Comment:     Non  GFR Calc  Starting 12/18/2018, serum creatinine based estimated GFR (eGFR) will be   calculated using the Chronic Kidney Disease Epidemiology Collaboration   (CKD-EPI) equation.       Calcium   Date Value Ref Range Status   01/20/2021 8.3 (L) 8.5 - 10.1 mg/dL Final     Hemoglobin A1C   Date Value Ref Range Status   01/17/2021 5.7 (H) 0 - 5.6 % Final     Comment:     Normal <5.7% Prediabetes 5.7-6.4%  Diabetes 6.5% or higher - adopted from ADA   consensus guidelines.     06/22/2020 5.5 0 - 5.6 % Final     Comment:     Normal <5.7% Prediabetes 5.7-6.4%  Diabetes 6.5% or higher - adopted from ADA   consensus guidelines.     02/01/2019 5.4 0 - 5.6 % Final     Comment:     Normal <5.7% Prediabetes 5.7-6.4%  Diabetes 6.5% or higher - adopted from ADA   consensus guidelines.           PT pulses are not palpable 2/2 pitting edema Faint sound heard with doppler. DP pulses are 2/4 bilaterally. CRT is instant. Positive pedal hair.   Gross sensation is diminished bilaterally. Protective sensation is intact as demonstrated with a 5.07G monofilament.  Equinus is noted bilaterally. No pain with active or passive ROM of the ankle, MTJ, 1st ray, or halluces bilaterally,. Hallus malleus noted to the right. Right hallux nail is thickened, dystrophic, and lytic at the distal 1/2.          A diabetic pessure wound is noted at right  distal hallux measuring 0.7cm x 0.7cm x 0.1cm.    Celis Classification: 2    Wound base: Yellow/Slough    Edges: slightly hyperkeratotic    Drainage: small/serous    Odor: no    Undermining: no    Bone Exposure: No    Clinical Signs of Infection: No    After obtaining patient consent, the wound was irrigated with  copious amounts of saline.     Barriers to Healing: Neuropathy, hallux malleus.     Treatment Plan: Iodofoam and Optifoam. DH2 shoe.     Pt Ability to Follow Plan: Excellent.       TAMANNA IMPRESSION:  1. RIGHT:       A. Resting TAMANNA is non compressible.       B. Noncompressible arteries may falsely elevate the TBI. TBI is  normal, 0.72.     2. LEFT:       A. Resting TAMANNA is non compressible.       B. Noncompressible arteries may falsely elevate the TBI. TBI is  normal, 0.85.     Guidelines:     TAMANNA Diagnostic Criteria (Based on criteria published in Circulation  2011; 124: 1715-4470):    > 1.4: Non compressible    1.00 - 1.40: Normal    0.91 - 0.99: Borderline    At or below 0.90: Abnormal     TAMANNA Diagnostic Criteria (Based on ACC/AHA guideline 2008):    >/=1.3 - non compressible vessels    1.00  -1.29 - Normal    0.91 - 0.99 - Borderline    0.41 - 0.90 - Mild to moderate PAD    0.00 - 0.40 - Severe PAD     CLAUDIA MCMILLAN MD    IMPRESSION:  1.  Ill-defined cortical destruction of the distal tuft of the distal phalanx of the right great toe most compatible with osteomyelitis given underlying clinical history.     2.  No other areas suspicious for osteomyelitis. If there are other areas suspicious for osteomyelitis consider MRI which is a much more sensitive evaluation.     3.  Marked soft tissue swelling involving the right great toe with diffuse subcutaneous edema elsewhere throughout the right foot. No evidence for peripherally enhancing fluid collection or gas within the soft tissues.     4.  Oblique nondisplaced intra-articular fracture involving the plantar aspect of the proximal portion of the distal phalanx right great toe.     5.  Symmetric marginal erosions involving the articular margin of the proximal phalanx of the right first MTP joint suspicious for gout.     6.  Remainder of findings as detailed above.     right foot xrays indicated in 3 weightbearing views.    Increased osteolysis with possible pathologic  fracture of the distal phalanx of the hallux.       Assessment: Pradip is a 54 YO with diabetes and neuropathy. He has an ulceration on the right distal hallux. He is currently on 6 weeks of abx. He is short about a week for the Bactrim. I refilled this enough to get him through to seeing Dr. Lewis.  I discussed with him that we will need to offload the ulcer, along with good dressing changes. I discussed with him possible sequela that could occur from the ulceration, including amputation if we cannot get good source control.     Plan:  - Pt seen and evaluated.  - XR taken and discussed with him. This does lag 10-14 days behind. Clinically, the area may be improving. Will cont with serial XR.   - Cont dressings as above.   - Cont abx as directed by ID.  - See again in 2 weeks.

## 2021-02-04 NOTE — LETTER
2/4/2021       RE: Thomas Gonzales  [address] [place] [address]     Dear Colleague,    Thank you for referring your patient, Thomas Gonzales, to the [facility] [place] [facility]. Please see a copy of my visit note below.    Date of Service: 1/28/2021    Chief Complaint:   Chief Complaint   Patient presents with     WOUND CARE     Pt here for wound check        HPI: Thomas is a 55 year old male who presents today for further evaluation of right foot wound with osteomyelitis. He has been using the foams on the toe. No pain. He is seeing ID 2/17/21. Taking Bactrim and Cipro. He did go to the orthotic lab and get the DH2 shoe.     Review of Systems: No n/v/d/f/c/ns/sob/cp    PMH:   Past Medical History:   Diagnosis Date     CAD (coronary artery disease)     S/p stenting of left circumflex     Diabetes mellitus (H)      Multiple sclerosis (H)      Peripheral neuropathy        PSxH:   Past Surgical History:   Procedure Laterality Date     APPENDECTOMY  1/8/2016     CATARACT IOL, RT/LT       CHOLECYSTECTOMY  1/8/2016     penile implant       LEON EN Y BOWEL  1/8/2016    for small bowel ischemia       Allergies: Shellfish-derived products, Adhesive tape, Fructose, Gluten meal, Lactose, and Reglan [metoclopramide]    SH:   Social History     Socioeconomic History     Marital status:      Spouse name: Not on file     Number of children: Not on file     Years of education: Not on file     Highest education level: Not on file   Occupational History     Not on file   Social Needs     Financial resource strain: Not on file     Food insecurity     Worry: Not on file     Inability: Not on file     Transportation needs     Medical: Not on file     Non-medical: Not on file   Tobacco Use     Smoking status: Never Smoker     Smokeless tobacco: Never Used   Substance and Sexual Activity     Alcohol use: Yes     Comment: occ     Drug use: No  "    Sexual activity: Yes     Partners: Female   Lifestyle     Physical activity     Days per week: Not on file     Minutes per session: Not on file     Stress: Not on file   Relationships     Social connections     Talks on phone: Not on file     Gets together: Not on file     Attends Nondenominational service: Not on file     Active member of club or organization: Not on file     Attends meetings of clubs or organizations: Not on file     Relationship status: Not on file     Intimate partner violence     Fear of current or ex partner: Not on file     Emotionally abused: Not on file     Physically abused: Not on file     Forced sexual activity: Not on file   Other Topics Concern     Parent/sibling w/ CABG, MI or angioplasty before 65F 55M? Not Asked   Social History Narrative     Not on file       FH:   Family History   Problem Relation Age of Onset     Arrhythmia Mother         bradycardia- pacemaker     Coronary Artery Disease Father        Objective:  Data Unavailable Data Unavailable Data Unavailable Data Unavailable 6' 2\" 239 lbs 0 oz  CRP Inflammation   Date Value Ref Range Status   01/20/2021 12.0 (H) 0.0 - 8.0 mg/L Final     Lab Results   Component Value Date    WBC 4.2 01/20/2021     Lab Results   Component Value Date    RBC 4.03 01/20/2021     Lab Results   Component Value Date    HGB 11.2 01/20/2021     Lab Results   Component Value Date    HCT 32.8 01/20/2021     No components found for: MCT  Lab Results   Component Value Date    MCV 81 01/20/2021     Lab Results   Component Value Date    MCH 27.8 01/20/2021     Lab Results   Component Value Date    MCHC 34.1 01/20/2021     Lab Results   Component Value Date    RDW 12.9 01/20/2021     Lab Results   Component Value Date     01/20/2021     Last Comprehensive Metabolic Panel:  Sodium   Date Value Ref Range Status   01/20/2021 145 (H) 133 - 144 mmol/L Final     Potassium   Date Value Ref Range Status   01/20/2021 3.8 3.4 - 5.3 mmol/L Final     Chloride   Date " Value Ref Range Status   01/20/2021 114 (H) 94 - 109 mmol/L Final     Carbon Dioxide   Date Value Ref Range Status   01/20/2021 25 20 - 32 mmol/L Final     Anion Gap   Date Value Ref Range Status   01/20/2021 6 3 - 14 mmol/L Final     Glucose   Date Value Ref Range Status   01/20/2021 86 70 - 99 mg/dL Final     Urea Nitrogen   Date Value Ref Range Status   01/20/2021 17 7 - 30 mg/dL Final     Creatinine   Date Value Ref Range Status   01/20/2021 0.90 0.66 - 1.25 mg/dL Final     GFR Estimate   Date Value Ref Range Status   01/20/2021 >90 >60 mL/min/[1.73_m2] Final     Comment:     Non  GFR Calc  Starting 12/18/2018, serum creatinine based estimated GFR (eGFR) will be   calculated using the Chronic Kidney Disease Epidemiology Collaboration   (CKD-EPI) equation.       Calcium   Date Value Ref Range Status   01/20/2021 8.3 (L) 8.5 - 10.1 mg/dL Final     Hemoglobin A1C   Date Value Ref Range Status   01/17/2021 5.7 (H) 0 - 5.6 % Final     Comment:     Normal <5.7% Prediabetes 5.7-6.4%  Diabetes 6.5% or higher - adopted from ADA   consensus guidelines.     06/22/2020 5.5 0 - 5.6 % Final     Comment:     Normal <5.7% Prediabetes 5.7-6.4%  Diabetes 6.5% or higher - adopted from ADA   consensus guidelines.     02/01/2019 5.4 0 - 5.6 % Final     Comment:     Normal <5.7% Prediabetes 5.7-6.4%  Diabetes 6.5% or higher - adopted from ADA   consensus guidelines.           PT pulses are not palpable 2/2 pitting edema Faint sound heard with doppler. DP pulses are 2/4 bilaterally. CRT is instant. Positive pedal hair.   Gross sensation is diminished bilaterally. Protective sensation is intact as demonstrated with a 5.07G monofilament.  Equinus is noted bilaterally. No pain with active or passive ROM of the ankle, MTJ, 1st ray, or halluces bilaterally,. Hallus malleus noted to the right. Right hallux nail is thickened, dystrophic, and lytic at the distal 1/2.          A diabetic pessure wound is noted at right  distal  hallux measuring 0.7cm x 0.7cm x 0.1cm.    Celis Classification: 2    Wound base: Yellow/Slough    Edges: slightly hyperkeratotic    Drainage: small/serous    Odor: no    Undermining: no    Bone Exposure: No    Clinical Signs of Infection: No    After obtaining patient consent, the wound was irrigated with copious amounts of saline.     Barriers to Healing: Neuropathy, hallux malleus.     Treatment Plan: Iodofoam and Optifoam. DH2 shoe.     Pt Ability to Follow Plan: Excellent.       TAMANNA IMPRESSION:  1. RIGHT:       A. Resting TAMANNA is non compressible.       B. Noncompressible arteries may falsely elevate the TBI. TBI is  normal, 0.72.     2. LEFT:       A. Resting TAMANNA is non compressible.       B. Noncompressible arteries may falsely elevate the TBI. TBI is  normal, 0.85.     Guidelines:     TAMANNA Diagnostic Criteria (Based on criteria published in Circulation  2011; 124: 9366-0629):    > 1.4: Non compressible    1.00 - 1.40: Normal    0.91 - 0.99: Borderline    At or below 0.90: Abnormal     TAMANNA Diagnostic Criteria (Based on ACC/AHA guideline 2008):    >/=1.3 - non compressible vessels    1.00  -1.29 - Normal    0.91 - 0.99 - Borderline    0.41 - 0.90 - Mild to moderate PAD    0.00 - 0.40 - Severe PAD     CLAUDIA MCMILLAN MD    IMPRESSION:  1.  Ill-defined cortical destruction of the distal tuft of the distal phalanx of the right great toe most compatible with osteomyelitis given underlying clinical history.     2.  No other areas suspicious for osteomyelitis. If there are other areas suspicious for osteomyelitis consider MRI which is a much more sensitive evaluation.     3.  Marked soft tissue swelling involving the right great toe with diffuse subcutaneous edema elsewhere throughout the right foot. No evidence for peripherally enhancing fluid collection or gas within the soft tissues.     4.  Oblique nondisplaced intra-articular fracture involving the plantar aspect of the proximal portion of the distal phalanx right  great toe.     5.  Symmetric marginal erosions involving the articular margin of the proximal phalanx of the right first MTP joint suspicious for gout.     6.  Remainder of findings as detailed above.     right foot xrays indicated in 3 weightbearing views.    Increased osteolysis with possible pathologic fracture of the distal phalanx of the hallux.       Assessment: Pradip is a 56 YO with diabetes and neuropathy. He has an ulceration on the right distal hallux. He is currently on 6 weeks of abx. He is short about a week for the Bactrim. I refilled this enough to get him through to seeing Dr. Lewis.  I discussed with him that we will need to offload the ulcer, along with good dressing changes. I discussed with him possible sequela that could occur from the ulceration, including amputation if we cannot get good source control.     Plan:  - Pt seen and evaluated.  - XR taken and discussed with him. This does lag 10-14 days behind. Clinically, the area may be improving. Will cont with serial XR.   - Cont dressings as above.   - Cont abx as directed by ID.  - See again in 2 weeks.      Again, thank you for allowing me to participate in the care of your patient.      Sincerely,    Norm West DPM

## 2021-02-04 NOTE — NURSING NOTE
"Chief Complaint   Patient presents with     WOUND CARE     Pt here for wound check       Vitals:    02/04/21 1548   Weight: 108.4 kg (239 lb)   Height: 1.88 m (6' 2\")       Body mass index is 30.69 kg/m .      LINA RojasT                      "

## 2021-02-17 ENCOUNTER — ANCILLARY PROCEDURE (OUTPATIENT)
Dept: GENERAL RADIOLOGY | Facility: CLINIC | Age: 56
End: 2021-02-17
Attending: PODIATRIST
Payer: COMMERCIAL

## 2021-02-17 ENCOUNTER — VIRTUAL VISIT (OUTPATIENT)
Dept: INFECTIOUS DISEASES | Facility: CLINIC | Age: 56
End: 2021-02-17
Attending: STUDENT IN AN ORGANIZED HEALTH CARE EDUCATION/TRAINING PROGRAM
Payer: COMMERCIAL

## 2021-02-17 ENCOUNTER — OFFICE VISIT (OUTPATIENT)
Dept: ORTHOPEDICS | Facility: CLINIC | Age: 56
End: 2021-02-17
Payer: COMMERCIAL

## 2021-02-17 DIAGNOSIS — L97.512 RIGHT FOOT ULCER, WITH FAT LAYER EXPOSED (H): ICD-10-CM

## 2021-02-17 DIAGNOSIS — E11.621 ULCER OF RIGHT GREAT TOE DUE TO DIABETES MELLITUS (H): ICD-10-CM

## 2021-02-17 DIAGNOSIS — Z23 NEED FOR VACCINATION: ICD-10-CM

## 2021-02-17 DIAGNOSIS — E11.42 DIABETIC POLYNEUROPATHY ASSOCIATED WITH TYPE 2 DIABETES MELLITUS (H): ICD-10-CM

## 2021-02-17 DIAGNOSIS — L97.519 ULCER OF RIGHT GREAT TOE DUE TO DIABETES MELLITUS (H): ICD-10-CM

## 2021-02-17 DIAGNOSIS — E11.49 TYPE II OR UNSPECIFIED TYPE DIABETES MELLITUS WITH NEUROLOGICAL MANIFESTATIONS, NOT STATED AS UNCONTROLLED(250.60) (H): Primary | ICD-10-CM

## 2021-02-17 DIAGNOSIS — E11.49 TYPE II OR UNSPECIFIED TYPE DIABETES MELLITUS WITH NEUROLOGICAL MANIFESTATIONS, NOT STATED AS UNCONTROLLED(250.60) (H): ICD-10-CM

## 2021-02-17 DIAGNOSIS — M86.271 SUBACUTE OSTEOMYELITIS OF RIGHT FOOT (H): Primary | ICD-10-CM

## 2021-02-17 PROCEDURE — 73660 X-RAY EXAM OF TOE(S): CPT | Mod: RT | Performed by: RADIOLOGY

## 2021-02-17 PROCEDURE — 99215 OFFICE O/P EST HI 40 MIN: CPT | Mod: 95 | Performed by: STUDENT IN AN ORGANIZED HEALTH CARE EDUCATION/TRAINING PROGRAM

## 2021-02-17 PROCEDURE — 99212 OFFICE O/P EST SF 10 MIN: CPT | Mod: 25 | Performed by: PODIATRIST

## 2021-02-17 PROCEDURE — 11042 DBRDMT SUBQ TIS 1ST 20SQCM/<: CPT | Performed by: PODIATRIST

## 2021-02-17 RX ORDER — CIPROFLOXACIN 750 MG/1
750 TABLET, FILM COATED ORAL EVERY 12 HOURS
Qty: 14 TABLET | Refills: 0 | Status: SHIPPED | OUTPATIENT
Start: 2021-02-17 | End: 2021-03-05

## 2021-02-17 RX ORDER — SULFAMETHOXAZOLE/TRIMETHOPRIM 800-160 MG
2 TABLET ORAL 2 TIMES DAILY
Qty: 84 TABLET | Refills: 0 | Status: SHIPPED | OUTPATIENT
Start: 2021-02-17 | End: 2021-03-05

## 2021-02-17 RX ORDER — METRONIDAZOLE 500 MG/1
500 TABLET ORAL 3 TIMES DAILY
Qty: 63 TABLET | Refills: 0 | Status: SHIPPED | OUTPATIENT
Start: 2021-02-17 | End: 2021-03-05

## 2021-02-17 NOTE — PROGRESS NOTES
Patient reports his wound is healing well, he did meet with wound care today and had imaging done as well.      Pradip is a 55 year old who is being evaluated via a billable video visit.      How would you like to obtain your AVS? MyChart    Will anyone else be joining your video visit? No      Video Start Time: 1.46 pm  Video-Visit Details    Type of service:  Video Visit    Video End Time:2:07 PM  Total time including chart review, care-coordination and documentation time on the date of encounter - 48 mins      Originating Location (pt. Location): Home    Distant Location (provider location):  [facility] [place]     Platform used for Video Visit: [employer] clinic progress note:  02/17/2021     Recommendations:  Continue bactrim 2 DS bid, cipro 750 mg bid  Add flagyl 500 mg po tid  All the above abxs for 3 more weeks. Has rcvd 4 weeks of abxs so far   Check CBC, CMP, CRP  Due for prevnar 13 and shingrix vaccinations after covid vaccine series    Return visit March 5 at 4 pm     Assessment:  -Osteomyelitis/ Diabetic foot wound, polymicrobial - has rcvd ~4 weeks of abxs so far with improvement in the wound. Will extend abxs for another 3 weeks till the wound heals. Will also add flagyl for anaerobic coverage as anaerobic cxs were not sent and many diabetic foot infections also have anaerobic infection. However there has been improvement in the wound without anaerobic coverage and so if he does not tolerate flagyl, I will not puruse iv since that is the only alternative to flagyl. Will also check blood work to make sure no adverse effect from bactrim and check CRP to see if that has normalized.     ---------------------------------------------------------------------------------------------------------------------------  This is a follow up after hospital discharge. First time seeing patient. Seen by my colleague as an inpatient.     HPI:  Thomas Gonzales is a 55 year old  "male with history significant for CAD s/p stenting, HLD, MS, type II DM c/b peripheral neuropathy, celiac disease, Castro-en-Y gastric bypass c/b strictures and intra-abdominal abscess (2016), who initially presented to urgent care on 1/16/21 with right great toe ulceration and CT c/w osteomyelitis. Reported injuring toe about 6 weeks prior to presentation, developed a \"blood blister\" which later unroofed, noted purulent drainage starting around 2-3 days before presentation, then significant erythema and swelling on day of presentation. No fluid collection on imaging, though was non-contrast. Started on vancomycin and Zosyn, then changed to vancomycin and cefepime. Superficial wound cultures with MSSA, S.epi, Pseudomonas (Pan-S to typical pseudomonas antibiotics including FQs) and Acinetobacter (Pan-S, S-TMP/SMX, S-Ciprofloxacin). Unclear utility of this sample as it was a superficial swab but will cover these organisms and general skin augusto with discharge antibiotic regimen.      Unfortunately, deeper wound cultures were not able to be obtained. TAMANNA suggestive of good blood flow to both feet. He was dced on bactrim 2 DS bid, cipro 750 mg every day. Anaerobes were not covered as he showed a good response with these abxs. Plan was to continue abxs till 2/27 when he would have completed 6 weeks of abxs.     Today he presents for follow up. He has been compliant with the po abxs. He reports no side effects. His blood sugars have been good always with A1c less than 6 for many years. He has peripheral neuropathy and cannot feel much with his feet. This is the first time he is having a diabetic foot infection.       Objective:  Unable to examine due to virtual visit   Was able to see pictures of right toe wound in epic under media tab. Latest photo from today by Dr. West. The wound looks much better than before but has not healed completely yet. There is no exposed bone currently.     Labs:  Results for ALIYAH SAN" BRIGITTE (MRN [id number]) as of 2/17/2021 19:55   Ref. Range 3/9/2017 08:03 5/1/2018 08:19 2/1/2019 14:02 6/22/2020 11:06 1/17/2021 09:15   Hemoglobin A1C Latest Ref Range: 0 - 5.6 % 6.2 (H) 5.0 5.4 5.5 5.7 (H)       Results for ALIYAH SAN (MRN [id number]) as of 2/17/2021 19:55   Ref. Range 1/16/2021 15:44 1/17/2021 05:53 1/18/2021 05:46 1/19/2021 07:29 1/20/2021 05:50   CRP Inflammation Latest Ref Range: 0.0 - 8.0 mg/L 67.0 (H) 51.0 (H) 26.0 (H) 15.0 (H) 12.0 (H)     CBC RESULTS:   Recent Labs   Lab Test 01/20/21  0550   WBC 4.2   RBC 4.03*   HGB 11.2*   HCT 32.8*   MCV 81   MCH 27.8   MCHC 34.1   RDW 12.9   *       Last Comprehensive Metabolic Panel:  Sodium   Date Value Ref Range Status   01/20/2021 145 (H) 133 - 144 mmol/L Final     Potassium   Date Value Ref Range Status   01/20/2021 3.8 3.4 - 5.3 mmol/L Final     Chloride   Date Value Ref Range Status   01/20/2021 114 (H) 94 - 109 mmol/L Final     Carbon Dioxide   Date Value Ref Range Status   01/20/2021 25 20 - 32 mmol/L Final     Anion Gap   Date Value Ref Range Status   01/20/2021 6 3 - 14 mmol/L Final     Glucose   Date Value Ref Range Status   01/20/2021 86 70 - 99 mg/dL Final     Urea Nitrogen   Date Value Ref Range Status   01/20/2021 17 7 - 30 mg/dL Final     Creatinine   Date Value Ref Range Status   01/20/2021 0.90 0.66 - 1.25 mg/dL Final     GFR Estimate   Date Value Ref Range Status   01/20/2021 >90 >60 mL/min/[1.73_m2] Final     Comment:     Non  GFR Calc  Starting 12/18/2018, serum creatinine based estimated GFR (eGFR) will be   calculated using the Chronic Kidney Disease Epidemiology Collaboration   (CKD-EPI) equation.       Calcium   Date Value Ref Range Status   01/20/2021 8.3 (L) 8.5 - 10.1 mg/dL Final     Bilirubin Total   Date Value Ref Range Status   01/16/2021 0.6 0.2 - 1.3 mg/dL Final     Alkaline Phosphatase   Date Value Ref Range Status   01/16/2021 117 40 - 150 U/L Final     ALT   Date Value Ref Range Status    2021 50 0 - 70 U/L Final     AST   Date Value Ref Range Status   2021 36 0 - 45 U/L Final               Imagin21 x ray right toe  IMPRESSION:  1. Continued progressive osteolysis of the first distal phalanx.  2. Redemonstration intra-articular fracture of first distal phalangeal  Base.    21 x ray right toe   1. Progressive osteolysis of the tuft of the first distal phalanx,  compatible with osteomyelitis.  2. Unchanged age-indeterminate first proximal and distal phalangeal  base fractures.    21 TAMANNA   IMPRESSION:  1. RIGHT:       A. Resting TAMANNA is non compressible.       B. Noncompressible arteries may falsely elevate the TBI. TBI is  normal, 0.72.     2. LEFT:       A. Resting TAMANNA is non compressible.       B. Noncompressible arteries may falsely elevate the TBI. TBI is  normal, 0.85.       21 CT toe right   1.  Ill-defined cortical destruction of the distal tuft of the distal phalanx of the right great toe most compatible with osteomyelitis given underlying clinical history.     2.  No other areas suspicious for osteomyelitis. If there are other areas suspicious for osteomyelitis consider MRI which is a much more sensitive evaluation.     3.  Marked soft tissue swelling involving the right great toe with diffuse subcutaneous edema elsewhere throughout the right foot. No evidence for peripherally enhancing fluid collection or gas within the soft tissues.     4.  Oblique nondisplaced intra-articular fracture involving the plantar aspect of the proximal portion of the distal phalanx right great toe.     5.  Symmetric marginal erosions involving the articular margin of the proximal phalanx of the right first MTP joint suspicious for gout.     21 x ray R toe   IMPRESSION: Cortical ill definition of the great toe distal phalangeal  tuft. This could represent early osteomyelitis, particularly if there  is an adjacent wound or sinus. Small calcific fragment in the  medial  proximal corner of the great toe proximal phalanx which may represent  age-indeterminate fracture. Similar finding is noted in the medial  aspect of the distal phalangeal base as well suspicious for  age-indeterminate fracture. Suspected erosion at the lateral base of  the proximal phalanx without associated joint space narrowing. Gout  could have this appearance. Forefoot small vessel others carotid  calcification is noted.    Microbiology:  Superficial Culture right great toe wound 1/16/21: Staph epidermidis     Staphylococcus aureus (1) Staphylococcus aureus (2)     TORIBIO TORIBIO     CLINDAMYCIN <=0.25 ug/mL Sensitive <=0.25 ug/mL Sensitive     ERYTHROMYCIN <=0.25 ug/mL Sensitive 0.5 ug/mL Sensitive     GENTAMICIN <=0.5 ug/mL Sensitive <=0.5 ug/mL Sensitive     OXACILLIN 0.5 ug/mL Sensitive 0.5 ug/mL Sensitive     TETRACYCLINE <=1 ug/mL Sensitive <=1 ug/mL Sensitive     Trimethoprim/Sulfa <=0.5/9.5 u... Sensitive <=0.5/9.5 u... Sensitive     VANCOMYCIN 1 ug/mL Sensitive <=0.5 ug/mL Sensitive             Susceptibility     Acinetobacter species, not baumannii Pseudomonas aeruginosa     TORIBIO TORIBIO     AMIKACIN <=16.0 ug/mL Sensitive 4 ug/mL Sensitive     AMPICILLIN/SULBACTAM 2.0 ug/mL Sensitive       CEFEPIME <=2.0 ug/mL Sensitive 4 ug/mL Sensitive     CEFTAZIDIME 2.0 ug/mL Sensitive 4 ug/mL Sensitive     CEFTRIAXONE 2.0 ug/mL Sensitive       CIPROFLOXACIN <=1.0 ug/mL Sensitive <=0.25 ug/mL Sensitive     GENTAMICIN <=1.0 ug/mL Sensitive 2 ug/mL Sensitive     IMIPENEM <=0.5 ug/mL Sensitive       LEVOFLOXACIN <=0.25 ug/mL Sensitive 1 ug/mL Sensitive     MEROPENEM <=1.0 ug/mL Sensitive <=0.25 ug/mL Sensitive     Piperacillin/Tazo <=4.0 ug/mL Sensitive <=4 ug/mL Sensitive     TOBRAMYCIN <=1.0 ug/mL Sensitive <=1 ug/mL Sensitive     Trimethoprim/Sulfa <=2.0/38.0 ... Sensitive

## 2021-02-17 NOTE — PROGRESS NOTES
Chief Complaint   Patient presents with     WOUND CARE     right hallux            HPI: Thomas is a 55 year old male who presents today for further evaluation of right foot wound with osteomyelitis. He has been using the foams on the toe. No pain. He is seeing ID today. Taking Bactrim and Cipro.     Review of Systems: No n/v/d/f/c/ns/sob/cp    PMH:   Past Medical History:   Diagnosis Date     CAD (coronary artery disease)     S/p stenting of left circumflex     Diabetes mellitus (H)      Multiple sclerosis (H)      Peripheral neuropathy        PSxH:   Past Surgical History:   Procedure Laterality Date     APPENDECTOMY  1/8/2016     CATARACT IOL, RT/LT       CHOLECYSTECTOMY  1/8/2016     penile implant       LEON EN Y BOWEL  1/8/2016    for small bowel ischemia       Allergies: Shellfish-derived products, Adhesive tape, Fructose, Gluten meal, Lactose, and Reglan [metoclopramide]    SH:   Social History     Socioeconomic History     Marital status:      Spouse name: Not on file     Number of children: Not on file     Years of education: Not on file     Highest education level: Not on file   Occupational History     Not on file   Social Needs     Financial resource strain: Not on file     Food insecurity     Worry: Not on file     Inability: Not on file     Transportation needs     Medical: Not on file     Non-medical: Not on file   Tobacco Use     Smoking status: Never Smoker     Smokeless tobacco: Never Used   Substance and Sexual Activity     Alcohol use: Yes     Comment: occ     Drug use: No     Sexual activity: Yes     Partners: Female   Lifestyle     Physical activity     Days per week: Not on file     Minutes per session: Not on file     Stress: Not on file   Relationships     Social connections     Talks on phone: Not on file     Gets together: Not on file     Attends Sikhism service: Not on file     Active member of club or organization: Not on file     Attends meetings of clubs or organizations: Not  on file     Relationship status: Not on file     Intimate partner violence     Fear of current or ex partner: Not on file     Emotionally abused: Not on file     Physically abused: Not on file     Forced sexual activity: Not on file   Other Topics Concern     Parent/sibling w/ CABG, MI or angioplasty before 65F 55M? Not Asked   Social History Narrative     Not on file       FH:   Family History   Problem Relation Age of Onset     Arrhythmia Mother         bradycardia- pacemaker     Coronary Artery Disease Father        Objective:  Data Unavailable Data Unavailable Data Unavailable Data Unavailable Data Unavailable 0 lbs 0 oz  CRP Inflammation   Date Value Ref Range Status   01/20/2021 12.0 (H) 0.0 - 8.0 mg/L Final     Lab Results   Component Value Date    WBC 4.2 01/20/2021     Lab Results   Component Value Date    RBC 4.03 01/20/2021     Lab Results   Component Value Date    HGB 11.2 01/20/2021     Lab Results   Component Value Date    HCT 32.8 01/20/2021     No components found for: MCT  Lab Results   Component Value Date    MCV 81 01/20/2021     Lab Results   Component Value Date    MCH 27.8 01/20/2021     Lab Results   Component Value Date    MCHC 34.1 01/20/2021     Lab Results   Component Value Date    RDW 12.9 01/20/2021     Lab Results   Component Value Date     01/20/2021     Last Comprehensive Metabolic Panel:  Sodium   Date Value Ref Range Status   01/20/2021 145 (H) 133 - 144 mmol/L Final     Potassium   Date Value Ref Range Status   01/20/2021 3.8 3.4 - 5.3 mmol/L Final     Chloride   Date Value Ref Range Status   01/20/2021 114 (H) 94 - 109 mmol/L Final     Carbon Dioxide   Date Value Ref Range Status   01/20/2021 25 20 - 32 mmol/L Final     Anion Gap   Date Value Ref Range Status   01/20/2021 6 3 - 14 mmol/L Final     Glucose   Date Value Ref Range Status   01/20/2021 86 70 - 99 mg/dL Final     Urea Nitrogen   Date Value Ref Range Status   01/20/2021 17 7 - 30 mg/dL Final     Creatinine   Date  Value Ref Range Status   01/20/2021 0.90 0.66 - 1.25 mg/dL Final     GFR Estimate   Date Value Ref Range Status   01/20/2021 >90 >60 mL/min/[1.73_m2] Final     Comment:     Non  GFR Calc  Starting 12/18/2018, serum creatinine based estimated GFR (eGFR) will be   calculated using the Chronic Kidney Disease Epidemiology Collaboration   (CKD-EPI) equation.       Calcium   Date Value Ref Range Status   01/20/2021 8.3 (L) 8.5 - 10.1 mg/dL Final     Hemoglobin A1C   Date Value Ref Range Status   01/17/2021 5.7 (H) 0 - 5.6 % Final     Comment:     Normal <5.7% Prediabetes 5.7-6.4%  Diabetes 6.5% or higher - adopted from ADA   consensus guidelines.     06/22/2020 5.5 0 - 5.6 % Final     Comment:     Normal <5.7% Prediabetes 5.7-6.4%  Diabetes 6.5% or higher - adopted from ADA   consensus guidelines.     02/01/2019 5.4 0 - 5.6 % Final     Comment:     Normal <5.7% Prediabetes 5.7-6.4%  Diabetes 6.5% or higher - adopted from ADA   consensus guidelines.           PT pulses are not palpable 2/2 pitting edema Faint sound heard with doppler. DP pulses are 2/4 bilaterally. CRT is instant. Positive pedal hair.   Gross sensation is diminished bilaterally. Protective sensation is intact as demonstrated with a 5.07G monofilament.  Equinus is noted bilaterally. No pain with active or passive ROM of the ankle, MTJ, 1st ray, or halluces bilaterally,. Hallus malleus noted to the right. Right hallux nail is thickened, dystrophic, and lytic at the distal 1/2.              A diabetic pessure wound is noted at right  distal hallux measuring 0.7cm x 0.4cm x 0.1cm.    Celis Classification: 2    Wound base: Yellow/Slough    Edges: slightly hyperkeratotic    Drainage: small/serous    Odor: no    Undermining: no    Bone Exposure: No    Clinical Signs of Infection: No    After obtaining patient consent, the wound was irrigated with copious amounts of saline. A scalpel was then used to debride the wound into subcutaneous tissue. The  wound edges were debrided back to healthy, bleeding tissue. The wound base exhibited healthy bleeding. Given the patient's lack of sensation, no anesthesia was necessary for the procedure.    Barriers to Healing: Neuropathy, hallux malleus.     Treatment Plan: Iodofoam and Optifoam. DH2 shoe.     Pt Ability to Follow Plan: Excellent.       TAMANNA IMPRESSION:  1. RIGHT:       A. Resting TAMANNA is non compressible.       B. Noncompressible arteries may falsely elevate the TBI. TBI is  normal, 0.72.     2. LEFT:       A. Resting TAMANNA is non compressible.       B. Noncompressible arteries may falsely elevate the TBI. TBI is  normal, 0.85.     Guidelines:     TAMANNA Diagnostic Criteria (Based on criteria published in Circulation  2011; 124: 3246-8124):    > 1.4: Non compressible    1.00 - 1.40: Normal    0.91 - 0.99: Borderline    At or below 0.90: Abnormal     TAMANNA Diagnostic Criteria (Based on ACC/AHA guideline 2008):    >/=1.3 - non compressible vessels    1.00  -1.29 - Normal    0.91 - 0.99 - Borderline    0.41 - 0.90 - Mild to moderate PAD    0.00 - 0.40 - Severe PAD     CLAUDIA MCMILLAN MD    IMPRESSION:  1.  Ill-defined cortical destruction of the distal tuft of the distal phalanx of the right great toe most compatible with osteomyelitis given underlying clinical history.     2.  No other areas suspicious for osteomyelitis. If there are other areas suspicious for osteomyelitis consider MRI which is a much more sensitive evaluation.     3.  Marked soft tissue swelling involving the right great toe with diffuse subcutaneous edema elsewhere throughout the right foot. No evidence for peripherally enhancing fluid collection or gas within the soft tissues.     4.  Oblique nondisplaced intra-articular fracture involving the plantar aspect of the proximal portion of the distal phalanx right great toe.     5.  Symmetric marginal erosions involving the articular margin of the proximal phalanx of the right first MTP joint suspicious for  gout.     6.  Remainder of findings as detailed above.     right foot xrays indicated in 3 weightbearing views.    On the oblique, increased mineralization is noted to the distal phalanx, but there is increased osteolysis to the phalanx on the lateral.       Assessment: Pradip is a 56 YO with diabetes and neuropathy. He has an ulceration on the right distal hallux. He is currently on 6 weeks of abx.   I discussed with him that we will need to offload the ulcer, along with good dressing changes. I discussed with him possible sequela that could occur from the ulceration, including amputation if we cannot get good source control.   XRs show some mineralization on the oblique, but lysis on the lateral. Will cont to get serial XRs, but with the wound healing appropriately, I am hopeful that the local cares and the systemic abx are improving his prognosis for the toe. Will leave the abx choice to my ID colleagues. Will try to connect with Dr. Quigley today.     Plan:  - Pt seen and evaluated.  - XR taken and discussed with him. This does lag 10-14 days behind. Clinically, the area may be improving. Will cont with serial XR.   - Cont dressings as above.   - Cont abx as directed by ID.  - See again in 3 weeks.

## 2021-02-17 NOTE — LETTER
2/17/2021         RE: Thomas Gonzales  [address] [place] [address]        Dear Colleague,    Thank you for referring your patient, Thomas Gonzales, to the [facility] [place]. Please see a copy of my visit note below.    Chief Complaint   Patient presents with     WOUND CARE     right hallux            HPI: Thomas is a 55 year old male who presents today for further evaluation of right foot wound with osteomyelitis. He has been using the foams on the toe. No pain. He is seeing ID today. Taking Bactrim and Cipro.     Review of Systems: No n/v/d/f/c/ns/sob/cp    PMH:   Past Medical History:   Diagnosis Date     CAD (coronary artery disease)     S/p stenting of left circumflex     Diabetes mellitus (H)      Multiple sclerosis (H)      Peripheral neuropathy        PSxH:   Past Surgical History:   Procedure Laterality Date     APPENDECTOMY  1/8/2016     CATARACT IOL, RT/LT       CHOLECYSTECTOMY  1/8/2016     penile implant       LEON EN Y BOWEL  1/8/2016    for small bowel ischemia       Allergies: Shellfish-derived products, Adhesive tape, Fructose, Gluten meal, Lactose, and Reglan [metoclopramide]    SH:   Social History     Socioeconomic History     Marital status:      Spouse name: Not on file     Number of children: Not on file     Years of education: Not on file     Highest education level: Not on file   Occupational History     Not on file   Social Needs     Financial resource strain: Not on file     Food insecurity     Worry: Not on file     Inability: Not on file     Transportation needs     Medical: Not on file     Non-medical: Not on file   Tobacco Use     Smoking status: Never Smoker     Smokeless tobacco: Never Used   Substance and Sexual Activity     Alcohol use: Yes     Comment: occ     Drug use: No     Sexual activity: Yes     Partners: Female   Lifestyle     Physical activity     Days per week: Not on file     Minutes per session: Not on file      Stress: Not on file   Relationships     Social connections     Talks on phone: Not on file     Gets together: Not on file     Attends Taoist service: Not on file     Active member of club or organization: Not on file     Attends meetings of clubs or organizations: Not on file     Relationship status: Not on file     Intimate partner violence     Fear of current or ex partner: Not on file     Emotionally abused: Not on file     Physically abused: Not on file     Forced sexual activity: Not on file   Other Topics Concern     Parent/sibling w/ CABG, MI or angioplasty before 65F 55M? Not Asked   Social History Narrative     Not on file       FH:   Family History   Problem Relation Age of Onset     Arrhythmia Mother         bradycardia- pacemaker     Coronary Artery Disease Father        Objective:  Data Unavailable Data Unavailable Data Unavailable Data Unavailable Data Unavailable 0 lbs 0 oz  CRP Inflammation   Date Value Ref Range Status   01/20/2021 12.0 (H) 0.0 - 8.0 mg/L Final     Lab Results   Component Value Date    WBC 4.2 01/20/2021     Lab Results   Component Value Date    RBC 4.03 01/20/2021     Lab Results   Component Value Date    HGB 11.2 01/20/2021     Lab Results   Component Value Date    HCT 32.8 01/20/2021     No components found for: MCT  Lab Results   Component Value Date    MCV 81 01/20/2021     Lab Results   Component Value Date    MCH 27.8 01/20/2021     Lab Results   Component Value Date    MCHC 34.1 01/20/2021     Lab Results   Component Value Date    RDW 12.9 01/20/2021     Lab Results   Component Value Date     01/20/2021     Last Comprehensive Metabolic Panel:  Sodium   Date Value Ref Range Status   01/20/2021 145 (H) 133 - 144 mmol/L Final     Potassium   Date Value Ref Range Status   01/20/2021 3.8 3.4 - 5.3 mmol/L Final     Chloride   Date Value Ref Range Status   01/20/2021 114 (H) 94 - 109 mmol/L Final     Carbon Dioxide   Date Value Ref Range Status   01/20/2021 25 20 - 32  mmol/L Final     Anion Gap   Date Value Ref Range Status   01/20/2021 6 3 - 14 mmol/L Final     Glucose   Date Value Ref Range Status   01/20/2021 86 70 - 99 mg/dL Final     Urea Nitrogen   Date Value Ref Range Status   01/20/2021 17 7 - 30 mg/dL Final     Creatinine   Date Value Ref Range Status   01/20/2021 0.90 0.66 - 1.25 mg/dL Final     GFR Estimate   Date Value Ref Range Status   01/20/2021 >90 >60 mL/min/[1.73_m2] Final     Comment:     Non  GFR Calc  Starting 12/18/2018, serum creatinine based estimated GFR (eGFR) will be   calculated using the Chronic Kidney Disease Epidemiology Collaboration   (CKD-EPI) equation.       Calcium   Date Value Ref Range Status   01/20/2021 8.3 (L) 8.5 - 10.1 mg/dL Final     Hemoglobin A1C   Date Value Ref Range Status   01/17/2021 5.7 (H) 0 - 5.6 % Final     Comment:     Normal <5.7% Prediabetes 5.7-6.4%  Diabetes 6.5% or higher - adopted from ADA   consensus guidelines.     06/22/2020 5.5 0 - 5.6 % Final     Comment:     Normal <5.7% Prediabetes 5.7-6.4%  Diabetes 6.5% or higher - adopted from ADA   consensus guidelines.     02/01/2019 5.4 0 - 5.6 % Final     Comment:     Normal <5.7% Prediabetes 5.7-6.4%  Diabetes 6.5% or higher - adopted from ADA   consensus guidelines.           PT pulses are not palpable 2/2 pitting edema Faint sound heard with doppler. DP pulses are 2/4 bilaterally. CRT is instant. Positive pedal hair.   Gross sensation is diminished bilaterally. Protective sensation is intact as demonstrated with a 5.07G monofilament.  Equinus is noted bilaterally. No pain with active or passive ROM of the ankle, MTJ, 1st ray, or halluces bilaterally,. Hallus malleus noted to the right. Right hallux nail is thickened, dystrophic, and lytic at the distal 1/2.              A diabetic pessure wound is noted at right  distal hallux measuring 0.7cm x 0.4cm x 0.1cm.    Celis Classification: 2    Wound base: Yellow/Slough    Edges: slightly  hyperkeratotic    Drainage: small/serous    Odor: no    Undermining: no    Bone Exposure: No    Clinical Signs of Infection: No    After obtaining patient consent, the wound was irrigated with copious amounts of saline. A scalpel was then used to debride the wound into subcutaneous tissue. The wound edges were debrided back to healthy, bleeding tissue. The wound base exhibited healthy bleeding. Given the patient's lack of sensation, no anesthesia was necessary for the procedure.    Barriers to Healing: Neuropathy, hallux malleus.     Treatment Plan: Iodofoam and Optifoam. DH2 shoe.     Pt Ability to Follow Plan: Excellent.       TAMANNA IMPRESSION:  1. RIGHT:       A. Resting TAMANNA is non compressible.       B. Noncompressible arteries may falsely elevate the TBI. TBI is  normal, 0.72.     2. LEFT:       A. Resting TAMANNA is non compressible.       B. Noncompressible arteries may falsely elevate the TBI. TBI is  normal, 0.85.     Guidelines:     TAMANNA Diagnostic Criteria (Based on criteria published in Circulation  2011; 124: 5785-5376):    > 1.4: Non compressible    1.00 - 1.40: Normal    0.91 - 0.99: Borderline    At or below 0.90: Abnormal     TAMANNA Diagnostic Criteria (Based on ACC/AHA guideline 2008):    >/=1.3 - non compressible vessels    1.00  -1.29 - Normal    0.91 - 0.99 - Borderline    0.41 - 0.90 - Mild to moderate PAD    0.00 - 0.40 - Severe PAD     CLAUDIA MCMILLAN MD    IMPRESSION:  1.  Ill-defined cortical destruction of the distal tuft of the distal phalanx of the right great toe most compatible with osteomyelitis given underlying clinical history.     2.  No other areas suspicious for osteomyelitis. If there are other areas suspicious for osteomyelitis consider MRI which is a much more sensitive evaluation.     3.  Marked soft tissue swelling involving the right great toe with diffuse subcutaneous edema elsewhere throughout the right foot. No evidence for peripherally enhancing fluid collection or gas within the  soft tissues.     4.  Oblique nondisplaced intra-articular fracture involving the plantar aspect of the proximal portion of the distal phalanx right great toe.     5.  Symmetric marginal erosions involving the articular margin of the proximal phalanx of the right first MTP joint suspicious for gout.     6.  Remainder of findings as detailed above.     right foot xrays indicated in 3 weightbearing views.    On the oblique, increased mineralization is noted to the distal phalanx, but there is increased osteolysis to the phalanx on the lateral.       Assessment: Pradip is a 56 YO with diabetes and neuropathy. He has an ulceration on the right distal hallux. He is currently on 6 weeks of abx.   I discussed with him that we will need to offload the ulcer, along with good dressing changes. I discussed with him possible sequela that could occur from the ulceration, including amputation if we cannot get good source control.   XRs show some mineralization on the oblique, but lysis on the lateral. Will cont to get serial XRs, but with the wound healing appropriately, I am hopeful that the local cares and the systemic abx are improving his prognosis for the toe. Will leave the abx choice to my ID colleagues. Will try to connect with Dr. Quigley today.     Plan:  - Pt seen and evaluated.  - XR taken and discussed with him. This does lag 10-14 days behind. Clinically, the area may be improving. Will cont with serial XR.   - Cont dressings as above.   - Cont abx as directed by ID.  - See again in 3 weeks.            Norm West DPM

## 2021-02-17 NOTE — LETTER
2/17/2021       RE: Thomas Gonzales  [address] [place] [address]     Dear Colleague,    Thank you for referring your patient, Thomas Gonzales, to the [facility] [place] [facility]. Please see a copy of my visit note below.      Patient reports his wound is healing well, he did meet with wound care today and had imaging done as well.      Pradip is a 55 year old who is being evaluated via a billable video visit.      How would you like to obtain your AVS? MyChart    Will anyone else be joining your video visit? No      Video Start Time: 1.46 pm  Video-Visit Details    Type of service:  Video Visit    Video End Time:2:07 PM  Total time including chart review, care-coordination and documentation time on the date of encounter - 48 mins      Originating Location (pt. Location): Home    Distant Location (provider location):  [facility] [place]     Platform used for Video Visit: [facility] clinic progress note:  02/17/2021     Recommendations:  Continue bactrim 2 DS bid, cipro 750 mg bid  Add flagyl 500 mg po tid  All the above abxs for 3 more weeks. Has rcvd 4 weeks of abxs so far   Check CBC, CMP, CRP  Due for prevnar 13 and shingrix vaccinations after covid vaccine series    Return visit March 5 at 4 pm     Assessment:  -Osteomyelitis/ Diabetic foot wound, polymicrobial - has rcvd ~4 weeks of abxs so far with improvement in the wound. Will extend abxs for another 3 weeks till the wound heals. Will also add flagyl for anaerobic coverage as anaerobic cxs were not sent and many diabetic foot infections also have anaerobic infection. However there has been improvement in the wound without anaerobic coverage and so if he does not tolerate flagyl, I will not puruse iv since that is the only alternative to flagyl. Will also check blood work to make sure no adverse effect from bactrim  "and check CRP to see if that has normalized.     ---------------------------------------------------------------------------------------------------------------------------  This is a follow up after hospital discharge. First time seeing patient. Seen by my colleague as an inpatient.     HPI:  Thomas Gonzales is a 55 year old male with history significant for CAD s/p stenting, HLD, MS, type II DM c/b peripheral neuropathy, celiac disease, Castro-en-Y gastric bypass c/b strictures and intra-abdominal abscess (2016), who initially presented to urgent care on 1/16/21 with right great toe ulceration and CT c/w osteomyelitis. Reported injuring toe about 6 weeks prior to presentation, developed a \"blood blister\" which later unroofed, noted purulent drainage starting around 2-3 days before presentation, then significant erythema and swelling on day of presentation. No fluid collection on imaging, though was non-contrast. Started on vancomycin and Zosyn, then changed to vancomycin and cefepime. Superficial wound cultures with MSSA, S.epi, Pseudomonas (Pan-S to typical pseudomonas antibiotics including FQs) and Acinetobacter (Pan-S, S-TMP/SMX, S-Ciprofloxacin). Unclear utility of this sample as it was a superficial swab but will cover these organisms and general skin augusto with discharge antibiotic regimen.      Unfortunately, deeper wound cultures were not able to be obtained. TAMANNA suggestive of good blood flow to both feet. He was dced on bactrim 2 DS bid, cipro 750 mg every day. Anaerobes were not covered as he showed a good response with these abxs. Plan was to continue abxs till 2/27 when he would have completed 6 weeks of abxs.     Today he presents for follow up. He has been compliant with the po abxs. He reports no side effects. His blood sugars have been good always with A1c less than 6 for many years. He has peripheral neuropathy and cannot feel much with his feet. This is the first time he is having a diabetic foot " infection.       Objective:  Unable to examine due to virtual visit   Was able to see pictures of right toe wound in epic under media tab. Latest photo from today by Dr. West. The wound looks much better than before but has not healed completely yet. There is no exposed bone currently.     Labs:  Results for ALIYAH SAN (MRN [id number]) as of 2/17/2021 19:55   Ref. Range 3/9/2017 08:03 5/1/2018 08:19 2/1/2019 14:02 6/22/2020 11:06 1/17/2021 09:15   Hemoglobin A1C Latest Ref Range: 0 - 5.6 % 6.2 (H) 5.0 5.4 5.5 5.7 (H)       Results for ALIYAH SAN (MRN [id number]) as of 2/17/2021 19:55   Ref. Range 1/16/2021 15:44 1/17/2021 05:53 1/18/2021 05:46 1/19/2021 07:29 1/20/2021 05:50   CRP Inflammation Latest Ref Range: 0.0 - 8.0 mg/L 67.0 (H) 51.0 (H) 26.0 (H) 15.0 (H) 12.0 (H)     CBC RESULTS:   Recent Labs   Lab Test 01/20/21  0550   WBC 4.2   RBC 4.03*   HGB 11.2*   HCT 32.8*   MCV 81   MCH 27.8   MCHC 34.1   RDW 12.9   *       Last Comprehensive Metabolic Panel:  Sodium   Date Value Ref Range Status   01/20/2021 145 (H) 133 - 144 mmol/L Final     Potassium   Date Value Ref Range Status   01/20/2021 3.8 3.4 - 5.3 mmol/L Final     Chloride   Date Value Ref Range Status   01/20/2021 114 (H) 94 - 109 mmol/L Final     Carbon Dioxide   Date Value Ref Range Status   01/20/2021 25 20 - 32 mmol/L Final     Anion Gap   Date Value Ref Range Status   01/20/2021 6 3 - 14 mmol/L Final     Glucose   Date Value Ref Range Status   01/20/2021 86 70 - 99 mg/dL Final     Urea Nitrogen   Date Value Ref Range Status   01/20/2021 17 7 - 30 mg/dL Final     Creatinine   Date Value Ref Range Status   01/20/2021 0.90 0.66 - 1.25 mg/dL Final     GFR Estimate   Date Value Ref Range Status   01/20/2021 >90 >60 mL/min/[1.73_m2] Final     Comment:     Non  GFR Calc  Starting 12/18/2018, serum creatinine based estimated GFR (eGFR) will be   calculated using the Chronic Kidney Disease Epidemiology Collaboration    (CKD-EPI) equation.       Calcium   Date Value Ref Range Status   2021 8.3 (L) 8.5 - 10.1 mg/dL Final     Bilirubin Total   Date Value Ref Range Status   2021 0.6 0.2 - 1.3 mg/dL Final     Alkaline Phosphatase   Date Value Ref Range Status   2021 117 40 - 150 U/L Final     ALT   Date Value Ref Range Status   2021 50 0 - 70 U/L Final     AST   Date Value Ref Range Status   2021 36 0 - 45 U/L Final               Imagin21 x ray right toe  IMPRESSION:  1. Continued progressive osteolysis of the first distal phalanx.  2. Redemonstration intra-articular fracture of first distal phalangeal  Base.    21 x ray right toe   1. Progressive osteolysis of the tuft of the first distal phalanx,  compatible with osteomyelitis.  2. Unchanged age-indeterminate first proximal and distal phalangeal  base fractures.    21 TAMANNA   IMPRESSION:  1. RIGHT:       A. Resting TAMANNA is non compressible.       B. Noncompressible arteries may falsely elevate the TBI. TBI is  normal, 0.72.     2. LEFT:       A. Resting TAMANNA is non compressible.       B. Noncompressible arteries may falsely elevate the TBI. TBI is  normal, 0.85.       21 CT toe right   1.  Ill-defined cortical destruction of the distal tuft of the distal phalanx of the right great toe most compatible with osteomyelitis given underlying clinical history.     2.  No other areas suspicious for osteomyelitis. If there are other areas suspicious for osteomyelitis consider MRI which is a much more sensitive evaluation.     3.  Marked soft tissue swelling involving the right great toe with diffuse subcutaneous edema elsewhere throughout the right foot. No evidence for peripherally enhancing fluid collection or gas within the soft tissues.     4.  Oblique nondisplaced intra-articular fracture involving the plantar aspect of the proximal portion of the distal phalanx right great toe.     5.  Symmetric marginal erosions involving the  articular margin of the proximal phalanx of the right first MTP joint suspicious for gout.     1/16/21 x ray R toe   IMPRESSION: Cortical ill definition of the great toe distal phalangeal  tuft. This could represent early osteomyelitis, particularly if there  is an adjacent wound or sinus. Small calcific fragment in the medial  proximal corner of the great toe proximal phalanx which may represent  age-indeterminate fracture. Similar finding is noted in the medial  aspect of the distal phalangeal base as well suspicious for  age-indeterminate fracture. Suspected erosion at the lateral base of  the proximal phalanx without associated joint space narrowing. Gout  could have this appearance. Forefoot small vessel others carotid  calcification is noted.    Microbiology:  Superficial Culture right great toe wound 1/16/21: Staph epidermidis     Staphylococcus aureus (1) Staphylococcus aureus (2)     TORIBIO TORIBIO     CLINDAMYCIN <=0.25 ug/mL Sensitive <=0.25 ug/mL Sensitive     ERYTHROMYCIN <=0.25 ug/mL Sensitive 0.5 ug/mL Sensitive     GENTAMICIN <=0.5 ug/mL Sensitive <=0.5 ug/mL Sensitive     OXACILLIN 0.5 ug/mL Sensitive 0.5 ug/mL Sensitive     TETRACYCLINE <=1 ug/mL Sensitive <=1 ug/mL Sensitive     Trimethoprim/Sulfa <=0.5/9.5 u... Sensitive <=0.5/9.5 u... Sensitive     VANCOMYCIN 1 ug/mL Sensitive <=0.5 ug/mL Sensitive             Susceptibility     Acinetobacter species, not baumannii Pseudomonas aeruginosa     TORIBIO TORIBIO     AMIKACIN <=16.0 ug/mL Sensitive 4 ug/mL Sensitive     AMPICILLIN/SULBACTAM 2.0 ug/mL Sensitive       CEFEPIME <=2.0 ug/mL Sensitive 4 ug/mL Sensitive     CEFTAZIDIME 2.0 ug/mL Sensitive 4 ug/mL Sensitive     CEFTRIAXONE 2.0 ug/mL Sensitive       CIPROFLOXACIN <=1.0 ug/mL Sensitive <=0.25 ug/mL Sensitive     GENTAMICIN <=1.0 ug/mL Sensitive 2 ug/mL Sensitive     IMIPENEM <=0.5 ug/mL Sensitive       LEVOFLOXACIN <=0.25 ug/mL Sensitive 1 ug/mL Sensitive     MEROPENEM <=1.0 ug/mL Sensitive <=0.25 ug/mL  Sensitive     Piperacillin/Tazo <=4.0 ug/mL Sensitive <=4 ug/mL Sensitive     TOBRAMYCIN <=1.0 ug/mL Sensitive <=1 ug/mL Sensitive     Trimethoprim/Sulfa <=2.0/38.0 ... Sensitive            Muna Lewis MD

## 2021-02-23 DIAGNOSIS — M86.271 SUBACUTE OSTEOMYELITIS OF RIGHT FOOT (H): ICD-10-CM

## 2021-02-23 LAB
BASOPHILS # BLD AUTO: 0 10E9/L (ref 0–0.2)
BASOPHILS NFR BLD AUTO: 0.5 %
CRP SERPL-MCNC: <2.9 MG/L (ref 0–8)
DIFFERENTIAL METHOD BLD: ABNORMAL
EOSINOPHIL # BLD AUTO: 0.2 10E9/L (ref 0–0.7)
EOSINOPHIL NFR BLD AUTO: 3.6 %
ERYTHROCYTE [DISTWIDTH] IN BLOOD BY AUTOMATED COUNT: 13.6 % (ref 10–15)
HCT VFR BLD AUTO: 37.8 % (ref 40–53)
HGB BLD-MCNC: 12.2 G/DL (ref 13.3–17.7)
LYMPHOCYTES # BLD AUTO: 1 10E9/L (ref 0.8–5.3)
LYMPHOCYTES NFR BLD AUTO: 22.9 %
MCH RBC QN AUTO: 27.9 PG (ref 26.5–33)
MCHC RBC AUTO-ENTMCNC: 32.3 G/DL (ref 31.5–36.5)
MCV RBC AUTO: 87 FL (ref 78–100)
MONOCYTES # BLD AUTO: 0.4 10E9/L (ref 0–1.3)
MONOCYTES NFR BLD AUTO: 9.3 %
NEUTROPHILS # BLD AUTO: 2.7 10E9/L (ref 1.6–8.3)
NEUTROPHILS NFR BLD AUTO: 63.7 %
PLATELET # BLD AUTO: 126 10E9/L (ref 150–450)
RBC # BLD AUTO: 4.37 10E12/L (ref 4.4–5.9)
WBC # BLD AUTO: 4.2 10E9/L (ref 4–11)

## 2021-02-23 PROCEDURE — 36415 COLL VENOUS BLD VENIPUNCTURE: CPT | Performed by: STUDENT IN AN ORGANIZED HEALTH CARE EDUCATION/TRAINING PROGRAM

## 2021-02-23 PROCEDURE — 80053 COMPREHEN METABOLIC PANEL: CPT | Performed by: STUDENT IN AN ORGANIZED HEALTH CARE EDUCATION/TRAINING PROGRAM

## 2021-02-23 PROCEDURE — 85025 COMPLETE CBC W/AUTO DIFF WBC: CPT | Performed by: STUDENT IN AN ORGANIZED HEALTH CARE EDUCATION/TRAINING PROGRAM

## 2021-02-23 PROCEDURE — 86140 C-REACTIVE PROTEIN: CPT | Performed by: STUDENT IN AN ORGANIZED HEALTH CARE EDUCATION/TRAINING PROGRAM

## 2021-02-24 ENCOUNTER — TELEPHONE (OUTPATIENT)
Dept: INFECTIOUS DISEASES | Facility: CLINIC | Age: 56
End: 2021-02-24
Payer: COMMERCIAL

## 2021-02-24 LAB
ALBUMIN SERPL-MCNC: 3.6 G/DL (ref 3.4–5)
ALP SERPL-CCNC: 121 U/L (ref 40–150)
ALT SERPL W P-5'-P-CCNC: 159 U/L (ref 0–70)
ANION GAP SERPL CALCULATED.3IONS-SCNC: 8 MMOL/L (ref 3–14)
AST SERPL W P-5'-P-CCNC: 105 U/L (ref 0–45)
BILIRUB SERPL-MCNC: 0.2 MG/DL (ref 0.2–1.3)
BUN SERPL-MCNC: 41 MG/DL (ref 7–30)
CALCIUM SERPL-MCNC: 8.7 MG/DL (ref 8.5–10.1)
CHLORIDE SERPL-SCNC: 114 MMOL/L (ref 94–109)
CO2 SERPL-SCNC: 16 MMOL/L (ref 20–32)
CREAT SERPL-MCNC: 2.24 MG/DL (ref 0.66–1.25)
GFR SERPL CREATININE-BSD FRML MDRD: 32 ML/MIN/{1.73_M2}
GLUCOSE SERPL-MCNC: 85 MG/DL (ref 70–99)
POTASSIUM SERPL-SCNC: 5.6 MMOL/L (ref 3.4–5.3)
PROT SERPL-MCNC: 7 G/DL (ref 6.8–8.8)
SODIUM SERPL-SCNC: 138 MMOL/L (ref 133–144)

## 2021-02-24 PROCEDURE — 99213 OFFICE O/P EST LOW 20 MIN: CPT | Mod: 95 | Performed by: STUDENT IN AN ORGANIZED HEALTH CARE EDUCATION/TRAINING PROGRAM

## 2021-02-25 ENCOUNTER — INFUSION THERAPY VISIT (OUTPATIENT)
Dept: INFUSION THERAPY | Facility: CLINIC | Age: 56
End: 2021-02-25
Attending: INTERNAL MEDICINE
Payer: COMMERCIAL

## 2021-02-25 VITALS
DIASTOLIC BLOOD PRESSURE: 73 MMHG | SYSTOLIC BLOOD PRESSURE: 166 MMHG | HEART RATE: 72 BPM | OXYGEN SATURATION: 96 % | TEMPERATURE: 98 F | RESPIRATION RATE: 16 BRPM

## 2021-02-25 DIAGNOSIS — L03.116 CELLULITIS OF LEFT LOWER EXTREMITY: Primary | ICD-10-CM

## 2021-02-25 LAB
CREAT SERPL-MCNC: 1.59 MG/DL (ref 0.66–1.25)
GFR SERPL CREATININE-BSD FRML MDRD: 48 ML/MIN/{1.73_M2}
POTASSIUM SERPL-SCNC: 5.2 MMOL/L (ref 3.4–5.3)

## 2021-02-25 PROCEDURE — 258N000003 HC RX IP 258 OP 636: Performed by: STUDENT IN AN ORGANIZED HEALTH CARE EDUCATION/TRAINING PROGRAM

## 2021-02-25 PROCEDURE — 96365 THER/PROPH/DIAG IV INF INIT: CPT

## 2021-02-25 PROCEDURE — 96367 TX/PROPH/DG ADDL SEQ IV INF: CPT

## 2021-02-25 PROCEDURE — 96360 HYDRATION IV INFUSION INIT: CPT

## 2021-02-25 PROCEDURE — 84132 ASSAY OF SERUM POTASSIUM: CPT | Performed by: STUDENT IN AN ORGANIZED HEALTH CARE EDUCATION/TRAINING PROGRAM

## 2021-02-25 PROCEDURE — 82565 ASSAY OF CREATININE: CPT | Performed by: STUDENT IN AN ORGANIZED HEALTH CARE EDUCATION/TRAINING PROGRAM

## 2021-02-25 PROCEDURE — 96361 HYDRATE IV INFUSION ADD-ON: CPT

## 2021-02-25 RX ORDER — HEPARIN SODIUM,PORCINE 10 UNIT/ML
5 VIAL (ML) INTRAVENOUS
Status: CANCELLED | OUTPATIENT
Start: 2021-02-25

## 2021-02-25 RX ORDER — HEPARIN SODIUM (PORCINE) LOCK FLUSH IV SOLN 100 UNIT/ML 100 UNIT/ML
5 SOLUTION INTRAVENOUS
Status: CANCELLED | OUTPATIENT
Start: 2021-02-25

## 2021-02-25 RX ADMIN — SODIUM CHLORIDE 3000 ML: 9 INJECTION, SOLUTION INTRAVENOUS at 14:20

## 2021-02-25 NOTE — PROGRESS NOTES
Updated order on therapy plan after speaking with charge nurse, Emily, in [facility]. 3L of IV fluids needs to be given over 3 hours, not 2. Pt has appt with [facility] at 2:45pm today, and he is aware. Updated Dr. Lewis.

## 2021-02-25 NOTE — PROGRESS NOTES
Nursing Note  Thomas Gonzales presents [facility] for:   Chief Complaint   Patient presents with     Infusion     IV Fluids     During today's [facility] appointment, orders from Dr. Lewis were completed.  Frequency: once    Progress note:  Patient identification verified by name and date of birth.  Assessment completed.  Vitals recorded in Doc Flowsheets.  Patient was provided with education regarding medication/procedure and possible side effects.  Patient verbalized understanding.     present during visit today: Not Applicable.    Treatment Conditions: Non-applicable.    Premedications: were not ordered.    Drug Waste Record: No    Infusion length and rate:  infusion given over approximately 3 hours    Labs: were drawn post infusion as ordered.     Vascular access: peripheral IV placed today.    Is the next appt scheduled? No, therapy complete  Asymptomatic COVID test completed? Negative 1/16    Post Infusion Assessment:  Patient tolerated infusion without incident.  Site patent and intact, free from redness, edema or discomfort.  No evidence of extravasations.  Access discontinued per protocol.     Discharge Plan:   AVS given to patient.   Follow up plan of care with: ordering provider as scheduled.  Discharge instructions were reviewed with patient.  Patient/representative verbalized understanding of discharge instructions and all questions answered.  Patient discharged from [facility] in stable condition.    Administrations This Visit     0.9% sodium chloride BOLUS     Admin Date  02/25/2021 Action  New Bag Dose  3,000 mL Rate  1,000 mL/hr Route  Intravenous Administered By  Usman Cortez RN              Vital signs:  Temp: 98  F (36.7  C) Temp src: Oral BP: 130/68 Pulse: 67   Resp: 18 SpO2: 96 % O2 Device: None (Room air)        Estimated body mass index is 30.69 kg/m  as calculated from the  "following:    Height as of 2/4/21: 1.88 m (6' 2\").    Weight as of 2/4/21: 108.4 kg (239 lb).            "

## 2021-02-25 NOTE — PROGRESS NOTES
Per msg from Dr. Lewis, pt needs iv fluids - NS 3 L infused over 2 hours (via peripheral iv) followed by a creatinine and potassium check. Orders entered into therapy plan and sent to provider for co-sign. Sent urgent msg to [facility] scheduling pool to see if they can get pt in today.    Pattie Phillips RN

## 2021-02-25 NOTE — LETTER
2/25/2021         RE: Thomas Gonzales  [address] [place] [address]        Dear Colleague,    Thank you for referring your patient, Thomas Gonzales, to the [facility] TREAT[facility]. Please see a copy of my visit note below.    Nursing Note  Thomas Gonzales [facility] for:   Chief Complaint   Patient presents with     Infusion     IV Fluids     During today's [facility] appointment, orders from Dr. Lewis were completed.  Frequency: once    Progress note:  Patient identification verified by name and date of birth.  Assessment completed.  Vitals recorded in Doc Flowsheets.  Patient was provided with education regarding medication/procedure and possible side effects.  Patient verbalized understanding.     present during visit today: Not Applicable.    Treatment Conditions: Non-applicable.    Premedications: were not ordered.    Drug Waste Record: No    Infusion length and rate:  infusion given over approximately 3 hours    Labs: were drawn post infusion as ordered.     Vascular access: peripheral IV placed today.    Is the next appt scheduled? No, therapy complete  Asymptomatic COVID test completed? Negative 1/16    Post Infusion Assessment:  Patient tolerated infusion without incident.  Site patent and intact, free from redness, edema or discomfort.  No evidence of extravasations.  Access discontinued per protocol.     Discharge Plan:   AVS given to patient.   Follow up plan of care with: ordering provider as scheduled.  Discharge instructions were reviewed with patient.  Patient/representative verbalized understanding of discharge instructions and all questions answered.  Patient discharged from [facility] in stable condition.    Administrations This Visit     0.9% sodium chloride BOLUS     Admin Date  02/25/2021 Action  New Bag Dose  3,000  "mL Rate  1,000 mL/hr Route  Intravenous Administered By  Usman Cortez RN              Vital signs:  Temp: 98  F (36.7  C) Temp src: Oral BP: 130/68 Pulse: 67   Resp: 18 SpO2: 96 % O2 Device: None (Room air)        Estimated body mass index is 30.69 kg/m  as calculated from the following:    Height as of 2/4/21: 1.88 m (6' 2\").    Weight as of 2/4/21: 108.4 kg (239 lb).                Again, thank you for allowing me to participate in the care of your patient.        Sincerely,        [facility]    "

## 2021-02-25 NOTE — TELEPHONE ENCOUNTER
Tried calling patient multiple times and left voice messages. Was finally able to get a hold of him.     Reviewed blood work done yesterday which showed increase in creat from 0.8 to 2.2 with potassium of 5.4 and decreased bicarb and mildly elevated LFTs. He has also been having liquid stools for the past 2 days due to flagyl. He feels the wound is looking good.     I have asked him to stop all the three abxs and drink plenty of fluids. Will arrange for 3 L NS infusion over 2 hours at an infusion center tomorrow followed by a creat and potassium check. He has a follow up appt with me soon.     Telephone time 12 mins   Muna Lewis MD

## 2021-02-25 NOTE — PATIENT INSTRUCTIONS
Dear Thomas Gonzales    Thank you for choosing [facility] ([facility]) for your infusion.  The following information is a summary of our appointment as well as important reminders.        We look forward in seeing you on your next appointment here at [facility] ([facility]).  Please don t hesitate to call us at [phone] to reschedule any of your appointments or to speak with one of the [facility] registered nurses.  It was a pleasure taking care of you today.    Sincerely,    [facility]  [facility]  [address] [place] [address]  [id number]  Phone:  [phone]

## 2021-03-05 ENCOUNTER — VIRTUAL VISIT (OUTPATIENT)
Dept: INFECTIOUS DISEASES | Facility: CLINIC | Age: 56
End: 2021-03-05
Attending: STUDENT IN AN ORGANIZED HEALTH CARE EDUCATION/TRAINING PROGRAM
Payer: COMMERCIAL

## 2021-03-05 DIAGNOSIS — M86.271 SUBACUTE OSTEOMYELITIS OF RIGHT FOOT (H): ICD-10-CM

## 2021-03-05 DIAGNOSIS — T88.7XXA DRUG SIDE EFFECTS: ICD-10-CM

## 2021-03-05 DIAGNOSIS — E11.42 DIABETIC POLYNEUROPATHY ASSOCIATED WITH TYPE 2 DIABETES MELLITUS (H): ICD-10-CM

## 2021-03-05 DIAGNOSIS — L97.512 RIGHT FOOT ULCER, WITH FAT LAYER EXPOSED (H): ICD-10-CM

## 2021-03-05 DIAGNOSIS — N17.9 AKI (ACUTE KIDNEY INJURY) (H): Primary | ICD-10-CM

## 2021-03-05 PROCEDURE — 99214 OFFICE O/P EST MOD 30 MIN: CPT | Mod: 95 | Performed by: STUDENT IN AN ORGANIZED HEALTH CARE EDUCATION/TRAINING PROGRAM

## 2021-03-05 ASSESSMENT — PAIN SCALES - GENERAL: PAINLEVEL: NO PAIN (0)

## 2021-03-05 NOTE — LETTER
3/5/2021       RE: Thomas Gonzales  [address] [place] [address]     Dear Colleague,    Thank you for referring your patient, Thomas Gonzales, to the [facility] [place] at [facility]. Please see a copy of my visit note below.    Patient was called and message left with call back number. Tatiana Correa on 3/5/2021 at 3:22 PM    Patient was called and message left with call back number. Tatiana Correa on 3/5/2021 at 3:04 PM    Pradip is a 55 year old who is being evaluated via a billable video visit.      How would you like to obtain your AVS? "[email or web address]  If the video visit is dropped, the invitation should be resent by: Text to cell phone: [phone]  Will anyone else be joining your video visit? No      Video Start Time: 4.14 pm  Video-Visit Details    Type of service:  Video Visit    Video End Time:4:26 PM  Total time including chart review, care-coordination and documentation time on the date of encounter - 31 mins    Originating Location (pt. Location): Home    Distant Location (provider location):  [facility] [place]     Platform used for Video Visit: Riley       Patient reports his wound is healing well, he did meet with wound care today and had imaging done as well.      ID clinic progress note:  03/05/2021     Recommendations:  - rcvd 5 weeks of bactrim and cipro stopped 2/24  - rcvd 1 week of flagyl , stopped 2/24  - continue to hold off of abxs  - patient to send pictures of wound via [employer]  - recheck CMP, if creat close to baseline, can stop increased fluids to help decrease bilateral pedal edema.   - recommend bilateral feet elevation for pedal edema.   -Due for prevnar 13 and shingrix vaccinations after covid vaccine series    No follow up scheduled at this time. Patient knows to contact me if issues arise.     Assessment:  Thomas Gonzales is a 55 year old male with  history significant for CAD s/p stenting, HLD, MS, type II DM c/b peripheral neuropathy, celiac disease, Castro-en-Y gastric bypass c/b strictures and intra-abdominal abscess (2016).     -Osteomyelitis/ Diabetic foot wound, polymicrobial - first diabetic foot ulcer. Started after trauma with blood blister. CT c/w osteomyelitis. Superficial wound aerobic cxs (anaerobic cx not sent) with MSSA, S.epi, Pseudomonas (Pan-S to typical pseudomonas antibiotics including FQs) and Acinetobacter (Pan-S, S-TMP/SMX, S-Ciprofloxacin). Dced on bactrim 2 DS bid and cipro 750 mg daily. I saw him at 4 weeks and added flagyl. Follow up blood work at 5 weeks with ERICK from bactrim. Stopped all 3 abxs 2/24/21.     Today 3/5/21 the wound looks good - very superficial. Therefore will continue to hold off of abxs.     ---------------------------------------------------------------------------------------------------------------------------  Interval events:  Last visit 2/17.   Reviewed blood work done 2/23 and called him 2/24 which showed increase in creat from 0.8 to 2.2 with potassium of 5.6 and decreased bicarb and mildly elevated LFTs. He had also been having liquid stools for the past 2 days due to flagyl.  I had asked him to stop all the three abxs and drink plenty of fluids.   We arranged for 3 L NS infusion the next day over 3 hours. Follow up creat and potassiim 2/25 after fluids had improved to 1.6 and 5.2 respectively.   Today he reports that he is continuing to drink plenty of fluids and he stopped all abxs on 2/24.   He reports swelling of both feet in the past week - likely due to all the fluids.   He reports that the wound is looking about the same as before.     This is a follow up after hospital discharge. First time seeing patient. Seen by my colleague as an inpatient.     HPI:  Thomas Gonzales is a 55 year old male with history significant for CAD s/p stenting, HLD, MS, type II DM c/b peripheral neuropathy, celiac disease,  "Castro-en-Y gastric bypass c/b strictures and intra-abdominal abscess (2016), who initially presented to urgent care on 1/16/21 with right great toe ulceration and CT c/w osteomyelitis. Reported injuring toe about 6 weeks prior to presentation, developed a \"blood blister\" which later unroofed, noted purulent drainage starting around 2-3 days before presentation, then significant erythema and swelling on day of presentation. No fluid collection on imaging, though was non-contrast. Started on vancomycin and Zosyn, then changed to vancomycin and cefepime. Superficial wound cultures  Unclear utility of this sample as it was a superficial swab but will cover these organisms and general skin augusto with discharge antibiotic regimen.      Unfortunately, deeper wound cultures were not able to be obtained. TAMANNA suggestive of good blood flow to both feet. He was dced on bactrim 2 DS bid, cipro 750 mg every day. Anaerobes were not covered as he showed a good response with these abxs. Plan was to continue abxs till 2/27 when he would have completed 6 weeks of abxs.     Today he presents for follow up. He has been compliant with the po abxs. He reports no side effects. His blood sugars have been good always with A1c less than 6 for many years. He has peripheral neuropathy and cannot feel much with his feet. This is the first time he is having a diabetic foot infection.       Objective:  Unable to examine due to virtual visit   Was able to see video of the wound and I think there is very little remaining to be healed. Its very shallow. However video clarity was not good and I recommend patient sending photos of the wound via [employer]    Labs:  Results for ALIYAH SAN (MRN [id number]) as of 2/17/2021 19:55   Ref. Range 3/9/2017 08:03 5/1/2018 08:19 2/1/2019 14:02 6/22/2020 11:06 1/17/2021 09:15   Hemoglobin A1C Latest Ref Range: 0 - 5.6 % 6.2 (H) 5.0 5.4 5.5 5.7 (H)       Results for ALIYAH SAN (MRN [id number]) as of " 2021 19:55   Ref. Range 2021 15:44 2021 05:53 2021 05:46 2021 07:29 2021 05:50   CRP Inflammation Latest Ref Range: 0.0 - 8.0 mg/L 67.0 (H) 51.0 (H) 26.0 (H) 15.0 (H) 12.0 (H)     CRP Inflammation   Date Value Ref Range Status   2021 <2.9 0.0 - 8.0 mg/L Final     CBC RESULTS:   Recent Labs   Lab Test 21  0550   WBC 4.2   RBC 4.03*   HGB 11.2*   HCT 32.8*   MCV 81   MCH 27.8   MCHC 34.1   RDW 12.9   *       Last Comprehensive Metabolic Panel:  Sodium   Date Value Ref Range Status   2021 138 133 - 144 mmol/L Final     Potassium   Date Value Ref Range Status   2021 5.2 3.4 - 5.3 mmol/L Final     Chloride   Date Value Ref Range Status   2021 114 (H) 94 - 109 mmol/L Final     Carbon Dioxide   Date Value Ref Range Status   2021 16 (L) 20 - 32 mmol/L Final     Anion Gap   Date Value Ref Range Status   2021 8 3 - 14 mmol/L Final     Glucose   Date Value Ref Range Status   2021 85 70 - 99 mg/dL Final     Urea Nitrogen   Date Value Ref Range Status   2021 41 (H) 7 - 30 mg/dL Final     Creatinine   Date Value Ref Range Status   2021 1.59 (H) 0.66 - 1.25 mg/dL Final     GFR Estimate   Date Value Ref Range Status   2021 48 (L) >60 mL/min/[1.73_m2] Final     Comment:     Non  GFR Calc  Starting 2018, serum creatinine based estimated GFR (eGFR) will be   calculated using the Chronic Kidney Disease Epidemiology Collaboration   (CKD-EPI) equation.       Calcium   Date Value Ref Range Status   2021 8.7 8.5 - 10.1 mg/dL Final     Bilirubin Total   Date Value Ref Range Status   2021 0.2 0.2 - 1.3 mg/dL Final     Alkaline Phosphatase   Date Value Ref Range Status   2021 121 40 - 150 U/L Final     ALT   Date Value Ref Range Status   2021 159 (H) 0 - 70 U/L Final     AST   Date Value Ref Range Status   2021 105 (H) 0 - 45 U/L Final       Imagin21 x ray right  toe  IMPRESSION:  1. Continued progressive osteolysis of the first distal phalanx.  2. Redemonstration intra-articular fracture of first distal phalangeal  Base.    2/4/21 x ray right toe   1. Progressive osteolysis of the tuft of the first distal phalanx,  compatible with osteomyelitis.  2. Unchanged age-indeterminate first proximal and distal phalangeal  base fractures.    1/18/21 TAMANNA   IMPRESSION:  1. RIGHT:       A. Resting TAMANNA is non compressible.       B. Noncompressible arteries may falsely elevate the TBI. TBI is  normal, 0.72.     2. LEFT:       A. Resting TAMANNA is non compressible.       B. Noncompressible arteries may falsely elevate the TBI. TBI is  normal, 0.85.       1/16/21 CT toe right   1.  Ill-defined cortical destruction of the distal tuft of the distal phalanx of the right great toe most compatible with osteomyelitis given underlying clinical history.     2.  No other areas suspicious for osteomyelitis. If there are other areas suspicious for osteomyelitis consider MRI which is a much more sensitive evaluation.     3.  Marked soft tissue swelling involving the right great toe with diffuse subcutaneous edema elsewhere throughout the right foot. No evidence for peripherally enhancing fluid collection or gas within the soft tissues.     4.  Oblique nondisplaced intra-articular fracture involving the plantar aspect of the proximal portion of the distal phalanx right great toe.     5.  Symmetric marginal erosions involving the articular margin of the proximal phalanx of the right first MTP joint suspicious for gout.     1/16/21 x ray R toe   IMPRESSION: Cortical ill definition of the great toe distal phalangeal  tuft. This could represent early osteomyelitis, particularly if there  is an adjacent wound or sinus. Small calcific fragment in the medial  proximal corner of the great toe proximal phalanx which may represent  age-indeterminate fracture. Similar finding is noted in the medial  aspect of the  distal phalangeal base as well suspicious for  age-indeterminate fracture. Suspected erosion at the lateral base of  the proximal phalanx without associated joint space narrowing. Gout  could have this appearance. Forefoot small vessel others carotid  calcification is noted.    Microbiology:  Superficial Culture aerobic  right great toe wound 1/16/21: Staph epidermidis     Staphylococcus aureus (1) Staphylococcus aureus (2)     TORIBIO TORIBIO     CLINDAMYCIN <=0.25 ug/mL Sensitive <=0.25 ug/mL Sensitive     ERYTHROMYCIN <=0.25 ug/mL Sensitive 0.5 ug/mL Sensitive     GENTAMICIN <=0.5 ug/mL Sensitive <=0.5 ug/mL Sensitive     OXACILLIN 0.5 ug/mL Sensitive 0.5 ug/mL Sensitive     TETRACYCLINE <=1 ug/mL Sensitive <=1 ug/mL Sensitive     Trimethoprim/Sulfa <=0.5/9.5 u... Sensitive <=0.5/9.5 u... Sensitive     VANCOMYCIN 1 ug/mL Sensitive <=0.5 ug/mL Sensitive             Susceptibility     Acinetobacter species, not baumannii Pseudomonas aeruginosa     TORIBIO TORIBIO     AMIKACIN <=16.0 ug/mL Sensitive 4 ug/mL Sensitive     AMPICILLIN/SULBACTAM 2.0 ug/mL Sensitive       CEFEPIME <=2.0 ug/mL Sensitive 4 ug/mL Sensitive     CEFTAZIDIME 2.0 ug/mL Sensitive 4 ug/mL Sensitive     CEFTRIAXONE 2.0 ug/mL Sensitive       CIPROFLOXACIN <=1.0 ug/mL Sensitive <=0.25 ug/mL Sensitive     GENTAMICIN <=1.0 ug/mL Sensitive 2 ug/mL Sensitive     IMIPENEM <=0.5 ug/mL Sensitive       LEVOFLOXACIN <=0.25 ug/mL Sensitive 1 ug/mL Sensitive     MEROPENEM <=1.0 ug/mL Sensitive <=0.25 ug/mL Sensitive     Piperacillin/Tazo <=4.0 ug/mL Sensitive <=4 ug/mL Sensitive     TOBRAMYCIN <=1.0 ug/mL Sensitive <=1 ug/mL Sensitive     Trimethoprim/Sulfa <=2.0/38.0 ... Sensitive          Again, thank you for allowing me to participate in the care of your patient.      Sincerely,    Muna Lewis MD

## 2021-03-05 NOTE — PROGRESS NOTES
Patient was called and message left with call back number. Tatiana Correa on 3/5/2021 at 3:22 PM    Patient was called and message left with call back number. Tatiana Correa on 3/5/2021 at 3:04 PM    Pradip is a 55 year old who is being evaluated via a billable video visit.      How would you like to obtain your AVS? MyChart  If the video visit is dropped, the invitation should be resent by: Text to cell phone: [phone]  Will anyone else be joining your video visit? No      Video Start Time: 4.14 pm  Video-Visit Details    Type of service:  Video Visit    Video End Time:4:26 PM  Total time including chart review, care-coordination and documentation time on the date of encounter - 31 mins    Originating Location (pt. Location): Home    Distant Location (provider location):  [facility] [place]     [facility] for [facility]       Patient reports his wound is healing well, he did meet with wound care today and had imaging done as well.      ID clinic progress note:  03/05/2021     Recommendations:  - rcvd 5 weeks of bactrim and cipro stopped 2/24  - rcvd 1 week of flagyl , stopped 2/24  - continue to hold off of abxs  - patient to send pictures of wound via Truliahart  - recheck CMP, if creat close to baseline, can stop increased fluids to help decrease bilateral pedal edema.   - recommend bilateral feet elevation for pedal edema.   -Due for prevnar 13 and shingrix vaccinations after covid vaccine series    No follow up scheduled at this time. Patient knows to contact me if issues arise.     Assessment:  Thomas Gonzales is a 55 year old male with history significant for CAD s/p stenting, HLD, MS, type II DM c/b peripheral neuropathy, celiac disease, Castro-en-Y gastric bypass c/b strictures and intra-abdominal abscess (2016).     -Osteomyelitis/ Diabetic foot wound, polymicrobial - first diabetic foot ulcer. Started after trauma with blood blister. CT c/w osteomyelitis.  "Superficial wound aerobic cxs (anaerobic cx not sent) with MSSA, S.epi, Pseudomonas (Pan-S to typical pseudomonas antibiotics including FQs) and Acinetobacter (Pan-S, S-TMP/SMX, S-Ciprofloxacin). Dced on bactrim 2 DS bid and cipro 750 mg daily. I saw him at 4 weeks and added flagyl. Follow up blood work at 5 weeks with ERICK from bactrim. Stopped all 3 abxs 2/24/21.     Today 3/5/21 the wound looks good - very superficial. Therefore will continue to hold off of abxs.     ---------------------------------------------------------------------------------------------------------------------------  Interval events:  Last visit 2/17.   Reviewed blood work done 2/23 and called him 2/24 which showed increase in creat from 0.8 to 2.2 with potassium of 5.6 and decreased bicarb and mildly elevated LFTs. He had also been having liquid stools for the past 2 days due to flagyl.  I had asked him to stop all the three abxs and drink plenty of fluids.   We arranged for 3 L NS infusion the next day over 3 hours. Follow up creat and potassiim 2/25 after fluids had improved to 1.6 and 5.2 respectively.   Today he reports that he is continuing to drink plenty of fluids and he stopped all abxs on 2/24.   He reports swelling of both feet in the past week - likely due to all the fluids.   He reports that the wound is looking about the same as before.     This is a follow up after hospital discharge. First time seeing patient. Seen by my colleague as an inpatient.     HPI:  Thomas Gonzales is a 55 year old male with history significant for CAD s/p stenting, HLD, MS, type II DM c/b peripheral neuropathy, celiac disease, Castro-en-Y gastric bypass c/b strictures and intra-abdominal abscess (2016), who initially presented to urgent care on 1/16/21 with right great toe ulceration and CT c/w osteomyelitis. Reported injuring toe about 6 weeks prior to presentation, developed a \"blood blister\" which later unroofed, noted purulent drainage starting " around 2-3 days before presentation, then significant erythema and swelling on day of presentation. No fluid collection on imaging, though was non-contrast. Started on vancomycin and Zosyn, then changed to vancomycin and cefepime. Superficial wound cultures  Unclear utility of this sample as it was a superficial swab but will cover these organisms and general skin augusto with discharge antibiotic regimen.      Unfortunately, deeper wound cultures were not able to be obtained. TAMANNA suggestive of good blood flow to both feet. He was dced on bactrim 2 DS bid, cipro 750 mg every day. Anaerobes were not covered as he showed a good response with these abxs. Plan was to continue abxs till 2/27 when he would have completed 6 weeks of abxs.     Today he presents for follow up. He has been compliant with the po abxs. He reports no side effects. His blood sugars have been good always with A1c less than 6 for many years. He has peripheral neuropathy and cannot feel much with his feet. This is the first time he is having a diabetic foot infection.       Objective:  Unable to examine due to virtual visit   Was able to see video of the wound and I think there is very little remaining to be healed. Its very shallow. However video clarity was not good and I recommend patient sending photos of the wound via Skin Scan    Labs:  Results for ALIYAH SAN (MRN [id number]) as of 2/17/2021 19:55   Ref. Range 3/9/2017 08:03 5/1/2018 08:19 2/1/2019 14:02 6/22/2020 11:06 1/17/2021 09:15   Hemoglobin A1C Latest Ref Range: 0 - 5.6 % 6.2 (H) 5.0 5.4 5.5 5.7 (H)       Results for ALIYAH SAN (MRN [id number]) as of 2/17/2021 19:55   Ref. Range 1/16/2021 15:44 1/17/2021 05:53 1/18/2021 05:46 1/19/2021 07:29 1/20/2021 05:50   CRP Inflammation Latest Ref Range: 0.0 - 8.0 mg/L 67.0 (H) 51.0 (H) 26.0 (H) 15.0 (H) 12.0 (H)     CRP Inflammation   Date Value Ref Range Status   02/23/2021 <2.9 0.0 - 8.0 mg/L Final     CBC RESULTS:   Recent Labs    Lab Test 21  0550   WBC 4.2   RBC 4.03*   HGB 11.2*   HCT 32.8*   MCV 81   MCH 27.8   MCHC 34.1   RDW 12.9   *       Last Comprehensive Metabolic Panel:  Sodium   Date Value Ref Range Status   2021 138 133 - 144 mmol/L Final     Potassium   Date Value Ref Range Status   2021 5.2 3.4 - 5.3 mmol/L Final     Chloride   Date Value Ref Range Status   2021 114 (H) 94 - 109 mmol/L Final     Carbon Dioxide   Date Value Ref Range Status   2021 16 (L) 20 - 32 mmol/L Final     Anion Gap   Date Value Ref Range Status   2021 8 3 - 14 mmol/L Final     Glucose   Date Value Ref Range Status   2021 85 70 - 99 mg/dL Final     Urea Nitrogen   Date Value Ref Range Status   2021 41 (H) 7 - 30 mg/dL Final     Creatinine   Date Value Ref Range Status   2021 1.59 (H) 0.66 - 1.25 mg/dL Final     GFR Estimate   Date Value Ref Range Status   2021 48 (L) >60 mL/min/[1.73_m2] Final     Comment:     Non  GFR Calc  Starting 2018, serum creatinine based estimated GFR (eGFR) will be   calculated using the Chronic Kidney Disease Epidemiology Collaboration   (CKD-EPI) equation.       Calcium   Date Value Ref Range Status   2021 8.7 8.5 - 10.1 mg/dL Final     Bilirubin Total   Date Value Ref Range Status   2021 0.2 0.2 - 1.3 mg/dL Final     Alkaline Phosphatase   Date Value Ref Range Status   2021 121 40 - 150 U/L Final     ALT   Date Value Ref Range Status   2021 159 (H) 0 - 70 U/L Final     AST   Date Value Ref Range Status   2021 105 (H) 0 - 45 U/L Final       Imagin21 x ray right toe  IMPRESSION:  1. Continued progressive osteolysis of the first distal phalanx.  2. Redemonstration intra-articular fracture of first distal phalangeal  Base.    21 x ray right toe   1. Progressive osteolysis of the tuft of the first distal phalanx,  compatible with osteomyelitis.  2. Unchanged age-indeterminate first proximal and  distal phalangeal  base fractures.    1/18/21 TAMANNA   IMPRESSION:  1. RIGHT:       A. Resting TAMANNA is non compressible.       B. Noncompressible arteries may falsely elevate the TBI. TBI is  normal, 0.72.     2. LEFT:       A. Resting TAMANNA is non compressible.       B. Noncompressible arteries may falsely elevate the TBI. TBI is  normal, 0.85.       1/16/21 CT toe right   1.  Ill-defined cortical destruction of the distal tuft of the distal phalanx of the right great toe most compatible with osteomyelitis given underlying clinical history.     2.  No other areas suspicious for osteomyelitis. If there are other areas suspicious for osteomyelitis consider MRI which is a much more sensitive evaluation.     3.  Marked soft tissue swelling involving the right great toe with diffuse subcutaneous edema elsewhere throughout the right foot. No evidence for peripherally enhancing fluid collection or gas within the soft tissues.     4.  Oblique nondisplaced intra-articular fracture involving the plantar aspect of the proximal portion of the distal phalanx right great toe.     5.  Symmetric marginal erosions involving the articular margin of the proximal phalanx of the right first MTP joint suspicious for gout.     1/16/21 x ray R toe   IMPRESSION: Cortical ill definition of the great toe distal phalangeal  tuft. This could represent early osteomyelitis, particularly if there  is an adjacent wound or sinus. Small calcific fragment in the medial  proximal corner of the great toe proximal phalanx which may represent  age-indeterminate fracture. Similar finding is noted in the medial  aspect of the distal phalangeal base as well suspicious for  age-indeterminate fracture. Suspected erosion at the lateral base of  the proximal phalanx without associated joint space narrowing. Gout  could have this appearance. Forefoot small vessel others carotid  calcification is noted.    Microbiology:  Superficial Culture aerobic  right great toe  wound 1/16/21: Staph epidermidis     Staphylococcus aureus (1) Staphylococcus aureus (2)     TORIBIO TORIBIO     CLINDAMYCIN <=0.25 ug/mL Sensitive <=0.25 ug/mL Sensitive     ERYTHROMYCIN <=0.25 ug/mL Sensitive 0.5 ug/mL Sensitive     GENTAMICIN <=0.5 ug/mL Sensitive <=0.5 ug/mL Sensitive     OXACILLIN 0.5 ug/mL Sensitive 0.5 ug/mL Sensitive     TETRACYCLINE <=1 ug/mL Sensitive <=1 ug/mL Sensitive     Trimethoprim/Sulfa <=0.5/9.5 u... Sensitive <=0.5/9.5 u... Sensitive     VANCOMYCIN 1 ug/mL Sensitive <=0.5 ug/mL Sensitive             Susceptibility     Acinetobacter species, not baumannii Pseudomonas aeruginosa     TORIBIO TORIBIO     AMIKACIN <=16.0 ug/mL Sensitive 4 ug/mL Sensitive     AMPICILLIN/SULBACTAM 2.0 ug/mL Sensitive       CEFEPIME <=2.0 ug/mL Sensitive 4 ug/mL Sensitive     CEFTAZIDIME 2.0 ug/mL Sensitive 4 ug/mL Sensitive     CEFTRIAXONE 2.0 ug/mL Sensitive       CIPROFLOXACIN <=1.0 ug/mL Sensitive <=0.25 ug/mL Sensitive     GENTAMICIN <=1.0 ug/mL Sensitive 2 ug/mL Sensitive     IMIPENEM <=0.5 ug/mL Sensitive       LEVOFLOXACIN <=0.25 ug/mL Sensitive 1 ug/mL Sensitive     MEROPENEM <=1.0 ug/mL Sensitive <=0.25 ug/mL Sensitive     Piperacillin/Tazo <=4.0 ug/mL Sensitive <=4 ug/mL Sensitive     TOBRAMYCIN <=1.0 ug/mL Sensitive <=1 ug/mL Sensitive     Trimethoprim/Sulfa <=2.0/38.0 ... Sensitive

## 2021-03-06 DIAGNOSIS — T88.7XXA DRUG SIDE EFFECTS: ICD-10-CM

## 2021-03-06 PROCEDURE — 80053 COMPREHEN METABOLIC PANEL: CPT | Performed by: STUDENT IN AN ORGANIZED HEALTH CARE EDUCATION/TRAINING PROGRAM

## 2021-03-06 PROCEDURE — 36415 COLL VENOUS BLD VENIPUNCTURE: CPT | Performed by: STUDENT IN AN ORGANIZED HEALTH CARE EDUCATION/TRAINING PROGRAM

## 2021-03-08 LAB
ALBUMIN SERPL-MCNC: 3.4 G/DL (ref 3.4–5)
ALP SERPL-CCNC: 94 U/L (ref 40–150)
ALT SERPL W P-5'-P-CCNC: 91 U/L (ref 0–70)
ANION GAP SERPL CALCULATED.3IONS-SCNC: 5 MMOL/L (ref 3–14)
AST SERPL W P-5'-P-CCNC: 54 U/L (ref 0–45)
BILIRUB SERPL-MCNC: 0.5 MG/DL (ref 0.2–1.3)
BUN SERPL-MCNC: 28 MG/DL (ref 7–30)
CALCIUM SERPL-MCNC: 8.7 MG/DL (ref 8.5–10.1)
CHLORIDE SERPL-SCNC: 112 MMOL/L (ref 94–109)
CO2 SERPL-SCNC: 26 MMOL/L (ref 20–32)
CREAT SERPL-MCNC: 1.12 MG/DL (ref 0.66–1.25)
GFR SERPL CREATININE-BSD FRML MDRD: 74 ML/MIN/{1.73_M2}
GLUCOSE SERPL-MCNC: 146 MG/DL (ref 70–99)
POTASSIUM SERPL-SCNC: 4.8 MMOL/L (ref 3.4–5.3)
PROT SERPL-MCNC: 6.6 G/DL (ref 6.8–8.8)
SODIUM SERPL-SCNC: 143 MMOL/L (ref 133–144)

## 2021-03-10 ENCOUNTER — IMMUNIZATION (OUTPATIENT)
Dept: NURSING | Facility: CLINIC | Age: 56
End: 2021-03-10
Payer: COMMERCIAL

## 2021-03-10 ENCOUNTER — OFFICE VISIT (OUTPATIENT)
Dept: ORTHOPEDICS | Facility: CLINIC | Age: 56
End: 2021-03-10
Payer: COMMERCIAL

## 2021-03-10 ENCOUNTER — ANCILLARY PROCEDURE (OUTPATIENT)
Dept: GENERAL RADIOLOGY | Facility: CLINIC | Age: 56
End: 2021-03-10
Attending: PODIATRIST
Payer: COMMERCIAL

## 2021-03-10 DIAGNOSIS — E11.49 TYPE II OR UNSPECIFIED TYPE DIABETES MELLITUS WITH NEUROLOGICAL MANIFESTATIONS, NOT STATED AS UNCONTROLLED(250.60) (H): Primary | ICD-10-CM

## 2021-03-10 DIAGNOSIS — E11.621 ULCER OF RIGHT GREAT TOE DUE TO DIABETES MELLITUS (H): ICD-10-CM

## 2021-03-10 DIAGNOSIS — E11.49 TYPE II OR UNSPECIFIED TYPE DIABETES MELLITUS WITH NEUROLOGICAL MANIFESTATIONS, NOT STATED AS UNCONTROLLED(250.60) (H): ICD-10-CM

## 2021-03-10 DIAGNOSIS — L97.519 ULCER OF RIGHT GREAT TOE DUE TO DIABETES MELLITUS (H): ICD-10-CM

## 2021-03-10 PROCEDURE — 91303 PR COVID VAC JANSSEN AD26 0.5ML: CPT

## 2021-03-10 PROCEDURE — 0031A PR COVID VAC JANSSEN AD26 0.5ML: CPT

## 2021-03-10 PROCEDURE — 99213 OFFICE O/P EST LOW 20 MIN: CPT | Performed by: PODIATRIST

## 2021-03-10 PROCEDURE — 73660 X-RAY EXAM OF TOE(S): CPT | Mod: RT | Performed by: RADIOLOGY

## 2021-03-10 ASSESSMENT — PATIENT HEALTH QUESTIONNAIRE - PHQ9: SUM OF ALL RESPONSES TO PHQ QUESTIONS 1-9: 0

## 2021-03-10 NOTE — LETTER
3/10/2021         RE: Thomas Gonzales  [address] [place] [address]        Dear Colleague,    Thank you for referring your patient, Thomas Gonzales, to the [facility] [place]. Please see a copy of my visit note below.    Chief Complaint   Patient presents with     Wound Check     Right hallux.           HPI: Thomas is a 55 year old male who presents today for further evaluation of right foot wound with osteomyelitis. He has been using the foams on the toe. No pain. ID has stopped his abx. No pain to the foot.     Review of Systems: No n/v/d/f/c/ns/sob/cp    PMH:   Past Medical History:   Diagnosis Date     CAD (coronary artery disease)     S/p stenting of left circumflex     Diabetes mellitus (H)      Multiple sclerosis (H)      Peripheral neuropathy        PSxH:   Past Surgical History:   Procedure Laterality Date     APPENDECTOMY  1/8/2016     CATARACT IOL, RT/LT       CHOLECYSTECTOMY  1/8/2016     penile implant       LEON EN Y BOWEL  1/8/2016    for small bowel ischemia       Allergies: Shellfish-derived products, Adhesive tape, Fructose, Gluten meal, Lactose, and Reglan [metoclopramide]    SH:   Social History     Socioeconomic History     Marital status:      Spouse name: Not on file     Number of children: Not on file     Years of education: Not on file     Highest education level: Not on file   Occupational History     Not on file   Social Needs     Financial resource strain: Not on file     Food insecurity     Worry: Not on file     Inability: Not on file     Transportation needs     Medical: Not on file     Non-medical: Not on file   Tobacco Use     Smoking status: Never Smoker     Smokeless tobacco: Never Used   Substance and Sexual Activity     Alcohol use: Yes     Comment: occ     Drug use: No     Sexual activity: Yes     Partners: Female   Lifestyle     Physical activity     Days per week: Not on file     Minutes per session: Not on file      Stress: Not on file   Relationships     Social connections     Talks on phone: Not on file     Gets together: Not on file     Attends [employer] service: Not on file     Active member of club or organization: Not on file     Attends meetings of clubs or organizations: Not on file     Relationship status: Not on file     Intimate partner violence     Fear of current or ex partner: Not on file     Emotionally abused: Not on file     Physically abused: Not on file     Forced sexual activity: Not on file   Other Topics Concern     Parent/sibling w/ CABG, MI or angioplasty before 65F 55M? Not Asked   Social History Narrative     Not on file       FH:   Family History   Problem Relation Age of Onset     Arrhythmia Mother         bradycardia- pacemaker     Coronary Artery Disease Father        Objective:  Data Unavailable Data Unavailable Data Unavailable Data Unavailable Data Unavailable 0 lbs 0 oz  CRP Inflammation   Date Value Ref Range Status   02/23/2021 <2.9 0.0 - 8.0 mg/L Final     Lab Results   Component Value Date    WBC 4.2 01/20/2021     Lab Results   Component Value Date    RBC 4.03 01/20/2021     Lab Results   Component Value Date    HGB 11.2 01/20/2021     Lab Results   Component Value Date    HCT 32.8 01/20/2021     No components found for: MCT  Lab Results   Component Value Date    MCV 81 01/20/2021     Lab Results   Component Value Date    MCH 27.8 01/20/2021     Lab Results   Component Value Date    MCHC 34.1 01/20/2021     Lab Results   Component Value Date    RDW 12.9 01/20/2021     Lab Results   Component Value Date     01/20/2021     Last Comprehensive Metabolic Panel:  Sodium   Date Value Ref Range Status   03/06/2021 143 133 - 144 mmol/L Final     Potassium   Date Value Ref Range Status   03/06/2021 4.8 3.4 - 5.3 mmol/L Final     Chloride   Date Value Ref Range Status   03/06/2021 112 (H) 94 - 109 mmol/L Final     Carbon Dioxide   Date Value Ref Range Status   03/06/2021 26 20 - 32 mmol/L  Final     Anion Gap   Date Value Ref Range Status   03/06/2021 5 3 - 14 mmol/L Final     Glucose   Date Value Ref Range Status   03/06/2021 146 (H) 70 - 99 mg/dL Final     Comment:     Non Fasting     Urea Nitrogen   Date Value Ref Range Status   03/06/2021 28 7 - 30 mg/dL Final     Creatinine   Date Value Ref Range Status   03/06/2021 1.12 0.66 - 1.25 mg/dL Final     GFR Estimate   Date Value Ref Range Status   03/06/2021 74 >60 mL/min/[1.73_m2] Final     Comment:     Non  GFR Calc  Starting 12/18/2018, serum creatinine based estimated GFR (eGFR) will be   calculated using the Chronic Kidney Disease Epidemiology Collaboration   (CKD-EPI) equation.       Calcium   Date Value Ref Range Status   03/06/2021 8.7 8.5 - 10.1 mg/dL Final     Hemoglobin A1C   Date Value Ref Range Status   01/17/2021 5.7 (H) 0 - 5.6 % Final     Comment:     Normal <5.7% Prediabetes 5.7-6.4%  Diabetes 6.5% or higher - adopted from ADA   consensus guidelines.     06/22/2020 5.5 0 - 5.6 % Final     Comment:     Normal <5.7% Prediabetes 5.7-6.4%  Diabetes 6.5% or higher - adopted from ADA   consensus guidelines.     02/01/2019 5.4 0 - 5.6 % Final     Comment:     Normal <5.7% Prediabetes 5.7-6.4%  Diabetes 6.5% or higher - adopted from ADA   consensus guidelines.           PT pulses are not palpable 2/2 pitting edema Faint sound heard with doppler. DP pulses are 2/4 bilaterally. CRT is instant. Positive pedal hair.   Gross sensation is diminished bilaterally. Protective sensation is intact as demonstrated with a 5.07G monofilament.  Equinus is noted bilaterally. No pain with active or passive ROM of the ankle, MTJ, 1st ray, or halluces bilaterally,. Hallus malleus noted to the right. Right hallux nail is thickened, dystrophic, and lytic at the distal 1/2.          A diabetic pessure wound is noted at right  distal hallux measuring 0.2cm x 0.2cm x 0.1cm.    Celis Classification: 2    Wound base: Red/granulation    Edges: slightly  hyperkeratotic    Drainage: small/serous    Odor: no    Undermining: no    Bone Exposure: No    Clinical Signs of Infection: No    After obtaining patient consent, the wound was irrigated with copious amounts of saline. A scalpel was then used to debride the wound into subcutaneous tissue. The wound edges were debrided back to healthy, bleeding tissue. The wound base exhibited healthy bleeding. Given the patient's lack of sensation, no anesthesia was necessary for the procedure.    Barriers to Healing: Neuropathy, hallux malleus.     Treatment Plan: Iodofoam and Optifoam. DH2 shoe.     Pt Ability to Follow Plan: Excellent.       TAMANNA IMPRESSION:  1. RIGHT:       A. Resting TAMANNA is non compressible.       B. Noncompressible arteries may falsely elevate the TBI. TBI is  normal, 0.72.     2. LEFT:       A. Resting TAMANNA is non compressible.       B. Noncompressible arteries may falsely elevate the TBI. TBI is  normal, 0.85.     Guidelines:     TAMANNA Diagnostic Criteria (Based on criteria published in Circulation  2011; 124: 2181-0272):    > 1.4: Non compressible    1.00 - 1.40: Normal    0.91 - 0.99: Borderline    At or below 0.90: Abnormal     TAMANNA Diagnostic Criteria (Based on ACC/AHA guideline 2008):    >/=1.3 - non compressible vessels    1.00  -1.29 - Normal    0.91 - 0.99 - Borderline    0.41 - 0.90 - Mild to moderate PAD    0.00 - 0.40 - Severe PAD     CLAUDIA MCMILLAN MD    IMPRESSION:  1.  Ill-defined cortical destruction of the distal tuft of the distal phalanx of the right great toe most compatible with osteomyelitis given underlying clinical history.     2.  No other areas suspicious for osteomyelitis. If there are other areas suspicious for osteomyelitis consider MRI which is a much more sensitive evaluation.     3.  Marked soft tissue swelling involving the right great toe with diffuse subcutaneous edema elsewhere throughout the right foot. No evidence for peripherally enhancing fluid collection or gas within the  soft tissues.     4.  Oblique nondisplaced intra-articular fracture involving the plantar aspect of the proximal portion of the distal phalanx right great toe.     5.  Symmetric marginal erosions involving the articular margin of the proximal phalanx of the right first MTP joint suspicious for gout.     6.  Remainder of findings as detailed above.     right foot xrays indicated in 3 weightbearing views.    On the oblique, increased mineralization is noted to the distal phalanx, but there is increased osteolysis to the phalanx on the lateral.       Assessment: Pradip is a 54 YO with diabetes and neuropathy. He has an ulceration on the right distal hallux. This is healing well.  I discussed with him that we will need to offload the ulcer, along with good dressing changes. I discussed with him possible sequela that could occur from the ulceration, including amputation if we cannot get good source control.   XRs show significant mineralization on the oblique.    Plan:  - Pt seen and evaluated.  - XR taken and discussed with him. This does lag 10-14 days behind. Clinically, the area may be improving.   - Cont dressings as above.   - Cont DH2 shoe  - See again in 4 weeks.            Norm West, BRAXTON

## 2021-03-10 NOTE — NURSING NOTE
Reason For Visit:   Chief Complaint   Patient presents with     Wound Check     Right hallux.        Pain Assessment  Patient Currently in Pain: Denies        Allergies   Allergen Reactions     Shellfish-Derived Products Anaphylaxis     Adhesive Tape      Paper tape is okay  Tegarderm is okay     Fructose      Gluten Meal      Celiac     Lactose      Reglan [Metoclopramide] Janneth North LPN

## 2021-03-10 NOTE — PROGRESS NOTES
Chief Complaint   Patient presents with     Wound Check     Right hallux.           HPI: Thomas is a 55 year old male who presents today for further evaluation of right foot wound with osteomyelitis. He has been using the foams on the toe. No pain. ID has stopped his abx. No pain to the foot.     Review of Systems: No n/v/d/f/c/ns/sob/cp    PMH:   Past Medical History:   Diagnosis Date     CAD (coronary artery disease)     S/p stenting of left circumflex     Diabetes mellitus (H)      Multiple sclerosis (H)      Peripheral neuropathy        PSxH:   Past Surgical History:   Procedure Laterality Date     APPENDECTOMY  1/8/2016     CATARACT IOL, RT/LT       CHOLECYSTECTOMY  1/8/2016     penile implant       LEON EN Y BOWEL  1/8/2016    for small bowel ischemia       Allergies: Shellfish-derived products, Adhesive tape, Fructose, Gluten meal, Lactose, and Reglan [metoclopramide]    SH:   Social History     Socioeconomic History     Marital status:      Spouse name: Not on file     Number of children: Not on file     Years of education: Not on file     Highest education level: Not on file   Occupational History     Not on file   Social Needs     Financial resource strain: Not on file     Food insecurity     Worry: Not on file     Inability: Not on file     Transportation needs     Medical: Not on file     Non-medical: Not on file   Tobacco Use     Smoking status: Never Smoker     Smokeless tobacco: Never Used   Substance and Sexual Activity     Alcohol use: Yes     Comment: occ     Drug use: No     Sexual activity: Yes     Partners: Female   Lifestyle     Physical activity     Days per week: Not on file     Minutes per session: Not on file     Stress: Not on file   Relationships     Social connections     Talks on phone: Not on file     Gets together: Not on file     Attends Yarsani service: Not on file     Active member of club or organization: Not on file     Attends meetings of clubs or organizations: Not on  file     Relationship status: Not on file     Intimate partner violence     Fear of current or ex partner: Not on file     Emotionally abused: Not on file     Physically abused: Not on file     Forced sexual activity: Not on file   Other Topics Concern     Parent/sibling w/ CABG, MI or angioplasty before 65F 55M? Not Asked   Social History Narrative     Not on file       FH:   Family History   Problem Relation Age of Onset     Arrhythmia Mother         bradycardia- pacemaker     Coronary Artery Disease Father        Objective:  Data Unavailable Data Unavailable Data Unavailable Data Unavailable Data Unavailable 0 lbs 0 oz  CRP Inflammation   Date Value Ref Range Status   02/23/2021 <2.9 0.0 - 8.0 mg/L Final     Lab Results   Component Value Date    WBC 4.2 01/20/2021     Lab Results   Component Value Date    RBC 4.03 01/20/2021     Lab Results   Component Value Date    HGB 11.2 01/20/2021     Lab Results   Component Value Date    HCT 32.8 01/20/2021     No components found for: MCT  Lab Results   Component Value Date    MCV 81 01/20/2021     Lab Results   Component Value Date    MCH 27.8 01/20/2021     Lab Results   Component Value Date    MCHC 34.1 01/20/2021     Lab Results   Component Value Date    RDW 12.9 01/20/2021     Lab Results   Component Value Date     01/20/2021     Last Comprehensive Metabolic Panel:  Sodium   Date Value Ref Range Status   03/06/2021 143 133 - 144 mmol/L Final     Potassium   Date Value Ref Range Status   03/06/2021 4.8 3.4 - 5.3 mmol/L Final     Chloride   Date Value Ref Range Status   03/06/2021 112 (H) 94 - 109 mmol/L Final     Carbon Dioxide   Date Value Ref Range Status   03/06/2021 26 20 - 32 mmol/L Final     Anion Gap   Date Value Ref Range Status   03/06/2021 5 3 - 14 mmol/L Final     Glucose   Date Value Ref Range Status   03/06/2021 146 (H) 70 - 99 mg/dL Final     Comment:     Non Fasting     Urea Nitrogen   Date Value Ref Range Status   03/06/2021 28 7 - 30 mg/dL Final      Creatinine   Date Value Ref Range Status   03/06/2021 1.12 0.66 - 1.25 mg/dL Final     GFR Estimate   Date Value Ref Range Status   03/06/2021 74 >60 mL/min/[1.73_m2] Final     Comment:     Non  GFR Calc  Starting 12/18/2018, serum creatinine based estimated GFR (eGFR) will be   calculated using the Chronic Kidney Disease Epidemiology Collaboration   (CKD-EPI) equation.       Calcium   Date Value Ref Range Status   03/06/2021 8.7 8.5 - 10.1 mg/dL Final     Hemoglobin A1C   Date Value Ref Range Status   01/17/2021 5.7 (H) 0 - 5.6 % Final     Comment:     Normal <5.7% Prediabetes 5.7-6.4%  Diabetes 6.5% or higher - adopted from ADA   consensus guidelines.     06/22/2020 5.5 0 - 5.6 % Final     Comment:     Normal <5.7% Prediabetes 5.7-6.4%  Diabetes 6.5% or higher - adopted from ADA   consensus guidelines.     02/01/2019 5.4 0 - 5.6 % Final     Comment:     Normal <5.7% Prediabetes 5.7-6.4%  Diabetes 6.5% or higher - adopted from ADA   consensus guidelines.           PT pulses are not palpable 2/2 pitting edema Faint sound heard with doppler. DP pulses are 2/4 bilaterally. CRT is instant. Positive pedal hair.   Gross sensation is diminished bilaterally. Protective sensation is intact as demonstrated with a 5.07G monofilament.  Equinus is noted bilaterally. No pain with active or passive ROM of the ankle, MTJ, 1st ray, or halluces bilaterally,. Hallus malleus noted to the right. Right hallux nail is thickened, dystrophic, and lytic at the distal 1/2.          A diabetic pessure wound is noted at right  distal hallux measuring 0.2cm x 0.2cm x 0.1cm.    Celis Classification: 2    Wound base: Red/granulation    Edges: slightly hyperkeratotic    Drainage: small/serous    Odor: no    Undermining: no    Bone Exposure: No    Clinical Signs of Infection: No    After obtaining patient consent, the wound was irrigated with copious amounts of saline. A scalpel was then used to debride the wound into  subcutaneous tissue. The wound edges were debrided back to healthy, bleeding tissue. The wound base exhibited healthy bleeding. Given the patient's lack of sensation, no anesthesia was necessary for the procedure.    Barriers to Healing: Neuropathy, hallux malleus.     Treatment Plan: Iodofoam and Optifoam. DH2 shoe.     Pt Ability to Follow Plan: Excellent.       TAMANNA IMPRESSION:  1. RIGHT:       A. Resting TAMANNA is non compressible.       B. Noncompressible arteries may falsely elevate the TBI. TBI is  normal, 0.72.     2. LEFT:       A. Resting TAMANNA is non compressible.       B. Noncompressible arteries may falsely elevate the TBI. TBI is  normal, 0.85.     Guidelines:     TAMANNA Diagnostic Criteria (Based on criteria published in Circulation  2011; 124: 5826-0224):    > 1.4: Non compressible    1.00 - 1.40: Normal    0.91 - 0.99: Borderline    At or below 0.90: Abnormal     TAMANNA Diagnostic Criteria (Based on ACC/AHA guideline 2008):    >/=1.3 - non compressible vessels    1.00  -1.29 - Normal    0.91 - 0.99 - Borderline    0.41 - 0.90 - Mild to moderate PAD    0.00 - 0.40 - Severe PAD     CLAUDIA MCMILLAN MD    IMPRESSION:  1.  Ill-defined cortical destruction of the distal tuft of the distal phalanx of the right great toe most compatible with osteomyelitis given underlying clinical history.     2.  No other areas suspicious for osteomyelitis. If there are other areas suspicious for osteomyelitis consider MRI which is a much more sensitive evaluation.     3.  Marked soft tissue swelling involving the right great toe with diffuse subcutaneous edema elsewhere throughout the right foot. No evidence for peripherally enhancing fluid collection or gas within the soft tissues.     4.  Oblique nondisplaced intra-articular fracture involving the plantar aspect of the proximal portion of the distal phalanx right great toe.     5.  Symmetric marginal erosions involving the articular margin of the proximal phalanx of the right first MTP  joint suspicious for gout.     6.  Remainder of findings as detailed above.     right foot xrays indicated in 3 weightbearing views.    On the oblique, increased mineralization is noted to the distal phalanx, but there is increased osteolysis to the phalanx on the lateral.       Assessment: Pradip is a 56 YO with diabetes and neuropathy. He has an ulceration on the right distal hallux. This is healing well.  I discussed with him that we will need to offload the ulcer, along with good dressing changes. I discussed with him possible sequela that could occur from the ulceration, including amputation if we cannot get good source control.   XRs show significant mineralization on the oblique.    Plan:  - Pt seen and evaluated.  - XR taken and discussed with him. This does lag 10-14 days behind. Clinically, the area may be improving.   - Cont dressings as above.   - Cont DH2 shoe  - See again in 4 weeks.

## 2021-04-12 ENCOUNTER — ANCILLARY PROCEDURE (OUTPATIENT)
Dept: GENERAL RADIOLOGY | Facility: CLINIC | Age: 56
End: 2021-04-12
Attending: PODIATRIST
Payer: COMMERCIAL

## 2021-04-12 ENCOUNTER — OFFICE VISIT (OUTPATIENT)
Dept: ORTHOPEDICS | Facility: CLINIC | Age: 56
End: 2021-04-12
Payer: COMMERCIAL

## 2021-04-12 DIAGNOSIS — M21.42 PES PLANUS OF BOTH FEET: ICD-10-CM

## 2021-04-12 DIAGNOSIS — E11.621 ULCER OF RIGHT GREAT TOE DUE TO DIABETES MELLITUS (H): Primary | ICD-10-CM

## 2021-04-12 DIAGNOSIS — E11.49 TYPE II OR UNSPECIFIED TYPE DIABETES MELLITUS WITH NEUROLOGICAL MANIFESTATIONS, NOT STATED AS UNCONTROLLED(250.60) (H): ICD-10-CM

## 2021-04-12 DIAGNOSIS — E11.621 ULCER OF RIGHT GREAT TOE DUE TO DIABETES MELLITUS (H): ICD-10-CM

## 2021-04-12 DIAGNOSIS — M21.41 PES PLANUS OF BOTH FEET: ICD-10-CM

## 2021-04-12 DIAGNOSIS — L97.519 ULCER OF RIGHT GREAT TOE DUE TO DIABETES MELLITUS (H): Primary | ICD-10-CM

## 2021-04-12 DIAGNOSIS — L97.519 ULCER OF RIGHT GREAT TOE DUE TO DIABETES MELLITUS (H): ICD-10-CM

## 2021-04-12 PROCEDURE — 99213 OFFICE O/P EST LOW 20 MIN: CPT | Performed by: PODIATRIST

## 2021-04-12 PROCEDURE — 73660 X-RAY EXAM OF TOE(S): CPT | Mod: RT | Performed by: RADIOLOGY

## 2021-04-12 NOTE — NURSING NOTE
Reason For Visit:   Chief Complaint   Patient presents with     Follow Up     Right great hallux.       Pain Assessment  Patient Currently in Pain: Denies        Allergies   Allergen Reactions     Shellfish-Derived Products Anaphylaxis     Adhesive Tape      Paper tape is okay  Tegarderm is okay     Fructose      Gluten Meal      Celiac     Lactose      Reglan [Metoclopramide] Janneth North LPN

## 2021-04-12 NOTE — LETTER
4/12/2021         RE: Thomas Gonzales  [address] [place] [address]        Dear Colleague,    Thank you for referring your patient, Thomas Gonzales, to the [facility] [place]. Please see a copy of my visit note below.    Chief Complaint   Patient presents with     Follow Up     Right great hallux.            HPI: Thomas is a 55 year old male who presents today for further evaluation of right foot wound with osteomyelitis. He has been using the foams on the toe. No pain. ID has stopped his abx. No pain to the foot.     Review of Systems: No n/v/d/f/c/ns/sob/cp    PMH:   Past Medical History:   Diagnosis Date     CAD (coronary artery disease)     S/p stenting of left circumflex     Diabetes mellitus (H)      Multiple sclerosis (H)      Peripheral neuropathy        PSxH:   Past Surgical History:   Procedure Laterality Date     APPENDECTOMY  1/8/2016     CATARACT IOL, RT/LT       CHOLECYSTECTOMY  1/8/2016     penile implant       LEON EN Y BOWEL  1/8/2016    for small bowel ischemia       Allergies: Shellfish-derived products, Adhesive tape, Fructose, Gluten meal, Lactose, and Reglan [metoclopramide]    SH:   Social History     Socioeconomic History     Marital status:      Spouse name: Not on file     Number of children: Not on file     Years of education: Not on file     Highest education level: Not on file   Occupational History     Not on file   Social Needs     Financial resource strain: Not on file     Food insecurity     Worry: Not on file     Inability: Not on file     Transportation needs     Medical: Not on file     Non-medical: Not on file   Tobacco Use     Smoking status: Never Smoker     Smokeless tobacco: Never Used   Substance and Sexual Activity     Alcohol use: Yes     Comment: occ     Drug use: No     Sexual activity: Yes     Partners: Female   Lifestyle     Physical activity     Days per week: Not on file     Minutes per session: Not on file      Stress: Not on file   Relationships     Social connections     Talks on phone: Not on file     Gets together: Not on file     Attends [employer] service: Not on file     Active member of club or organization: Not on file     Attends meetings of clubs or organizations: Not on file     Relationship status: Not on file     Intimate partner violence     Fear of current or ex partner: Not on file     Emotionally abused: Not on file     Physically abused: Not on file     Forced sexual activity: Not on file   Other Topics Concern     Parent/sibling w/ CABG, MI or angioplasty before 65F 55M? Not Asked   Social History Narrative     Not on file       FH:   Family History   Problem Relation Age of Onset     Arrhythmia Mother         bradycardia- pacemaker     Coronary Artery Disease Father        Objective:  Data Unavailable Data Unavailable Data Unavailable Data Unavailable Data Unavailable 0 lbs 0 oz  CRP Inflammation   Date Value Ref Range Status   02/23/2021 <2.9 0.0 - 8.0 mg/L Final     Lab Results   Component Value Date    WBC 4.2 01/20/2021     Lab Results   Component Value Date    RBC 4.03 01/20/2021     Lab Results   Component Value Date    HGB 11.2 01/20/2021     Lab Results   Component Value Date    HCT 32.8 01/20/2021     No components found for: MCT  Lab Results   Component Value Date    MCV 81 01/20/2021     Lab Results   Component Value Date    MCH 27.8 01/20/2021     Lab Results   Component Value Date    MCHC 34.1 01/20/2021     Lab Results   Component Value Date    RDW 12.9 01/20/2021     Lab Results   Component Value Date     01/20/2021     Last Comprehensive Metabolic Panel:  Sodium   Date Value Ref Range Status   03/06/2021 143 133 - 144 mmol/L Final     Potassium   Date Value Ref Range Status   03/06/2021 4.8 3.4 - 5.3 mmol/L Final     Chloride   Date Value Ref Range Status   03/06/2021 112 (H) 94 - 109 mmol/L Final     Carbon Dioxide   Date Value Ref Range Status   03/06/2021 26 20 - 32 mmol/L  Final     Anion Gap   Date Value Ref Range Status   03/06/2021 5 3 - 14 mmol/L Final     Glucose   Date Value Ref Range Status   03/06/2021 146 (H) 70 - 99 mg/dL Final     Comment:     Non Fasting     Urea Nitrogen   Date Value Ref Range Status   03/06/2021 28 7 - 30 mg/dL Final     Creatinine   Date Value Ref Range Status   03/06/2021 1.12 0.66 - 1.25 mg/dL Final     GFR Estimate   Date Value Ref Range Status   03/06/2021 74 >60 mL/min/[1.73_m2] Final     Comment:     Non  GFR Calc  Starting 12/18/2018, serum creatinine based estimated GFR (eGFR) will be   calculated using the Chronic Kidney Disease Epidemiology Collaboration   (CKD-EPI) equation.       Calcium   Date Value Ref Range Status   03/06/2021 8.7 8.5 - 10.1 mg/dL Final     Hemoglobin A1C   Date Value Ref Range Status   01/17/2021 5.7 (H) 0 - 5.6 % Final     Comment:     Normal <5.7% Prediabetes 5.7-6.4%  Diabetes 6.5% or higher - adopted from ADA   consensus guidelines.     06/22/2020 5.5 0 - 5.6 % Final     Comment:     Normal <5.7% Prediabetes 5.7-6.4%  Diabetes 6.5% or higher - adopted from ADA   consensus guidelines.     02/01/2019 5.4 0 - 5.6 % Final     Comment:     Normal <5.7% Prediabetes 5.7-6.4%  Diabetes 6.5% or higher - adopted from ADA   consensus guidelines.           PT pulses are not palpable 2/2 pitting edema Faint sound heard with doppler. DP pulses are 2/4 bilaterally. CRT is instant. Positive pedal hair.   Gross sensation is diminished bilaterally. Protective sensation is intact as demonstrated with a 5.07G monofilament.  Equinus is noted bilaterally. No pain with active or passive ROM of the ankle, MTJ, 1st ray, or halluces bilaterally,. Hallus malleus noted to the right. Right hallux nail is thickened, dystrophic, and lytic at the distal 1/2.  A scab is noted to the distal right hallux     right hallux xrays indicated in 3 weightbearing views.    Increased mineralization and uniformity to the right distal phalanx. No  acute processes.       Assessment: Pradip is a 56 YO with diabetes and neuropathy. He has an ulceration on the right distal hallux. This is healing well.  I discussed with him that we will need to offload the ulcer, along with good dressing changes. I discussed with him possible sequela that could occur from the ulceration, including amputation if we cannot get good source control.   XRs show significant mineralization on all views.    Plan:  - Pt seen and evaluated.  - XR taken and discussed with him.   - Cont dressings as he has been until gone.   - Cont DH2 shoe with slow introduction of regular shoes. Start with 1H/day.  - Rx for diabetic shoes.   - See again in 4 weeks.        Norm West DPM

## 2021-04-12 NOTE — PROGRESS NOTES
Chief Complaint   Patient presents with     Follow Up     Right great hallux.            HPI: Thomas is a 55 year old male who presents today for further evaluation of right foot wound with osteomyelitis. He has been using the foams on the toe. No pain. ID has stopped his abx. No pain to the foot.     Review of Systems: No n/v/d/f/c/ns/sob/cp    PMH:   Past Medical History:   Diagnosis Date     CAD (coronary artery disease)     S/p stenting of left circumflex     Diabetes mellitus (H)      Multiple sclerosis (H)      Peripheral neuropathy        PSxH:   Past Surgical History:   Procedure Laterality Date     APPENDECTOMY  1/8/2016     CATARACT IOL, RT/LT       CHOLECYSTECTOMY  1/8/2016     penile implant       LEON EN Y BOWEL  1/8/2016    for small bowel ischemia       Allergies: Shellfish-derived products, Adhesive tape, Fructose, Gluten meal, Lactose, and Reglan [metoclopramide]    SH:   Social History     Socioeconomic History     Marital status:      Spouse name: Not on file     Number of children: Not on file     Years of education: Not on file     Highest education level: Not on file   Occupational History     Not on file   Social Needs     Financial resource strain: Not on file     Food insecurity     Worry: Not on file     Inability: Not on file     Transportation needs     Medical: Not on file     Non-medical: Not on file   Tobacco Use     Smoking status: Never Smoker     Smokeless tobacco: Never Used   Substance and Sexual Activity     Alcohol use: Yes     Comment: occ     Drug use: No     Sexual activity: Yes     Partners: Female   Lifestyle     Physical activity     Days per week: Not on file     Minutes per session: Not on file     Stress: Not on file   Relationships     Social connections     Talks on phone: Not on file     Gets together: Not on file     Attends Taoist service: Not on file     Active member of club or organization: Not on file     Attends meetings of clubs or organizations:  Not on file     Relationship status: Not on file     Intimate partner violence     Fear of current or ex partner: Not on file     Emotionally abused: Not on file     Physically abused: Not on file     Forced sexual activity: Not on file   Other Topics Concern     Parent/sibling w/ CABG, MI or angioplasty before 65F 55M? Not Asked   Social History Narrative     Not on file       FH:   Family History   Problem Relation Age of Onset     Arrhythmia Mother         bradycardia- pacemaker     Coronary Artery Disease Father        Objective:  Data Unavailable Data Unavailable Data Unavailable Data Unavailable Data Unavailable 0 lbs 0 oz  CRP Inflammation   Date Value Ref Range Status   02/23/2021 <2.9 0.0 - 8.0 mg/L Final     Lab Results   Component Value Date    WBC 4.2 01/20/2021     Lab Results   Component Value Date    RBC 4.03 01/20/2021     Lab Results   Component Value Date    HGB 11.2 01/20/2021     Lab Results   Component Value Date    HCT 32.8 01/20/2021     No components found for: MCT  Lab Results   Component Value Date    MCV 81 01/20/2021     Lab Results   Component Value Date    MCH 27.8 01/20/2021     Lab Results   Component Value Date    MCHC 34.1 01/20/2021     Lab Results   Component Value Date    RDW 12.9 01/20/2021     Lab Results   Component Value Date     01/20/2021     Last Comprehensive Metabolic Panel:  Sodium   Date Value Ref Range Status   03/06/2021 143 133 - 144 mmol/L Final     Potassium   Date Value Ref Range Status   03/06/2021 4.8 3.4 - 5.3 mmol/L Final     Chloride   Date Value Ref Range Status   03/06/2021 112 (H) 94 - 109 mmol/L Final     Carbon Dioxide   Date Value Ref Range Status   03/06/2021 26 20 - 32 mmol/L Final     Anion Gap   Date Value Ref Range Status   03/06/2021 5 3 - 14 mmol/L Final     Glucose   Date Value Ref Range Status   03/06/2021 146 (H) 70 - 99 mg/dL Final     Comment:     Non Fasting     Urea Nitrogen   Date Value Ref Range Status   03/06/2021 28 7 - 30  mg/dL Final     Creatinine   Date Value Ref Range Status   03/06/2021 1.12 0.66 - 1.25 mg/dL Final     GFR Estimate   Date Value Ref Range Status   03/06/2021 74 >60 mL/min/[1.73_m2] Final     Comment:     Non  GFR Calc  Starting 12/18/2018, serum creatinine based estimated GFR (eGFR) will be   calculated using the Chronic Kidney Disease Epidemiology Collaboration   (CKD-EPI) equation.       Calcium   Date Value Ref Range Status   03/06/2021 8.7 8.5 - 10.1 mg/dL Final     Hemoglobin A1C   Date Value Ref Range Status   01/17/2021 5.7 (H) 0 - 5.6 % Final     Comment:     Normal <5.7% Prediabetes 5.7-6.4%  Diabetes 6.5% or higher - adopted from ADA   consensus guidelines.     06/22/2020 5.5 0 - 5.6 % Final     Comment:     Normal <5.7% Prediabetes 5.7-6.4%  Diabetes 6.5% or higher - adopted from ADA   consensus guidelines.     02/01/2019 5.4 0 - 5.6 % Final     Comment:     Normal <5.7% Prediabetes 5.7-6.4%  Diabetes 6.5% or higher - adopted from ADA   consensus guidelines.           PT pulses are not palpable 2/2 pitting edema Faint sound heard with doppler. DP pulses are 2/4 bilaterally. CRT is instant. Positive pedal hair.   Gross sensation is diminished bilaterally. Protective sensation is intact as demonstrated with a 5.07G monofilament.  Equinus is noted bilaterally. No pain with active or passive ROM of the ankle, MTJ, 1st ray, or halluces bilaterally,. Hallus malleus noted to the right. Right hallux nail is thickened, dystrophic, and lytic at the distal 1/2.  A scab is noted to the distal right hallux     right hallux xrays indicated in 3 weightbearing views.    Increased mineralization and uniformity to the right distal phalanx. No acute processes.       Assessment: Pradip is a 56 YO with diabetes and neuropathy. He has an ulceration on the right distal hallux. This is healing well.  I discussed with him that we will need to offload the ulcer, along with good dressing changes. I discussed with  him possible sequela that could occur from the ulceration, including amputation if we cannot get good source control.   XRs show significant mineralization on all views.    Plan:  - Pt seen and evaluated.  - XR taken and discussed with him.   - Cont dressings as he has been until gone.   - Cont DH2 shoe with slow introduction of regular shoes. Start with 1H/day.  - Rx for diabetic shoes.   - See again in 4 weeks.

## 2021-05-10 ENCOUNTER — OFFICE VISIT (OUTPATIENT)
Dept: ORTHOPEDICS | Facility: CLINIC | Age: 56
End: 2021-05-10
Payer: COMMERCIAL

## 2021-05-10 DIAGNOSIS — E11.49 TYPE II OR UNSPECIFIED TYPE DIABETES MELLITUS WITH NEUROLOGICAL MANIFESTATIONS, NOT STATED AS UNCONTROLLED(250.60) (H): Primary | ICD-10-CM

## 2021-05-10 PROCEDURE — 99212 OFFICE O/P EST SF 10 MIN: CPT | Performed by: PODIATRIST

## 2021-05-10 NOTE — PROGRESS NOTES
Chief Complaint   Patient presents with     RECHECK     1 month follow up wound check             HPI: Thomas is a 55 year old male who presents today for further evaluation of right foot wound with osteomyelitis. He no longer needs to cover the toe. He does have a pair of diabetic shoes. No pain to the toe.     Review of Systems: No n/v/d/f/c/ns/sob/cp    PMH:   Past Medical History:   Diagnosis Date     CAD (coronary artery disease)     S/p stenting of left circumflex     Diabetes mellitus (H)      Multiple sclerosis (H)      Peripheral neuropathy        PSxH:   Past Surgical History:   Procedure Laterality Date     APPENDECTOMY  1/8/2016     CATARACT IOL, RT/LT       CHOLECYSTECTOMY  1/8/2016     penile implant       LEON EN Y BOWEL  1/8/2016    for small bowel ischemia       Allergies: Shellfish-derived products, Adhesive tape, Fructose, Gluten meal, Lactose, and Reglan [metoclopramide]    SH:   Social History     Socioeconomic History     Marital status:      Spouse name: Not on file     Number of children: Not on file     Years of education: Not on file     Highest education level: Not on file   Occupational History     Not on file   Social Needs     Financial resource strain: Not on file     Food insecurity     Worry: Not on file     Inability: Not on file     Transportation needs     Medical: Not on file     Non-medical: Not on file   Tobacco Use     Smoking status: Never Smoker     Smokeless tobacco: Never Used   Substance and Sexual Activity     Alcohol use: Yes     Comment: occ     Drug use: No     Sexual activity: Yes     Partners: Female   Lifestyle     Physical activity     Days per week: Not on file     Minutes per session: Not on file     Stress: Not on file   Relationships     Social connections     Talks on phone: Not on file     Gets together: Not on file     Attends Bahai service: Not on file     Active member of club or organization: Not on file     Attends meetings of clubs or  organizations: Not on file     Relationship status: Not on file     Intimate partner violence     Fear of current or ex partner: Not on file     Emotionally abused: Not on file     Physically abused: Not on file     Forced sexual activity: Not on file   Other Topics Concern     Parent/sibling w/ CABG, MI or angioplasty before 65F 55M? Not Asked   Social History Narrative     Not on file       FH:   Family History   Problem Relation Age of Onset     Arrhythmia Mother         bradycardia- pacemaker     Coronary Artery Disease Father        Objective:  Data Unavailable Data Unavailable Data Unavailable Data Unavailable Data Unavailable 0 lbs 0 oz  CRP Inflammation   Date Value Ref Range Status   02/23/2021 <2.9 0.0 - 8.0 mg/L Final     Lab Results   Component Value Date    WBC 4.2 01/20/2021     Lab Results   Component Value Date    RBC 4.03 01/20/2021     Lab Results   Component Value Date    HGB 11.2 01/20/2021     Lab Results   Component Value Date    HCT 32.8 01/20/2021     No components found for: MCT  Lab Results   Component Value Date    MCV 81 01/20/2021     Lab Results   Component Value Date    MCH 27.8 01/20/2021     Lab Results   Component Value Date    MCHC 34.1 01/20/2021     Lab Results   Component Value Date    RDW 12.9 01/20/2021     Lab Results   Component Value Date     01/20/2021     Last Comprehensive Metabolic Panel:  Sodium   Date Value Ref Range Status   03/06/2021 143 133 - 144 mmol/L Final     Potassium   Date Value Ref Range Status   03/06/2021 4.8 3.4 - 5.3 mmol/L Final     Chloride   Date Value Ref Range Status   03/06/2021 112 (H) 94 - 109 mmol/L Final     Carbon Dioxide   Date Value Ref Range Status   03/06/2021 26 20 - 32 mmol/L Final     Anion Gap   Date Value Ref Range Status   03/06/2021 5 3 - 14 mmol/L Final     Glucose   Date Value Ref Range Status   03/06/2021 146 (H) 70 - 99 mg/dL Final     Comment:     Non Fasting     Urea Nitrogen   Date Value Ref Range Status   03/06/2021  28 7 - 30 mg/dL Final     Creatinine   Date Value Ref Range Status   03/06/2021 1.12 0.66 - 1.25 mg/dL Final     GFR Estimate   Date Value Ref Range Status   03/06/2021 74 >60 mL/min/[1.73_m2] Final     Comment:     Non  GFR Calc  Starting 12/18/2018, serum creatinine based estimated GFR (eGFR) will be   calculated using the Chronic Kidney Disease Epidemiology Collaboration   (CKD-EPI) equation.       Calcium   Date Value Ref Range Status   03/06/2021 8.7 8.5 - 10.1 mg/dL Final     Hemoglobin A1C   Date Value Ref Range Status   01/17/2021 5.7 (H) 0 - 5.6 % Final     Comment:     Normal <5.7% Prediabetes 5.7-6.4%  Diabetes 6.5% or higher - adopted from ADA   consensus guidelines.     06/22/2020 5.5 0 - 5.6 % Final     Comment:     Normal <5.7% Prediabetes 5.7-6.4%  Diabetes 6.5% or higher - adopted from ADA   consensus guidelines.     02/01/2019 5.4 0 - 5.6 % Final     Comment:     Normal <5.7% Prediabetes 5.7-6.4%  Diabetes 6.5% or higher - adopted from ADA   consensus guidelines.           PT pulses are not palpable 2/2 pitting edema Faint sound heard with doppler. DP pulses are 2/4 bilaterally. CRT is instant. Positive pedal hair.   Gross sensation is diminished bilaterally. Protective sensation is intact as demonstrated with a 5.07G monofilament.  Equinus is noted bilaterally. No pain with active or passive ROM of the ankle, MTJ, 1st ray, or halluces bilaterally,. Hallus malleus noted to the right. Right hallux nail is thickened, dystrophic, and lytic at the distal 1/2.  A scab is noted to the distal right hallux.          Assessment: Pradip is a 54 YO with diabetes and neuropathy. He has an ulceration on the right distal hallux. This has healed. He is now in offloading shoes.   XRs show significant mineralization on all views.    Plan:  - Pt seen and evaluated.  - Cont diabetic shoes.   - Watch the area to make sure it does not open again  - See again in 10 weeks.

## 2021-05-10 NOTE — LETTER
5/10/2021         RE: Thomas Gonzales  [address] [place] [address]        Dear Colleague,    Thank you for referring your patient, Thomas Gonzales, to the [facility] [place]. Please see a copy of my visit note below.    Chief Complaint   Patient presents with     RECHECK     1 month follow up wound check             HPI: Thomas is a 55 year old male who presents today for further evaluation of right foot wound with osteomyelitis. He no longer needs to cover the toe. He does have a pair of diabetic shoes. No pain to the toe.     Review of Systems: No n/v/d/f/c/ns/sob/cp    PMH:   Past Medical History:   Diagnosis Date     CAD (coronary artery disease)     S/p stenting of left circumflex     Diabetes mellitus (H)      Multiple sclerosis (H)      Peripheral neuropathy        PSxH:   Past Surgical History:   Procedure Laterality Date     APPENDECTOMY  1/8/2016     CATARACT IOL, RT/LT       CHOLECYSTECTOMY  1/8/2016     penile implant       LEON EN Y BOWEL  1/8/2016    for small bowel ischemia       Allergies: Shellfish-derived products, Adhesive tape, Fructose, Gluten meal, Lactose, and Reglan [metoclopramide]    SH:   Social History     Socioeconomic History     Marital status:      Spouse name: Not on file     Number of children: Not on file     Years of education: Not on file     Highest education level: Not on file   Occupational History     Not on file   Social Needs     Financial resource strain: Not on file     Food insecurity     Worry: Not on file     Inability: Not on file     Transportation needs     Medical: Not on file     Non-medical: Not on file   Tobacco Use     Smoking status: Never Smoker     Smokeless tobacco: Never Used   Substance and Sexual Activity     Alcohol use: Yes     Comment: occ     Drug use: No     Sexual activity: Yes     Partners: Female   Lifestyle     Physical activity     Days per week: Not on file     Minutes per session:  Not on file     Stress: Not on file   Relationships     Social connections     Talks on phone: Not on file     Gets together: Not on file     Attends Sikh service: Not on file     Active member of club or organization: Not on file     Attends meetings of clubs or organizations: Not on file     Relationship status: Not on file     Intimate partner violence     Fear of current or ex partner: Not on file     Emotionally abused: Not on file     Physically abused: Not on file     Forced sexual activity: Not on file   Other Topics Concern     Parent/sibling w/ CABG, MI or angioplasty before 65F 55M? Not Asked   Social History Narrative     Not on file       FH:   Family History   Problem Relation Age of Onset     Arrhythmia Mother         bradycardia- pacemaker     Coronary Artery Disease Father        Objective:  Data Unavailable Data Unavailable Data Unavailable Data Unavailable Data Unavailable 0 lbs 0 oz  CRP Inflammation   Date Value Ref Range Status   02/23/2021 <2.9 0.0 - 8.0 mg/L Final     Lab Results   Component Value Date    WBC 4.2 01/20/2021     Lab Results   Component Value Date    RBC 4.03 01/20/2021     Lab Results   Component Value Date    HGB 11.2 01/20/2021     Lab Results   Component Value Date    HCT 32.8 01/20/2021     No components found for: MCT  Lab Results   Component Value Date    MCV 81 01/20/2021     Lab Results   Component Value Date    MCH 27.8 01/20/2021     Lab Results   Component Value Date    MCHC 34.1 01/20/2021     Lab Results   Component Value Date    RDW 12.9 01/20/2021     Lab Results   Component Value Date     01/20/2021     Last Comprehensive Metabolic Panel:  Sodium   Date Value Ref Range Status   03/06/2021 143 133 - 144 mmol/L Final     Potassium   Date Value Ref Range Status   03/06/2021 4.8 3.4 - 5.3 mmol/L Final     Chloride   Date Value Ref Range Status   03/06/2021 112 (H) 94 - 109 mmol/L Final     Carbon Dioxide   Date Value Ref Range Status   03/06/2021 26 20  - 32 mmol/L Final     Anion Gap   Date Value Ref Range Status   03/06/2021 5 3 - 14 mmol/L Final     Glucose   Date Value Ref Range Status   03/06/2021 146 (H) 70 - 99 mg/dL Final     Comment:     Non Fasting     Urea Nitrogen   Date Value Ref Range Status   03/06/2021 28 7 - 30 mg/dL Final     Creatinine   Date Value Ref Range Status   03/06/2021 1.12 0.66 - 1.25 mg/dL Final     GFR Estimate   Date Value Ref Range Status   03/06/2021 74 >60 mL/min/[1.73_m2] Final     Comment:     Non  GFR Calc  Starting 12/18/2018, serum creatinine based estimated GFR (eGFR) will be   calculated using the Chronic Kidney Disease Epidemiology Collaboration   (CKD-EPI) equation.       Calcium   Date Value Ref Range Status   03/06/2021 8.7 8.5 - 10.1 mg/dL Final     Hemoglobin A1C   Date Value Ref Range Status   01/17/2021 5.7 (H) 0 - 5.6 % Final     Comment:     Normal <5.7% Prediabetes 5.7-6.4%  Diabetes 6.5% or higher - adopted from ADA   consensus guidelines.     06/22/2020 5.5 0 - 5.6 % Final     Comment:     Normal <5.7% Prediabetes 5.7-6.4%  Diabetes 6.5% or higher - adopted from ADA   consensus guidelines.     02/01/2019 5.4 0 - 5.6 % Final     Comment:     Normal <5.7% Prediabetes 5.7-6.4%  Diabetes 6.5% or higher - adopted from ADA   consensus guidelines.           PT pulses are not palpable 2/2 pitting edema Faint sound heard with doppler. DP pulses are 2/4 bilaterally. CRT is instant. Positive pedal hair.   Gross sensation is diminished bilaterally. Protective sensation is intact as demonstrated with a 5.07G monofilament.  Equinus is noted bilaterally. No pain with active or passive ROM of the ankle, MTJ, 1st ray, or halluces bilaterally,. Hallus malleus noted to the right. Right hallux nail is thickened, dystrophic, and lytic at the distal 1/2.  A scab is noted to the distal right hallux.          Assessment: Pradip is a 54 YO with diabetes and neuropathy. He has an ulceration on the right distal hallux. This  has healed. He is now in offloading shoes.   XRs show significant mineralization on all views.    Plan:  - Pt seen and evaluated.  - Cont diabetic shoes.   - Watch the area to make sure it does not open again  - See again in 10 weeks.            Norm West, SHASHANKM

## 2021-06-09 NOTE — TELEPHONE ENCOUNTER
REFERRAL INFORMATION:    Referring Provider:  N/A    Referring Clinic:  N/A    Reason for Visit/Diagnosis: IBS     FUTURE VISIT INFORMATION:    Appointment Date: 8/10/2021    Appointment Time: 7:40 AM      NOTES STATUS DETAILS   OFFICE NOTE from Referring Provider N/A    OFFICE NOTE from Other Specialist Received 4/22/19 Office visit with Dr. Zheng Murry ([facility])     4/16/18, 12/15/17 Office visit with Dr. Milton Umanzor ([facility])     8/29/16 Office visit with Dr. Tate Pelaez ([facility])    HOSPITAL DISCHARGE SUMMARY/  ED VISITS N/A    OPERATIVE REPORT Care Everywhere Sigmoidoscopy: 6/3/08 ([facility])    MEDICATION LIST Internal         ENDOSCOPY  Care Everywhere EGD: 1/3/16 ([facility])   EGD: 7/15/08 ([facility])     COLONOSCOPY Care Everywhere 1/19/17 ([facility]) - request  7/15/08 ([facility])   ERCP N/A    EUS N/A    STOOL TESTING Care Everywhere 4/27/16   PERTINENT LABS Internal/ Care Everywhere    PATHOLOGY REPORTS (RELATED) Received 1/19/17   IMAGING (CT, MRI, EGD, MRCP, Small Bowel Follow Through/SBT, MR/CT Enterography) Care Everywhere [facility]:  - CT Abdomen Pelvis: 8/11/17, 7/25/17, 5/19/17 7/5/2021 10:45am Fax request sent to [facility] for med recs.  Fax request sent to [facility] for images. -Rudy     7/26/2021 8:25am Received recs from [facility]; sent to [facility] .Ishan

## 2021-07-19 ENCOUNTER — OFFICE VISIT (OUTPATIENT)
Dept: ORTHOPEDICS | Facility: CLINIC | Age: 56
End: 2021-07-19
Payer: COMMERCIAL

## 2021-07-19 DIAGNOSIS — E11.49 TYPE II OR UNSPECIFIED TYPE DIABETES MELLITUS WITH NEUROLOGICAL MANIFESTATIONS, NOT STATED AS UNCONTROLLED(250.60) (H): Primary | ICD-10-CM

## 2021-07-19 DIAGNOSIS — L84 TYLOMA: ICD-10-CM

## 2021-07-19 DIAGNOSIS — M21.42 PES PLANUS OF BOTH FEET: ICD-10-CM

## 2021-07-19 DIAGNOSIS — M21.41 PES PLANUS OF BOTH FEET: ICD-10-CM

## 2021-07-19 PROCEDURE — 99213 OFFICE O/P EST LOW 20 MIN: CPT | Mod: 25 | Performed by: PODIATRIST

## 2021-07-19 PROCEDURE — 11055 PARING/CUTG B9 HYPRKER LES 1: CPT | Performed by: PODIATRIST

## 2021-07-19 NOTE — PROGRESS NOTES
Chief Complaint   Patient presents with     Follow Up     10 week follow up.          HPI: Thomas is a 55 year old male who presents today for further evaluation of right foot wound with osteomyelitis. He no longer needs to cover the toe. He does have a pair of diabetic shoes.  He relates that he is diabetic shoes are starting to wear down.  No pain to the toe.     Review of Systems: No n/v/d/f/c/ns/sob/cp    PMH:   Past Medical History:   Diagnosis Date     CAD (coronary artery disease)     S/p stenting of left circumflex     Diabetes mellitus (H)      Multiple sclerosis (H)      Peripheral neuropathy        PSxH:   Past Surgical History:   Procedure Laterality Date     APPENDECTOMY  1/8/2016     CATARACT IOL, RT/LT       CHOLECYSTECTOMY  1/8/2016     penile implant       LEON EN Y BOWEL  1/8/2016    for small bowel ischemia       Allergies: Shellfish-derived products, Adhesive tape, Fructose, Gluten meal, Lactose, and Reglan [metoclopramide]    SH:   Social History     Socioeconomic History     Marital status:      Spouse name: Not on file     Number of children: Not on file     Years of education: Not on file     Highest education level: Not on file   Occupational History     Not on file   Tobacco Use     Smoking status: Never Smoker     Smokeless tobacco: Never Used   Substance and Sexual Activity     Alcohol use: Yes     Comment: occ     Drug use: No     Sexual activity: Yes     Partners: Female   Other Topics Concern     Parent/sibling w/ CABG, MI or angioplasty before 65F 55M? Not Asked   Social History Narrative     Not on file     Social Determinants of Health     Financial Resource Strain:      Difficulty of Paying Living Expenses:    Food Insecurity:      Worried About Running Out of Food in the Last Year:      Ran Out of Food in the Last Year:    Transportation Needs:      Lack of Transportation (Medical):      Lack of Transportation (Non-Medical):    Physical Activity:      Days of Exercise per  Week:      Minutes of Exercise per Session:    Stress:      Feeling of Stress :    Social Connections:      Frequency of Communication with Friends and Family:      Frequency of Social Gatherings with Friends and Family:      Attends Restorationism Services:      Active Member of Clubs or Organizations:      Attends Club or Organization Meetings:      Marital Status:    Intimate Partner Violence:      Fear of Current or Ex-Partner:      Emotionally Abused:      Physically Abused:      Sexually Abused:        FH:   Family History   Problem Relation Age of Onset     Arrhythmia Mother         bradycardia- pacemaker     Coronary Artery Disease Father        Objective:  Data Unavailable Data Unavailable Data Unavailable Data Unavailable Data Unavailable 0 lbs 0 oz  CRP Inflammation   Date Value Ref Range Status   02/23/2021 <2.9 0.0 - 8.0 mg/L Final     Lab Results   Component Value Date    WBC 4.2 01/20/2021     Lab Results   Component Value Date    RBC 4.03 01/20/2021     Lab Results   Component Value Date    HGB 11.2 01/20/2021     Lab Results   Component Value Date    HCT 32.8 01/20/2021     No components found for: MCT  Lab Results   Component Value Date    MCV 81 01/20/2021     Lab Results   Component Value Date    MCH 27.8 01/20/2021     Lab Results   Component Value Date    MCHC 34.1 01/20/2021     Lab Results   Component Value Date    RDW 12.9 01/20/2021     Lab Results   Component Value Date     01/20/2021     Last Comprehensive Metabolic Panel:  Sodium   Date Value Ref Range Status   03/06/2021 143 133 - 144 mmol/L Final     Potassium   Date Value Ref Range Status   03/06/2021 4.8 3.4 - 5.3 mmol/L Final     Chloride   Date Value Ref Range Status   03/06/2021 112 (H) 94 - 109 mmol/L Final     Carbon Dioxide   Date Value Ref Range Status   03/06/2021 26 20 - 32 mmol/L Final     Anion Gap   Date Value Ref Range Status   03/06/2021 5 3 - 14 mmol/L Final     Glucose   Date Value Ref Range Status   03/06/2021 146  (H) 70 - 99 mg/dL Final     Comment:     Non Fasting     Urea Nitrogen   Date Value Ref Range Status   03/06/2021 28 7 - 30 mg/dL Final     Creatinine   Date Value Ref Range Status   03/06/2021 1.12 0.66 - 1.25 mg/dL Final     GFR Estimate   Date Value Ref Range Status   03/06/2021 74 >60 mL/min/[1.73_m2] Final     Comment:     Non  GFR Calc  Starting 12/18/2018, serum creatinine based estimated GFR (eGFR) will be   calculated using the Chronic Kidney Disease Epidemiology Collaboration   (CKD-EPI) equation.       Calcium   Date Value Ref Range Status   03/06/2021 8.7 8.5 - 10.1 mg/dL Final     Hemoglobin A1C   Date Value Ref Range Status   01/17/2021 5.7 (H) 0 - 5.6 % Final     Comment:     Normal <5.7% Prediabetes 5.7-6.4%  Diabetes 6.5% or higher - adopted from ADA   consensus guidelines.     06/22/2020 5.5 0 - 5.6 % Final     Comment:     Normal <5.7% Prediabetes 5.7-6.4%  Diabetes 6.5% or higher - adopted from ADA   consensus guidelines.     02/01/2019 5.4 0 - 5.6 % Final     Comment:     Normal <5.7% Prediabetes 5.7-6.4%  Diabetes 6.5% or higher - adopted from ADA   consensus guidelines.           PT pulses are not palpable 2/2 pitting edema Faint sound heard with doppler. DP pulses are 2/4 bilaterally. CRT is instant. Positive pedal hair.   Gross sensation is diminished bilaterally. Protective sensation is intact as demonstrated with a 5.07G monofilament.  Equinus is noted bilaterally. No pain with active or passive ROM of the ankle, MTJ, 1st ray, or halluces bilaterally,. Hallux malleus noted to the right.   A small tyloma is noted to the distal right hallux.  Tyloma is also noted to the plantar aspect of the right second digit at the DIPJ.  This was sharply debrided, and no open lesion was noted.      Assessment: Pradip is a 56 YO with diabetes and neuropathy. He had an ulceration on the right distal hallux. This has healed. He is now in offloading shoes. Tyloma to the 2nd digit on the  right.      Plan:  - Pt seen and evaluated.  - Tyloma debrided x 1.  - Cont diabetic shoes.   - Watch the area to make sure it does not open again  - See again in 10 weeks.

## 2021-07-19 NOTE — LETTER
7/19/2021         RE: Thomas Gonzales  [address] [place] [address]        Dear Colleague,    Thank you for referring your patient, Thomas Gonzales, to the [facility] [place]. Please see a copy of my visit note below.    Chief Complaint   Patient presents with     Follow Up     10 week follow up.          HPI: Thomas is a 55 year old male who presents today for further evaluation of right foot wound with osteomyelitis. He no longer needs to cover the toe. He does have a pair of diabetic shoes.  He relates that he is diabetic shoes are starting to wear down.  No pain to the toe.     Review of Systems: No n/v/d/f/c/ns/sob/cp    PMH:   Past Medical History:   Diagnosis Date     CAD (coronary artery disease)     S/p stenting of left circumflex     Diabetes mellitus (H)      Multiple sclerosis (H)      Peripheral neuropathy        PSxH:   Past Surgical History:   Procedure Laterality Date     APPENDECTOMY  1/8/2016     CATARACT IOL, RT/LT       CHOLECYSTECTOMY  1/8/2016     penile implant       LEON EN Y BOWEL  1/8/2016    for small bowel ischemia       Allergies: Shellfish-derived products, Adhesive tape, Fructose, Gluten meal, Lactose, and Reglan [metoclopramide]    SH:   Social History     Socioeconomic History     Marital status:      Spouse name: Not on file     Number of children: Not on file     Years of education: Not on file     Highest education level: Not on file   Occupational History     Not on file   Tobacco Use     Smoking status: Never Smoker     Smokeless tobacco: Never Used   Substance and Sexual Activity     Alcohol use: Yes     Comment: occ     Drug use: No     Sexual activity: Yes     Partners: Female   Other Topics Concern     Parent/sibling w/ CABG, MI or angioplasty before 65F 55M? Not Asked   Social History Narrative     Not on file     Social Determinants of Health     Financial Resource Strain:      Difficulty of Paying Living  Expenses:    Food Insecurity:      Worried About Running Out of Food in the Last Year:      Ran Out of Food in the Last Year:    Transportation Needs:      Lack of Transportation (Medical):      Lack of Transportation (Non-Medical):    Physical Activity:      Days of Exercise per Week:      Minutes of Exercise per Session:    Stress:      Feeling of Stress :    Social Connections:      Frequency of Communication with Friends and Family:      Frequency of Social Gatherings with Friends and Family:      Attends Sikhism Services:      Active Member of Clubs or Organizations:      Attends Club or Organization Meetings:      Marital Status:    Intimate Partner Violence:      Fear of Current or Ex-Partner:      Emotionally Abused:      Physically Abused:      Sexually Abused:        FH:   Family History   Problem Relation Age of Onset     Arrhythmia Mother         bradycardia- pacemaker     Coronary Artery Disease Father        Objective:  Data Unavailable Data Unavailable Data Unavailable Data Unavailable Data Unavailable 0 lbs 0 oz  CRP Inflammation   Date Value Ref Range Status   02/23/2021 <2.9 0.0 - 8.0 mg/L Final     Lab Results   Component Value Date    WBC 4.2 01/20/2021     Lab Results   Component Value Date    RBC 4.03 01/20/2021     Lab Results   Component Value Date    HGB 11.2 01/20/2021     Lab Results   Component Value Date    HCT 32.8 01/20/2021     No components found for: MCT  Lab Results   Component Value Date    MCV 81 01/20/2021     Lab Results   Component Value Date    MCH 27.8 01/20/2021     Lab Results   Component Value Date    MCHC 34.1 01/20/2021     Lab Results   Component Value Date    RDW 12.9 01/20/2021     Lab Results   Component Value Date     01/20/2021     Last Comprehensive Metabolic Panel:  Sodium   Date Value Ref Range Status   03/06/2021 143 133 - 144 mmol/L Final     Potassium   Date Value Ref Range Status   03/06/2021 4.8 3.4 - 5.3 mmol/L Final     Chloride   Date Value Ref  Range Status   03/06/2021 112 (H) 94 - 109 mmol/L Final     Carbon Dioxide   Date Value Ref Range Status   03/06/2021 26 20 - 32 mmol/L Final     Anion Gap   Date Value Ref Range Status   03/06/2021 5 3 - 14 mmol/L Final     Glucose   Date Value Ref Range Status   03/06/2021 146 (H) 70 - 99 mg/dL Final     Comment:     Non Fasting     Urea Nitrogen   Date Value Ref Range Status   03/06/2021 28 7 - 30 mg/dL Final     Creatinine   Date Value Ref Range Status   03/06/2021 1.12 0.66 - 1.25 mg/dL Final     GFR Estimate   Date Value Ref Range Status   03/06/2021 74 >60 mL/min/[1.73_m2] Final     Comment:     Non  GFR Calc  Starting 12/18/2018, serum creatinine based estimated GFR (eGFR) will be   calculated using the Chronic Kidney Disease Epidemiology Collaboration   (CKD-EPI) equation.       Calcium   Date Value Ref Range Status   03/06/2021 8.7 8.5 - 10.1 mg/dL Final     Hemoglobin A1C   Date Value Ref Range Status   01/17/2021 5.7 (H) 0 - 5.6 % Final     Comment:     Normal <5.7% Prediabetes 5.7-6.4%  Diabetes 6.5% or higher - adopted from ADA   consensus guidelines.     06/22/2020 5.5 0 - 5.6 % Final     Comment:     Normal <5.7% Prediabetes 5.7-6.4%  Diabetes 6.5% or higher - adopted from ADA   consensus guidelines.     02/01/2019 5.4 0 - 5.6 % Final     Comment:     Normal <5.7% Prediabetes 5.7-6.4%  Diabetes 6.5% or higher - adopted from ADA   consensus guidelines.           PT pulses are not palpable 2/2 pitting edema Faint sound heard with doppler. DP pulses are 2/4 bilaterally. CRT is instant. Positive pedal hair.   Gross sensation is diminished bilaterally. Protective sensation is intact as demonstrated with a 5.07G monofilament.  Equinus is noted bilaterally. No pain with active or passive ROM of the ankle, MTJ, 1st ray, or halluces bilaterally,. Hallux malleus noted to the right.   A small tyloma is noted to the distal right hallux.  Tyloma is also noted to the plantar aspect of the right  second digit at the DIPJ.  This was sharply debrided, and no open lesion was noted.      Assessment: Pradip is a 54 YO with diabetes and neuropathy. He had an ulceration on the right distal hallux. This has healed. He is now in offloading shoes. Tyloma to the 2nd digit on the right.      Plan:  - Pt seen and evaluated.  - Tyloma debrided x 1.  - Cont diabetic shoes.   - Watch the area to make sure it does not open again  - See again in 10 weeks.          Again, thank you for allowing me to participate in the care of your patient.        Sincerely,        Norm West DPM

## 2021-07-19 NOTE — NURSING NOTE
Reason For Visit:   Chief Complaint   Patient presents with     Follow Up     10 week follow up.       Pain Assessment  Patient Currently in Pain: Denies        Allergies   Allergen Reactions     Shellfish-Derived Products Anaphylaxis     Adhesive Tape      Paper tape is okay  Tegarderm is okay     Fructose      Gluten Meal      Celiac     Lactose      Reglan [Metoclopramide] Janneth North LPN

## 2021-08-10 ENCOUNTER — PRE VISIT (OUTPATIENT)
Dept: GASTROENTEROLOGY | Facility: CLINIC | Age: 56
End: 2021-08-10

## 2021-08-10 ENCOUNTER — VIRTUAL VISIT (OUTPATIENT)
Dept: GASTROENTEROLOGY | Facility: CLINIC | Age: 56
End: 2021-08-10
Payer: COMMERCIAL

## 2021-08-10 DIAGNOSIS — R15.9 FULL INCONTINENCE OF FECES: ICD-10-CM

## 2021-08-10 DIAGNOSIS — R19.5 LOOSE STOOLS: Primary | ICD-10-CM

## 2021-08-10 DIAGNOSIS — Z85.820 HISTORY OF MELANOMA: ICD-10-CM

## 2021-08-10 DIAGNOSIS — Z87.19 HISTORY OF ISCHEMIC BOWEL DISEASE: ICD-10-CM

## 2021-08-10 DIAGNOSIS — G35 MS (MULTIPLE SCLEROSIS) (H): ICD-10-CM

## 2021-08-10 DIAGNOSIS — K90.0 CELIAC DISEASE: ICD-10-CM

## 2021-08-10 DIAGNOSIS — R14.0 ABDOMINAL BLOATING: ICD-10-CM

## 2021-08-10 PROCEDURE — 99205 OFFICE O/P NEW HI 60 MIN: CPT | Mod: GC | Performed by: INTERNAL MEDICINE

## 2021-08-10 NOTE — PROGRESS NOTES
[facility] GASTROENTEROLOGY   Thomas Gonzales 1965   MRN: [id number]     Reason for Visit:  IBS    Referred by: Self     History of Present Illness:   Thomas Gonzales is a 55 year old male with MS, DM II (diet controlled), CAD, celiac disease, hx of microscopic colitis, s/p Castro-en-Y for mesenteric/bowel ischemia (2016) c/b fistula managed conservatively, hx of malignant melanoma on limb (s/p excision), s/p cholecystectomy and appendectomy, liver steatosis and abnormal LFTs who is presenting as a new patient in consultation at the request of Self with a chief complaint of diarrhea and bloating.  ---------------------------------------------------------------  Thomas Gonzales states he has had loose bowels/diarrhea and bloating.    Onset: Since surgeries at [place] in 2016; emergency Castro-en-Y for intestinal ischemia. Complicated by fistula and recurrent septic shock s/p multiple TATYANA tubes and conservative management. All healed up as of right now. Prior to surgery, did not have bowel symptoms and had regular BMs.  Frequency: 1-2x per day  [place]: 5-6  Color: yellow/brown  Melena/hematochezia: denies  + Urgency, + stool incontinence. Does have nocturnal diarrhea. These are his most bothersome symptoms - Checking back in to see if there are anything else he could try and to consolidate care.    Has been seen by [facility] (2017) and [facility] (his favorite doctor there retired ~1.5 years ago so he decided to move to one Care team) for follow-up of these issues. Has been tested for SIBO, negative multiple times. Has been tested for VIP, gastinoma, etc. At [facility] with negative workup 2017. Since last seeing his [facility] doctor ~1.5 years ago and went on FODMAP.     He has hx of celiac disease and is on strict gluten free diet. Was recommended FODMAP diet which he has been following for 1.5 years with some success.    No OTC outside of rare tylenol (2-3x/month). Medicines include ASA 81 mg, statin,  vitamins, and probiotic.    Wt: 229 lbs which is stable    He was started on antibiotics for an infection of his toe early 2021 and he then had constipation for the following month to month and a half.     Last colonoscopy was 1/19/2017. Last EGD: unknown    --------------------------------------------------------------------------------------  ROS    + abdominal distension/bloating (assoicated with eating and 3 hours)    Denies dysphagia, odynophagia, heartburn/reflux, nausea, vomiting, early satiety, abdominal pain, hematochezia, or melena. No unintentional weight loss. No fevers or night sweats. No chest pain or pressure.      Family or personal history of IBD or colon cancer: none      STUDIES & PROCEDURES:    EGD:   Date: none/ unknown  Impression:  Pathology Report:    Colonoscopy:  Date: 1/19/2017  Impression:  - preparation of the colon was inadequate.   - The entire examine colon is normal where seen. Biopsied.   - The distal rectum and anal verge are normal on retroflexion view.   Pathology Report: cannot locate    CT: none recently    Prior medical records were reviewed including, but not limited to, notes from referring providers, lab work, radiographic tests, and other diagnostic tests. Pertinent results were summarized above.     History     Past Medical History:   Diagnosis Date     CAD (coronary artery disease)     S/p stenting of left circumflex     Diabetes mellitus (H)      Multiple sclerosis (H)      Peripheral neuropathy      Past Surgical History:   Procedure Laterality Date     APPENDECTOMY  1/8/2016     CATARACT IOL, RT/LT       CHOLECYSTECTOMY  1/8/2016     penile implant       LEON EN Y BOWEL  1/8/2016    for small bowel ischemia     Social History     Socioeconomic History     Marital status:      Spouse name: Not on file     Number of children: Not on file     Years of education: Not on file     Highest education level: Not on file   Occupational History     Not on file   Tobacco  Use     Smoking status: Never Smoker     Smokeless tobacco: Never Used   Substance and Sexual Activity     Alcohol use: Yes     Comment: occ     Drug use: No     Sexual activity: Yes     Partners: Female   Other Topics Concern     Parent/sibling w/ CABG, MI or angioplasty before 65F 55M? Not Asked   Social History Narrative     Not on file     Social Determinants of Health     Financial Resource Strain:      Difficulty of Paying Living Expenses:    Food Insecurity:      Worried About Running Out of Food in the Last Year:      Ran Out of Food in the Last Year:    Transportation Needs:      Lack of Transportation (Medical):      Lack of Transportation (Non-Medical):    Physical Activity:      Days of Exercise per Week:      Minutes of Exercise per Session:    Stress:      Feeling of Stress :    Social Connections:      Frequency of Communication with Friends and Family:      Frequency of Social Gatherings with Friends and Family:      Attends Holiness Services:      Active Member of Clubs or Organizations:      Attends Club or Organization Meetings:      Marital Status:    Intimate Partner Violence:      Fear of Current or Ex-Partner:      Emotionally Abused:      Physically Abused:      Sexually Abused:        Family History   Problem Relation Age of Onset     Arrhythmia Mother         bradycardia- pacemaker     Coronary Artery Disease Father        Medications and Allergies:     Outpatient Encounter Medications as of 8/10/2021   Medication Sig Dispense Refill     ASPIRIN LOW DOSE 81 MG EC tablet Take 1 tablet by mouth daily. 90 tablet 0     atorvastatin (LIPITOR) 40 MG tablet Take 1 tablet by mouth daily. 90 tablet 0     bismuth subsalicylate 262 MG TABS Take 4-5 tablets by mouth daily        Cholecalciferol (VITAMIN D) 2000 UNITS CAPS Take 2,000 Units by mouth daily        multivitamin, therapeutic with minerals (MULTI-VITAMIN) TABS Take 1 tablet by mouth daily       order for DME Equipment being ordered: custom  orthotic inserts for shoes 1 each 1     Probiotic Product (PROBIOTIC DAILY) CAPS Take 1 capsule by mouth daily        No facility-administered encounter medications on file as of 8/10/2021.        Allergies   Allergen Reactions     Shellfish-Derived Products Anaphylaxis     Adhesive Tape      Paper tape is okay  Tegarderm is okay     Fructose      Gluten Meal      Celiac     Lactose      Reglan [Metoclopramide] Hives       Objective Findings:   Physical Exam:        Wt Readings from Last 5 Encounters:   02/04/21 108.4 kg (239 lb)   01/28/21 108.4 kg (239 lb)   01/20/21 108.4 kg (239 lb)   06/22/20 105.1 kg (231 lb 9.6 oz)   02/01/19 95.1 kg (209 lb 11.2 oz)       General: Alert, cooperative, no distress, well-appearing  Head: Atraumatic, normocephalic, no obvious abnormalities   Eyes: EOMI, Sclera anicteric, no obvious conjunctival hemorrhage   Nose: Nares normal, no obvious malformation, no obvious rhinorrhea   Respiratory: normal appearing respirations, no cough  Musculoskeletal: Range of motion intact, no obvious strength deficit  Skin: No jaundice, no obvious rash  Neurologic: AAOx3, no obvious neurologic abnormality  Psychiatric: Normal Affect, appropriate mood  Extremities: No obvious edema, no obvious malformation    Labs, Radiology, Pathology     Lab Results   Component Value Date    WBC 4.2 02/23/2021    WBC 4.2 01/20/2021    WBC 4.9 01/19/2021    HGB 12.2 (L) 02/23/2021    HGB 11.2 (L) 01/20/2021    HGB 11.7 (L) 01/19/2021     (L) 02/23/2021     (L) 01/20/2021     (L) 01/19/2021    CHOL 112 06/22/2020    CHOL 106 04/10/2018    CHOL 110 07/03/2017    TRIG 48 06/22/2020    TRIG 47 04/10/2018    TRIG 44 07/03/2017    HDL 47 06/22/2020    HDL 38 (L) 04/10/2018    HDL 61 07/03/2017    ALT 91 (H) 03/06/2021     (H) 02/23/2021    ALT 50 01/16/2021    AST 54 (H) 03/06/2021     (H) 02/23/2021    AST 36 01/16/2021     03/06/2021     02/23/2021     (H) 01/20/2021     BUN 28 03/06/2021    BUN 41 (H) 02/23/2021    BUN 17 01/20/2021    CO2 26 03/06/2021    CO2 16 (L) 02/23/2021    CO2 25 01/20/2021    TSH 1.84 06/22/2020    TSH 2.77 08/22/2016    INR 1.18 (H) 01/16/2021        Liver Function Studies -   Recent Labs   Lab Test 03/06/21  1056   PROTTOTAL 6.6*   ALBUMIN 3.4   BILITOTAL 0.5   ALKPHOS 94   AST 54*   ALT 91*        Patient Active Problem List    Diagnosis Date Noted     Loose stools 08/10/2021     Priority: Medium     Abdominal bloating 08/10/2021     Priority: Medium     Full incontinence of feces 08/10/2021     Priority: Medium     Cellulitis of left lower extremity 01/16/2021     Priority: Medium     Diabetic ulcer of toe of left foot associated with type 2 diabetes mellitus, unspecified ulcer stage (H) 01/16/2021     Priority: Medium     History of abdominal abscess 01/31/2018     Priority: Medium     History of ischemic bowel disease 07/03/2017     Priority: Medium     History of melanoma 07/03/2017     Priority: Medium     Microscopic colitis, unspecified microscopic colitis type 04/09/2017     Priority: Medium     Major depressive disorder, recurrent episode, mild (H) 03/09/2017     Priority: Medium     Vitamin B12 deficiency (non anemic) 08/22/2016     Priority: Medium     MS (multiple sclerosis) (H) 07/24/2016     Priority: Medium     Follows at [facility].       Diabetic polyneuropathy associated with type 2 diabetes mellitus (H) 07/24/2016     Priority: Medium     Mast cell disease 04/23/2016     Priority: Medium     Overview:   possible mast cell activation syndrome       Microcytic anemia 04/23/2016     Priority: Medium     Abnormal liver function tests 03/19/2016     Priority: Medium     Body mass index (BMI) greater than 30 in adult 03/19/2016     Priority: Medium     Vertigo 03/19/2016     Priority: Medium     Stricture of duodenum 01/06/2016     Priority: Medium     Coronary arteriosclerosis in native artery 12/08/2015     Priority: Medium     Type 2  diabetes mellitus with diabetic nephropathy (H) 08/31/2015     Priority: Medium     Celiac disease 01/22/2015     Priority: Medium     Steatosis of liver 11/07/2012     Priority: Medium     Mild nonproliferative diabetic retinopathy (H) 08/30/2007     Priority: Medium     Overview:   Overview:   Hx of grid laser to L eye - Dr. Driscoll       Multiple-type hyperlipidemia 12/02/2005     Priority: Medium        Assessment and Plan   Thomas Gonzales is a 55 year old male with MS, DM II (diet controlled), CAD, celiac disease, hx of microscopic colitis, s/p Castro-en-Y for mesenteric/bowel ischemia (2016) c/b fistula managed conservatively, hx of malignant melanoma on limb (s/p excision), s/p cholecystectomy and appendectomy, liver steatosis and abnormal LFTs who is seen for consultation of urge incontinence, loose stools and bloating.     He was previously diagnosed with IBS however, in setting of timing of symptoms, urge incontinence and nocturnal diarrhea, ruling out other pathology is necessary. He did have previous workup with [facility] and [facility], however, has been multiple years.     For his diarrhea and bloating, the differential includes bile acid diarrhea, celiac disease with occult gluten in diet, microscopic colitis, IBD, SIBO, malignancy, other malabsorption syndrome or food intolerance. Recommend initial lab tests and endoscopy for further evaluation. .      For his stool urge incontinence, differential includes anal sphincter weakness or decreased perception secondary to nervous system abnormalities in setting of Multiple sclerosis and diabetes mellitus. No other symptoms/signs of spinal cord/cauda equina syndrome. This incontinence could also be due to or worsened by underlying bile acid diarrhea, IBS, or IBD.    1. Loose stools    2. Abdominal bloating    3. Full incontinence of feces    4. Celiac disease    5. MS (multiple sclerosis) (H)    6. History of ischemic bowel disease    7. History of melanoma        Plan:  1. Check labs - CBC, IgA, TTG IgA and IgG, B12  2. If TTG wnl, would recommend starting cholestyramine for empiric treatment of bile acid diarrhea  3. Recommend EGD and colonoscopy with biopsies to rule out IBD/microscopic colitis and celiac eval  4. If all of above are normal, would recommend anorectal manometry for further workup of incontience    Follow up plan:   Return to clinic 3 months and as needed.    The risks and benefits of my recommendations, as well as other treatment options were discussed with the patient and any available family today. All questions were answered.     o Today, I personally spent 40 minutes in direct face to face time with the patient, of which greater than 50% of the time was spent in patient education and counseling as described above. Approximately 15 minutes were spent on indirect care associated with the patient's consultation including but not limited to review of: patient medical records to date, clinic visits, hospital records, lab results, imaging studies, procedural documentation, and coordinating care with other providers. The findings from this review are summarized in the above note.    This patient was seen and discussed with the attending, Dr. Rainey.    Anastasia Bernal MD  Internal Medicine Resident   [facility]  Pager: [phone] text page    Attending Attestation:  I saw the patient with Dr. Bernal and agree with the findings and the plan of care as documented in the their note. In summary, the patient is an 55 year old male with a history of MS, DM II (diet controlled), CAD, celiac disease, hx of microscopic colitis, s/p Castro-en-Y for mesenteric/bowel ischemia (2016) c/b fistula managed conservatively, hx of malignant melanoma on limb (s/p excision), s/p cholecystectomy and appendectomy, liver steatosis and abnormal LFTs who is seen for consultation of urge incontinence, loose stools and bloating.     The patient was  seen in video telemedicine consultation regarding his symptoms of diarrhea, fecal urge incontinence, and bloating.     His symptoms could be from celiac disease however he states he is on a gluten free diet. We will check TTG-IgA to determine if there is inadvertent ingestion of gluten. If negative we will begin treating with cholestyramine which is a medication he states he has never tried before. During his bypass surgery he underwent cholecystectomy. Based on the timing of loose stool and incontinence beginning following surgery it stands to reason that this is related to a bile acid diarrhea. Alternatively this could be microscopic colitis.     We will also plan to perform EGD and colonoscopy with biopsy to determine if there are other processes contributing to his symptoms.     In the future he may wish to consider anorectal manometry however we will not pursue that at this time as the incontinence appears to be urge related rather than a sphincter weakness.      Overall time spent discussing, thinking, reviewing the chart including available imaging and labs, and evaluating the patient was 55 minutes.    Patient was seen August 10, 2021    Kamran Rainey DO   of Medicine  Division of Gastroenterology, Hepatology, and Nutrition  [facility]

## 2021-08-10 NOTE — LETTER
8/10/2021         RE: Thomas Gonzales  [address] [place] [address]        Dear Colleague,    Thank you for referring your patient, Thomas Gonzales, to the [facility] [place]. Please see a copy of my visit note below.    [facility]   Thomas Gonzales 1965   MRN: [id number]     Reason for Visit:  IBS    Referred by: Self     History of Present Illness:   Thomas Gonzales is a 55 year old male with MS, DM II (diet controlled), CAD, celiac disease, hx of microscopic colitis, s/p Castro-en-Y for mesenteric/bowel ischemia (2016) c/b fistula managed conservatively, hx of malignant melanoma on limb (s/p excision), s/p cholecystectomy and appendectomy, liver steatosis and abnormal LFTs who is presenting as a new patient in consultation at the request of Self with a chief complaint of diarrhea and bloating.  ---------------------------------------------------------------  Thomas Gonzales states he has had loose bowels/diarrhea and bloating.    Onset: Since surgeries at [facility] in 2016; emergency Castro-en-Y for intestinal ischemia. Complicated by fistula and recurrent septic shock s/p multiple TATYANA tubes and conservative management. All healed up as of right now. Prior to surgery, did not have bowel symptoms and had regular BMs.  Frequency: 1-2x per day  Cotton: 5-6  Color: yellow/brown  Melena/hematochezia: denies  + Urgency, + stool incontinence. Does have nocturnal diarrhea. These are his most bothersome symptoms - Checking back in to see if there are anything else he could try and to consolidate care.    Has been seen by [facility] (2017) and [facility] (his favorite doctor there retired ~1.5 years ago so he decided to move to one Care team) for follow-up of these issues. Has been tested for SIBO, negative multiple times. Has been tested for VIP, gastinoma, etc. At [facility] with negative workup 2017. Since last seeing his [facility] doctor ~1.5  years ago and went on FODMAP.     He has hx of celiac disease and is on strict gluten free diet. Was recommended FODMAP diet which he has been following for 1.5 years with some success.    No OTC outside of rare tylenol (2-3x/month). Medicines include ASA 81 mg, statin, vitamins, and probiotic.    Wt: 229 lbs which is stable    He was started on antibiotics for an infection of his toe early 2021 and he then had constipation for the following month to month and a half.     Last colonoscopy was 1/19/2017. Last EGD: unknown    --------------------------------------------------------------------------------------  ROS    + abdominal distension/bloating (assoicated with eating and 3 hours)    Denies dysphagia, odynophagia, heartburn/reflux, nausea, vomiting, early satiety, abdominal pain, hematochezia, or melena. No unintentional weight loss. No fevers or night sweats. No chest pain or pressure.      Family or personal history of IBD or colon cancer: none      STUDIES & PROCEDURES:    EGD:   Date: none/ unknown  Impression:  Pathology Report:    Colonoscopy:  Date: 1/19/2017  Impression:  - preparation of the colon was inadequate.   - The entire examine colon is normal where seen. Biopsied.   - The distal rectum and anal verge are normal on retroflexion view.   Pathology Report: cannot locate    CT: none recently    Prior medical records were reviewed including, but not limited to, notes from referring providers, lab work, radiographic tests, and other diagnostic tests. Pertinent results were summarized above.     History     Past Medical History:   Diagnosis Date     CAD (coronary artery disease)     S/p stenting of left circumflex     Diabetes mellitus (H)      Multiple sclerosis (H)      Peripheral neuropathy      Past Surgical History:   Procedure Laterality Date     APPENDECTOMY  1/8/2016     CATARACT IOL, RT/LT       CHOLECYSTECTOMY  1/8/2016     penile implant       LEON EN Y BOWEL  1/8/2016    for small bowel  ischemia     Social History     Socioeconomic History     Marital status:      Spouse name: Not on file     Number of children: Not on file     Years of education: Not on file     Highest education level: Not on file   Occupational History     Not on file   Tobacco Use     Smoking status: Never Smoker     Smokeless tobacco: Never Used   Substance and Sexual Activity     Alcohol use: Yes     Comment: occ     Drug use: No     Sexual activity: Yes     Partners: Female   Other Topics Concern     Parent/sibling w/ CABG, MI or angioplasty before 65F 55M? Not Asked   Social History Narrative     Not on file     Social Determinants of Health     Financial Resource Strain:      Difficulty of Paying Living Expenses:    Food Insecurity:      Worried About Running Out of Food in the Last Year:      Ran Out of Food in the Last Year:    Transportation Needs:      Lack of Transportation (Medical):      Lack of Transportation (Non-Medical):    Physical Activity:      Days of Exercise per Week:      Minutes of Exercise per Session:    Stress:      Feeling of Stress :    Social Connections:      Frequency of Communication with Friends and Family:      Frequency of Social Gatherings with Friends and Family:      Attends Mandaen Services:      Active Member of Clubs or Organizations:      Attends Club or Organization Meetings:      Marital Status:    Intimate Partner Violence:      Fear of Current or Ex-Partner:      Emotionally Abused:      Physically Abused:      Sexually Abused:        Family History   Problem Relation Age of Onset     Arrhythmia Mother         bradycardia- pacemaker     Coronary Artery Disease Father        Medications and Allergies:     Outpatient Encounter Medications as of 8/10/2021   Medication Sig Dispense Refill     ASPIRIN LOW DOSE 81 MG EC tablet Take 1 tablet by mouth daily. 90 tablet 0     atorvastatin (LIPITOR) 40 MG tablet Take 1 tablet by mouth daily. 90 tablet 0     bismuth subsalicylate 262  MG TABS Take 4-5 tablets by mouth daily        Cholecalciferol (VITAMIN D) 2000 UNITS CAPS Take 2,000 Units by mouth daily        multivitamin, therapeutic with minerals (MULTI-VITAMIN) TABS Take 1 tablet by mouth daily       order for DME Equipment being ordered: custom orthotic inserts for shoes 1 each 1     Probiotic Product (PROBIOTIC DAILY) CAPS Take 1 capsule by mouth daily        No facility-administered encounter medications on file as of 8/10/2021.        Allergies   Allergen Reactions     Shellfish-Derived Products Anaphylaxis     Adhesive Tape      Paper tape is okay  Tegarderm is okay     Fructose      Gluten Meal      Celiac     Lactose      Reglan [Metoclopramide] Hives       Objective Findings:   Physical Exam:        Wt Readings from Last 5 Encounters:   02/04/21 108.4 kg (239 lb)   01/28/21 108.4 kg (239 lb)   01/20/21 108.4 kg (239 lb)   06/22/20 105.1 kg (231 lb 9.6 oz)   02/01/19 95.1 kg (209 lb 11.2 oz)       General: Alert, cooperative, no distress, well-appearing  Head: Atraumatic, normocephalic, no obvious abnormalities   Eyes: EOMI, Sclera anicteric, no obvious conjunctival hemorrhage   Nose: Nares normal, no obvious malformation, no obvious rhinorrhea   Respiratory: normal appearing respirations, no cough  Musculoskeletal: Range of motion intact, no obvious strength deficit  Skin: No jaundice, no obvious rash  Neurologic: AAOx3, no obvious neurologic abnormality  Psychiatric: Normal Affect, appropriate mood  Extremities: No obvious edema, no obvious malformation    Labs, Radiology, Pathology     Lab Results   Component Value Date    WBC 4.2 02/23/2021    WBC 4.2 01/20/2021    WBC 4.9 01/19/2021    HGB 12.2 (L) 02/23/2021    HGB 11.2 (L) 01/20/2021    HGB 11.7 (L) 01/19/2021     (L) 02/23/2021     (L) 01/20/2021     (L) 01/19/2021    CHOL 112 06/22/2020    CHOL 106 04/10/2018    CHOL 110 07/03/2017    TRIG 48 06/22/2020    TRIG 47 04/10/2018    TRIG 44 07/03/2017    HDL  47 06/22/2020    HDL 38 (L) 04/10/2018    HDL 61 07/03/2017    ALT 91 (H) 03/06/2021     (H) 02/23/2021    ALT 50 01/16/2021    AST 54 (H) 03/06/2021     (H) 02/23/2021    AST 36 01/16/2021     03/06/2021     02/23/2021     (H) 01/20/2021    BUN 28 03/06/2021    BUN 41 (H) 02/23/2021    BUN 17 01/20/2021    CO2 26 03/06/2021    CO2 16 (L) 02/23/2021    CO2 25 01/20/2021    TSH 1.84 06/22/2020    TSH 2.77 08/22/2016    INR 1.18 (H) 01/16/2021        Liver Function Studies -   Recent Labs   Lab Test 03/06/21  1056   PROTTOTAL 6.6*   ALBUMIN 3.4   BILITOTAL 0.5   ALKPHOS 94   AST 54*   ALT 91*        Patient Active Problem List    Diagnosis Date Noted     Loose stools 08/10/2021     Priority: Medium     Abdominal bloating 08/10/2021     Priority: Medium     Full incontinence of feces 08/10/2021     Priority: Medium     Cellulitis of left lower extremity 01/16/2021     Priority: Medium     Diabetic ulcer of toe of left foot associated with type 2 diabetes mellitus, unspecified ulcer stage (H) 01/16/2021     Priority: Medium     History of abdominal abscess 01/31/2018     Priority: Medium     History of ischemic bowel disease 07/03/2017     Priority: Medium     History of melanoma 07/03/2017     Priority: Medium     Microscopic colitis, unspecified microscopic colitis type 04/09/2017     Priority: Medium     Major depressive disorder, recurrent episode, mild (H) 03/09/2017     Priority: Medium     Vitamin B12 deficiency (non anemic) 08/22/2016     Priority: Medium     MS (multiple sclerosis) (H) 07/24/2016     Priority: Medium     Follows at [facility].       Diabetic polyneuropathy associated with type 2 diabetes mellitus (H) 07/24/2016     Priority: Medium     Mast cell disease 04/23/2016     Priority: Medium     Overview:   possible mast cell activation syndrome       Microcytic anemia 04/23/2016     Priority: Medium     Abnormal liver function tests 03/19/2016     Priority: Medium     Body  mass index (BMI) greater than 30 in adult 03/19/2016     Priority: Medium     Vertigo 03/19/2016     Priority: Medium     Stricture of duodenum 01/06/2016     Priority: Medium     Coronary arteriosclerosis in native artery 12/08/2015     Priority: Medium     Type 2 diabetes mellitus with diabetic nephropathy (H) 08/31/2015     Priority: Medium     Celiac disease 01/22/2015     Priority: Medium     Steatosis of liver 11/07/2012     Priority: Medium     Mild nonproliferative diabetic retinopathy (H) 08/30/2007     Priority: Medium     Overview:   Overview:   Hx of grid laser to L eye - Dr. Driscoll       Multiple-type hyperlipidemia 12/02/2005     Priority: Medium        Assessment and Plan   Thomas Gonzales is a 55 year old male with MS, DM II (diet controlled), CAD, celiac disease, hx of microscopic colitis, s/p Castro-en-Y for mesenteric/bowel ischemia (2016) c/b fistula managed conservatively, hx of malignant melanoma on limb (s/p excision), s/p cholecystectomy and appendectomy, liver steatosis and abnormal LFTs who is seen for consultation of urge incontinence, loose stools and bloating.     He was previously diagnosed with IBS however, in setting of timing of symptoms, urge incontinence and nocturnal diarrhea, ruling out other pathology is necessary. He did have previous workup with [facility] and [facility], however, has been multiple years.     For his diarrhea and bloating, the differential includes bile acid diarrhea, celiac disease with occult gluten in diet, microscopic colitis, IBD, SIBO, malignancy, other malabsorption syndrome or food intolerance. Recommend initial lab tests and endoscopy for further evaluation. .      For his stool urge incontinence, differential includes anal sphincter weakness or decreased perception secondary to nervous system abnormalities in setting of Multiple sclerosis and diabetes mellitus. No other symptoms/signs of spinal cord/cauda equina syndrome. This incontinence could also be due to  or worsened by underlying bile acid diarrhea, IBS, or IBD.    1. Loose stools    2. Abdominal bloating    3. Full incontinence of feces    4. Celiac disease    5. MS (multiple sclerosis) (H)    6. History of ischemic bowel disease    7. History of melanoma       Plan:  1. Check labs - CBC, IgA, TTG IgA and IgG, B12  2. If TTG wnl, would recommend starting cholestyramine for empiric treatment of bile acid diarrhea  3. Recommend EGD and colonoscopy with biopsies to rule out IBD/microscopic colitis and celiac eval  4. If all of above are normal, would recommend anorectal manometry for further workup of incontience    Follow up plan:   Return to clinic 3 months and as needed.    The risks and benefits of my recommendations, as well as other treatment options were discussed with the patient and any available family today. All questions were answered.     o Today, I personally spent 40 minutes in direct face to face time with the patient, of which greater than 50% of the time was spent in patient education and counseling as described above. Approximately 15 minutes were spent on indirect care associated with the patient's consultation including but not limited to review of: patient medical records to date, clinic visits, hospital records, lab results, imaging studies, procedural documentation, and coordinating care with other providers. The findings from this review are summarized in the above note.    This patient was seen and discussed with the attending, Dr. Rainey.    Anastasia Bernal MD  Internal Medicine Resident   [facility]  Pager: [phone] text page    Attending Attestation:  I saw the patient with Dr. Bernal and agree with the findings and the plan of care as documented in the their note. In summary, the patient is an 55 year old male with a history of MS, DM II (diet controlled), CAD, celiac disease, hx of microscopic colitis, s/p Castro-en-Y for mesenteric/bowel ischemia  (2016) c/b fistula managed conservatively, hx of malignant melanoma on limb (s/p excision), s/p cholecystectomy and appendectomy, liver steatosis and abnormal LFTs who is seen for consultation of urge incontinence, loose stools and bloating.     The patient was seen in video telemedicine consultation regarding his symptoms of diarrhea, fecal urge incontinence, and bloating.     His symptoms could be from celiac disease however he states he is on a gluten free diet. We will check TTG-IgA to determine if there is inadvertent ingestion of gluten. If negative we will begin treating with cholestyramine which is a medication he states he has never tried before. During his bypass surgery he underwent cholecystectomy. Based on the timing of loose stool and incontinence beginning following surgery it stands to reason that this is related to a bile acid diarrhea. Alternatively this could be microscopic colitis.     We will also plan to perform EGD and colonoscopy with biopsy to determine if there are other processes contributing to his symptoms.     In the future he may wish to consider anorectal manometry however we will not pursue that at this time as the incontinence appears to be urge related rather than a sphincter weakness.      Overall time spent discussing, thinking, reviewing the chart including available imaging and labs, and evaluating the patient was 55 minutes.    Patient was seen August 10, 2021    Kamran Rainey DO   of Medicine  Division of Gastroenterology, Hepatology, and Nutrition  [facility]        Again, thank you for allowing me to participate in the care of your patient.      Sincerely,    Kamran Rainey DO

## 2021-08-10 NOTE — PROGRESS NOTES
Thomas Gonzales is a 55 year old old who is being evaluated via a billable video visit.      How would you like to obtain your AVS? MyChart  If the video visit is dropped, the invitation should be resent by: Text to cell phone: [phone]  Will anyone else be joining your video visit? No      RENALDO Bradshaw  Surgery Clinic    Start 7:45  Stop 8:25

## 2021-08-11 NOTE — PATIENT INSTRUCTIONS
It was a pleasure taking care of you today.  I've included a brief summary of our discussion and care plan from today's visit below.  Please review this information with your primary care provider.  _______________________________________________________________________    My recommendations are summarized as follows:    1. Schedule endoscopy and colonoscopy  2. Obtain blood work (CBC, B12, TTG-IgA, IgA)  3. If celiac blood work is negative we will start cholestyramine     To schedule endoscopic procedures you may call: [phone]  To schedule radiology tests you may call: [phone]  To schedule an ENT appointment you may call: [phone]    Please call my nurse Erika ([phone]), Hui ([phone]) with any questions or concerns.  If you were seen through the [facility] please feel free to reach out to Mary at [phone]   --    Return to GI Clinic in 3 months to review your progress.    _______________________________________________________________________    Who do I call with any questions after my visit?  Please be in touch if there are any further questions that arise following today's visit.  There are multiple ways to contact your gastroenterology care team.        During business hours, you may reach a Gastroenterology nurse at [phone] and choose option 3.         To schedule or reschedule an appointment, please call [phone].       You can always send a secure message through [employer].  [employer] messages are answered by your nurse or doctor typically within 24 hours.  Please allow extra time on weekends and holidays.        For urgent/emergent questions after business hours, you may reach the on-call GI Fellow by contacting the [facility] at [phone].     How will I get the results of any tests ordered?    You will receive all of your results.  If you have signed up for MyChart, any tests ordered at your visit will be available to you after your  physician reviews them.  Typically this takes 1-2 weeks.  If there are urgent results that require a change in your care plan, your physician or nurse will call you to discuss the next steps.      What is [employer]?  [facility] is a secure way for you to access all of your healthcare records from the [facility].  It is a web based computer program, so you can sign on to it from any location.  It also allows you to send secure messages to your care team.  I recommend signing up for [facility] access if you have not already done so and are comfortable with using a computer.      How to I schedule a follow-up visit?  If you did not schedule a follow-up visit today, please call [phone] to schedule a follow-up office visit.        Sincerely,    Kamran Rainey DO   of Medicine  Division of Gastroenterology, Hepatology, and Nutrition  [facility]

## 2021-08-19 ENCOUNTER — TELEPHONE (OUTPATIENT)
Dept: GASTROENTEROLOGY | Facility: CLINIC | Age: 56
End: 2021-08-19

## 2021-08-19 DIAGNOSIS — Z11.59 ENCOUNTER FOR SCREENING FOR OTHER VIRAL DISEASES: ICD-10-CM

## 2021-08-19 NOTE — TELEPHONE ENCOUNTER
Screening Questions  1. Are you active on mychart?    2. What insurance is in the chart? medica    2.  Ordering/Referring Provider: Kamran Rainey DO    3. BMI 28.9    4. Are you on daily home oxygen? no    5. Do you have a history of difficult airway?  no    6. Have you had a heart, lung, or liver transplant? no    7. Are you currently on dialysis?  no    8. Have you had a stroke or Transient ischemic atttack (TIA) within 6 months? no    9. In the past 6 months, have you had any heart related issues including cardiomyopathy or heart attack?         If yes, did it require cardiac stenting or other implantable device? no    10. Do you have any implantable devices in your body (pacemaker, defib, LVAD)?  YES    11. Do you take nitroglycerin? If yes, how often? no    12. Are you currently taking any blood thinners? no    13. Are you a diabetic? no    14. (Females) Are you currently pregnant?   If yes, how many weeks?    15. Have you had a procedure in the past that was difficult to tolerate with conscious sedation? Any allergies to Fentanyl or Versed  no    16. Are you taking any scheduled prescription narcotics more than once daily?  no    17. Do you have any chemical dependencies such as alcohol, street drugs, or methadone?  no    18. Do you have any history of post-traumatic stress syndrome or mental health issues? no    19. Do you transfer independently?  yes    20.  Do you have any issues with constipation? no    21. Preferred Pharmacy for Pre Prescription  [facility] on chart    Scheduling Details    Which Colonoscopy Prep was Sent?: Miralax  Procedure Scheduled: colon  Provider/Surgeon: Redd  Date of Procedure: 9/1/2021  [place] [facility]er (Please ask for phone number if not scheduled by patient): Pradip Gonzales      Sedation Type: MAC  Conscious Sedation- Needs  for 6 hours after the procedure  MAC/General-Needs  for 24 hours after procedure    Pre-op Required at [facility] [place], [place]  and OR for MAC sedation:   (if yes advise patient they will need a pre-op prior to procedure)      Is patient on blood thinners? -no (If yes- inform patient to follow up with PCP or provider for follow up instructions)     Informed patient they will need an adult  y  Cannot take any type of public or medical transportation alone    Informed Patient of COVID Test Requirement yes    Confirmed Nurse will call to complete assessment yes    Additional comments: no

## 2021-08-23 ENCOUNTER — TELEPHONE (OUTPATIENT)
Dept: GASTROENTEROLOGY | Facility: CLINIC | Age: 56
End: 2021-08-23

## 2021-08-23 DIAGNOSIS — R19.5 LOOSE STOOLS: Primary | ICD-10-CM

## 2021-08-23 RX ORDER — BISACODYL 5 MG
TABLET, DELAYED RELEASE (ENTERIC COATED) ORAL
Qty: 2 TABLET | Refills: 0 | Status: SHIPPED | OUTPATIENT
Start: 2021-08-23 | End: 2021-09-23

## 2021-08-23 NOTE — TELEPHONE ENCOUNTER
Attempted to contact patient regarding upcoming colonoscopy/EGD procedure on 9/1/21 for pre assessment questions. No answer.     Unable to leave a voicemail.     Covid test scheduled?     Arrival time: 1255    Facility location: [facility]    Sedation type: MAC    Bowel prep recommendation: Extended prep due to previous endoscopy done in 2017 with inadequate prep.     Extended prep sent to [facility][id number] - [place], [place] - [facility] [address] AT [facility] [address]. Prep instructions sent via [employer]     Will send [employer] message to return call and reminder of Covid test.     Verify implanted devices. Patient reported yes in scheduling questionnaire.     Geraldine Polanco RN

## 2021-08-24 ENCOUNTER — LAB (OUTPATIENT)
Dept: LAB | Facility: CLINIC | Age: 56
End: 2021-08-24
Payer: COMMERCIAL

## 2021-08-24 DIAGNOSIS — R14.0 ABDOMINAL BLOATING: ICD-10-CM

## 2021-08-24 DIAGNOSIS — R19.5 LOOSE STOOLS: ICD-10-CM

## 2021-08-24 LAB
ERYTHROCYTE [DISTWIDTH] IN BLOOD BY AUTOMATED COUNT: 14.9 % (ref 10–15)
HCT VFR BLD AUTO: 38.9 % (ref 40–53)
HGB BLD-MCNC: 13.2 G/DL (ref 13.3–17.7)
IGA SERPL-MCNC: 232 MG/DL (ref 65–400)
MCH RBC QN AUTO: 28.7 PG (ref 26.5–33)
MCHC RBC AUTO-ENTMCNC: 33.9 G/DL (ref 31.5–36.5)
MCV RBC AUTO: 85 FL (ref 78–100)
PLATELET # BLD AUTO: 145 10E3/UL (ref 150–450)
RBC # BLD AUTO: 4.6 10E6/UL (ref 4.4–5.9)
VIT B12 SERPL-MCNC: 654 PG/ML (ref 213–816)
WBC # BLD AUTO: 4.8 10E3/UL (ref 4–11)

## 2021-08-24 PROCEDURE — 83516 IMMUNOASSAY NONANTIBODY: CPT | Mod: 59

## 2021-08-24 PROCEDURE — 82607 VITAMIN B-12: CPT

## 2021-08-24 PROCEDURE — 85027 COMPLETE CBC AUTOMATED: CPT

## 2021-08-24 PROCEDURE — 82784 ASSAY IGA/IGD/IGG/IGM EACH: CPT

## 2021-08-24 PROCEDURE — 36415 COLL VENOUS BLD VENIPUNCTURE: CPT

## 2021-08-26 LAB
TTG IGA SER-ACNC: 1.1 U/ML
TTG IGG SER-ACNC: <0.6 U/ML

## 2021-08-26 NOTE — TELEPHONE ENCOUNTER
Second attempt.    Attempted to contact patient regarding upcoming colonoscopy/EGD procedure on 9/1/21 for pre assessment questions. No answer.      Left message to return call [phone] #2     Covid test scheduled: 8.29.2021     Arrival time: 1255     Facility location: [facility]     Sedation type: MAC     Bowel prep recommendation: Extended prep due to previous endoscopy done in 2017 with inadequate prep.      Verify implanted devices. Patient reported yes in scheduling questionnaire.    Charito Ko RN

## 2021-08-26 NOTE — TELEPHONE ENCOUNTER
Advised pt  increase in cholesterol-may consider meds but continue with apple/day and monitor. Anemia present-recommended  eating foods rich in iron. Pt gave a verbal understanding.   Pre assessment questions completed for upcoming colonoscopy/EGD procedure scheduled on 9/1/21    COVID test scheduled 8/29/21    Procedural arrival time and facility location reviewed.    Designated  policy reviewed.    Reviewed Extended prep instructions with patient. No fiber/iron supplements or foods that contain nuts/seeds 7 days prior to procedure.     Anticoagulation/blood thinners? no    Electronic implanted devices? No - patient denied any electric implanted devices.     Patient verbalized understanding and had no questions or concerns at this time.    Geraldine Polanco RN

## 2021-08-29 ENCOUNTER — LAB (OUTPATIENT)
Dept: FAMILY MEDICINE | Facility: CLINIC | Age: 56
End: 2021-08-29
Attending: INTERNAL MEDICINE
Payer: COMMERCIAL

## 2021-08-29 DIAGNOSIS — Z11.59 ENCOUNTER FOR SCREENING FOR OTHER VIRAL DISEASES: ICD-10-CM

## 2021-08-29 LAB — SARS-COV-2 RNA RESP QL NAA+PROBE: NEGATIVE

## 2021-08-29 PROCEDURE — U0005 INFEC AGEN DETEC AMPLI PROBE: HCPCS

## 2021-08-29 PROCEDURE — U0003 INFECTIOUS AGENT DETECTION BY NUCLEIC ACID (DNA OR RNA); SEVERE ACUTE RESPIRATORY SYNDROME CORONAVIRUS 2 (SARS-COV-2) (CORONAVIRUS DISEASE [COVID-19]), AMPLIFIED PROBE TECHNIQUE, MAKING USE OF HIGH THROUGHPUT TECHNOLOGIES AS DESCRIBED BY CMS-2020-01-R: HCPCS

## 2021-09-01 ENCOUNTER — ANESTHESIA EVENT (OUTPATIENT)
Dept: SURGERY | Facility: AMBULATORY SURGERY CENTER | Age: 56
End: 2021-09-01
Payer: COMMERCIAL

## 2021-09-01 ENCOUNTER — HOSPITAL ENCOUNTER (OUTPATIENT)
Facility: AMBULATORY SURGERY CENTER | Age: 56
End: 2021-09-01
Attending: INTERNAL MEDICINE
Payer: COMMERCIAL

## 2021-09-01 ENCOUNTER — ANESTHESIA (OUTPATIENT)
Dept: SURGERY | Facility: AMBULATORY SURGERY CENTER | Age: 56
End: 2021-09-01
Payer: COMMERCIAL

## 2021-09-01 VITALS
DIASTOLIC BLOOD PRESSURE: 65 MMHG | OXYGEN SATURATION: 100 % | RESPIRATION RATE: 12 BRPM | WEIGHT: 225 LBS | BODY MASS INDEX: 28.88 KG/M2 | SYSTOLIC BLOOD PRESSURE: 121 MMHG | HEIGHT: 74 IN | HEART RATE: 54 BPM | TEMPERATURE: 97 F

## 2021-09-01 VITALS — HEART RATE: 54 BPM

## 2021-09-01 LAB
COLONOSCOPY: NORMAL
GLUCOSE BLDC GLUCOMTR-MCNC: 86 MG/DL (ref 70–99)
UPPER GI ENDOSCOPY: NORMAL

## 2021-09-01 PROCEDURE — 88305 TISSUE EXAM BY PATHOLOGIST: CPT | Performed by: PATHOLOGY

## 2021-09-01 PROCEDURE — 45380 COLONOSCOPY AND BIOPSY: CPT

## 2021-09-01 PROCEDURE — 43239 EGD BIOPSY SINGLE/MULTIPLE: CPT

## 2021-09-01 RX ORDER — NALOXONE HYDROCHLORIDE 0.4 MG/ML
0.4 INJECTION, SOLUTION INTRAMUSCULAR; INTRAVENOUS; SUBCUTANEOUS
Status: DISCONTINUED | OUTPATIENT
Start: 2021-09-01 | End: 2021-09-02 | Stop reason: HOSPADM

## 2021-09-01 RX ORDER — ONDANSETRON 4 MG/1
4 TABLET, ORALLY DISINTEGRATING ORAL EVERY 6 HOURS PRN
Status: DISCONTINUED | OUTPATIENT
Start: 2021-09-01 | End: 2021-09-02 | Stop reason: HOSPADM

## 2021-09-01 RX ORDER — LIDOCAINE 40 MG/G
CREAM TOPICAL
Status: DISCONTINUED | OUTPATIENT
Start: 2021-09-01 | End: 2021-09-01 | Stop reason: HOSPADM

## 2021-09-01 RX ORDER — SIMETHICONE
LIQUID (ML) MISCELLANEOUS PRN
Status: DISCONTINUED | OUTPATIENT
Start: 2021-09-01 | End: 2021-09-01 | Stop reason: HOSPADM

## 2021-09-01 RX ORDER — LIDOCAINE HYDROCHLORIDE 20 MG/ML
INJECTION, SOLUTION INFILTRATION; PERINEURAL PRN
Status: DISCONTINUED | OUTPATIENT
Start: 2021-09-01 | End: 2021-09-01

## 2021-09-01 RX ORDER — FLUMAZENIL 0.1 MG/ML
0.2 INJECTION, SOLUTION INTRAVENOUS
Status: DISCONTINUED | OUTPATIENT
Start: 2021-09-01 | End: 2021-09-02 | Stop reason: HOSPADM

## 2021-09-01 RX ORDER — ONDANSETRON 2 MG/ML
4 INJECTION INTRAMUSCULAR; INTRAVENOUS
Status: DISCONTINUED | OUTPATIENT
Start: 2021-09-01 | End: 2021-09-01 | Stop reason: HOSPADM

## 2021-09-01 RX ORDER — GLYCOPYRROLATE 0.2 MG/ML
INJECTION, SOLUTION INTRAMUSCULAR; INTRAVENOUS PRN
Status: DISCONTINUED | OUTPATIENT
Start: 2021-09-01 | End: 2021-09-01

## 2021-09-01 RX ORDER — PROPOFOL 10 MG/ML
INJECTION, EMULSION INTRAVENOUS PRN
Status: DISCONTINUED | OUTPATIENT
Start: 2021-09-01 | End: 2021-09-01

## 2021-09-01 RX ORDER — PROPOFOL 10 MG/ML
INJECTION, EMULSION INTRAVENOUS CONTINUOUS PRN
Status: DISCONTINUED | OUTPATIENT
Start: 2021-09-01 | End: 2021-09-01

## 2021-09-01 RX ORDER — ONDANSETRON 2 MG/ML
4 INJECTION INTRAMUSCULAR; INTRAVENOUS EVERY 6 HOURS PRN
Status: DISCONTINUED | OUTPATIENT
Start: 2021-09-01 | End: 2021-09-02 | Stop reason: HOSPADM

## 2021-09-01 RX ORDER — PROCHLORPERAZINE MALEATE 10 MG
10 TABLET ORAL EVERY 6 HOURS PRN
Status: DISCONTINUED | OUTPATIENT
Start: 2021-09-01 | End: 2021-09-02 | Stop reason: HOSPADM

## 2021-09-01 RX ORDER — NALOXONE HYDROCHLORIDE 0.4 MG/ML
0.2 INJECTION, SOLUTION INTRAMUSCULAR; INTRAVENOUS; SUBCUTANEOUS
Status: DISCONTINUED | OUTPATIENT
Start: 2021-09-01 | End: 2021-09-02 | Stop reason: HOSPADM

## 2021-09-01 RX ORDER — KETAMINE HYDROCHLORIDE 10 MG/ML
INJECTION, SOLUTION INTRAMUSCULAR; INTRAVENOUS PRN
Status: DISCONTINUED | OUTPATIENT
Start: 2021-09-01 | End: 2021-09-01

## 2021-09-01 RX ORDER — SODIUM CHLORIDE, SODIUM LACTATE, POTASSIUM CHLORIDE, CALCIUM CHLORIDE 600; 310; 30; 20 MG/100ML; MG/100ML; MG/100ML; MG/100ML
INJECTION, SOLUTION INTRAVENOUS CONTINUOUS PRN
Status: DISCONTINUED | OUTPATIENT
Start: 2021-09-01 | End: 2021-09-01

## 2021-09-01 RX ADMIN — PROPOFOL 200 MCG/KG/MIN: 10 INJECTION, EMULSION INTRAVENOUS at 14:05

## 2021-09-01 RX ADMIN — SODIUM CHLORIDE, SODIUM LACTATE, POTASSIUM CHLORIDE, CALCIUM CHLORIDE: 600; 310; 30; 20 INJECTION, SOLUTION INTRAVENOUS at 13:59

## 2021-09-01 RX ADMIN — KETAMINE HYDROCHLORIDE 20 MG: 10 INJECTION, SOLUTION INTRAMUSCULAR; INTRAVENOUS at 14:06

## 2021-09-01 RX ADMIN — GLYCOPYRROLATE 0.2 MG: 0.2 INJECTION, SOLUTION INTRAMUSCULAR; INTRAVENOUS at 14:02

## 2021-09-01 RX ADMIN — LIDOCAINE HYDROCHLORIDE 50 MG: 20 INJECTION, SOLUTION INFILTRATION; PERINEURAL at 14:05

## 2021-09-01 RX ADMIN — PROPOFOL 100 MG: 10 INJECTION, EMULSION INTRAVENOUS at 14:08

## 2021-09-01 ASSESSMENT — MIFFLIN-ST. JEOR: SCORE: 1930.34

## 2021-09-01 NOTE — ANESTHESIA POSTPROCEDURE EVALUATION
Patient: Thomas Gonzales    Procedure(s):  ESOPHAGOGASTRODUODENOSCOPY, WITH BIOPSY  COLONOSCOPY, WITH POLYPECTOMY AND BIOPSY    Diagnosis:Loose stools [R19.5]  Diagnosis Additional Information: No value filed.    Anesthesia Type:  MAC    Note:  Disposition: Outpatient   Postop Pain Control: Uneventful            Sign Out: Well controlled pain   PONV: No   Neuro/Psych: Uneventful            Sign Out: Acceptable/Baseline neuro status   Airway/Respiratory: Uneventful            Sign Out: Acceptable/Baseline resp. status   CV/Hemodynamics: Uneventful            Sign Out: Acceptable CV status; No obvious hypovolemia; No obvious fluid overload   Other NRE: NONE   DID A NON-ROUTINE EVENT OCCUR? No           Last vitals:  Vitals Value Taken Time   /65 09/01/21 1510   Temp 36.1  C (97  F) 09/01/21 1510   Pulse 54 09/01/21 1510   Resp 12 09/01/21 1510   SpO2 100 % 09/01/21 1510       Electronically Signed By: Enrico Ramos MD  September 1, 2021  3:59 PM

## 2021-09-01 NOTE — H&P
Thomas Velozman  [id number]  male  55 year old      Reason for procedure/surgery: egd/colon loose stools    Patient Active Problem List   Diagnosis     Abnormal liver function tests     Celiac disease     Coronary arteriosclerosis in native artery     Type 2 diabetes mellitus with diabetic nephropathy (H)     Mild nonproliferative diabetic retinopathy (H)     Stricture of duodenum     Steatosis of liver     Mast cell disease     Microcytic anemia     Multiple-type hyperlipidemia     Body mass index (BMI) greater than 30 in adult     Vertigo     MS (multiple sclerosis) (H)     Diabetic polyneuropathy associated with type 2 diabetes mellitus (H)     Vitamin B12 deficiency (non anemic)     Major depressive disorder, recurrent episode, mild (H)     Microscopic colitis, unspecified microscopic colitis type     History of ischemic bowel disease     History of melanoma     History of abdominal abscess     Cellulitis of left lower extremity     Diabetic ulcer of toe of left foot associated with type 2 diabetes mellitus, unspecified ulcer stage (H)     Loose stools     Abdominal bloating     Full incontinence of feces       Past Surgical History:    Past Surgical History:   Procedure Laterality Date     APPENDECTOMY  1/8/2016     CATARACT IOL, RT/LT       CHOLECYSTECTOMY  1/8/2016     penile implant       LEON EN Y BOWEL  1/8/2016    for small bowel ischemia       Past Medical History:   Past Medical History:   Diagnosis Date     CAD (coronary artery disease)     S/p stenting of left circumflex     Diabetes mellitus (H)      Multiple sclerosis (H)      Peripheral neuropathy        Social History:   Social History     Tobacco Use     Smoking status: Never Smoker     Smokeless tobacco: Never Used   Substance Use Topics     Alcohol use: Yes     Comment: occ       Family History:   Family History   Problem Relation Age of Onset     Arrhythmia Mother         bradycardia- pacemaker     Coronary Artery Disease Father         Allergies:   Allergies   Allergen Reactions     Shellfish-Derived Products Anaphylaxis     Adhesive Tape      Paper tape is okay  Tegarderm is okay     Fructose      Gluten Meal      Celiac     Lactose      Reglan [Metoclopramide] Hives       Active Medications:   Current Outpatient Medications   Medication Sig Dispense Refill     ASPIRIN LOW DOSE 81 MG EC tablet Take 1 tablet by mouth daily. 90 tablet 0     atorvastatin (LIPITOR) 40 MG tablet Take 1 tablet by mouth daily. 90 tablet 0     bisacodyl (DULCOLAX) 5 MG EC tablet Take as directed. One day prior to exam at 10:00am take 2 tablets 2 tablet 0     bismuth subsalicylate 262 MG TABS Take 4-5 tablets by mouth daily        Cholecalciferol (VITAMIN D) 2000 UNITS CAPS Take 2,000 Units by mouth daily        magnesium citrate solution Take as directed. Two days prior to exam drink 10oz bottle of magnesium citrate at 4:00pm 296 mL 0     multivitamin, therapeutic with minerals (MULTI-VITAMIN) TABS Take 1 tablet by mouth daily       polyethylene glycol (GOLYTELY) 236 g suspension Take as directed. One day before your exam fill the first container with water. Cover and shake until mixed well. At 3:00pm drink one 8oz glass every 10-15 minutes until half of the first container is empty. Store the remainder in the refrigerator. At 8:00pm drink the second half of the first container until it is gone. Before you go to bed mix the second container with water and put in refrigerator. Six hours before your check in time drink one 8oz glass every 10-15 minutes until half of container is empty. Discard the remainder of solution. 8000 mL 0     Probiotic Product (PROBIOTIC DAILY) CAPS Take 1 capsule by mouth daily        order for DME Equipment being ordered: custom orthotic inserts for shoes 1 each 1       Systemic Review:   CONSTITUTIONAL: NEGATIVE for fever, chills, change in weight  ENT/MOUTH: NEGATIVE for ear, mouth and throat problems  RESP: NEGATIVE for significant cough  "or SOB  CV: NEGATIVE for chest pain, palpitations or peripheral edema    Physical Examination:   Vital Signs: /67   Pulse 57   Temp 97.9  F (36.6  C) (Temporal)   Resp 18   Ht 1.88 m (6' 2\")   Wt 102.1 kg (225 lb)   SpO2 99%   BMI 28.89 kg/m    GENERAL: healthy, alert and no distress  NECK: no adenopathy, no asymmetry, masses, or scars  RESP: lungs clear to auscultation - no rales, rhonchi or wheezes  CV: regular rate and rhythm, normal S1 S2, no S3 or S4, no murmur, click or rub, no peripheral edema and peripheral pulses strong  ABDOMEN: soft, nontender, no hepatosplenomegaly, no masses and bowel sounds normal  MS: no gross musculoskeletal defects noted, no edema    Plan: Appropriate to proceed as scheduled.      Kamran Rainey DO  9/1/2021    PCP:  Anni Sage    "

## 2021-09-01 NOTE — ANESTHESIA PREPROCEDURE EVALUATION
Anesthesia Pre-Procedure Evaluation    Patient: Thomas Gonzales   MRN: [id number] : 1965        Preoperative Diagnosis: Loose stools [R19.5]   Procedure : Procedure(s):  ESOPHAGOGASTRODUODENOSCOPY (EGD)  COLONOSCOPY     Past Medical History:   Diagnosis Date     CAD (coronary artery disease)     S/p stenting of left circumflex     Diabetes mellitus (H)      Multiple sclerosis (H)      Peripheral neuropathy       Past Surgical History:   Procedure Laterality Date     APPENDECTOMY  2016     CATARACT IOL, RT/LT       CHOLECYSTECTOMY  2016     penile implant       LEON EN Y BOWEL  2016    for small bowel ischemia      Allergies   Allergen Reactions     Shellfish-Derived Products Anaphylaxis     Adhesive Tape      Paper tape is okay  Tegarderm is okay     Fructose      Gluten Meal      Celiac     Lactose      Reglan [Metoclopramide] Hives      Social History     Tobacco Use     Smoking status: Never Smoker     Smokeless tobacco: Never Used   Substance Use Topics     Alcohol use: Yes     Comment: occ      Wt Readings from Last 1 Encounters:   21 102.1 kg (225 lb)        Anesthesia Evaluation            ROS/MED HX  ENT/Pulmonary:       Neurologic:     (+) peripheral neuropathy, Multiple Sclerosis, limitations: fatigue.     Cardiovascular:     (+) Dyslipidemia --CAD ---Previous cardiac testing   Echo: Date: 5/15/2006 Results:  Normal echo  Stress Test: Date: Results:    ECG Reviewed: Date: 20 Results:  SR  Cath: Date: Results:      METS/Exercise Tolerance:     Hematologic:       Musculoskeletal:       GI/Hepatic: Comment: Celiac disease, colitis, diarrhea, bloating    (+) liver disease,     Renal/Genitourinary:       Endo:     (+) type II DM,     Psychiatric/Substance Use:     (+) psychiatric history depression     Infectious Disease:       Malignancy: Comment: Melanoma  (+) Malignancy, History of Skin.Skin CA Remission status post.        Other:            Physical Exam    Airway         Mallampati: I   TM distance: > 3 FB   Neck ROM: full   Mouth opening: > 3 cm    Respiratory Devices and Support         Dental  no notable dental history         Cardiovascular   cardiovascular exam normal          Pulmonary   pulmonary exam normal                OUTSIDE LABS:  CBC:   Lab Results   Component Value Date    WBC 4.8 08/24/2021    WBC 4.2 02/23/2021    HGB 13.2 (L) 08/24/2021    HGB 12.2 (L) 02/23/2021    HCT 38.9 (L) 08/24/2021    HCT 37.8 (L) 02/23/2021     (L) 08/24/2021     (L) 02/23/2021     BMP:   Lab Results   Component Value Date     03/06/2021     02/23/2021    POTASSIUM 4.8 03/06/2021    POTASSIUM 5.2 02/25/2021    CHLORIDE 112 (H) 03/06/2021    CHLORIDE 114 (H) 02/23/2021    CO2 26 03/06/2021    CO2 16 (L) 02/23/2021    BUN 28 03/06/2021    BUN 41 (H) 02/23/2021    CR 1.12 03/06/2021    CR 1.59 (H) 02/25/2021     (H) 03/06/2021    GLC 85 02/23/2021     COAGS:   Lab Results   Component Value Date    INR 1.18 (H) 01/16/2021     POC:   Lab Results   Component Value Date     (H) 01/19/2021     HEPATIC:   Lab Results   Component Value Date    ALBUMIN 3.4 03/06/2021    PROTTOTAL 6.6 (L) 03/06/2021    ALT 91 (H) 03/06/2021    AST 54 (H) 03/06/2021    ALKPHOS 94 03/06/2021    BILITOTAL 0.5 03/06/2021     OTHER:   Lab Results   Component Value Date    LACT 0.8 01/16/2021    A1C 5.7 (H) 01/17/2021    DIONICIO 8.7 03/06/2021    PHOS 3.7 06/22/2020    MAG 1.9 06/22/2020    TSH 1.84 06/22/2020    CRP <2.9 02/23/2021    SED 16 01/17/2021       Anesthesia Plan    ASA Status:  3   NPO Status:  NPO Appropriate    Anesthesia Type: MAC.     - Reason for MAC: chronic cardiopulmonary disease, straight local not clinically adequate   Induction: Intravenous.   Maintenance: TIVA.        Consents    Anesthesia Plan(s) and associated risks, benefits, and realistic alternatives discussed. Questions answered and patient/representative(s) expressed understanding.     - Discussed with:   Patient         Postoperative Care    Pain management: Multi-modal analgesia.   PONV prophylaxis: Background Propofol Infusion     Comments:                ALICIA RAM MD

## 2021-09-01 NOTE — ANESTHESIA CARE TRANSFER NOTE
Patient: Thomas Gonzales    Procedure(s):  ESOPHAGOGASTRODUODENOSCOPY, WITH BIOPSY  COLONOSCOPY, WITH POLYPECTOMY AND BIOPSY    Diagnosis: Loose stools [R19.5]  Diagnosis Additional Information: No value filed.    Anesthesia Type:   MAC     Note:    Oropharynx: oropharynx clear of all foreign objects  Level of Consciousness: awake  Oxygen Supplementation: room air    Independent Airway: airway patency satisfactory and stable  Dentition: dentition unchanged  Vital Signs Stable: post-procedure vital signs reviewed and stable  Report to RN Given: handoff report given  Patient transferred to: Phase II  Comments: VSS and WNL, comfortable, no PONV, report to Aneta RN  Handoff Report: Identifed the Patient, Identified the Reponsible Provider, Reviewed the pertinent medical history, Discussed the surgical course, Reviewed Intra-OP anesthesia mangement and issues during anesthesia, Set expectations for post-procedure period and Allowed opportunity for questions and acknowledgement of understanding      Vitals:  Vitals Value Taken Time   BP     Temp     Pulse     Resp     SpO2         Electronically Signed By: DAVE Montgomery CRNA  September 1, 2021  2:50 PM

## 2021-09-02 DIAGNOSIS — R19.5 LOOSE STOOLS: Primary | ICD-10-CM

## 2021-09-02 LAB
PATH REPORT.COMMENTS IMP SPEC: NORMAL
PATH REPORT.COMMENTS IMP SPEC: NORMAL
PATH REPORT.FINAL DX SPEC: NORMAL
PATH REPORT.GROSS SPEC: NORMAL
PATH REPORT.MICROSCOPIC SPEC OTHER STN: NORMAL
PATH REPORT.RELEVANT HX SPEC: NORMAL
PHOTO IMAGE: NORMAL

## 2021-09-02 RX ORDER — CHOLESTYRAMINE 4 G/9G
1 POWDER, FOR SUSPENSION ORAL 2 TIMES DAILY WITH MEALS
Qty: 180 PACKET | Refills: 3 | Status: SHIPPED | OUTPATIENT
Start: 2021-09-02 | End: 2022-10-21

## 2021-09-05 ENCOUNTER — HEALTH MAINTENANCE LETTER (OUTPATIENT)
Age: 56
End: 2021-09-05

## 2021-09-22 ASSESSMENT — ENCOUNTER SYMPTOMS
NERVOUS/ANXIOUS: 0
DYSURIA: 0
ABDOMINAL PAIN: 0
CONSTIPATION: 0
SHORTNESS OF BREATH: 0
JOINT SWELLING: 0
ARTHRALGIAS: 0
CHILLS: 0
HEMATOCHEZIA: 0
COUGH: 0
HEADACHES: 0
FREQUENCY: 0
DIZZINESS: 0
SORE THROAT: 0
PARESTHESIAS: 0
WEAKNESS: 0
PALPITATIONS: 0
DIARRHEA: 1
HEARTBURN: 0
NAUSEA: 0
HEMATURIA: 0
MYALGIAS: 0
FEVER: 0
EYE PAIN: 0

## 2021-09-23 ENCOUNTER — OFFICE VISIT (OUTPATIENT)
Dept: PEDIATRICS | Facility: CLINIC | Age: 56
End: 2021-09-23
Payer: COMMERCIAL

## 2021-09-23 VITALS
WEIGHT: 239.4 LBS | TEMPERATURE: 97.2 F | HEART RATE: 86 BPM | SYSTOLIC BLOOD PRESSURE: 135 MMHG | DIASTOLIC BLOOD PRESSURE: 64 MMHG | BODY MASS INDEX: 30.72 KG/M2 | RESPIRATION RATE: 14 BRPM | HEIGHT: 74 IN

## 2021-09-23 DIAGNOSIS — Z13.220 SCREENING FOR HYPERLIPIDEMIA: ICD-10-CM

## 2021-09-23 DIAGNOSIS — I25.10 CORONARY ARTERIOSCLEROSIS IN NATIVE ARTERY: ICD-10-CM

## 2021-09-23 DIAGNOSIS — Z12.5 SCREENING FOR PROSTATE CANCER: ICD-10-CM

## 2021-09-23 DIAGNOSIS — Z00.00 ROUTINE GENERAL MEDICAL EXAMINATION AT A HEALTH CARE FACILITY: Primary | ICD-10-CM

## 2021-09-23 DIAGNOSIS — Z11.59 NEED FOR HEPATITIS C SCREENING TEST: ICD-10-CM

## 2021-09-23 DIAGNOSIS — E11.40 TYPE 2 DIABETES MELLITUS WITH SENSORY NEUROPATHY (H): ICD-10-CM

## 2021-09-23 DIAGNOSIS — G35 MS (MULTIPLE SCLEROSIS) (H): ICD-10-CM

## 2021-09-23 PROBLEM — F33.0 MAJOR DEPRESSIVE DISORDER, RECURRENT EPISODE, MILD (H): Status: RESOLVED | Noted: 2017-03-09 | Resolved: 2021-09-23

## 2021-09-23 LAB — HBA1C MFR BLD: 5.2 % (ref 0–5.6)

## 2021-09-23 PROCEDURE — 99396 PREV VISIT EST AGE 40-64: CPT | Mod: 25 | Performed by: INTERNAL MEDICINE

## 2021-09-23 PROCEDURE — 90682 RIV4 VACC RECOMBINANT DNA IM: CPT | Performed by: INTERNAL MEDICINE

## 2021-09-23 PROCEDURE — 36415 COLL VENOUS BLD VENIPUNCTURE: CPT | Performed by: INTERNAL MEDICINE

## 2021-09-23 PROCEDURE — 82043 UR ALBUMIN QUANTITATIVE: CPT | Performed by: INTERNAL MEDICINE

## 2021-09-23 PROCEDURE — 83036 HEMOGLOBIN GLYCOSYLATED A1C: CPT | Performed by: INTERNAL MEDICINE

## 2021-09-23 PROCEDURE — 99207 PR FOOT EXAM NO CHARGE: CPT | Mod: 25 | Performed by: INTERNAL MEDICINE

## 2021-09-23 PROCEDURE — 86803 HEPATITIS C AB TEST: CPT | Performed by: INTERNAL MEDICINE

## 2021-09-23 PROCEDURE — 80061 LIPID PANEL: CPT | Performed by: INTERNAL MEDICINE

## 2021-09-23 PROCEDURE — 80053 COMPREHEN METABOLIC PANEL: CPT | Performed by: INTERNAL MEDICINE

## 2021-09-23 PROCEDURE — 90471 IMMUNIZATION ADMIN: CPT | Performed by: INTERNAL MEDICINE

## 2021-09-23 RX ORDER — ATORVASTATIN CALCIUM 40 MG/1
40 TABLET, FILM COATED ORAL DAILY
Qty: 90 TABLET | Refills: 3 | Status: SHIPPED | OUTPATIENT
Start: 2021-09-23 | End: 2023-07-11

## 2021-09-23 ASSESSMENT — ENCOUNTER SYMPTOMS
JOINT SWELLING: 0
HEARTBURN: 0
MYALGIAS: 0
HEMATOCHEZIA: 0
DIARRHEA: 1
COUGH: 0
PALPITATIONS: 0
ABDOMINAL PAIN: 0
CONSTIPATION: 0
CHILLS: 0
DYSURIA: 0
HEMATURIA: 0
WEAKNESS: 0
NAUSEA: 0
SORE THROAT: 0
FEVER: 0
ARTHRALGIAS: 0
PARESTHESIAS: 0
SHORTNESS OF BREATH: 0
FREQUENCY: 0
EYE PAIN: 0
DIZZINESS: 0
HEADACHES: 0
NERVOUS/ANXIOUS: 0

## 2021-09-23 ASSESSMENT — MIFFLIN-ST. JEOR: SCORE: 1995.66

## 2021-09-23 NOTE — PROGRESS NOTES
SUBJECTIVE:   CC: Thomas Gonzales is an 55 year old male who presents for preventative health visit.       Patient has been advised of split billing requirements and indicates understanding: Yes  Healthy Habits:     Getting at least 3 servings of Calcium per day:  NO    Bi-annual eye exam:  Yes    Dental care twice a year:  Yes    Sleep apnea or symptoms of sleep apnea:  None    Diet:  Gluten-free/reduced and Other    Frequency of exercise:  4-5 days/week    Duration of exercise:  30-45 minutes    Taking medications regularly:  No    Medication side effects:  Not applicable    PHQ-2 Total Score: 0    Additional concerns today:  No      Has been doing overall well. Had a significant infection of R great toe following injury while remodeling his basement. Was on antibiotics, now doing quite well. No concerns at this time.    Has been following closely with GI for management of loose stools, recently started on cholestyramine. Doesn't seem to be working all that well.     Diabetes remains well controlled with diet and activity; not on any medications. Doesn't take ACE-I due to hx of hypotension with even low doses.         Today's PHQ-2 Score:   PHQ-2 ( 1999 Pfizer) 9/22/2021   Q1: Little interest or pleasure in doing things 0   Q2: Feeling down, depressed or hopeless 0   PHQ-2 Score 0   Q1: Little interest or pleasure in doing things Not at all   Q2: Feeling down, depressed or hopeless Not at all   PHQ-2 Score 0       Abuse: Current or Past(Physical, Sexual or Emotional)- No  Do you feel safe in your environment? Yes    Have you ever done Advance Care Planning? (For example, a Health Directive, POLST, or a discussion with a medical provider or your loved ones about your wishes): Yes, patient states has an Advance Care Planning document and will bring a copy to the clinic.    Social History     Tobacco Use     Smoking status: Never Smoker     Smokeless tobacco: Never Used   Substance Use Topics     Alcohol use: Yes      Comment: occ         Alcohol Use 9/22/2021   Prescreen: >3 drinks/day or >7 drinks/week? No   Prescreen: >3 drinks/day or >7 drinks/week? -       Last PSA: No results found for: PSA    Reviewed orders with patient. Reviewed health maintenance and updated orders accordingly - Yes  Patient Active Problem List   Diagnosis     Abnormal liver function tests     Celiac disease     Coronary arteriosclerosis in native artery     Type 2 diabetes mellitus with diabetic nephropathy (H)     Mild nonproliferative diabetic retinopathy (H)     Stricture of duodenum     Steatosis of liver     Mast cell disease     Microcytic anemia     Multiple-type hyperlipidemia     Body mass index (BMI) greater than 30 in adult     Vertigo     MS (multiple sclerosis) (H)     Diabetic polyneuropathy associated with type 2 diabetes mellitus (H)     Vitamin B12 deficiency (non anemic)     Microscopic colitis, unspecified microscopic colitis type     History of ischemic bowel disease     History of melanoma     History of abdominal abscess     Cellulitis of left lower extremity     Diabetic ulcer of toe of left foot associated with type 2 diabetes mellitus, unspecified ulcer stage (H)     Loose stools     Abdominal bloating     Full incontinence of feces     Past Surgical History:   Procedure Laterality Date     APPENDECTOMY  1/8/2016     CATARACT IOL, RT/LT       CHOLECYSTECTOMY  1/8/2016     COLONOSCOPY N/A 9/1/2021    Procedure: COLONOSCOPY, WITH POLYPECTOMY AND BIOPSY;  Surgeon: Kamran Rainey DO;  Location: [facility] OR     ESOPHAGOSCOPY, GASTROSCOPY, DUODENOSCOPY (EGD), COMBINED N/A 9/1/2021    Procedure: ESOPHAGOGASTRODUODENOSCOPY, WITH BIOPSY;  Surgeon: Kamran Rainey DO;  Location: [facility] OR     penile implant       LEON EN Y BOWEL  1/8/2016    for small bowel ischemia       Social History     Tobacco Use     Smoking status: Never Smoker     Smokeless tobacco: Never Used   Substance Use Topics     Alcohol use: Yes     Comment: occ     Family  "History   Problem Relation Age of Onset     Arrhythmia Mother         bradycardia- pacemaker     Coronary Artery Disease Father            Reviewed and updated as needed this visit by clinical staff  Tobacco  Allergies    Med Hx  Surg Hx  Fam Hx  Soc Hx        Reviewed and updated as needed this visit by Provider                    Review of Systems   Constitutional: Negative for chills and fever.   HENT: Negative for congestion, ear pain, hearing loss and sore throat.    Eyes: Negative for pain and visual disturbance.   Respiratory: Negative for cough and shortness of breath.    Cardiovascular: Negative for chest pain, palpitations and peripheral edema.   Gastrointestinal: Positive for diarrhea. Negative for abdominal pain, constipation, heartburn, hematochezia and nausea.   Genitourinary: Positive for impotence and urgency. Negative for discharge, dysuria, frequency, genital sores and hematuria.   Musculoskeletal: Negative for arthralgias, joint swelling and myalgias.   Skin: Negative for rash.   Neurological: Negative for dizziness, weakness, headaches and paresthesias.   Psychiatric/Behavioral: Negative for mood changes. The patient is not nervous/anxious.          OBJECTIVE:   BP (!) 148/62 (BP Location: Right arm, Patient Position: Sitting, Cuff Size: Adult Regular)   Pulse 86   Temp 97.2  F (36.2  C) (Tympanic)   Resp 14   Ht 1.88 m (6' 2\")   Wt 108.6 kg (239 lb 6.4 oz)   BMI 30.74 kg/m      Physical Exam  GENERAL: healthy, alert and no distress  EYES: Eyes grossly normal to inspection, PERRL and conjunctivae and sclerae normal  HENT: ear canals and TM's normal, nose and mouth without ulcers or lesions  NECK: no adenopathy, no asymmetry, masses, or scars and thyroid normal to palpation  RESP: lungs clear to auscultation - no rales, rhonchi or wheezes  CV: regular rate and rhythm, normal S1 S2, no S3 or S4, no murmur, click or rub, no peripheral edema and peripheral pulses strong  ABDOMEN: soft, " nontender, no hepatosplenomegaly, no masses and bowel sounds normal  MS: no gross musculoskeletal defects noted, no edema  SKIN: no suspicious lesions or rashes  NEURO: Normal strength and tone, mentation intact and speech normal  PSYCH: mentation appears normal, affect normal/bright  FOOT EXAM: no sensation on toes bilaterally, good pulses, healing lesion on R great toe. Small amount of athlete's foot in L toe web of 3rd and 4th foot.     Diagnostic Test Results:  Labs reviewed in Epic    ASSESSMENT/PLAN:   1. Routine general medical examination at a health care facility  Counseling as below. Flu shot today, will return for shingles vaccine.     2. Need for hepatitis C screening test  - Hepatitis C Screen Reflex to HCV RNA Quant and Genotype    3. Coronary arteriosclerosis in native artery  On statin and asa. Unable to tolerate beta blocker, ACE-I due to hx f labile BPs and hypotension.   - Lipid panel reflex to direct LDL Non-fasting; Future  - Comprehensive metabolic panel (BMP + Alb, Alk Phos, ALT, AST, Total. Bili, TP); Future  - atorvastatin (LIPITOR) 40 MG tablet; Take 1 tablet (40 mg) by mouth daily  Dispense: 90 tablet; Refill: 3  - Comprehensive metabolic panel (BMP + Alb, Alk Phos, ALT, AST, Total. Bili, TP)  - Lipid panel reflex to direct LDL Non-fasting    5. Type 2 diabetes mellitus with sensory neuropathy (H)  Previously well controlled off medications; due for lab work.   - Albumin Random Urine Quantitative with Creat Ratio; Future  - HEMOGLOBIN A1C; Future  - Comprehensive metabolic panel (BMP + Alb, Alk Phos, ALT, AST, Total. Bili, TP); Future  - Comprehensive metabolic panel (BMP + Alb, Alk Phos, ALT, AST, Total. Bili, TP)  - HEMOGLOBIN A1C  - Albumin Random Urine Quantitative with Creat Ratio    6. MS (multiple sclerosis) (H)  Follows with neurology. Not on medications, overall mild and associated with some fatigue.       COUNSELING:   Reviewed preventive health counseling, as reflected in  "patient instructions       Regular exercise       Healthy diet/nutrition       Vision screening       Hearing screening       Consider Hep C screening for all patients one time for ages 18-79 years       Colon cancer screening    Estimated body mass index is 30.74 kg/m  as calculated from the following:    Height as of this encounter: 1.88 m (6' 2\").    Weight as of this encounter: 108.6 kg (239 lb 6.4 oz).     Weight management plan: Discussed healthy diet and exercise guidelines    He reports that he has never smoked. He has never used smokeless tobacco.      Counseling Resources:  ATP IV Guidelines  Pooled Cohorts Equation Calculator  FRAX Risk Assessment  ICSI Preventive Guidelines  Dietary Guidelines for Americans, 2010  USDA's MyPlate  ASA Prophylaxis  Lung CA Screening    Anni Serrano MD  [facility]  "

## 2021-09-23 NOTE — PATIENT INSTRUCTIONS
Flu shot today. Labs today.     Follow-up in a year, sooner if needed.    Over the counter lotrimin to area between left 3rd and 4th toes.    Shingles vaccine over the winter.     Preventive Health Recommendations  Male Ages 50 - 64    Yearly exam:             See your health care provider every year in order to  o   Review health changes.   o   Discuss preventive care.    o   Review your medicines if your doctor has prescribed any.     Have a cholesterol test every 5 years, or more frequently if you are at risk for high cholesterol/heart disease.     Have a diabetes test (fasting glucose) every three years. If you are at risk for diabetes, you should have this test more often.     Have a colonoscopy at age 50, or have a yearly FIT test (stool test). These exams will check for colon cancer.      Talk with your health care provider about whether or not a prostate cancer screening test (PSA) is right for you.    You should be tested each year for STDs (sexually transmitted diseases), if you re at risk.     Shots: Get a flu shot each year. Get a tetanus shot every 10 years.     Nutrition:    Eat at least 5 servings of fruits and vegetables daily.     Eat whole-grain bread, whole-wheat pasta and brown rice instead of white grains and rice.     Get adequate Calcium and Vitamin D.     Lifestyle    Exercise for at least 150 minutes a week (30 minutes a day, 5 days a week). This will help you control your weight and prevent disease.     Limit alcohol to one drink per day.     No smoking.     Wear sunscreen to prevent skin cancer.     See your dentist every six months for an exam and cleaning.     See your eye doctor every 1 to 2 years.

## 2021-09-24 LAB
CHOLEST SERPL-MCNC: 88 MG/DL
CREAT UR-MCNC: 165 MG/DL
FASTING STATUS PATIENT QL REPORTED: YES
HCV AB SERPL QL IA: NONREACTIVE
HDLC SERPL-MCNC: 42 MG/DL
LDLC SERPL CALC-MCNC: 39 MG/DL
MICROALBUMIN UR-MCNC: 310 MG/L
MICROALBUMIN/CREAT UR: 187.88 MG/G CR (ref 0–17)
NONHDLC SERPL-MCNC: 46 MG/DL
TRIGL SERPL-MCNC: 37 MG/DL

## 2021-09-29 ENCOUNTER — OFFICE VISIT (OUTPATIENT)
Dept: ORTHOPEDICS | Facility: CLINIC | Age: 56
End: 2021-09-29
Payer: COMMERCIAL

## 2021-09-29 DIAGNOSIS — M21.42 PES PLANUS OF BOTH FEET: ICD-10-CM

## 2021-09-29 DIAGNOSIS — M21.41 PES PLANUS OF BOTH FEET: ICD-10-CM

## 2021-09-29 DIAGNOSIS — E11.49 TYPE II OR UNSPECIFIED TYPE DIABETES MELLITUS WITH NEUROLOGICAL MANIFESTATIONS, NOT STATED AS UNCONTROLLED(250.60) (H): Primary | ICD-10-CM

## 2021-09-29 PROCEDURE — 99213 OFFICE O/P EST LOW 20 MIN: CPT | Performed by: PODIATRIST

## 2021-09-29 NOTE — LETTER
9/29/2021         RE: Thomas Gonzales  [address] [place] [address]        Dear Colleague,    Thank you for referring your patient, Thomas Gonzales, to the [facility] [place]. Please see a copy of my visit note below.    Chief Complaint   Patient presents with     Follow Up     diabetic foot exam           HPI: Thomas is a 55 year old male who presents today for further evaluation of right foot wound with osteomyelitis. He no longer needs to cover the toe. No new LE complaints.     Review of Systems: No n/v/d/f/c/ns/sob/cp    PMH:   Past Medical History:   Diagnosis Date     CAD (coronary artery disease)     S/p stenting of left circumflex     Diabetes mellitus (H)      Multiple sclerosis (H)      Peripheral neuropathy        PSxH:   Past Surgical History:   Procedure Laterality Date     APPENDECTOMY  1/8/2016     CATARACT IOL, RT/LT       CHOLECYSTECTOMY  1/8/2016     COLONOSCOPY N/A 9/1/2021    Procedure: COLONOSCOPY, WITH POLYPECTOMY AND BIOPSY;  Surgeon: Kamran Rainey DO;  [place] [facility] [place]     ESOPHAGOSCOPY, GASTROSCOPY, DUODENOSCOPY (EGD), COMBINED N/A 9/1/2021    Procedure: ESOPHAGOGASTRODUODENOSCOPY, WITH BIOPSY;  Surgeon: Kamran Rainey DO;  [place] [facility] [place]     penile implant       LEON EN Y BOWEL  1/8/2016    for small bowel ischemia       Allergies: Shellfish-derived products, Adhesive tape, Fructose, Gluten meal, Lactose, and Reglan [metoclopramide]    SH:   Social History     Socioeconomic History     Marital status:      Spouse name: Not on file     Number of children: Not on file     Years of education: Not on file     Highest education level: Not on file   Occupational History     Not on file   Tobacco Use     Smoking status: Never Smoker     Smokeless tobacco: Never Used   Substance and Sexual Activity     Alcohol use: Yes     Comment: occ     Drug use: No     Sexual activity: Yes     Partners: Female   Other Topics Concern      Parent/sibling w/ CABG, MI or angioplasty before 65F 55M? Not Asked   Social History Narrative     Not on file     Social Determinants of Health     Financial Resource Strain:      Difficulty of Paying Living Expenses:    Food Insecurity:      Worried About Running Out of Food in the Last Year:      Ran Out of Food in the Last Year:    Transportation Needs:      Lack of Transportation (Medical):      Lack of Transportation (Non-Medical):    Physical Activity:      Days of Exercise per Week:      Minutes of Exercise per Session:    Stress:      Feeling of Stress :    Social Connections:      Frequency of Communication with Friends and Family:      Frequency of Social Gatherings with Friends and Family:      Attends [employer] Services:      Active Member of Clubs or Organizations:      Attends Club or Organization Meetings:      Marital Status:    Intimate Partner Violence:      Fear of Current or Ex-Partner:      Emotionally Abused:      Physically Abused:      Sexually Abused:        FH:   Family History   Problem Relation Age of Onset     Arrhythmia Mother         bradycardia- pacemaker     Coronary Artery Disease Father        Objective:  Data Unavailable Data Unavailable Data Unavailable Data Unavailable Data Unavailable 0 lbs 0 oz  CRP Inflammation   Date Value Ref Range Status   02/23/2021 <2.9 0.0 - 8.0 mg/L Final     Lab Results   Component Value Date    WBC 4.2 01/20/2021     Lab Results   Component Value Date    RBC 4.03 01/20/2021     Lab Results   Component Value Date    HGB 11.2 01/20/2021     Lab Results   Component Value Date    HCT 32.8 01/20/2021     No components found for: MCT  Lab Results   Component Value Date    MCV 81 01/20/2021     Lab Results   Component Value Date    MCH 27.8 01/20/2021     Lab Results   Component Value Date    MCHC 34.1 01/20/2021     Lab Results   Component Value Date    RDW 12.9 01/20/2021     Lab Results   Component Value Date     01/20/2021     Last Comprehensive  Metabolic Panel:  Sodium   Date Value Ref Range Status   09/23/2021 147 (H) 133 - 144 mmol/L Final   03/06/2021 143 133 - 144 mmol/L Final     Potassium   Date Value Ref Range Status   09/23/2021 4.3 3.4 - 5.3 mmol/L Final   03/06/2021 4.8 3.4 - 5.3 mmol/L Final     Chloride   Date Value Ref Range Status   09/23/2021 118 (H) 94 - 109 mmol/L Final   03/06/2021 112 (H) 94 - 109 mmol/L Final     Carbon Dioxide   Date Value Ref Range Status   03/06/2021 26 20 - 32 mmol/L Final     Carbon Dioxide (CO2)   Date Value Ref Range Status   09/23/2021 22 20 - 32 mmol/L Final     Anion Gap   Date Value Ref Range Status   09/23/2021 7 3 - 14 mmol/L Final   03/06/2021 5 3 - 14 mmol/L Final     Glucose   Date Value Ref Range Status   09/23/2021 163 (H) 70 - 99 mg/dL Final   03/06/2021 146 (H) 70 - 99 mg/dL Final     Comment:     Non Fasting     Urea Nitrogen   Date Value Ref Range Status   09/23/2021 23 7 - 30 mg/dL Final   03/06/2021 28 7 - 30 mg/dL Final     Creatinine   Date Value Ref Range Status   09/23/2021 1.11 0.66 - 1.25 mg/dL Final   03/06/2021 1.12 0.66 - 1.25 mg/dL Final     GFR Estimate   Date Value Ref Range Status   09/23/2021 75 >60 mL/min/1.73m2 Final     Comment:     As of July 11, 2021, eGFR is calculated by the CKD-EPI creatinine equation, without race adjustment. eGFR can be influenced by muscle mass, exercise, and diet. The reported eGFR is an estimation only and is only applicable if the renal function is stable.   03/06/2021 74 >60 mL/min/[1.73_m2] Final     Comment:     Non  GFR Calc  Starting 12/18/2018, serum creatinine based estimated GFR (eGFR) will be   calculated using the Chronic Kidney Disease Epidemiology Collaboration   (CKD-EPI) equation.       Calcium   Date Value Ref Range Status   09/23/2021 8.0 (L) 8.5 - 10.1 mg/dL Final   03/06/2021 8.7 8.5 - 10.1 mg/dL Final     Hemoglobin A1C   Date Value Ref Range Status   09/23/2021 5.2 0.0 - 5.6 % Final     Comment:     Normal <5.7%    Prediabetes 5.7-6.4%    Diabetes 6.5% or higher     Note: Adopted from ADA consensus guidelines.   01/17/2021 5.7 (H) 0 - 5.6 % Final     Comment:     Normal <5.7% Prediabetes 5.7-6.4%  Diabetes 6.5% or higher - adopted from ADA   consensus guidelines.     06/22/2020 5.5 0 - 5.6 % Final     Comment:     Normal <5.7% Prediabetes 5.7-6.4%  Diabetes 6.5% or higher - adopted from ADA   consensus guidelines.     02/01/2019 5.4 0 - 5.6 % Final     Comment:     Normal <5.7% Prediabetes 5.7-6.4%  Diabetes 6.5% or higher - adopted from ADA   consensus guidelines.           PT pulses are not palpable 2/2 pitting edema Faint sound heard with doppler. DP pulses are 2/4 bilaterally. CRT is instant. Positive pedal hair.   Gross sensation is diminished bilaterally. Protective sensation is intact as demonstrated with a 5.07G monofilament.  Equinus is noted bilaterally. No pain with active or passive ROM of the ankle, MTJ, 1st ray, or halluces bilaterally,. Hallux malleus noted to the right.   A small tyloma is noted to the distal right hallux has resolved.       Assessment: Pradip is a 54 YO with diabetes and neuropathy. He had an ulceration on the right distal hallux. This has healed.       Plan:  - Pt seen and evaluated.  - Cont diabetic shoes.   - Watch the area to make sure it does not open again  - See again in 12 weeks.          Again, thank you for allowing me to participate in the care of your patient.        Sincerely,        Norm West DPM

## 2021-09-29 NOTE — PROGRESS NOTES
Chief Complaint   Patient presents with     Follow Up     diabetic foot exam           HPI: Thomas is a 55 year old male who presents today for further evaluation of right foot wound with osteomyelitis. He no longer needs to cover the toe. No new LE complaints.     Review of Systems: No n/v/d/f/c/ns/sob/cp    PMH:   Past Medical History:   Diagnosis Date     CAD (coronary artery disease)     S/p stenting of left circumflex     Diabetes mellitus (H)      Multiple sclerosis (H)      Peripheral neuropathy        PSxH:   Past Surgical History:   Procedure Laterality Date     APPENDECTOMY  1/8/2016     CATARACT IOL, RT/LT       CHOLECYSTECTOMY  1/8/2016     COLONOSCOPY N/A 9/1/2021    Procedure: COLONOSCOPY, WITH POLYPECTOMY AND BIOPSY;  Surgeon: Kamran Rainey DO;  [place] [facility] [place]     ESOPHAGOSCOPY, GASTROSCOPY, DUODENOSCOPY (EGD), COMBINED N/A 9/1/2021    Procedure: ESOPHAGOGASTRODUODENOSCOPY, WITH BIOPSY;  Surgeon: Kamran Rainey DO;  [place] [facility] [place]     penile implant       LEON EN Y BOWEL  1/8/2016    for small bowel ischemia       Allergies: Shellfish-derived products, Adhesive tape, Fructose, Gluten meal, Lactose, and Reglan [metoclopramide]    SH:   Social History     Socioeconomic History     Marital status:      Spouse name: Not on file     Number of children: Not on file     Years of education: Not on file     Highest education level: Not on file   Occupational History     Not on file   Tobacco Use     Smoking status: Never Smoker     Smokeless tobacco: Never Used   Substance and Sexual Activity     Alcohol use: Yes     Comment: occ     Drug use: No     Sexual activity: Yes     Partners: Female   Other Topics Concern     Parent/sibling w/ CABG, MI or angioplasty before 65F 55M? Not Asked   Social History Narrative     Not on file     Social Determinants of Health     Financial Resource Strain:      Difficulty of Paying Living Expenses:    Food Insecurity:      Worried About Running Out of Food in  the Last Year:      Ran Out of Food in the Last Year:    Transportation Needs:      Lack of Transportation (Medical):      Lack of Transportation (Non-Medical):    Physical Activity:      Days of Exercise per Week:      Minutes of Exercise per Session:    Stress:      Feeling of Stress :    Social Connections:      Frequency of Communication with Friends and Family:      Frequency of Social Gatherings with Friends and Family:      Attends Ten[employer] Services:      Active Member of Clubs or Organizations:      Attends Club or Organization Meetings:      Marital Status:    Intimate Partner Violence:      Fear of Current or Ex-Partner:      Emotionally Abused:      Physically Abused:      Sexually Abused:        FH:   Family History   Problem Relation Age of Onset     Arrhythmia Mother         bradycardia- pacemaker     Coronary Artery Disease Father        Objective:  Data Unavailable Data Unavailable Data Unavailable Data Unavailable Data Unavailable 0 lbs 0 oz  CRP Inflammation   Date Value Ref Range Status   02/23/2021 <2.9 0.0 - 8.0 mg/L Final     Lab Results   Component Value Date    WBC 4.2 01/20/2021     Lab Results   Component Value Date    RBC 4.03 01/20/2021     Lab Results   Component Value Date    HGB 11.2 01/20/2021     Lab Results   Component Value Date    HCT 32.8 01/20/2021     No components found for: MCT  Lab Results   Component Value Date    MCV 81 01/20/2021     Lab Results   Component Value Date    MCH 27.8 01/20/2021     Lab Results   Component Value Date    MCHC 34.1 01/20/2021     Lab Results   Component Value Date    RDW 12.9 01/20/2021     Lab Results   Component Value Date     01/20/2021     Last Comprehensive Metabolic Panel:  Sodium   Date Value Ref Range Status   09/23/2021 147 (H) 133 - 144 mmol/L Final   03/06/2021 143 133 - 144 mmol/L Final     Potassium   Date Value Ref Range Status   09/23/2021 4.3 3.4 - 5.3 mmol/L Final   03/06/2021 4.8 3.4 - 5.3 mmol/L Final     Chloride    Date Value Ref Range Status   09/23/2021 118 (H) 94 - 109 mmol/L Final   03/06/2021 112 (H) 94 - 109 mmol/L Final     Carbon Dioxide   Date Value Ref Range Status   03/06/2021 26 20 - 32 mmol/L Final     Carbon Dioxide (CO2)   Date Value Ref Range Status   09/23/2021 22 20 - 32 mmol/L Final     Anion Gap   Date Value Ref Range Status   09/23/2021 7 3 - 14 mmol/L Final   03/06/2021 5 3 - 14 mmol/L Final     Glucose   Date Value Ref Range Status   09/23/2021 163 (H) 70 - 99 mg/dL Final   03/06/2021 146 (H) 70 - 99 mg/dL Final     Comment:     Non Fasting     Urea Nitrogen   Date Value Ref Range Status   09/23/2021 23 7 - 30 mg/dL Final   03/06/2021 28 7 - 30 mg/dL Final     Creatinine   Date Value Ref Range Status   09/23/2021 1.11 0.66 - 1.25 mg/dL Final   03/06/2021 1.12 0.66 - 1.25 mg/dL Final     GFR Estimate   Date Value Ref Range Status   09/23/2021 75 >60 mL/min/1.73m2 Final     Comment:     As of July 11, 2021, eGFR is calculated by the CKD-EPI creatinine equation, without race adjustment. eGFR can be influenced by muscle mass, exercise, and diet. The reported eGFR is an estimation only and is only applicable if the renal function is stable.   03/06/2021 74 >60 mL/min/[1.73_m2] Final     Comment:     Non  GFR Calc  Starting 12/18/2018, serum creatinine based estimated GFR (eGFR) will be   calculated using the Chronic Kidney Disease Epidemiology Collaboration   (CKD-EPI) equation.       Calcium   Date Value Ref Range Status   09/23/2021 8.0 (L) 8.5 - 10.1 mg/dL Final   03/06/2021 8.7 8.5 - 10.1 mg/dL Final     Hemoglobin A1C   Date Value Ref Range Status   09/23/2021 5.2 0.0 - 5.6 % Final     Comment:     Normal <5.7%   Prediabetes 5.7-6.4%    Diabetes 6.5% or higher     Note: Adopted from ADA consensus guidelines.   01/17/2021 5.7 (H) 0 - 5.6 % Final     Comment:     Normal <5.7% Prediabetes 5.7-6.4%  Diabetes 6.5% or higher - adopted from ADA   consensus guidelines.     06/22/2020 5.5 0 -  5.6 % Final     Comment:     Normal <5.7% Prediabetes 5.7-6.4%  Diabetes 6.5% or higher - adopted from ADA   consensus guidelines.     02/01/2019 5.4 0 - 5.6 % Final     Comment:     Normal <5.7% Prediabetes 5.7-6.4%  Diabetes 6.5% or higher - adopted from ADA   consensus guidelines.           PT pulses are not palpable 2/2 pitting edema Faint sound heard with doppler. DP pulses are 2/4 bilaterally. CRT is instant. Positive pedal hair.   Gross sensation is diminished bilaterally. Protective sensation is intact as demonstrated with a 5.07G monofilament.  Equinus is noted bilaterally. No pain with active or passive ROM of the ankle, MTJ, 1st ray, or halluces bilaterally,. Hallux malleus noted to the right.   A small tyloma is noted to the distal right hallux has resolved.       Assessment: Pradip is a 56 YO with diabetes and neuropathy. He had an ulceration on the right distal hallux. This has healed.       Plan:  - Pt seen and evaluated.  - Cont diabetic shoes.   - Watch the area to make sure it does not open again  - See again in 12 weeks.

## 2021-10-07 LAB
ALBUMIN SERPL-MCNC: 3.3 G/DL (ref 3.4–5)
ALP SERPL-CCNC: 103 U/L (ref 40–150)
ALT SERPL W P-5'-P-CCNC: 60 U/L (ref 0–70)
ANION GAP SERPL CALCULATED.3IONS-SCNC: 7 MMOL/L (ref 3–14)
AST SERPL W P-5'-P-CCNC: 50 U/L (ref 0–45)
BILIRUB SERPL-MCNC: 0.6 MG/DL (ref 0.2–1.3)
BUN SERPL-MCNC: 23 MG/DL (ref 7–30)
CALCIUM SERPL-MCNC: 9.1 MG/DL (ref 8.5–10.1)
CHLORIDE BLD-SCNC: 118 MMOL/L (ref 94–109)
CO2 SERPL-SCNC: 22 MMOL/L (ref 20–32)
CREAT SERPL-MCNC: 1.11 MG/DL (ref 0.66–1.25)
GFR SERPL CREATININE-BSD FRML MDRD: 75 ML/MIN/1.73M2
GLUCOSE BLD-MCNC: 163 MG/DL (ref 70–99)
POTASSIUM BLD-SCNC: 4.3 MMOL/L (ref 3.4–5.3)
PROT SERPL-MCNC: 6.4 G/DL (ref 6.8–8.8)
SODIUM SERPL-SCNC: 147 MMOL/L (ref 133–144)

## 2021-10-13 DIAGNOSIS — I25.10 CORONARY ARTERIOSCLEROSIS IN NATIVE ARTERY: ICD-10-CM

## 2021-10-13 RX ORDER — ASPIRIN 81 MG/1
TABLET, COATED ORAL
Qty: 90 TABLET | Refills: 2 | Status: SHIPPED | OUTPATIENT
Start: 2021-10-13 | End: 2023-07-11

## 2021-10-13 NOTE — TELEPHONE ENCOUNTER
Prescription approved per [facility] Refill Protocol.  Benita Gaines RN

## 2021-11-16 ENCOUNTER — OFFICE VISIT (OUTPATIENT)
Dept: ORTHOPEDICS | Facility: CLINIC | Age: 56
End: 2021-11-16
Payer: COMMERCIAL

## 2021-11-16 ENCOUNTER — ANCILLARY PROCEDURE (OUTPATIENT)
Dept: GENERAL RADIOLOGY | Facility: CLINIC | Age: 56
End: 2021-11-16
Attending: PODIATRIST
Payer: COMMERCIAL

## 2021-11-16 DIAGNOSIS — E11.49 TYPE II OR UNSPECIFIED TYPE DIABETES MELLITUS WITH NEUROLOGICAL MANIFESTATIONS, NOT STATED AS UNCONTROLLED(250.60) (H): Primary | ICD-10-CM

## 2021-11-16 DIAGNOSIS — L03.116 CELLULITIS OF LEFT FOOT: ICD-10-CM

## 2021-11-16 DIAGNOSIS — E11.49 TYPE II OR UNSPECIFIED TYPE DIABETES MELLITUS WITH NEUROLOGICAL MANIFESTATIONS, NOT STATED AS UNCONTROLLED(250.60) (H): ICD-10-CM

## 2021-11-16 PROCEDURE — 99214 OFFICE O/P EST MOD 30 MIN: CPT | Performed by: PODIATRIST

## 2021-11-16 PROCEDURE — 73630 X-RAY EXAM OF FOOT: CPT | Mod: LT | Performed by: RADIOLOGY

## 2021-11-16 RX ORDER — LEVOFLOXACIN 500 MG/1
500 TABLET, FILM COATED ORAL DAILY
Qty: 5 TABLET | Refills: 0 | Status: SHIPPED | OUTPATIENT
Start: 2021-11-16 | End: 2022-10-21

## 2021-11-16 NOTE — LETTER
11/16/2021         RE: Thomas Gonzales  [address] [place] [address]        Dear Colleague,    Thank you for referring your patient, Thomas Gonzales, to the [facility] [place]. Please see a copy of my visit note below.    Chief Complaint:   Chief Complaint   Patient presents with     Follow Up     Left foot concerns.           Allergies   Allergen Reactions     Shellfish-Derived Products Anaphylaxis     Adhesive Tape      Paper tape is okay  Tegarderm is okay     Fructose      Gluten Meal      Celiac     Lactose      Reglan [Metoclopramide] Hives         Subjective: Thomas is a 56 year old male who presents to the clinic today for a follow up of cellulitis of the left foot.  He relates that this started a couple of days ago.  He relates last night, it was very painful for him, and he could not sleep.  He does not note any trauma to the area.    Objective  Data Unavailable Data Unavailable Data Unavailable Data Unavailable Data Unavailable 0 lbs 0 oz  The left foot has some edema, calor, and erythema noted to the dorsal aspect of the second, third, and fourth metatarsals.  This also extends to the plantar surface of the met heads.  With closer inspection, he does have skin fissuring noted to the third interspace.  This does not go deep.  This area is significantly painful for him.  DP and PT pulses are 2/4.  Capillary fill time is 1 second.  Equinus is noted bilaterally.    left foot xrays indicated in 3 weightbearing views.    No fractures. Normal alignment. No soft tissue abnormality      Assessment: Pradip is a 56-year-old type II diabetic with cellulitis of the left foot.  There is skin fissuring noted. This was likely the entry point for the cellulitis. Recommend 5 days of Levaquin. Recommend that he keep the area covered with alginate and change daily.     Plan:   - Pt seen and evaluated  - Cover the wound as described.  - XRs discussed with him.   - Pt to  return to clinic in 1 week.  I spent 30 minutes today with patient care, documentation, image review.     Again, thank you for allowing me to participate in the care of your patient.        Sincerely,        Norm West DPM

## 2021-11-16 NOTE — PROGRESS NOTES
Chief Complaint:   Chief Complaint   Patient presents with     Follow Up     Left foot concerns.           Allergies   Allergen Reactions     Shellfish-Derived Products Anaphylaxis     Adhesive Tape      Paper tape is okay  Tegarderm is okay     Fructose      Gluten Meal      Celiac     Lactose      Reglan [Metoclopramide] Hives         Subjective: Thomas is a 56 year old male who presents to the clinic today for a follow up of cellulitis of the left foot.  He relates that this started a couple of days ago.  He relates last night, it was very painful for him, and he could not sleep.  He does not note any trauma to the area.    Objective  Data Unavailable Data Unavailable Data Unavailable Data Unavailable Data Unavailable 0 lbs 0 oz  The left foot has some edema, calor, and erythema noted to the dorsal aspect of the second, third, and fourth metatarsals.  This also extends to the plantar surface of the met heads.  With closer inspection, he does have skin fissuring noted to the third interspace.  This does not go deep.  This area is significantly painful for him.  DP and PT pulses are 2/4.  Capillary fill time is 1 second.  Equinus is noted bilaterally.    left foot xrays indicated in 3 weightbearing views.    No fractures. Normal alignment. No soft tissue abnormality      Assessment: Pradip is a 56-year-old type II diabetic with cellulitis of the left foot.  There is skin fissuring noted. This was likely the entry point for the cellulitis. Recommend 5 days of Levaquin. Recommend that he keep the area covered with alginate and change daily.     Plan:   - Pt seen and evaluated  - Cover the wound as described.  - XRs discussed with him.   - Pt to return to clinic in 1 week.  I spent 30 minutes today with patient care, documentation, image review.

## 2021-11-23 ENCOUNTER — VIRTUAL VISIT (OUTPATIENT)
Dept: GASTROENTEROLOGY | Facility: CLINIC | Age: 56
End: 2021-11-23
Payer: COMMERCIAL

## 2021-11-23 ENCOUNTER — OFFICE VISIT (OUTPATIENT)
Dept: ORTHOPEDICS | Facility: CLINIC | Age: 56
End: 2021-11-23
Payer: COMMERCIAL

## 2021-11-23 DIAGNOSIS — L03.116 CELLULITIS OF LEFT FOOT: ICD-10-CM

## 2021-11-23 DIAGNOSIS — E11.49 TYPE II OR UNSPECIFIED TYPE DIABETES MELLITUS WITH NEUROLOGICAL MANIFESTATIONS, NOT STATED AS UNCONTROLLED(250.60) (H): Primary | ICD-10-CM

## 2021-11-23 DIAGNOSIS — R19.5 LOOSE STOOLS: Primary | ICD-10-CM

## 2021-11-23 DIAGNOSIS — Z87.19 HISTORY OF ISCHEMIC BOWEL DISEASE: ICD-10-CM

## 2021-11-23 DIAGNOSIS — K90.0 CELIAC DISEASE: ICD-10-CM

## 2021-11-23 DIAGNOSIS — I25.10 CORONARY ARTERIOSCLEROSIS IN NATIVE ARTERY: ICD-10-CM

## 2021-11-23 PROCEDURE — 99215 OFFICE O/P EST HI 40 MIN: CPT | Mod: 95 | Performed by: INTERNAL MEDICINE

## 2021-11-23 PROCEDURE — 99213 OFFICE O/P EST LOW 20 MIN: CPT | Performed by: PODIATRIST

## 2021-11-23 RX ORDER — LEVOFLOXACIN 500 MG/1
500 TABLET, FILM COATED ORAL DAILY
Qty: 3 TABLET | Refills: 0 | Status: SHIPPED | OUTPATIENT
Start: 2021-11-23 | End: 2022-10-21

## 2021-11-23 NOTE — NURSING NOTE
Reason For Visit:   Chief Complaint   Patient presents with     RECHECK     1 week follow up. Diabetic, cellulitis of the left foot.       Pain Assessment  Patient Currently in Pain: Denies        Allergies   Allergen Reactions     Shellfish-Derived Products Anaphylaxis     Adhesive Tape      Paper tape is okay  Tegarderm is okay     Fructose      Gluten Meal      Celiac     Lactose      Reglan [Metoclopramide] Janneth North LPN

## 2021-11-23 NOTE — PROGRESS NOTES
Lou is a 56 year old who is being evaluated via a billable video visit.      How would you like to obtain your AVS? MyChart  If the video visit is dropped, the invitation should be resent by: Send to e-mail at: [email or web address]  Will anyone else be joining your video visit? No      Video Start Time: 6:59 AM  Video-Visit Details    Type of service:  Video Visit    Video End Time:729    Originating Location (pt. Location): Home    Distant Location (provider location):  [facility] [place]     Platform used for Video Visit: Located within [facility] GASTROENTEROLOGY   Thomas Gonzales 1965   MRN: [id number]     Reason for Visit:  IBS    Referred by: Self     History of Present Illness:   Thomas Gonzales is a 55 year old male with MS, DM II (diet controlled), CAD, celiac disease, hx of microscopic colitis, s/p Castro-en-Y for mesenteric/bowel ischemia (2016) c/b fistula managed conservatively, hx of malignant melanoma on limb (s/p excision), s/p cholecystectomy and appendectomy, liver steatosis and abnormal LFTs who is presenting as a new patient in consultation at the request of Self with a chief complaint of diarrhea and bloating.    11/23/21  Cholestyramine BID no change in diarrhea. Has bowel movements that vary 3-4 times a day. Loose water stools. Nocturnal symptoms of stool. 4-5 hours after a meal he has these symptoms regardless of whatever he has eaten. Nothing has worked in the past. These symptoms have been ongoing since his abdominal surgery. Has used kaopectate with some benefit. Currently using this 4-5 times a day. When he was in the hospital for infection of his toe he was on antibiotics and had significant improvement in his stool. He was on prolonged course of antibiotics- bactrim and cipro. He would have one bowel movement a day with these antibiotics.    Has not tried lomitil, tincture of opium,  rifaximin.      ---------------------------------------------------------------  8/10/21  Thomas Gonzales states he has had loose bowels/diarrhea and bloating.    Onset: Since surgeries at [facility] in 2016; emergency Castro-en-Y for intestinal ischemia. Complicated by fistula and recurrent septic shock s/p multiple TATYANA tubes and conservative management. All healed up as of right now. Prior to surgery, did not have bowel symptoms and had regular BMs.  Frequency: 1-2x per day  Early: 5-6  Color: yellow/brown  Melena/hematochezia: denies  + Urgency, + stool incontinence. Does have nocturnal diarrhea. These are his most bothersome symptoms - Checking back in to see if there are anything else he could try and to consolidate care.    Has been seen by [facility] (2017) and [facility] (his favorite doctor there retired ~1.5 years ago so he decided to move to one Care team) for follow-up of these issues. Has been tested for SIBO, negative multiple times. Has been tested for VIP, gastinoma, etc. At [facility] with negative workup 2017. Since last seeing his [facility] doctor ~1.5 years ago and went on FODMAP.     He has hx of celiac disease and is on strict gluten free diet. Was recommended FODMAP diet which he has been following for 1.5 years with some success.    No OTC outside of rare tylenol (2-3x/month). Medicines include ASA 81 mg, statin, vitamins, and probiotic.    Wt: 229 lbs which is stable    He was started on antibiotics for an infection of his toe early 2021 and he then had constipation for the following month to month and a half.     Last colonoscopy was 1/19/2017. Last EGD: unknown    --------------------------------------------------------------------------------------  ROS    + abdominal distension/bloating (assoicated with eating and 3 hours)    Denies dysphagia, odynophagia, heartburn/reflux, nausea, vomiting, early satiety, abdominal pain, hematochezia, or melena. No unintentional weight loss. No fevers or night sweats.  "No chest pain or pressure.      Family or personal history of IBD or colon cancer: none      STUDIES & PROCEDURES:    EGD:   Date: 9/1/21  Impression:  Findings:        The examined esophagus was normal.        The Z-line was regular and was found 41 cm from the incisors.        The entire examined stomach was normal. Biopsies were taken with a cold        forceps for histology. Verification of patient identification for the        specimen was done. Estimated blood loss: none. Surgical anatom noted.        Possible bilroth anatomy vs cande-y anatomy. Surgical staples noted.        The examined jejunum was normal. This was biopsied with a cold        large-capacity forceps for histology. Verification of patient        identification for the specimen was done. Estimated blood loss: none.                                                                                     Impression:          - Normal esophagus.                        - Z-line regular, 41 cm from the incisors.                        - Normal stomach. Biopsied.                        - Normal examined jejunum. Biopsied.   Pathology Report:  A. SMALL INTESTINE DESIGNATED \"DUODENUM\", BIOPSY:  -Small intestinal mucosa with preserved villous architecture and no significant histologic abnormality  -Negative for celiac sprue     B. GASTRIC BIOPSY:  -Gastric body mucosa with no significant histologic abnormality  -Negative for intestinal metaplasia and dysplasia  -No H. pylori-like organisms identified on routine stain    C. ILEUM BIOPSY:  -Small intestinal mucosa with no significant histologic abnormality  -Negative for active inflammation, granuloma formation, pseudopyloric metaplasia and dysplasia   Colonoscopy:  Date 9/1/21  Findings:        The perianal and digital rectal examinations were normal.        The terminal ileum appeared normal. Biopsies were taken with a cold        forceps for histology. Verification of patient identification for the       "  specimen was done. Estimated blood loss: none.        The colon (entire examined portion) appeared normal. Biopsies for        histology were taken with a cold forceps from the right colon and left        colon for evaluation of microscopic colitis. Verification of patient        identification for the specimen was done. Estimated blood loss: none.        A 2 mm polyp was found in the rectum. The polyp was sessile. The polyp        was removed with a cold biopsy forceps. Resection and retrieval were        complete. Verification of patient identification for the specimen was        done. Estimated blood loss: none.        Non-bleeding internal hemorrhoids were found during retroflexion. The        hemorrhoids were small.        Scattered small-mouthed diverticula were found in the sigmoid colon and        ascending colon.                                                                                     Impression:          - The examined portion of the ileum was normal. Biopsied.                        - The entire examined colon is normal. Biopsied.                        - One 2 mm polyp in the rectum, removed with a cold                        biopsy forceps. Resected and retrieved.                        - Non-bleeding internal hemorrhoids.                        - Diverticulosis in the sigmoid colon and in the                        ascending colon.   Path  C. ILEUM BIOPSY:  -Small intestinal mucosa with no significant histologic abnormality  -Negative for active inflammation, granuloma formation, pseudopyloric metaplasia and dysplasia    D. RIGHT COLON BIOPSY:  -Colonic mucosa with no significant histologic abnormality  -Negative for active inflammation and lymphocytic colitis    E. LEFT COLON BIOPSY:  -Colonic mucosa with no significant histologic abnormality  -Negative for active inflammation and lymphocytic colitis    F. RECTAL POLYP, POLYPECTOMY:  -Hyperplastic polyp, negative for  dysplasia    colonoscopy  Date: 1/19/2017  Impression:  - preparation of the colon was inadequate.   - The entire examine colon is normal where seen. Biopsied.   - The distal rectum and anal verge are normal on retroflexion view.   Pathology Report: cannot locate    CT: none recently    Prior medical records were reviewed including, but not limited to, notes from referring providers, lab work, radiographic tests, and other diagnostic tests. Pertinent results were summarized above.     History     Past Medical History:   Diagnosis Date     CAD (coronary artery disease)     S/p stenting of left circumflex     Diabetes mellitus (H)      Multiple sclerosis (H)      Peripheral neuropathy      Past Surgical History:   Procedure Laterality Date     APPENDECTOMY  1/8/2016     CATARACT IOL, RT/LT       CHOLECYSTECTOMY  1/8/2016     COLONOSCOPY N/A 9/1/2021    Procedure: COLONOSCOPY, WITH POLYPECTOMY AND BIOPSY;  Surgeon: Kamran Rainey DO;  [place] [facility]     ESOPHAGOSCOPY, GASTROSCOPY, DUODENOSCOPY (EGD), COMBINED N/A 9/1/2021    Procedure: ESOPHAGOGASTRODUODENOSCOPY, WITH BIOPSY;  Surgeon: Kamran Rainey DO;  Location: [facility]     penile implant       LEON EN Y BOWEL  1/8/2016    for small bowel ischemia     Social History     Socioeconomic History     Marital status:      Spouse name: Not on file     Number of children: Not on file     Years of education: Not on file     Highest education level: Not on file   Occupational History     Not on file   Tobacco Use     Smoking status: Never Smoker     Smokeless tobacco: Never Used   Substance and Sexual Activity     Alcohol use: Yes     Comment: occ     Drug use: No     Sexual activity: Yes     Partners: Female   Other Topics Concern     Parent/sibling w/ CABG, MI or angioplasty before 65F 55M? Not Asked   Social History Narrative     Not on file     Social Determinants of Health     Financial Resource Strain: Not on file   Food Insecurity: Not on file    Transportation Needs: Not on file   Physical Activity: Not on file   Stress: Not on file   Social Connections: Not on file   Intimate Partner Violence: Not on file   Housing Stability: Not on file       Family History   Problem Relation Age of Onset     Arrhythmia Mother         bradycardia- pacemaker     Coronary Artery Disease Father        Medications and Allergies:     Outpatient Encounter Medications as of 11/23/2021   Medication Sig Dispense Refill     ASPIRIN LOW DOSE 81 MG EC tablet Take 1 tablet by mouth daily. 90 tablet 2     atorvastatin (LIPITOR) 40 MG tablet Take 1 tablet (40 mg) by mouth daily 90 tablet 3     bismuth subsalicylate 262 MG TABS Take 4-5 tablets by mouth daily        Cholecalciferol (VITAMIN D) 2000 UNITS CAPS Take 2,000 Units by mouth daily        cholestyramine (QUESTRAN) 4 g packet Take 1 packet (4 g) by mouth 2 times daily (with meals) 180 packet 3     multivitamin, therapeutic with minerals (MULTI-VITAMIN) TABS Take 1 tablet by mouth daily       order for DME Equipment being ordered: custom orthotic inserts for shoes 1 each 1     Probiotic Product (PROBIOTIC DAILY) CAPS Take 1 capsule by mouth daily        levofloxacin (LEVAQUIN) 500 MG tablet Take 1 tablet (500 mg) by mouth daily (Patient not taking: Reported on 11/23/2021) 5 tablet 0     No facility-administered encounter medications on file as of 11/23/2021.        Allergies   Allergen Reactions     Shellfish-Derived Products Anaphylaxis     Adhesive Tape      Paper tape is okay  Tegarderm is okay     Fructose      Gluten Meal      Celiac     Lactose      Reglan [Metoclopramide] Hives       Objective Findings:   Physical Exam:        Wt Readings from Last 5 Encounters:   09/23/21 108.6 kg (239 lb 6.4 oz)   09/01/21 102.1 kg (225 lb)   02/04/21 108.4 kg (239 lb)   01/28/21 108.4 kg (239 lb)   01/20/21 108.4 kg (239 lb)       General: Alert, cooperative, no distress, well-appearing  Head: Atraumatic, normocephalic, no obvious  abnormalities   Eyes: EOMI, Sclera anicteric, no obvious conjunctival hemorrhage   Nose: Nares normal, no obvious malformation, no obvious rhinorrhea   Respiratory: normal appearing respirations, no cough  Musculoskeletal: Range of motion intact, no obvious strength deficit  Skin: No jaundice, no obvious rash  Neurologic: AAOx3, no obvious neurologic abnormality  Psychiatric: Normal Affect, appropriate mood  Extremities: No obvious edema, no obvious malformation    Labs, Radiology, Pathology     Lab Results   Component Value Date    WBC 4.8 08/24/2021    WBC 4.2 02/23/2021    WBC 4.2 01/20/2021    HGB 13.2 (L) 08/24/2021    HGB 12.2 (L) 02/23/2021    HGB 11.2 (L) 01/20/2021     (L) 08/24/2021     (L) 02/23/2021     (L) 01/20/2021    CHOL 88 09/23/2021    CHOL 112 06/22/2020    CHOL 106 04/10/2018    TRIG 37 09/23/2021    TRIG 48 06/22/2020    TRIG 47 04/10/2018    HDL 42 09/23/2021    HDL 47 06/22/2020    HDL 38 (L) 04/10/2018    ALT 60 09/23/2021    ALT 91 (H) 03/06/2021     (H) 02/23/2021    AST 50 (H) 09/23/2021    AST 54 (H) 03/06/2021     (H) 02/23/2021     (H) 09/23/2021     03/06/2021     02/23/2021    BUN 23 09/23/2021    BUN 28 03/06/2021    BUN 41 (H) 02/23/2021    CO2 22 09/23/2021    CO2 26 03/06/2021    CO2 16 (L) 02/23/2021    TSH 1.84 06/22/2020    TSH 2.77 08/22/2016    INR 1.18 (H) 01/16/2021        Liver Function Studies -   Recent Labs   Lab Test 03/06/21  1056   PROTTOTAL 6.6*   ALBUMIN 3.4   BILITOTAL 0.5   ALKPHOS 94   AST 54*   ALT 91*        Patient Active Problem List    Diagnosis Date Noted     Loose stools 08/10/2021     Priority: Medium     Abdominal bloating 08/10/2021     Priority: Medium     Full incontinence of feces 08/10/2021     Priority: Medium     Cellulitis of left lower extremity 01/16/2021     Priority: Medium     Diabetic ulcer of toe of left foot associated with type 2 diabetes mellitus, unspecified ulcer stage (H)  01/16/2021     Priority: Medium     History of abdominal abscess 01/31/2018     Priority: Medium     History of ischemic bowel disease 07/03/2017     Priority: Medium     History of melanoma 07/03/2017     Priority: Medium     Microscopic colitis, unspecified microscopic colitis type 04/09/2017     Priority: Medium     Vitamin B12 deficiency (non anemic) 08/22/2016     Priority: Medium     MS (multiple sclerosis) (H) 07/24/2016     Priority: Medium     Follows at [facility].       Diabetic polyneuropathy associated with type 2 diabetes mellitus (H) 07/24/2016     Priority: Medium     Mast cell disease 04/23/2016     Priority: Medium     Overview:   possible mast cell activation syndrome       Microcytic anemia 04/23/2016     Priority: Medium     Abnormal liver function tests 03/19/2016     Priority: Medium     Body mass index (BMI) greater than 30 in adult 03/19/2016     Priority: Medium     Vertigo 03/19/2016     Priority: Medium     Stricture of duodenum 01/06/2016     Priority: Medium     Coronary arteriosclerosis in native artery 12/08/2015     Priority: Medium     Type 2 diabetes mellitus with diabetic nephropathy (H) 08/31/2015     Priority: Medium     Celiac disease 01/22/2015     Priority: Medium     Steatosis of liver 11/07/2012     Priority: Medium     Mild nonproliferative diabetic retinopathy (H) 08/30/2007     Priority: Medium     Overview:   Overview:   Hx of grid laser to L eye - Dr. 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    The patient was seen in video telemedicine consultation regarding his persistent diarrhea since significant bowel surgery. While I do not yet know what the underlying pathophysiology is he has trialed cholestyramine BID with no improvement. I have asked that he increase to TID for one week. If there is no improvement he may stop the medication. At that point he should add imodium back into his regimen for 2 weeks and let me know if there is any benefit.     Interestingly he has been on cipro and bactrim for a foot infection with complete resolution of his diarrhea which suggests some degree of bacterial overgrowth causing diarrhea with possible IBS-D overly. If imodium does not result in significant improvement we will try rifaximin based on his prior improvement with abx. I prefer rifaximin to more systemic abx to reduce the likelihood of resistance if those other antibiotics are needed for future foot infections.     If there is no benefit I will reach out to a former colleague at [facility] who specializes in diarrhea.    1. Loose stools    2. Celiac disease    3. Coronary arteriosclerosis in native artery    4. History of ischemic bowel disease       Plan:  1. Increase cholestyramine three times a day for 1 week. If this does not help improve your diarrhea you may stop the medication all together   2. If the cholestyramine has not help, please add imodium to your regimen. Try taking 1-2 times a day at first and let me know via [employer] if you have had any benefit after 2 weeks.   3. If you have had no improvement we will try to get rifaximin improved to treat your diarrhea. This is an antibiotics that may provide benefit.      Follow up plan:   Return to clinic 4 months and as needed.    The risks and benefits of my recommendations, as well as other treatment options were discussed with the patient and any available family today. All questions were answered.     o Today, I personally spent 30 minutes in  direct face to face time with the patient, of which greater than 50% of the time was spent in patient education and counseling as described above. Approximately 15 minutes were spent on indirect care associated with the patient's consultation including but not limited to review of: patient medical records to date, clinic visits, hospital records, lab results, imaging studies, procedural documentation, and coordinating care with other providers. The findings from this review are summarized in the above note.      Kamran Rainey DO   of Medicine  Division of Gastroenterology, Hepatology, and Nutrition  [facility]

## 2021-11-23 NOTE — LETTER
11/23/2021         RE: Thomas Gonzales  [address] [place] [address]        Dear Colleague,    Thank you for referring your patient, Thomas Gonzales, to the [facility] [place]. Please see a copy of my visit note below.    Chief Complaint:   Chief Complaint   Patient presents with     RECHECK     1 week follow up. Diabetic, cellulitis of the left foot.          Allergies   Allergen Reactions     Shellfish-Derived Products Anaphylaxis     Adhesive Tape      Paper tape is okay  Tegarderm is okay     Fructose      Gluten Meal      Celiac     Lactose      Reglan [Metoclopramide] Hives         Subjective: Thomas is a 56 year old male who presents to the clinic today for a follow up of cellulitis of the left foot.  He relates that he has finished the abx. No pain to the foot. He is feeling better. He does use the alginate on the area.     Objective  3rd interspace on the left has resolved. No pain to the area. Cellulitis has improved. Some residual indurating noted to the area.   DP and PT pulses are 2/4.  Capillary fill time is 1 second.  Equinus is noted bilaterally.      Assessment: Pradip is a 56-year-old type II diabetic with cellulitis of the left foot. Skin fissuring resolved. This was likely the entry point for the cellulitis.     Plan:   - Pt seen and evaluated.  - Activity as tolerated.   - Pt to return to clinic in 3 weeks.      Norm West DPM

## 2021-11-23 NOTE — PATIENT INSTRUCTIONS
It was a pleasure taking care of you today.  I've included a brief summary of our discussion and care plan from today's visit below.  Please review this information with your primary care provider.  _______________________________________________________________________    My recommendations are summarized as follows:    1. Increase cholestyramine three times a day for 1 week. If this does not help improve your diarrhea you may stop the medication all together   2. If the cholestyramine has not help, please add imodium to your regimen. Try taking 1-2 times a day at first and let me know via MyChart if you have had any benefit after 2 weeks.   3. If you have had no improvement we will try to get rifaximin improved to treat your diarrhea. This is an antibiotics that may provide benefit.      To schedule endoscopic procedures you may call: [phone]  To schedule radiology tests you may call: [phone]  To schedule an ENT appointment you may call: [phone]    Please call my nurse Erika ([phone]), Hui ([phone]) with any questions or concerns.  If you were seen through the [facility] please feel free to reach out to Mary at [phone]   --    Return to GI Clinic in 4 months to review your progress.    _______________________________________________________________________    Who do I call with any questions after my visit?  Please be in touch if there are any further questions that arise following today's visit.  There are multiple ways to contact your gastroenterology care team.        During business hours, you may reach a Gastroenterology nurse at [phone] and choose option 3.         To schedule or reschedule an appointment, please call [phone].       You can always send a secure message through [employer].  [employer] messages are answered by your nurse or doctor typically within 24 hours.  Please allow extra time on weekends and holidays.        For urgent/emergent questions after  business hours, you may reach the on-call GI Fellow by contacting the [facility]  at [phone].     How will I get the results of any tests ordered?    You will receive all of your results.  If you have signed up for [email or web address], any tests ordered at your visit will be available to you after your physician reviews them.  Typically this takes 1-2 weeks.  If there are urgent results that require a change in your care plan, your physician or nurse will call you to discuss the next steps.      What is [employer]  [employer] is a secure way for you to access all of your healthcare records from the [facility].  It is a web based computer program, so you can sign on to it from any location.  It also allows you to send secure messages to your care team.  I recommend signing up for [employer] access if you have not already done so and are comfortable with using a computer.      How to I schedule a follow-up visit?  If you did not schedule a follow-up visit today, please call [phone] to schedule a follow-up office visit.        Sincerely,    Kamran Rainey DO   of Medicine  Division of Gastroenterology, Hepatology, and Nutrition  [facility]

## 2021-11-23 NOTE — LETTER
11/23/2021         RE: Thomas Gonzales  [address] [place] [address]        Dear Colleague,    Thank you for referring your patient, Thoams Gonzales, to the [facility] [place]. Please see a copy of my visit note below.    [facility]   Thomas Gonzales 1965   MRN: [id number]     Reason for Visit:  IBS    Referred by: Self     History of Present Illness:   Thomas Gonzales is a 55 year old male with MS, DM II (diet controlled), CAD, celiac disease, hx of microscopic colitis, s/p Castro-en-Y for mesenteric/bowel ischemia (2016) c/b fistula managed conservatively, hx of malignant melanoma on limb (s/p excision), s/p cholecystectomy and appendectomy, liver steatosis and abnormal LFTs who is presenting as a new patient in consultation at the request of Self with a chief complaint of diarrhea and bloating.    11/23/21  Cholestyramine BID no change in diarrhea. Has bowel movements that vary 3-4 times a day. Loose water stools. Nocturnal symptoms of stool. 4-5 hours after a meal he has these symptoms regardless of whatever he has eaten. Nothing has worked in the past. These symptoms have been ongoing since his abdominal surgery. Has used kaopectate with some benefit. Currently using this 4-5 times a day. When he was in the hospital for infection of his toe he was on antibiotics and had significant improvement in his stool. He was on prolonged course of antibiotics- bactrim and cipro. He would have one bowel movement a day with these antibiotics.    Has not tried lomitil, tincture of opium, rifaximin.      ---------------------------------------------------------------  8/10/21  Thomas Gonzales states he has had loose bowels/diarrhea and bloating.    Onset: Since surgeries at [place] in 2016; emergency Castro-en-Y for intestinal ischemia. Complicated by fistula and recurrent septic shock s/p multiple TATYANA tubes and conservative  management. All healed up as of right now. Prior to surgery, did not have bowel symptoms and had regular BMs.  Frequency: 1-2x per day  Rose: 5-6  Color: yellow/brown  Melena/hematochezia: denies  + Urgency, + stool incontinence. Does have nocturnal diarrhea. These are his most bothersome symptoms - Checking back in to see if there are anything else he could try and to consolidate care.    Has been seen by [facility] (2017) and [facility] (his favorite doctor there retired ~1.5 years ago so he decided to move to one Care team) for follow-up of these issues. Has been tested for SIBO, negative multiple times. Has been tested for VIP, gastinoma, etc. At [facility] with negative workup 2017. Since last seeing his [facility] doctor ~1.5 years ago and went on FODMAP.     He has hx of celiac disease and is on strict gluten free diet. Was recommended FODMAP diet which he has been following for 1.5 years with some success.    No OTC outside of rare tylenol (2-3x/month). Medicines include ASA 81 mg, statin, vitamins, and probiotic.    Wt: 229 lbs which is stable    He was started on antibiotics for an infection of his toe early 2021 and he then had constipation for the following month to month and a half.     Last colonoscopy was 1/19/2017. Last EGD: unknown    --------------------------------------------------------------------------------------  ROS    + abdominal distension/bloating (assoicated with eating and 3 hours)    Denies dysphagia, odynophagia, heartburn/reflux, nausea, vomiting, early satiety, abdominal pain, hematochezia, or melena. No unintentional weight loss. No fevers or night sweats. No chest pain or pressure.      Family or personal history of IBD or colon cancer: none      STUDIES & PROCEDURES:    EGD:   Date: 9/1/21  Impression:  Findings:        The examined esophagus was normal.        The Z-line was regular and was found 41 cm from the incisors.        The entire examined stomach was normal. Biopsies were taken with  "a cold        forceps for histology. Verification of patient identification for the        specimen was done. Estimated blood loss: none. Surgical anatom noted.        Possible bilroth anatomy vs cande-y anatomy. Surgical staples noted.        The examined jejunum was normal. This was biopsied with a cold        large-capacity forceps for histology. Verification of patient        identification for the specimen was done. Estimated blood loss: none.                                                                                     Impression:          - Normal esophagus.                        - Z-line regular, 41 cm from the incisors.                        - Normal stomach. Biopsied.                        - Normal examined jejunum. Biopsied.   Pathology Report:  A. SMALL INTESTINE DESIGNATED \"DUODENUM\", BIOPSY:  -Small intestinal mucosa with preserved villous architecture and no significant histologic abnormality  -Negative for celiac sprue     B. GASTRIC BIOPSY:  -Gastric body mucosa with no significant histologic abnormality  -Negative for intestinal metaplasia and dysplasia  -No H. pylori-like organisms identified on routine stain    C. ILEUM BIOPSY:  -Small intestinal mucosa with no significant histologic abnormality  -Negative for active inflammation, granuloma formation, pseudopyloric metaplasia and dysplasia   Colonoscopy:  Date 9/1/21  Findings:        The perianal and digital rectal examinations were normal.        The terminal ileum appeared normal. Biopsies were taken with a cold        forceps for histology. Verification of patient identification for the        specimen was done. Estimated blood loss: none.        The colon (entire examined portion) appeared normal. Biopsies for        histology were taken with a cold forceps from the right colon and left        colon for evaluation of microscopic colitis. Verification of patient        identification for the specimen was done. Estimated blood loss: " none.        A 2 mm polyp was found in the rectum. The polyp was sessile. The polyp        was removed with a cold biopsy forceps. Resection and retrieval were        complete. Verification of patient identification for the specimen was        done. Estimated blood loss: none.        Non-bleeding internal hemorrhoids were found during retroflexion. The        hemorrhoids were small.        Scattered small-mouthed diverticula were found in the sigmoid colon and        ascending colon.                                                                                     Impression:          - The examined portion of the ileum was normal. Biopsied.                        - The entire examined colon is normal. Biopsied.                        - One 2 mm polyp in the rectum, removed with a cold                        biopsy forceps. Resected and retrieved.                        - Non-bleeding internal hemorrhoids.                        - Diverticulosis in the sigmoid colon and in the                        ascending colon.   Path  C. ILEUM BIOPSY:  -Small intestinal mucosa with no significant histologic abnormality  -Negative for active inflammation, granuloma formation, pseudopyloric metaplasia and dysplasia    D. RIGHT COLON BIOPSY:  -Colonic mucosa with no significant histologic abnormality  -Negative for active inflammation and lymphocytic colitis    E. LEFT COLON BIOPSY:  -Colonic mucosa with no significant histologic abnormality  -Negative for active inflammation and lymphocytic colitis    F. RECTAL POLYP, POLYPECTOMY:  -Hyperplastic polyp, negative for dysplasia    colonoscopy  Date: 1/19/2017  Impression:  - preparation of the colon was inadequate.   - The entire examine colon is normal where seen. Biopsied.   - The distal rectum and anal verge are normal on retroflexion view.   Pathology Report: cannot locate    CT: none recently    Prior medical records were reviewed including, but not limited to, notes from  referring providers, lab work, radiographic tests, and other diagnostic tests. Pertinent results were summarized above.     History     Past Medical History:   Diagnosis Date     CAD (coronary artery disease)     S/p stenting of left circumflex     Diabetes mellitus (H)      Multiple sclerosis (H)      Peripheral neuropathy      Past Surgical History:   Procedure Laterality Date     APPENDECTOMY  1/8/2016     CATARACT IOL, RT/LT       CHOLECYSTECTOMY  1/8/2016     COLONOSCOPY N/A 9/1/2021    Procedure: COLONOSCOPY, WITH POLYPECTOMY AND BIOPSY;  Surgeon: Kamran Rainey DO;  [place] [facility] [place]     ESOPHAGOSCOPY, GASTROSCOPY, DUODENOSCOPY (EGD), COMBINED N/A 9/1/2021    Procedure: ESOPHAGOGASTRODUODENOSCOPY, WITH BIOPSY;  Surgeon: Kamran Rainey DO;  [place] [facility] [place]     penile implant       LEON EN Y BOWEL  1/8/2016    for small bowel ischemia     Social History     Socioeconomic History     Marital status:      Spouse name: Not on file     Number of children: Not on file     Years of education: Not on file     Highest education level: Not on file   Occupational History     Not on file   Tobacco Use     Smoking status: Never Smoker     Smokeless tobacco: Never Used   Substance and Sexual Activity     Alcohol use: Yes     Comment: occ     Drug use: No     Sexual activity: Yes     Partners: Female   Other Topics Concern     Parent/sibling w/ CABG, MI or angioplasty before 65F 55M? Not Asked   Social History Narrative     Not on file     Social Determinants of Health     Financial Resource Strain: Not on file   Food Insecurity: Not on file   Transportation Needs: Not on file   Physical Activity: Not on file   Stress: Not on file   Social Connections: Not on file   Intimate Partner Violence: Not on file   Housing Stability: Not on file       Family History   Problem Relation Age of Onset     Arrhythmia Mother         bradycardia- pacemaker     Coronary Artery Disease Father        Medications and Allergies:      Outpatient Encounter Medications as of 11/23/2021   Medication Sig Dispense Refill     ASPIRIN LOW DOSE 81 MG EC tablet Take 1 tablet by mouth daily. 90 tablet 2     atorvastatin (LIPITOR) 40 MG tablet Take 1 tablet (40 mg) by mouth daily 90 tablet 3     bismuth subsalicylate 262 MG TABS Take 4-5 tablets by mouth daily        Cholecalciferol (VITAMIN D) 2000 UNITS CAPS Take 2,000 Units by mouth daily        cholestyramine (QUESTRAN) 4 g packet Take 1 packet (4 g) by mouth 2 times daily (with meals) 180 packet 3     multivitamin, therapeutic with minerals (MULTI-VITAMIN) TABS Take 1 tablet by mouth daily       order for DME Equipment being ordered: custom orthotic inserts for shoes 1 each 1     Probiotic Product (PROBIOTIC DAILY) CAPS Take 1 capsule by mouth daily        levofloxacin (LEVAQUIN) 500 MG tablet Take 1 tablet (500 mg) by mouth daily (Patient not taking: Reported on 11/23/2021) 5 tablet 0     No facility-administered encounter medications on file as of 11/23/2021.        Allergies   Allergen Reactions     Shellfish-Derived Products Anaphylaxis     Adhesive Tape      Paper tape is okay  Tegarderm is okay     Fructose      Gluten Meal      Celiac     Lactose      Reglan [Metoclopramide] Hives       Objective Findings:   Physical Exam:        Wt Readings from Last 5 Encounters:   09/23/21 108.6 kg (239 lb 6.4 oz)   09/01/21 102.1 kg (225 lb)   02/04/21 108.4 kg (239 lb)   01/28/21 108.4 kg (239 lb)   01/20/21 108.4 kg (239 lb)       General: Alert, cooperative, no distress, well-appearing  Head: Atraumatic, normocephalic, no obvious abnormalities   Eyes: EOMI, Sclera anicteric, no obvious conjunctival hemorrhage   Nose: Nares normal, no obvious malformation, no obvious rhinorrhea   Respiratory: normal appearing respirations, no cough  Musculoskeletal: Range of motion intact, no obvious strength deficit  Skin: No jaundice, no obvious rash  Neurologic: AAOx3, no obvious neurologic abnormality  Psychiatric:  Normal Affect, appropriate mood  Extremities: No obvious edema, no obvious malformation    Labs, Radiology, Pathology     Lab Results   Component Value Date    WBC 4.8 08/24/2021    WBC 4.2 02/23/2021    WBC 4.2 01/20/2021    HGB 13.2 (L) 08/24/2021    HGB 12.2 (L) 02/23/2021    HGB 11.2 (L) 01/20/2021     (L) 08/24/2021     (L) 02/23/2021     (L) 01/20/2021    CHOL 88 09/23/2021    CHOL 112 06/22/2020    CHOL 106 04/10/2018    TRIG 37 09/23/2021    TRIG 48 06/22/2020    TRIG 47 04/10/2018    HDL 42 09/23/2021    HDL 47 06/22/2020    HDL 38 (L) 04/10/2018    ALT 60 09/23/2021    ALT 91 (H) 03/06/2021     (H) 02/23/2021    AST 50 (H) 09/23/2021    AST 54 (H) 03/06/2021     (H) 02/23/2021     (H) 09/23/2021     03/06/2021     02/23/2021    BUN 23 09/23/2021    BUN 28 03/06/2021    BUN 41 (H) 02/23/2021    CO2 22 09/23/2021    CO2 26 03/06/2021    CO2 16 (L) 02/23/2021    TSH 1.84 06/22/2020    TSH 2.77 08/22/2016    INR 1.18 (H) 01/16/2021        Liver Function Studies -   Recent Labs   Lab Test 03/06/21  1056   PROTTOTAL 6.6*   ALBUMIN 3.4   BILITOTAL 0.5   ALKPHOS 94   AST 54*   ALT 91*        Patient Active Problem List    Diagnosis Date Noted     Loose stools 08/10/2021     Priority: Medium     Abdominal bloating 08/10/2021     Priority: Medium     Full incontinence of feces 08/10/2021     Priority: Medium     Cellulitis of left lower extremity 01/16/2021     Priority: Medium     Diabetic ulcer of toe of left foot associated with type 2 diabetes mellitus, unspecified ulcer stage (H) 01/16/2021     Priority: Medium     History of abdominal abscess 01/31/2018     Priority: Medium     History of ischemic bowel disease 07/03/2017     Priority: Medium     History of melanoma 07/03/2017     Priority: Medium     Microscopic colitis, unspecified microscopic colitis type 04/09/2017     Priority: Medium     Vitamin B12 deficiency (non anemic) 08/22/2016     Priority:  Medium     MS (multiple sclerosis) (H) 07/24/2016     Priority: Medium     Follows at [facility].       Diabetic polyneuropathy associated with type 2 diabetes mellitus (H) 07/24/2016     Priority: Medium     Mast cell disease 04/23/2016     Priority: Medium     Overview:   possible mast cell activation syndrome       Microcytic anemia 04/23/2016     Priority: Medium     Abnormal liver function tests 03/19/2016     Priority: Medium     Body mass index (BMI) greater than 30 in adult 03/19/2016     Priority: Medium     Vertigo 03/19/2016     Priority: Medium     Stricture of duodenum 01/06/2016     Priority: Medium     Coronary arteriosclerosis in native artery 12/08/2015     Priority: Medium     Type 2 diabetes mellitus with diabetic nephropathy (H) 08/31/2015     Priority: Medium     Celiac disease 01/22/2015     Priority: Medium     Steatosis of liver 11/07/2012     Priority: Medium     Mild nonproliferative diabetic retinopathy (H) 08/30/2007     Priority: Medium     Overview:   Overview:   Hx of grid laser to L eye - Dr. 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    The patient was seen in video telemedicine consultation regarding his persistent diarrhea since significant bowel surgery. While I do not yet know what the underlying pathophysiology is he has trialed cholestyramine BID with no improvement. I have asked that he increase to TID for one week. If there is no improvement he may stop the medication. At that point he should add  imodium back into his regimen for 2 weeks and let me know if there is any benefit.     Interestingly he has been on cipro and bactrim for a foot infection with complete resolution of his diarrhea which suggests some degree of bacterial overgrowth causing diarrhea with possible IBS-D overly. If imodium does not result in significant improvement we will try rifaximin based on his prior improvement with abx. I prefer rifaximin to more systemic abx to reduce the likelihood of resistance if those other antibiotics are needed for future foot infections.     If there is no benefit I will reach out to a former colleague at [facility] who specializes in diarrhea.    1. Loose stools    2. Celiac disease    3. Coronary arteriosclerosis in native artery    4. History of ischemic bowel disease       Plan:  1. Increase cholestyramine three times a day for 1 week. If this does not help improve your diarrhea you may stop the medication all together   2. If the cholestyramine has not help, please add imodium to your regimen. Try taking 1-2 times a day at first and let me know via [employer] if you have had any benefit after 2 weeks.   3. If you have had no improvement we will try to get rifaximin improved to treat your diarrhea. This is an antibiotics that may provide benefit.      Follow up plan:   Return to clinic 4 months and as needed.    The risks and benefits of my recommendations, as well as other treatment options were discussed with the patient and any available family today. All questions were answered.     o Today, I personally spent 30 minutes in direct face to face time with the patient, of which greater than 50% of the time was spent in patient education and counseling as described above. Approximately 15 minutes were spent on indirect care associated with the patient's consultation including but not limited to review of: patient medical records to date, clinic visits, hospital records, lab results, imaging studies,  procedural documentation, and coordinating care with other providers. The findings from this review are summarized in the above note.      Kamran Rainey DO   of Medicine  Division of Gastroenterology, Hepatology, and Nutrition  [facility]        Again, thank you for allowing me to participate in the care of your patient.      Sincerely,    Kamran Rainey DO

## 2021-11-23 NOTE — PROGRESS NOTES
Chief Complaint:   Chief Complaint   Patient presents with     RECHECK     1 week follow up. Diabetic, cellulitis of the left foot.          Allergies   Allergen Reactions     Shellfish-Derived Products Anaphylaxis     Adhesive Tape      Paper tape is okay  Tegarderm is okay     Fructose      Gluten Meal      Celiac     Lactose      Reglan [Metoclopramide] Hives         Subjective: Thomas is a 56 year old male who presents to the clinic today for a follow up of cellulitis of the left foot.  He relates that he has finished the abx. No pain to the foot. He is feeling better. He does use the alginate on the area.     Objective  3rd interspace on the left has resolved. No pain to the area. Cellulitis has improved. Some residual indurating noted to the area.   DP and PT pulses are 2/4.  Capillary fill time is 1 second.  Equinus is noted bilaterally.      Assessment: Pradip is a 56-year-old type II diabetic with cellulitis of the left foot. Skin fissuring resolved. This was likely the entry point for the cellulitis.     Plan:   - Pt seen and evaluated.  - Activity as tolerated.   - Pt to return to clinic in 3 weeks.

## 2021-11-29 DIAGNOSIS — L03.116 CELLULITIS OF LEFT FOOT: ICD-10-CM

## 2021-11-29 DIAGNOSIS — E11.49 TYPE II OR UNSPECIFIED TYPE DIABETES MELLITUS WITH NEUROLOGICAL MANIFESTATIONS, NOT STATED AS UNCONTROLLED(250.60) (H): ICD-10-CM

## 2021-11-30 DIAGNOSIS — L03.116 CELLULITIS OF LEFT FOOT: ICD-10-CM

## 2021-11-30 DIAGNOSIS — E11.49 TYPE II OR UNSPECIFIED TYPE DIABETES MELLITUS WITH NEUROLOGICAL MANIFESTATIONS, NOT STATED AS UNCONTROLLED(250.60) (H): Primary | ICD-10-CM

## 2021-11-30 RX ORDER — DOXYCYCLINE 100 MG/1
100 CAPSULE ORAL 2 TIMES DAILY
Qty: 10 CAPSULE | Refills: 0 | Status: SHIPPED | OUTPATIENT
Start: 2021-11-30 | End: 2022-10-21

## 2021-11-30 RX ORDER — LEVOFLOXACIN 500 MG/1
TABLET, FILM COATED ORAL
Qty: 5 TABLET | Refills: 0 | OUTPATIENT
Start: 2021-11-30

## 2021-12-14 ENCOUNTER — OFFICE VISIT (OUTPATIENT)
Dept: ORTHOPEDICS | Facility: CLINIC | Age: 56
End: 2021-12-14
Payer: COMMERCIAL

## 2021-12-14 DIAGNOSIS — L03.116 CELLULITIS OF LEFT FOOT: Primary | ICD-10-CM

## 2021-12-14 PROCEDURE — 99213 OFFICE O/P EST LOW 20 MIN: CPT | Performed by: PODIATRIST

## 2021-12-14 NOTE — LETTER
12/14/2021         RE: Thomas Gonzales  [address] [place] [address]        Dear Colleague,    Thank you for referring your patient, Thomas Gonzales, to the [facility] [place]. Please see a copy of my visit note below.    Chief Complaint:   Chief Complaint   Patient presents with     Follow Up     1 week follow up. Diabetic, cellulitis of the left foot.          Allergies   Allergen Reactions     Shellfish-Derived Products Anaphylaxis     Adhesive Tape      Paper tape is okay  Tegarderm is okay     Fructose      Gluten Meal      Celiac     Lactose      Reglan [Metoclopramide] Hives         Subjective: Thomas is a 56 year old male who presents to the clinic today for a follow up of cellulitis of the left foot.  He relates that he has finished the abx. No pain to the foot. He is feeling better. He is not covering the area.     Objective  3rd interspace on the left has resolved. No pain to the area. Cellulitis has improved. Some residual indurating noted to the area.   DP and PT pulses are 2/4.  Capillary fill time is 1 second.  Equinus is noted bilaterally.      Assessment: Pradip is a 56-year-old type II diabetic with cellulitis of the left foot. Skin fissuring resolved. This was likely the entry point for the cellulitis.     Plan:   - Pt seen and evaluated.  - Activity as tolerated.   - Pt to return to clinic in 3 weeks.    Norm West DPM

## 2021-12-14 NOTE — PROGRESS NOTES
Chief Complaint:   Chief Complaint   Patient presents with     Follow Up     1 week follow up. Diabetic, cellulitis of the left foot.          Allergies   Allergen Reactions     Shellfish-Derived Products Anaphylaxis     Adhesive Tape      Paper tape is okay  Tegarderm is okay     Fructose      Gluten Meal      Celiac     Lactose      Reglan [Metoclopramide] Hives         Subjective: Thomas is a 56 year old male who presents to the clinic today for a follow up of cellulitis of the left foot.  He relates that he has finished the abx. No pain to the foot. He is feeling better. He is not covering the area.     Objective  3rd interspace on the left has resolved. No pain to the area. Cellulitis has improved. Some residual indurating noted to the area.   DP and PT pulses are 2/4.  Capillary fill time is 1 second.  Equinus is noted bilaterally.      Assessment: Pradip is a 56-year-old type II diabetic with cellulitis of the left foot. Skin fissuring resolved. This was likely the entry point for the cellulitis.     Plan:   - Pt seen and evaluated.  - Activity as tolerated.   - Pt to return to clinic in 3 weeks.

## 2022-01-07 ENCOUNTER — E-VISIT (OUTPATIENT)
Dept: FAMILY MEDICINE | Facility: CLINIC | Age: 57
End: 2022-01-07
Payer: COMMERCIAL

## 2022-01-07 DIAGNOSIS — J02.9 SORE THROAT: ICD-10-CM

## 2022-01-07 DIAGNOSIS — Z20.822 SUSPECTED COVID-19 VIRUS INFECTION: ICD-10-CM

## 2022-01-07 PROCEDURE — 99421 OL DIG E/M SVC 5-10 MIN: CPT | Performed by: PHYSICIAN ASSISTANT

## 2022-01-08 NOTE — PATIENT INSTRUCTIONS
Pradip,    Your symptoms show that you may have coronavirus (COVID-19). This illness can cause fever, cough and trouble breathing. Many people get a mild case and get better on their own. Some people can get very sick.    Because you also reported sore throat, I would like to also test you for Strep Throat to determine if we need to treat you for that as well.    What should I do?  We would like to test you for COVID-19 virus and Strep Throat. I have placed orders for these tests. To schedule: go to your [employer] home page and scroll down to the section that says  You have an appointment that needs to be scheduled  and click the large green button that says  Schedule Now  and follow the steps to find the next available openings. It is important that when you are asked what the reason for your appointment is that you mention you need BOTH COVID and Strep tests.    If you are unable to complete these [employer] scheduling steps, please call [phone] to schedule your testing.     Return to work/school/ guidance:   Please let your workplace manager and staffing office know when your isolation ends.       If you receive a positive COVID-19 test result, follow the guidance of the those who are giving you the results. Usually the return to work is 10 days from symptom onset or positive test date (or in some cases 20 days if you are immunocompromised). If your symptoms started after your positive test, the 10 days should start when your symptoms started.   o If you [facility] [employer], you must also be cleared by Employee Occupational Health and Safety to return to work.      If you receive a negative COVID-19 test result and did not have a high risk exposure to someone with a known positive COVID-19 test, you can return to work once you're free of fever for 24 hours without fever-reducing medication and your symptoms are improving or resolved.    If you receive a negative COVID-19 test and if you had a high  risk exposure to someone who has tested positive for COVID-19 then you can return to work 14 days after your last contact with the positive individual. Follow quarantine guidance given by your doctor or public health officials.    Sign up for [employer].   We know it's scary to hear that you might have COVID-19. We want to track your symptoms to make sure you're okay over the next 2 weeks. Please look for an email from [employer]--this is a free, online program that we'll use to keep in touch. To sign up, follow the link in the email you will receive. Learn more at [email or web address]    How can I take care of myself?  Over the counter medications may help with your symptoms like congestion, cough, chills, or fever.    There are not many effective prescription treatments for early COVID-19. Hydroxychloroquine, ivermectin, and azithromycin are not effective or recommended for COVID-19.    If your symptoms started in the last 10 days, you may be able to receive a treatment with monoclonal antibodies. This treatment can lower your risk of severe illness and going to the hospital. It is given through an IV or under your skin (subcutaneous) and must be given at an infusion center. You must be 12 or older, weight at least 88 pounds, and have a positive COVID-19 test.      If you would like to sign up to be considered to receive the monoclonal antibody medicine, please complete a participation form through the [facility] here: MNRAP ([email or web address]). You may also call the [facility] COVID-19 Public Hotline at 1-323.667.1231 (open Mon-Fri: 9am-7pm and Sat: 10am-6pm).     Not all people who are eligible will receive the medicine, since supply is limited. You will be contacted in the next 1 to 2 business days only if you are selected. If you do not receive a call, you have not been selected to receive the medicine. If you have any questions about  this medication, please contact your primary care provider. For more information, see [email or web address]      Get lots of rest. Drink extra fluids (unless a doctor has told you not to)    Take Tylenol (acetaminophen) or ibuprofen for fever or pain. If you have liver or kidney problems, ask your family doctor if it's okay to take Tylenol or ibuprofen    Take over the counter medications for your symptoms, as directed by your doctor. You may also talk to your pharmacist.      If you have other health problems (like cancer, heart failure, an organ transplant or severe kidney disease): Call your specialty clinic if you don't feel better in the next 2 days.    Know when to call 911. Emergency warning signs include:  o Trouble breathing or shortness of breath  o Pain or pressure in the chest that doesn't go away  o Feeling confused like you haven't felt before, or not being able to wake up  o Bluish-colored lips or face    Where can I get more information?    [facility] - About COVID-19: [email or web address]     CDC - What to Do If You're Sick:   www.cdc.gov/coronavirus/2019-ncov/about/steps-when-sick.html    CDC - Ending Home Isolation:  https://www.cdc.gov/coronavirus/2019-ncov/your-health/quarantine-isolation.html    CDC - Caring for Someone:  www.cdc.gov/coronavirus/2019-ncov/if-you-are-sick/care-for-someone.html    [facility] clinical trials (COVID-19 research studies): clinicalaffairs.[facility]/n-clinical-trials    Below are the COVID-19 hotlines at the [facility] ([facility]). Interpreters are available.  o For health questions: Call 386-683-2530 or 1-653.463.8625 (7 a.m. to 7 p.m.)  o For questions about schools and childcare: Call 082-040-7467 or 1-120.391.2104 (7 a.m. to 7 p.m.)  January 7, 2022  RE:  Thomas Gonzales                                                                                                                   [address] [place] [address]      To whom it may concern:    I evaluated Thomas Gonzales on January 7, 2022. Thomas Gonzales should be excused from work/school.     They should let their workplace manager and staffing office know when their quarantine ends.    We can not give an exact date as it depends on the information below. They can calculate this on their own or work with their manager/staffing office to calculate this. (For example if they were exposed on 10/04, they would have to quarantine for 14 full days. That would be through 10/18. They could return on 10/19.)    Quarantine Guidelines:    If patient receives a positive COVID-19 test result, they should follow the guidance of those who are giving the results. Usually the return to work is 10 (or in some cases 20 days from symptom onset.) If they work at [employer], they must be cleared by Employee Occupational Health and Safety to return to work.      If patient receives a negative COVID-19 test result and did not have a high risk exposure to someone with a known positive COVID-19 test, they can return to work once they're free of fever for 24 hours without fever-reducing medication and their symptoms are improving or resolved.    If patient receives a negative COVID-19 test and if they had a high risk exposure to someone who has tested positive for COVID-19 then they can return to work 14 days after their last contact with the positive individual    Note: If there is ongoing exposure to the covid positive person, this quarantine period may be longer than 14 days. (For example, if they are continually exposed to their child, who tested positive and cannot isolate from them, then the quarantine of 7-14 days can't start until their child is no longer contagious. This is typically 10 days from onset to the child's symptoms. So the total duration may be 17-24 days in this case.)     Sincerely,  Marga Summers,  JUSTIN          o

## 2022-01-12 ENCOUNTER — LAB (OUTPATIENT)
Dept: URGENT CARE | Facility: URGENT CARE | Age: 57
End: 2022-01-12
Attending: PHYSICIAN ASSISTANT
Payer: COMMERCIAL

## 2022-01-12 DIAGNOSIS — Z20.822 SUSPECTED COVID-19 VIRUS INFECTION: ICD-10-CM

## 2022-01-12 DIAGNOSIS — J02.9 SORE THROAT: ICD-10-CM

## 2022-01-12 LAB
DEPRECATED S PYO AG THROAT QL EIA: NEGATIVE
GROUP A STREP BY PCR: NOT DETECTED

## 2022-01-12 PROCEDURE — U0003 INFECTIOUS AGENT DETECTION BY NUCLEIC ACID (DNA OR RNA); SEVERE ACUTE RESPIRATORY SYNDROME CORONAVIRUS 2 (SARS-COV-2) (CORONAVIRUS DISEASE [COVID-19]), AMPLIFIED PROBE TECHNIQUE, MAKING USE OF HIGH THROUGHPUT TECHNOLOGIES AS DESCRIBED BY CMS-2020-01-R: HCPCS

## 2022-01-12 PROCEDURE — U0005 INFEC AGEN DETEC AMPLI PROBE: HCPCS

## 2022-01-12 PROCEDURE — 87651 STREP A DNA AMP PROBE: CPT

## 2022-01-13 LAB — SARS-COV-2 RNA RESP QL NAA+PROBE: NEGATIVE

## 2022-03-29 ENCOUNTER — VIRTUAL VISIT (OUTPATIENT)
Dept: GASTROENTEROLOGY | Facility: CLINIC | Age: 57
End: 2022-03-29
Payer: COMMERCIAL

## 2022-03-29 DIAGNOSIS — K90.0 CELIAC DISEASE: ICD-10-CM

## 2022-03-29 DIAGNOSIS — K58.0 IRRITABLE BOWEL SYNDROME WITH DIARRHEA: Primary | ICD-10-CM

## 2022-03-29 DIAGNOSIS — R15.9 FULL INCONTINENCE OF FECES: ICD-10-CM

## 2022-03-29 DIAGNOSIS — Z87.19 HISTORY OF ISCHEMIC BOWEL DISEASE: ICD-10-CM

## 2022-03-29 PROCEDURE — 99214 OFFICE O/P EST MOD 30 MIN: CPT | Mod: 95 | Performed by: INTERNAL MEDICINE

## 2022-03-29 NOTE — LETTER
"    3/29/2022         RE: Thomas Gonzales  [address] [place] [address]        Dear Colleague,    Thank you for referring your patient, Thomas Gonzales, to the [facility] [place]. Please see a copy of my visit note below.      History of Present Illness:   Thomas Gonzales is a 55 year old male with MS, DM II (diet controlled), CAD, celiac disease, hx of microscopic colitis, s/p Castro-en-Y for mesenteric/bowel ischemia (2016) c/b fistula managed conservatively, hx of malignant melanoma on limb (s/p excision), s/p cholecystectomy and appendectomy, liver steatosis and abnormal LFTs who is presenting as a follow up patient in consultation at the request of Self with a chief complaint of diarrhea and bloating.    3/29/22  Good days and bad days with regards to loose stools. Yesterday had two formed bowel movements with no urgency.   A \"not good day\" there is significant urgency. If he avoids fried or fatty foods he has a \"better day\" in terms of bowel movements. Using Kaopectate. Not using impdium.   _____________________________________  11/23/21  Cholestyramine BID no change in diarrhea. Has bowel movements that vary 3-4 times a day. Loose water stools. Nocturnal symptoms of stool. 4-5 hours after a meal he has these symptoms regardless of whatever he has eaten. Nothing has worked in the past. These symptoms have been ongoing since his abdominal surgery. Has used kaopectate with some benefit. Currently using this 4-5 times a day. When he was in the hospital for infection of his toe he was on antibiotics and had significant improvement in his stool. He was on prolonged course of antibiotics- bactrim and cipro. He would have one bowel movement a day with these antibiotics.    Has not tried lomitil, tincture of opium, rifaximin.  ---------------------------------------------------------------  8/10/21  Thomas Gonzales states he has had loose bowels/diarrhea and " bloating.    Onset: Since surgeries at [place] in 2016; emergency Castro-en-Y for intestinal ischemia. Complicated by fistula and recurrent septic shock s/p multiple TATYANA tubes and conservative management. All healed up as of right now. Prior to surgery, did not have bowel symptoms and had regular BMs.  Frequency: 1-2x per day  [place]: 5-6  Color: yellow/brown  Melena/hematochezia: denies  + Urgency, + stool incontinence. Does have nocturnal diarrhea. These are his most bothersome symptoms - Checking back in to see if there are anything else he could try and to consolidate care.    Has been seen by [facility] (2017) and [facility] (his favorite doctor there retired ~1.5 years ago so he decided to move to one Care team) for follow-up of these issues. Has been tested for SIBO, negative multiple times. Has been tested for VIP, gastinoma, etc. At [place] with negative workup 2017. Since last seeing his [facility] doctor ~1.5 years ago and went on FODMAP.     He has hx of celiac disease and is on strict gluten free diet. Was recommended FODMAP diet which he has been following for 1.5 years with some success.    No OTC outside of rare tylenol (2-3x/month). Medicines include ASA 81 mg, statin, vitamins, and probiotic.    Wt: 229 lbs which is stable    He was started on antibiotics for an infection of his toe early 2021 and he then had constipation for the following month to month and a half.     Last colonoscopy was 1/19/2017. Last EGD: unknown    --------------------------------------------------------------------------------------  ROS    + abdominal distension/bloating (assoicated with eating and 3 hours)    Denies dysphagia, odynophagia, heartburn/reflux, nausea, vomiting, early satiety, abdominal pain, hematochezia, or melena. No unintentional weight loss. No fevers or night sweats. No chest pain or pressure.      Family or personal history of IBD or colon cancer: none      STUDIES & PROCEDURES:    EGD:   Date:  "9/1/21  Impression:  Findings:        The examined esophagus was normal.        The Z-line was regular and was found 41 cm from the incisors.        The entire examined stomach was normal. Biopsies were taken with a cold        forceps for histology. Verification of patient identification for the        specimen was done. Estimated blood loss: none. Surgical anatom noted.        Possible bilroth anatomy vs cande-y anatomy. Surgical staples noted.        The examined jejunum was normal. This was biopsied with a cold        large-capacity forceps for histology. Verification of patient        identification for the specimen was done. Estimated blood loss: none.                                                                                     Impression:          - Normal esophagus.                        - Z-line regular, 41 cm from the incisors.                        - Normal stomach. Biopsied.                        - Normal examined jejunum. Biopsied.   Pathology Report:  A. SMALL INTESTINE DESIGNATED \"DUODENUM\", BIOPSY:  -Small intestinal mucosa with preserved villous architecture and no significant histologic abnormality  -Negative for celiac sprue     B. GASTRIC BIOPSY:  -Gastric body mucosa with no significant histologic abnormality  -Negative for intestinal metaplasia and dysplasia  -No H. pylori-like organisms identified on routine stain    C. ILEUM BIOPSY:  -Small intestinal mucosa with no significant histologic abnormality  -Negative for active inflammation, granuloma formation, pseudopyloric metaplasia and dysplasia   Colonoscopy:  Date 9/1/21  Findings:        The perianal and digital rectal examinations were normal.        The terminal ileum appeared normal. Biopsies were taken with a cold        forceps for histology. Verification of patient identification for the        specimen was done. Estimated blood loss: none.        The colon (entire examined portion) appeared normal. Biopsies for       "  histology were taken with a cold forceps from the right colon and left        colon for evaluation of microscopic colitis. Verification of patient        identification for the specimen was done. Estimated blood loss: none.        A 2 mm polyp was found in the rectum. The polyp was sessile. The polyp        was removed with a cold biopsy forceps. Resection and retrieval were        complete. Verification of patient identification for the specimen was        done. Estimated blood loss: none.        Non-bleeding internal hemorrhoids were found during retroflexion. The        hemorrhoids were small.        Scattered small-mouthed diverticula were found in the sigmoid colon and        ascending colon.                                                                                     Impression:          - The examined portion of the ileum was normal. Biopsied.                        - The entire examined colon is normal. Biopsied.                        - One 2 mm polyp in the rectum, removed with a cold                        biopsy forceps. Resected and retrieved.                        - Non-bleeding internal hemorrhoids.                        - Diverticulosis in the sigmoid colon and in the                        ascending colon.   Path  C. ILEUM BIOPSY:  -Small intestinal mucosa with no significant histologic abnormality  -Negative for active inflammation, granuloma formation, pseudopyloric metaplasia and dysplasia    D. RIGHT COLON BIOPSY:  -Colonic mucosa with no significant histologic abnormality  -Negative for active inflammation and lymphocytic colitis    E. LEFT COLON BIOPSY:  -Colonic mucosa with no significant histologic abnormality  -Negative for active inflammation and lymphocytic colitis    F. RECTAL POLYP, POLYPECTOMY:  -Hyperplastic polyp, negative for dysplasia    colonoscopy  Date: 1/19/2017  Impression:  - preparation of the colon was inadequate.   - The entire examine colon is normal where seen.  Biopsied.   - The distal rectum and anal verge are normal on retroflexion view.   Pathology Report: cannot locate    CT: none recently    Prior medical records were reviewed including, but not limited to, notes from referring providers, lab work, radiographic tests, and other diagnostic tests. Pertinent results were summarized above.     History     Past Medical History:   Diagnosis Date     CAD (coronary artery disease)     S/p stenting of left circumflex     Diabetes mellitus (H)      Multiple sclerosis (H)      Peripheral neuropathy      Past Surgical History:   Procedure Laterality Date     APPENDECTOMY  1/8/2016     CATARACT IOL, RT/LT       CHOLECYSTECTOMY  1/8/2016     COLONOSCOPY N/A 9/1/2021    Procedure: COLONOSCOPY, WITH POLYPECTOMY AND BIOPSY;  Surgeon: Kamran Rainey DO;  Location: [facility] OR     ESOPHAGOSCOPY, GASTROSCOPY, DUODENOSCOPY (EGD), COMBINED N/A 9/1/2021    Procedure: ESOPHAGOGASTRODUODENOSCOPY, WITH BIOPSY;  Surgeon: Kamran Rainey DO;  Location: [facility] OR     penile implant       LEON EN Y BOWEL  1/8/2016    for small bowel ischemia     Social History     Socioeconomic History     Marital status:      Spouse name: Not on file     Number of children: Not on file     Years of education: Not on file     Highest education level: Not on file   Occupational History     Not on file   Tobacco Use     Smoking status: Never Smoker     Smokeless tobacco: Never Used   Substance and Sexual Activity     Alcohol use: Yes     Comment: occ     Drug use: No     Sexual activity: Yes     Partners: Female   Other Topics Concern     Parent/sibling w/ CABG, MI or angioplasty before 65F 55M? Not Asked   Social History Narrative     Not on file     Social Determinants of Health     Financial Resource Strain: Not on file   Food Insecurity: Not on file   Transportation Needs: Not on file   Physical Activity: Not on file   Stress: Not on file   Social Connections: Not on file   Intimate Partner Violence: Not on file    Housing Stability: Not on file       Family History   Problem Relation Age of Onset     Arrhythmia Mother         bradycardia- pacemaker     Coronary Artery Disease Father        Medications and Allergies:     Outpatient Encounter Medications as of 3/29/2022   Medication Sig Dispense Refill     ASPIRIN LOW DOSE 81 MG EC tablet Take 1 tablet by mouth daily. 90 tablet 2     atorvastatin (LIPITOR) 40 MG tablet Take 1 tablet (40 mg) by mouth daily 90 tablet 3     bismuth subsalicylate 262 MG TABS Take 4-5 tablets by mouth daily        Cholecalciferol (VITAMIN D) 2000 UNITS CAPS Take 2,000 Units by mouth daily        multivitamin, therapeutic with minerals (MULTI-VITAMIN) TABS Take 1 tablet by mouth daily       order for DME Equipment being ordered: custom orthotic inserts for shoes 1 each 1     Probiotic Product (PROBIOTIC DAILY) CAPS Take 1 capsule by mouth daily        rifaximin (XIFAXAN) 550 MG TABS tablet Take 1 tablet (550 mg) by mouth 3 times daily for 14 days 42 tablet 0     cholestyramine (QUESTRAN) 4 g packet Take 1 packet (4 g) by mouth 2 times daily (with meals) (Patient not taking: Reported on 3/29/2022) 180 packet 3     doxycycline hyclate (VIBRAMYCIN) 100 MG capsule Take 1 capsule (100 mg) by mouth 2 times daily 10 capsule 0     levofloxacin (LEVAQUIN) 500 MG tablet Take 1 tablet (500 mg) by mouth daily 3 tablet 0     levofloxacin (LEVAQUIN) 500 MG tablet Take 1 tablet (500 mg) by mouth daily (Patient not taking: Reported on 11/23/2021) 5 tablet 0     No facility-administered encounter medications on file as of 3/29/2022.        Allergies   Allergen Reactions     Shellfish-Derived Products Anaphylaxis     Adhesive Tape      Paper tape is okay  Tegarderm is okay     Fructose      Gluten Meal      Celiac     Lactose      Reglan [Metoclopramide] Hives       Objective Findings:   Physical Exam:        Wt Readings from Last 5 Encounters:   09/23/21 108.6 kg (239 lb 6.4 oz)   09/01/21 102.1 kg (225 lb)    02/04/21 108.4 kg (239 lb)   01/28/21 108.4 kg (239 lb)   01/20/21 108.4 kg (239 lb)       General: Alert, cooperative, no distress, well-appearing  Head: Atraumatic, normocephalic, no obvious abnormalities   Eyes: EOMI, Sclera anicteric, no obvious conjunctival hemorrhage   Nose: Nares normal, no obvious malformation, no obvious rhinorrhea   Respiratory: normal appearing respirations, no cough  Musculoskeletal: Range of motion intact, no obvious strength deficit  Skin: No jaundice, no obvious rash  Neurologic: AAOx3, no obvious neurologic abnormality  Psychiatric: Normal Affect, appropriate mood  Extremities: No obvious edema, no obvious malformation    Labs, Radiology, Pathology     Lab Results   Component Value Date    WBC 4.8 08/24/2021    WBC 4.2 02/23/2021    WBC 4.2 01/20/2021    HGB 13.2 (L) 08/24/2021    HGB 12.2 (L) 02/23/2021    HGB 11.2 (L) 01/20/2021     (L) 08/24/2021     (L) 02/23/2021     (L) 01/20/2021    CHOL 88 09/23/2021    CHOL 112 06/22/2020    CHOL 106 04/10/2018    TRIG 37 09/23/2021    TRIG 48 06/22/2020    TRIG 47 04/10/2018    HDL 42 09/23/2021    HDL 47 06/22/2020    HDL 38 (L) 04/10/2018    ALT 60 09/23/2021    ALT 91 (H) 03/06/2021     (H) 02/23/2021    AST 50 (H) 09/23/2021    AST 54 (H) 03/06/2021     (H) 02/23/2021     (H) 09/23/2021     03/06/2021     02/23/2021    BUN 23 09/23/2021    BUN 28 03/06/2021    BUN 41 (H) 02/23/2021    CO2 22 09/23/2021    CO2 26 03/06/2021    CO2 16 (L) 02/23/2021    TSH 1.84 06/22/2020    TSH 2.77 08/22/2016    INR 1.18 (H) 01/16/2021        Liver Function Studies -   Recent Labs   Lab Test 03/06/21  1056   PROTTOTAL 6.6*   ALBUMIN 3.4   BILITOTAL 0.5   ALKPHOS 94   AST 54*   ALT 91*        Patient Active Problem List    Diagnosis Date Noted     Irritable bowel syndrome with diarrhea 03/29/2022     Priority: Medium     Loose stools 08/10/2021     Priority: Medium     Abdominal bloating 08/10/2021      Priority: Medium     Full incontinence of feces 08/10/2021     Priority: Medium     Cellulitis of left lower extremity 01/16/2021     Priority: Medium     Diabetic ulcer of toe of left foot associated with type 2 diabetes mellitus, unspecified ulcer stage (H) 01/16/2021     Priority: Medium     History of abdominal abscess 01/31/2018     Priority: Medium     History of ischemic bowel disease 07/03/2017     Priority: Medium     History of melanoma 07/03/2017     Priority: Medium     Microscopic colitis, unspecified microscopic colitis type 04/09/2017     Priority: Medium     Vitamin B12 deficiency (non anemic) 08/22/2016     Priority: Medium     MS (multiple sclerosis) (H) 07/24/2016     Priority: Medium     Follows at [place].       Diabetic polyneuropathy associated with type 2 diabetes mellitus (H) 07/24/2016     Priority: Medium     Mast cell disease 04/23/2016     Priority: Medium     Overview:   possible mast cell activation syndrome       Microcytic anemia 04/23/2016     Priority: Medium     Abnormal liver function tests 03/19/2016     Priority: Medium     Body mass index (BMI) greater than 30 in adult 03/19/2016     Priority: Medium     Vertigo 03/19/2016     Priority: Medium     Stricture of duodenum 01/06/2016     Priority: Medium     Coronary arteriosclerosis in native artery 12/08/2015     Priority: Medium     Type 2 diabetes mellitus with diabetic nephropathy (H) 08/31/2015     Priority: Medium     Celiac disease 01/22/2015     Priority: Medium     Steatosis of liver 11/07/2012     Priority: Medium     Mild nonproliferative diabetic retinopathy (H) 08/30/2007     Priority: Medium     Overview:   Overview:   Hx of grid laser to L eye - Dr. Driscoll       Multiple-type hyperlipidemia 12/02/2005     Priority: Medium        Assessment and Plan   Thomas Gonzales is a 55 year old male with MS, DM II (diet controlled), CAD, celiac disease, hx of microscopic colitis, s/p Castro-en-Y for mesenteric/bowel ischemia  (2016) c/b fistula managed conservatively, hx of malignant melanoma on limb (s/p excision), s/p cholecystectomy and appendectomy, liver steatosis and abnormal LFTs who is seen for consultation of urge incontinence, loose stools and bloating being seen in follow up.    The patient was seen in video telemedicine consultation regarding his persistent diarrhea since significant bowel surgery. He has had no benefit with cholestyramine or the addition of imodium to his kaopectate. At this time I will treat his diarrhea as IBS-D with a course of rifaximin. Hopefully he will have substantial benefit as he has had previous benefit with systemic antibiotics in the past.     He will notify me when he has completed a course of rifaximin to provide an update on his symptoms.     1. Irritable bowel syndrome with diarrhea    2. Celiac disease    3. Full incontinence of feces    4. History of ischemic bowel disease       Plan:  1. For your diarrhea we will try rifaximin for two weeks to treat your diarrhea as possible IBS-D  2. Please let me know when you have completed the course of antibiotic with an update on your symptoms    Follow up plan:   Return to clinic 4 months and as needed.    The risks and benefits of my recommendations, as well as other treatment options were discussed with the patient and any available family today. All questions were answered.     o Today, I personally spent 12 minutes in direct face to face time with the patient, of which greater than 50% of the time was spent in patient education and counseling as described above. Approximately 4 minutes were spent on indirect care associated with the patient's consultation including but not limited to review of: patient medical records to date, clinic visits, hospital records, lab results, imaging studies, procedural documentation, and coordinating care with other providers. The findings from this review are summarized in the above note. A total of 16 minutes  were spent on the encounter and documentation on the date of service.       Kamran Rainey DO   of Medicine  Director, Esophageal Disorders Program  Division of Gastroenterology, Hepatology, and Nutrition  [facility]

## 2022-03-29 NOTE — PATIENT INSTRUCTIONS
It was a pleasure taking care of you today.  I've included a brief summary of our discussion and care plan from today's visit below.  Please review this information with your primary care provider.  _______________________________________________________________________    My recommendations are summarized as follows:    1. For your diarrhea we will try rifaximin for two weeks to treat your diarrhea as possible IBS-D  2. Please let me know when you have completed the course of antibiotic with an update on your symptoms    To schedule endoscopic procedures you may call: [phone]  To schedule radiology tests you may call: [phone]  To schedule an ENT appointment you may call: [phone]    Please call my nurse Erika ([phone]), Hui ([phone]) with any questions or concerns.  If you were seen through the [facility] please feel free to reach out to Mary at [phone]   --    Return to GI Clinic in 4 months to review your progress.    _______________________________________________________________________    Who do I call with any questions after my visit?  Please be in touch if there are any further questions that arise following today's visit.  There are multiple ways to contact your gastroenterology care team.        During business hours, you may reach a Gastroenterology nurse at [phone] and choose option 3.         To schedule or reschedule an appointment, please call [phone].       You can always send a secure message through "[employer].  "[employer] messages are answered by your nurse or doctor typically within 24 hours.  Please allow extra time on weekends and holidays.        For urgent/emergent questions after business hours, you may reach the on-call GI Fellow by contacting the [facility] at [phone].     How will I get the results of any tests ordered?    You will receive all of your results.  If you have signed up for MyChart, any tests ordered at your  visit will be available to you after your physician reviews them.  Typically this takes 1-2 weeks.  If there are urgent results that require a change in your care plan, your physician or nurse will call you to discuss the next steps.      What is [employer]?  [employer] is a secure way for you to access all of your healthcare records from the [facility].  It is a web based computer program, so you can sign on to it from any location.  It also allows you to send secure messages to your care team.  I recommend signing up for [employer] access if you have not already done so and are comfortable with using a computer.      How to I schedule a follow-up visit?  If you did not schedule a follow-up visit today, please call [phone] to schedule a follow-up office visit.      If you feel you received exceptional care and are interested in supporting the clinical and research goals of Dr. Rainey or the Division of Gastroenterology, Hepatology, and Nutrition please contact [email or web address] from the [facility] to discuss opportunities to donate.    Sincerely,    Kamran Rainey DO   of Medicine  Director, Esophageal Disorders Program  Division of Gastroenterology, Hepatology, and Nutrition  [facility]

## 2022-03-29 NOTE — PROGRESS NOTES
"Pradip is a 56 year old who is being evaluated via a billable video visit.      How would you like to obtain your AVS? MyChart  If the video visit is dropped, the invitation should be resent by: Text to cell phone: [phone]  Will anyone else be joining your video visit? No      Video Start Time: 7:03 AM  Video-Visit Details    Type of service:  Video Visit    Video End Time:7:15    Originating Location (pt. Location): Home    Distant Location (provider location):  [facility] [place]     Platform used for Video Visit: Percentil     History of Present Illness:   Thomas Gonzales is a 55 year old male with MS, DM II (diet controlled), CAD, celiac disease, hx of microscopic colitis, s/p Castro-en-Y for mesenteric/bowel ischemia (2016) c/b fistula managed conservatively, hx of malignant melanoma on limb (s/p excision), s/p cholecystectomy and appendectomy, liver steatosis and abnormal LFTs who is presenting as a follow up patient in consultation at the request of Self with a chief complaint of diarrhea and bloating.    3/29/22  Good days and bad days with regards to loose stools. Yesterday had two formed bowel movements with no urgency.   A \"not good day\" there is significant urgency. If he avoids fried or fatty foods he has a \"better day\" in terms of bowel movements. Using Kaopectate. Not using impdium.   _____________________________________  11/23/21  Cholestyramine BID no change in diarrhea. Has bowel movements that vary 3-4 times a day. Loose water stools. Nocturnal symptoms of stool. 4-5 hours after a meal he has these symptoms regardless of whatever he has eaten. Nothing has worked in the past. These symptoms have been ongoing since his abdominal surgery. Has used kaopectate with some benefit. Currently using this 4-5 times a day. When he was in the hospital for infection of his toe he was on antibiotics and had significant improvement in his stool. He was on prolonged course of " antibiotics- bactrim and cipro. He would have one bowel movement a day with these antibiotics.    Has not tried lomitil, tincture of opium, rifaximin.  ---------------------------------------------------------------  8/10/21  Thomas Gonzales states he has had loose bowels/diarrhea and bloating.    Onset: Since surgeries at [facility] in 2016; emergency Castro-en-Y for intestinal ischemia. Complicated by fistula and recurrent septic shock s/p multiple TATYANA tubes and conservative management. All healed up as of right now. Prior to surgery, did not have bowel symptoms and had regular BMs.  Frequency: 1-2x per day  [place]: 5-6  Color: yellow/brown  Melena/hematochezia: denies  + Urgency, + stool incontinence. Does have nocturnal diarrhea. These are his most bothersome symptoms - Checking back in to see if there are anything else he could try and to consolidate care.    Has been seen by [facility] (2017) and [facility] (his favorite doctor there retired ~1.5 years ago so he decided to move to one Care team) for follow-up of these issues. Has been tested for SIBO, negative multiple times. Has been tested for VIP, gastinoma, etc. At [facility] with negative workup 2017. Since last seeing his [facility] doctor ~1.5 years ago and went on FODMAP.     He has hx of celiac disease and is on strict gluten free diet. Was recommended FODMAP diet which he has been following for 1.5 years with some success.    No OTC outside of rare tylenol (2-3x/month). Medicines include ASA 81 mg, statin, vitamins, and probiotic.    Wt: 229 lbs which is stable    He was started on antibiotics for an infection of his toe early 2021 and he then had constipation for the following month to month and a half.     Last colonoscopy was 1/19/2017. Last EGD: unknown    --------------------------------------------------------------------------------------  ROS    + abdominal distension/bloating (assoicated with eating and 3 hours)    Denies dysphagia, odynophagia,  "heartburn/reflux, nausea, vomiting, early satiety, abdominal pain, hematochezia, or melena. No unintentional weight loss. No fevers or night sweats. No chest pain or pressure.      Family or personal history of IBD or colon cancer: none      STUDIES & PROCEDURES:    EGD:   Date: 9/1/21  Impression:  Findings:        The examined esophagus was normal.        The Z-line was regular and was found 41 cm from the incisors.        The entire examined stomach was normal. Biopsies were taken with a cold        forceps for histology. Verification of patient identification for the        specimen was done. Estimated blood loss: none. Surgical anatom noted.        Possible bilroth anatomy vs cande-y anatomy. Surgical staples noted.        The examined jejunum was normal. This was biopsied with a cold        large-capacity forceps for histology. Verification of patient        identification for the specimen was done. Estimated blood loss: none.                                                                                     Impression:          - Normal esophagus.                        - Z-line regular, 41 cm from the incisors.                        - Normal stomach. Biopsied.                        - Normal examined jejunum. Biopsied.   Pathology Report:  A. SMALL INTESTINE DESIGNATED \"DUODENUM\", BIOPSY:  -Small intestinal mucosa with preserved villous architecture and no significant histologic abnormality  -Negative for celiac sprue     B. GASTRIC BIOPSY:  -Gastric body mucosa with no significant histologic abnormality  -Negative for intestinal metaplasia and dysplasia  -No H. pylori-like organisms identified on routine stain    C. ILEUM BIOPSY:  -Small intestinal mucosa with no significant histologic abnormality  -Negative for active inflammation, granuloma formation, pseudopyloric metaplasia and dysplasia   Colonoscopy:  Date 9/1/21  Findings:        The perianal and digital rectal examinations were normal.        The " terminal ileum appeared normal. Biopsies were taken with a cold        forceps for histology. Verification of patient identification for the        specimen was done. Estimated blood loss: none.        The colon (entire examined portion) appeared normal. Biopsies for        histology were taken with a cold forceps from the right colon and left        colon for evaluation of microscopic colitis. Verification of patient        identification for the specimen was done. Estimated blood loss: none.        A 2 mm polyp was found in the rectum. The polyp was sessile. The polyp        was removed with a cold biopsy forceps. Resection and retrieval were        complete. Verification of patient identification for the specimen was        done. Estimated blood loss: none.        Non-bleeding internal hemorrhoids were found during retroflexion. The        hemorrhoids were small.        Scattered small-mouthed diverticula were found in the sigmoid colon and        ascending colon.                                                                                     Impression:          - The examined portion of the ileum was normal. Biopsied.                        - The entire examined colon is normal. Biopsied.                        - One 2 mm polyp in the rectum, removed with a cold                        biopsy forceps. Resected and retrieved.                        - Non-bleeding internal hemorrhoids.                        - Diverticulosis in the sigmoid colon and in the                        ascending colon.   Path  C. ILEUM BIOPSY:  -Small intestinal mucosa with no significant histologic abnormality  -Negative for active inflammation, granuloma formation, pseudopyloric metaplasia and dysplasia    D. RIGHT COLON BIOPSY:  -Colonic mucosa with no significant histologic abnormality  -Negative for active inflammation and lymphocytic colitis    E. LEFT COLON BIOPSY:  -Colonic mucosa with no significant histologic  abnormality  -Negative for active inflammation and lymphocytic colitis    F. RECTAL POLYP, POLYPECTOMY:  -Hyperplastic polyp, negative for dysplasia    colonoscopy  Date: 1/19/2017  Impression:  - preparation of the colon was inadequate.   - The entire examine colon is normal where seen. Biopsied.   - The distal rectum and anal verge are normal on retroflexion view.   Pathology Report: cannot locate    CT: none recently    Prior medical records were reviewed including, but not limited to, notes from referring providers, lab work, radiographic tests, and other diagnostic tests. Pertinent results were summarized above.     History     Past Medical History:   Diagnosis Date     CAD (coronary artery disease)     S/p stenting of left circumflex     Diabetes mellitus (H)      Multiple sclerosis (H)      Peripheral neuropathy      Past Surgical History:   Procedure Laterality Date     APPENDECTOMY  1/8/2016     CATARACT IOL, RT/LT       CHOLECYSTECTOMY  1/8/2016     COLONOSCOPY N/A 9/1/2021    Procedure: COLONOSCOPY, WITH POLYPECTOMY AND BIOPSY;  Surgeon: Kamran Rainey DO;  [place] [facility] [place]     ESOPHAGOSCOPY, GASTROSCOPY, DUODENOSCOPY (EGD), COMBINED N/A 9/1/2021    Procedure: ESOPHAGOGASTRODUODENOSCOPY, WITH BIOPSY;  Surgeon: Kamran Rainey DO;  [place] [facility] [place]     penile implant       LEON EN Y BOWEL  1/8/2016    for small bowel ischemia     Social History     Socioeconomic History     Marital status:      Spouse name: Not on file     Number of children: Not on file     Years of education: Not on file     Highest education level: Not on file   Occupational History     Not on file   Tobacco Use     Smoking status: Never Smoker     Smokeless tobacco: Never Used   Substance and Sexual Activity     Alcohol use: Yes     Comment: occ     Drug use: No     Sexual activity: Yes     Partners: Female   Other Topics Concern     Parent/sibling w/ CABG, MI or angioplasty before 65F 55M? Not Asked   Social History Narrative      Not on file     Social Determinants of Health     Financial Resource Strain: Not on file   Food Insecurity: Not on file   Transportation Needs: Not on file   Physical Activity: Not on file   Stress: Not on file   Social Connections: Not on file   Intimate Partner Violence: Not on file   Housing Stability: Not on file       Family History   Problem Relation Age of Onset     Arrhythmia Mother         bradycardia- pacemaker     Coronary Artery Disease Father        Medications and Allergies:     Outpatient Encounter Medications as of 3/29/2022   Medication Sig Dispense Refill     ASPIRIN LOW DOSE 81 MG EC tablet Take 1 tablet by mouth daily. 90 tablet 2     atorvastatin (LIPITOR) 40 MG tablet Take 1 tablet (40 mg) by mouth daily 90 tablet 3     bismuth subsalicylate 262 MG TABS Take 4-5 tablets by mouth daily        Cholecalciferol (VITAMIN D) 2000 UNITS CAPS Take 2,000 Units by mouth daily        multivitamin, therapeutic with minerals (MULTI-VITAMIN) TABS Take 1 tablet by mouth daily       order for DME Equipment being ordered: custom orthotic inserts for shoes 1 each 1     Probiotic Product (PROBIOTIC DAILY) CAPS Take 1 capsule by mouth daily        rifaximin (XIFAXAN) 550 MG TABS tablet Take 1 tablet (550 mg) by mouth 3 times daily for 14 days 42 tablet 0     cholestyramine (QUESTRAN) 4 g packet Take 1 packet (4 g) by mouth 2 times daily (with meals) (Patient not taking: Reported on 3/29/2022) 180 packet 3     doxycycline hyclate (VIBRAMYCIN) 100 MG capsule Take 1 capsule (100 mg) by mouth 2 times daily 10 capsule 0     levofloxacin (LEVAQUIN) 500 MG tablet Take 1 tablet (500 mg) by mouth daily 3 tablet 0     levofloxacin (LEVAQUIN) 500 MG tablet Take 1 tablet (500 mg) by mouth daily (Patient not taking: Reported on 11/23/2021) 5 tablet 0     No facility-administered encounter medications on file as of 3/29/2022.        Allergies   Allergen Reactions     Shellfish-Derived Products Anaphylaxis     Adhesive Tape       Paper tape is okay  Tegarderm is okay     Fructose      Gluten Meal      Celiac     Lactose      Reglan [Metoclopramide] Hives       Objective Findings:   Physical Exam:        Wt Readings from Last 5 Encounters:   09/23/21 108.6 kg (239 lb 6.4 oz)   09/01/21 102.1 kg (225 lb)   02/04/21 108.4 kg (239 lb)   01/28/21 108.4 kg (239 lb)   01/20/21 108.4 kg (239 lb)       General: Alert, cooperative, no distress, well-appearing  Head: Atraumatic, normocephalic, no obvious abnormalities   Eyes: EOMI, Sclera anicteric, no obvious conjunctival hemorrhage   Nose: Nares normal, no obvious malformation, no obvious rhinorrhea   Respiratory: normal appearing respirations, no cough  Musculoskeletal: Range of motion intact, no obvious strength deficit  Skin: No jaundice, no obvious rash  Neurologic: AAOx3, no obvious neurologic abnormality  Psychiatric: Normal Affect, appropriate mood  Extremities: No obvious edema, no obvious malformation    Labs, Radiology, Pathology     Lab Results   Component Value Date    WBC 4.8 08/24/2021    WBC 4.2 02/23/2021    WBC 4.2 01/20/2021    HGB 13.2 (L) 08/24/2021    HGB 12.2 (L) 02/23/2021    HGB 11.2 (L) 01/20/2021     (L) 08/24/2021     (L) 02/23/2021     (L) 01/20/2021    CHOL 88 09/23/2021    CHOL 112 06/22/2020    CHOL 106 04/10/2018    TRIG 37 09/23/2021    TRIG 48 06/22/2020    TRIG 47 04/10/2018    HDL 42 09/23/2021    HDL 47 06/22/2020    HDL 38 (L) 04/10/2018    ALT 60 09/23/2021    ALT 91 (H) 03/06/2021     (H) 02/23/2021    AST 50 (H) 09/23/2021    AST 54 (H) 03/06/2021     (H) 02/23/2021     (H) 09/23/2021     03/06/2021     02/23/2021    BUN 23 09/23/2021    BUN 28 03/06/2021    BUN 41 (H) 02/23/2021    CO2 22 09/23/2021    CO2 26 03/06/2021    CO2 16 (L) 02/23/2021    TSH 1.84 06/22/2020    TSH 2.77 08/22/2016    INR 1.18 (H) 01/16/2021        Liver Function Studies -   Recent Labs   Lab Test 03/06/21  1056   PROTTOTAL 6.6*    ALBUMIN 3.4   BILITOTAL 0.5   ALKPHOS 94   AST 54*   ALT 91*        Patient Active Problem List    Diagnosis Date Noted     Irritable bowel syndrome with diarrhea 03/29/2022     Priority: Medium     Loose stools 08/10/2021     Priority: Medium     Abdominal bloating 08/10/2021     Priority: Medium     Full incontinence of feces 08/10/2021     Priority: Medium     Cellulitis of left lower extremity 01/16/2021     Priority: Medium     Diabetic ulcer of toe of left foot associated with type 2 diabetes mellitus, unspecified ulcer stage (H) 01/16/2021     Priority: Medium     History of abdominal abscess 01/31/2018     Priority: Medium     History of ischemic bowel disease 07/03/2017     Priority: Medium     History of melanoma 07/03/2017     Priority: Medium     Microscopic colitis, unspecified microscopic colitis type 04/09/2017     Priority: Medium     Vitamin B12 deficiency (non anemic) 08/22/2016     Priority: Medium     MS (multiple sclerosis) (H) 07/24/2016     Priority: Medium     Follows at [facility].       Diabetic polyneuropathy associated with type 2 diabetes mellitus (H) 07/24/2016     Priority: Medium     Mast cell disease 04/23/2016     Priority: Medium     Overview:   possible mast cell activation syndrome       Microcytic anemia 04/23/2016     Priority: Medium     Abnormal liver function tests 03/19/2016     Priority: Medium     Body mass index (BMI) greater than 30 in adult 03/19/2016     Priority: Medium     Vertigo 03/19/2016     Priority: Medium     Stricture of duodenum 01/06/2016     Priority: Medium     Coronary arteriosclerosis in native artery 12/08/2015     Priority: Medium     Type 2 diabetes mellitus with diabetic nephropathy (H) 08/31/2015     Priority: Medium     Celiac disease 01/22/2015     Priority: Medium     Steatosis of liver 11/07/2012     Priority: Medium     Mild nonproliferative diabetic retinopathy (H) 08/30/2007     Priority: Medium     Overview:   Overview:   Hx of grid laser to L  eye - Dr. 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    The patient was seen in video telemedicine consultation regarding his persistent diarrhea since significant bowel surgery. He has had no benefit with cholestyramine or the addition of imodium to his kaopectate. At this time I will treat his diarrhea as IBS-D with a course of rifaximin. Hopefully he will have substantial benefit as he has had previous benefit with systemic antibiotics in the past.     He will notify me when he has completed a course of rifaximin to provide an update on his symptoms.     1. Irritable bowel syndrome with diarrhea    2. Celiac disease    3. Full incontinence of feces    4. History of ischemic bowel disease       Plan:  1. For your diarrhea we will try rifaximin for two weeks to treat your diarrhea as possible IBS-D  2. Please let me know when you have completed the course of antibiotic with an update on your symptoms    Follow up plan:   Return to clinic 4 months and as needed.    The risks and benefits of my recommendations, as well as other treatment options were discussed with the patient and any available family today. All questions were answered.     o Today, I personally spent 12 minutes in direct face to face time with the patient, of which greater than 50% of the time was spent in patient education and counseling as described above. Approximately 4 minutes were spent on indirect care associated with the patient's consultation including but  not limited to review of: patient medical records to date, clinic visits, hospital records, lab results, imaging studies, procedural documentation, and coordinating care with other providers. The findings from this review are summarized in the above note. A total of 16 minutes were spent on the encounter and documentation on the date of service.       Kamran Rainey DO   of Medicine  Director, Esophageal Disorders Program  Division of Gastroenterology, Hepatology, and Nutrition  [facility]

## 2022-04-16 ENCOUNTER — HEALTH MAINTENANCE LETTER (OUTPATIENT)
Age: 57
End: 2022-04-16

## 2022-06-08 ENCOUNTER — TELEPHONE (OUTPATIENT)
Dept: GASTROENTEROLOGY | Facility: CLINIC | Age: 57
End: 2022-06-08
Payer: COMMERCIAL

## 2022-06-12 ENCOUNTER — E-VISIT (OUTPATIENT)
Dept: PEDIATRICS | Facility: CLINIC | Age: 57
End: 2022-06-12
Payer: COMMERCIAL

## 2022-06-12 DIAGNOSIS — F33.0 MILD EPISODE OF RECURRENT MAJOR DEPRESSIVE DISORDER (H): Primary | ICD-10-CM

## 2022-06-12 PROCEDURE — 99421 OL DIG E/M SVC 5-10 MIN: CPT | Performed by: INTERNAL MEDICINE

## 2022-06-12 ASSESSMENT — PATIENT HEALTH QUESTIONNAIRE - PHQ9
10. IF YOU CHECKED OFF ANY PROBLEMS, HOW DIFFICULT HAVE THESE PROBLEMS MADE IT FOR YOU TO DO YOUR WORK, TAKE CARE OF THINGS AT HOME, OR GET ALONG WITH OTHER PEOPLE: SOMEWHAT DIFFICULT
SUM OF ALL RESPONSES TO PHQ QUESTIONS 1-9: 4
SUM OF ALL RESPONSES TO PHQ QUESTIONS 1-9: 4

## 2022-06-13 ASSESSMENT — PATIENT HEALTH QUESTIONNAIRE - PHQ9: SUM OF ALL RESPONSES TO PHQ QUESTIONS 1-9: 4

## 2022-10-21 ENCOUNTER — OFFICE VISIT (OUTPATIENT)
Dept: PEDIATRICS | Facility: CLINIC | Age: 57
End: 2022-10-21
Payer: COMMERCIAL

## 2022-10-21 VITALS
SYSTOLIC BLOOD PRESSURE: 128 MMHG | DIASTOLIC BLOOD PRESSURE: 74 MMHG | RESPIRATION RATE: 16 BRPM | WEIGHT: 249 LBS | OXYGEN SATURATION: 99 % | HEART RATE: 69 BPM | TEMPERATURE: 97.9 F | BODY MASS INDEX: 31.95 KG/M2 | HEIGHT: 74 IN

## 2022-10-21 DIAGNOSIS — Z23 NEED FOR VACCINATION: ICD-10-CM

## 2022-10-21 DIAGNOSIS — I25.10 CORONARY ARTERIOSCLEROSIS IN NATIVE ARTERY: ICD-10-CM

## 2022-10-21 DIAGNOSIS — M86.171 OTHER ACUTE OSTEOMYELITIS, RIGHT ANKLE AND FOOT (H): ICD-10-CM

## 2022-10-21 DIAGNOSIS — N39.0 URINARY TRACT INFECTION IN MALE: Primary | ICD-10-CM

## 2022-10-21 DIAGNOSIS — R82.90 FOUL SMELLING URINE: Primary | ICD-10-CM

## 2022-10-21 DIAGNOSIS — Z13.220 SCREENING FOR HYPERLIPIDEMIA: ICD-10-CM

## 2022-10-21 DIAGNOSIS — F33.0 MILD EPISODE OF RECURRENT MAJOR DEPRESSIVE DISORDER (H): ICD-10-CM

## 2022-10-21 DIAGNOSIS — L29.89 PRURITIC ERYTHEMATOUS RASH: ICD-10-CM

## 2022-10-21 DIAGNOSIS — G35 MS (MULTIPLE SCLEROSIS) (H): ICD-10-CM

## 2022-10-21 DIAGNOSIS — L97.529 DIABETIC ULCER OF TOE OF LEFT FOOT ASSOCIATED WITH TYPE 2 DIABETES MELLITUS, UNSPECIFIED ULCER STAGE (H): ICD-10-CM

## 2022-10-21 DIAGNOSIS — E11.621 DIABETIC ULCER OF TOE OF LEFT FOOT ASSOCIATED WITH TYPE 2 DIABETES MELLITUS, UNSPECIFIED ULCER STAGE (H): ICD-10-CM

## 2022-10-21 LAB
ALBUMIN UR-MCNC: 100 MG/DL
ANION GAP SERPL CALCULATED.3IONS-SCNC: 4 MMOL/L (ref 3–14)
APPEARANCE UR: CLEAR
BACTERIA #/AREA URNS HPF: ABNORMAL /HPF
BILIRUB UR QL STRIP: NEGATIVE
BUN SERPL-MCNC: 29 MG/DL (ref 7–30)
CALCIUM SERPL-MCNC: 8.6 MG/DL (ref 8.5–10.1)
CHLORIDE BLD-SCNC: 112 MMOL/L (ref 94–109)
CHOLEST SERPL-MCNC: 168 MG/DL
CO2 SERPL-SCNC: 24 MMOL/L (ref 20–32)
COLOR UR AUTO: YELLOW
CREAT SERPL-MCNC: 1.4 MG/DL (ref 0.66–1.25)
CREAT UR-MCNC: 187 MG/DL
FASTING STATUS PATIENT QL REPORTED: YES
GFR SERPL CREATININE-BSD FRML MDRD: 59 ML/MIN/1.73M2
GLUCOSE BLD-MCNC: 162 MG/DL (ref 70–99)
GLUCOSE UR STRIP-MCNC: NEGATIVE MG/DL
HBA1C MFR BLD: 6.1 % (ref 0–5.6)
HDLC SERPL-MCNC: 43 MG/DL
HGB UR QL STRIP: ABNORMAL
KETONES UR STRIP-MCNC: NEGATIVE MG/DL
LDLC SERPL CALC-MCNC: 106 MG/DL
LEUKOCYTE ESTERASE UR QL STRIP: ABNORMAL
MICROALBUMIN UR-MCNC: 283 MG/L
MICROALBUMIN/CREAT UR: 151.34 MG/G CR (ref 0–17)
NITRATE UR QL: POSITIVE
NONHDLC SERPL-MCNC: 125 MG/DL
PH UR STRIP: 7 [PH] (ref 5–7)
POTASSIUM BLD-SCNC: 4.9 MMOL/L (ref 3.4–5.3)
RBC #/AREA URNS AUTO: ABNORMAL /HPF
SODIUM SERPL-SCNC: 140 MMOL/L (ref 133–144)
SP GR UR STRIP: 1.01 (ref 1–1.03)
SQUAMOUS #/AREA URNS AUTO: ABNORMAL /LPF
TRIGL SERPL-MCNC: 97 MG/DL
UROBILINOGEN UR STRIP-ACNC: 1 E.U./DL
WBC #/AREA URNS AUTO: >100 /HPF
WBC CLUMPS #/AREA URNS HPF: PRESENT /HPF

## 2022-10-21 PROCEDURE — 81001 URINALYSIS AUTO W/SCOPE: CPT | Performed by: NURSE PRACTITIONER

## 2022-10-21 PROCEDURE — 87186 SC STD MICRODIL/AGAR DIL: CPT | Performed by: NURSE PRACTITIONER

## 2022-10-21 PROCEDURE — 36415 COLL VENOUS BLD VENIPUNCTURE: CPT | Performed by: NURSE PRACTITIONER

## 2022-10-21 PROCEDURE — 87086 URINE CULTURE/COLONY COUNT: CPT | Performed by: NURSE PRACTITIONER

## 2022-10-21 PROCEDURE — 99214 OFFICE O/P EST MOD 30 MIN: CPT | Mod: 25 | Performed by: NURSE PRACTITIONER

## 2022-10-21 PROCEDURE — 90471 IMMUNIZATION ADMIN: CPT | Performed by: NURSE PRACTITIONER

## 2022-10-21 PROCEDURE — 80048 BASIC METABOLIC PNL TOTAL CA: CPT | Performed by: NURSE PRACTITIONER

## 2022-10-21 PROCEDURE — 90677 PCV20 VACCINE IM: CPT | Performed by: NURSE PRACTITIONER

## 2022-10-21 PROCEDURE — 82043 UR ALBUMIN QUANTITATIVE: CPT | Performed by: NURSE PRACTITIONER

## 2022-10-21 PROCEDURE — 83036 HEMOGLOBIN GLYCOSYLATED A1C: CPT | Performed by: NURSE PRACTITIONER

## 2022-10-21 PROCEDURE — 87088 URINE BACTERIA CULTURE: CPT | Performed by: NURSE PRACTITIONER

## 2022-10-21 PROCEDURE — 80061 LIPID PANEL: CPT | Performed by: NURSE PRACTITIONER

## 2022-10-21 RX ORDER — SULFAMETHOXAZOLE/TRIMETHOPRIM 800-160 MG
1 TABLET ORAL 2 TIMES DAILY
Qty: 14 TABLET | Refills: 0 | Status: SHIPPED | OUTPATIENT
Start: 2022-10-21 | End: 2022-10-28

## 2022-10-21 RX ORDER — TRIAMCINOLONE ACETONIDE 1 MG/G
OINTMENT TOPICAL 2 TIMES DAILY
Qty: 30 G | Refills: 0 | Status: SHIPPED | OUTPATIENT
Start: 2022-10-21

## 2022-10-21 ASSESSMENT — ANXIETY QUESTIONNAIRES
7. FEELING AFRAID AS IF SOMETHING AWFUL MIGHT HAPPEN: NOT AT ALL
5. BEING SO RESTLESS THAT IT IS HARD TO SIT STILL: NOT AT ALL
7. FEELING AFRAID AS IF SOMETHING AWFUL MIGHT HAPPEN: NOT AT ALL
1. FEELING NERVOUS, ANXIOUS, OR ON EDGE: SEVERAL DAYS
2. NOT BEING ABLE TO STOP OR CONTROL WORRYING: SEVERAL DAYS
IF YOU CHECKED OFF ANY PROBLEMS ON THIS QUESTIONNAIRE, HOW DIFFICULT HAVE THESE PROBLEMS MADE IT FOR YOU TO DO YOUR WORK, TAKE CARE OF THINGS AT HOME, OR GET ALONG WITH OTHER PEOPLE: NOT DIFFICULT AT ALL
8. IF YOU CHECKED OFF ANY PROBLEMS, HOW DIFFICULT HAVE THESE MADE IT FOR YOU TO DO YOUR WORK, TAKE CARE OF THINGS AT HOME, OR GET ALONG WITH OTHER PEOPLE?: NOT DIFFICULT AT ALL
4. TROUBLE RELAXING: SEVERAL DAYS
6. BECOMING EASILY ANNOYED OR IRRITABLE: SEVERAL DAYS
GAD7 TOTAL SCORE: 5
3. WORRYING TOO MUCH ABOUT DIFFERENT THINGS: SEVERAL DAYS
GAD7 TOTAL SCORE: 5
GAD7 TOTAL SCORE: 5

## 2022-10-21 ASSESSMENT — PATIENT HEALTH QUESTIONNAIRE - PHQ9
SUM OF ALL RESPONSES TO PHQ QUESTIONS 1-9: 6
SUM OF ALL RESPONSES TO PHQ QUESTIONS 1-9: 6
10. IF YOU CHECKED OFF ANY PROBLEMS, HOW DIFFICULT HAVE THESE PROBLEMS MADE IT FOR YOU TO DO YOUR WORK, TAKE CARE OF THINGS AT HOME, OR GET ALONG WITH OTHER PEOPLE: NOT DIFFICULT AT ALL

## 2022-10-21 ASSESSMENT — PAIN SCALES - GENERAL: PAINLEVEL: NO PAIN (0)

## 2022-10-21 NOTE — PROGRESS NOTES
Assessment & Plan     Mild episode of recurrent major depressive disorder (H)  Stable, refills given.  - FLUoxetine (PROZAC) 20 MG capsule; Take 1 capsule (20 mg) by mouth daily    Foul smelling urine  Will r/o infection.   - UA Macro with Reflex to Micro and Culture - lab collect; Future    Diabetic ulcer of toe of left foot associated with type 2 diabetes mellitus, unspecified ulcer stage (H)  Due for labs. Had diabetic eye exam July 2022.  - HEMOGLOBIN A1C; Future  - BASIC METABOLIC PANEL; Future  - Albumin Random Urine Quantitative with Creat Ratio; Future    Screening for hyperlipidemia  On statin, due for labs  - Lipid panel reflex to direct LDL Non-fasting; Future    Coronary arteriosclerosis in native artery  On statin, due for labs  - Lipid panel reflex to direct LDL Non-fasting; Future    Need for vaccination  Declines other due immunziations  - Pneumococcal 20 Valent Conjugate (Prevnar 20)    Pruritic erythematous rash  Seems most likely eczema, no signs of secondary infection. Also considered lichen planus but should follow-up with derm if not improving with steroid cream.  - Adult Dermatology Referral; Future  - triamcinolone (KENALOG) 0.1 % external ointment; Apply topically 2 times daily Do not use more than 2 weeks per month    Other acute osteomyelitis, right ankle and foot (H)  Hx of this with hospitalization, no current issues.    MS (multiple sclerosis) (H)  Had previously been followed at [facility], no concerns about this today        Patient Instructions   For the rash  -ok to try the steroid ointment  -schedule with derm in case this doesn't improve    Schedule physical with Dr. Sage    Get your shingles shot       Return in about 3 months (around 1/21/2023) for Physical Exam.    Ana Orourke NP  [facility] VAL Moseley is a 56 year old, presenting for the following health issues:  Follow Up    No other SX  History of Present Illness       Reason for visit:   Smelly urine  Symptom onset:  More than a month  Symptoms include:  Urine that is smelly  Symptom intensity:  Severe  Symptom progression:  Staying the same  Had these symptoms before:  No  What makes it worse:  No  What makes it better:  No    He eats 0-1 servings of fruits and vegetables daily.He consumes 1 sweetened beverage(s) daily.He exercises with enough effort to increase his heart rate 9 or less minutes per day.  He exercises with enough effort to increase his heart rate 3 or less days per week.   He is taking medications regularly.    Today's PHQ-9         PHQ-9 Total Score: 6    PHQ-9 Q9 Thoughts of better off dead/self-harm past 2 weeks :   Not at all    How difficult have these problems made it for you to do your work, take care of things at home, or get along with other people: Not difficult at all  Today's TIGIST-7 Score: 5       Depression Followup    How are you doing with your depression since your last visit? Improved     Are you having other symptoms that might be associated with depression? No    Have you had a significant life event?  No     Are you feeling anxious or having panic attacks?   No    Do you have any concerns with your use of alcohol or other drugs? No    Social History     Tobacco Use     Smoking status: Never     Smokeless tobacco: Never   Substance Use Topics     Alcohol use: Yes     Comment: occ     Drug use: No     PHQ 3/10/2021 6/12/2022 10/21/2022   PHQ-9 Total Score 0 4 6   Q9: Thoughts of better off dead/self-harm past 2 weeks Not at all Not at all Not at all     TIGIST-7 SCORE 2/1/2019 10/21/2022   Total Score - 5 (mild anxiety)   Total Score 4 5     Last PHQ-9 10/21/2022   1.  Little interest or pleasure in doing things 1   2.  Feeling down, depressed, or hopeless 0   3.  Trouble falling or staying asleep, or sleeping too much 1   4.  Feeling tired or having little energy 3   5.  Poor appetite or overeating 1   6.  Feeling bad about yourself 0   7.  Trouble concentrating 0  "  8.  Moving slowly or restless 0   Q9: Thoughts of better off dead/self-harm past 2 weeks 0   PHQ-9 Total Score 6   Difficulty at work, home, or with people -     TIGIST-7  10/21/2022   1. Feeling nervous, anxious, or on edge 1   2. Not being able to stop or control worrying 1   3. Worrying too much about different things 1   4. Trouble relaxing 1   5. Being so restless that it is hard to sit still 0   6. Becoming easily annoyed or irritable 1   7. Feeling afraid, as if something awful might happen 0   TIGIST-7 Total Score 5   If you checked any problems, how difficult have they made it for you to do your work, take care of things at home, or get along with other people? Not difficult at all       Mood is stable, no concerns    #rash  -elbows and knees, b/l  -starts as blisters/bumps, then he scratches and scabs  -months?  -has tried OTC creams, not helping    #urine  -foul smelling  -on/off since March 2021  -no other urinary symptoms    Review of Systems         Objective    /74   Pulse 69   Temp 97.9  F (36.6  C)   Resp 16   Ht 1.88 m (6' 2\")   Wt 112.9 kg (249 lb)   SpO2 99%   BMI 31.97 kg/m    Body mass index is 31.97 kg/m .  Physical Exam   GENERAL: healthy, alert and no distress  SKIN: abrasions (look secondary to scratching) on b/l knees and elbows                    "

## 2022-10-21 NOTE — PATIENT INSTRUCTIONS
For the rash  -ok to try the steroid ointment  -schedule with derm in case this doesn't improve    Schedule physical with Dr. Sage    Get your shingles shot

## 2022-10-22 LAB
BACTERIA UR CULT: ABNORMAL
BACTERIA UR CULT: ABNORMAL

## 2022-11-09 ENCOUNTER — TRANSFERRED RECORDS (OUTPATIENT)
Dept: HEALTH INFORMATION MANAGEMENT | Facility: CLINIC | Age: 57
End: 2022-11-09

## 2022-11-09 LAB — RETINOPATHY: POSITIVE

## 2022-11-13 ENCOUNTER — HOSPITAL ENCOUNTER (EMERGENCY)
Facility: CLINIC | Age: 57
Discharge: HOME | End: 2022-11-13
Attending: EMERGENCY MEDICINE | Admitting: EMERGENCY MEDICINE
Payer: COMMERCIAL

## 2022-11-13 ENCOUNTER — APPOINTMENT (OUTPATIENT)
Dept: ULTRASOUND IMAGING | Facility: CLINIC | Age: 57
End: 2022-11-13
Attending: EMERGENCY MEDICINE
Payer: COMMERCIAL

## 2022-11-13 VITALS
SYSTOLIC BLOOD PRESSURE: 154 MMHG | OXYGEN SATURATION: 100 % | HEART RATE: 102 BPM | TEMPERATURE: 98.1 F | DIASTOLIC BLOOD PRESSURE: 80 MMHG | RESPIRATION RATE: 16 BRPM

## 2022-11-13 DIAGNOSIS — M79.89 SWELLING OF LIMB: ICD-10-CM

## 2022-11-13 DIAGNOSIS — L03.116 CELLULITIS OF LEFT LOWER EXTREMITY: ICD-10-CM

## 2022-11-13 LAB
ANION GAP SERPL CALCULATED.3IONS-SCNC: 9 MMOL/L (ref 7–15)
BASOPHILS # BLD AUTO: 0 10E3/UL (ref 0–0.2)
BASOPHILS NFR BLD AUTO: 1 %
BUN SERPL-MCNC: 21.9 MG/DL (ref 6–20)
CALCIUM SERPL-MCNC: 8.8 MG/DL (ref 8.6–10)
CHLORIDE SERPL-SCNC: 106 MMOL/L (ref 98–107)
CREAT SERPL-MCNC: 1.43 MG/DL (ref 0.67–1.17)
DEPRECATED HCO3 PLAS-SCNC: 24 MMOL/L (ref 22–29)
EOSINOPHIL # BLD AUTO: 0.2 10E3/UL (ref 0–0.7)
EOSINOPHIL NFR BLD AUTO: 4 %
ERYTHROCYTE [DISTWIDTH] IN BLOOD BY AUTOMATED COUNT: 13.3 % (ref 10–15)
GFR SERPL CREATININE-BSD FRML MDRD: 58 ML/MIN/1.73M2
GLUCOSE SERPL-MCNC: 210 MG/DL (ref 70–99)
HCT VFR BLD AUTO: 38.1 % (ref 40–53)
HGB BLD-MCNC: 12.9 G/DL (ref 13.3–17.7)
HOLD SPECIMEN: NORMAL
IMM GRANULOCYTES # BLD: 0 10E3/UL
IMM GRANULOCYTES NFR BLD: 0 %
LYMPHOCYTES # BLD AUTO: 1.1 10E3/UL (ref 0.8–5.3)
LYMPHOCYTES NFR BLD AUTO: 28 %
MCH RBC QN AUTO: 28.5 PG (ref 26.5–33)
MCHC RBC AUTO-ENTMCNC: 33.9 G/DL (ref 31.5–36.5)
MCV RBC AUTO: 84 FL (ref 78–100)
MONOCYTES # BLD AUTO: 0.2 10E3/UL (ref 0–1.3)
MONOCYTES NFR BLD AUTO: 5 %
NEUTROPHILS # BLD AUTO: 2.6 10E3/UL (ref 1.6–8.3)
NEUTROPHILS NFR BLD AUTO: 62 %
NRBC # BLD AUTO: 0 10E3/UL
NRBC BLD AUTO-RTO: 0 /100
PLATELET # BLD AUTO: 148 10E3/UL (ref 150–450)
POTASSIUM SERPL-SCNC: 4.7 MMOL/L (ref 3.4–5.3)
RBC # BLD AUTO: 4.53 10E6/UL (ref 4.4–5.9)
SODIUM SERPL-SCNC: 139 MMOL/L (ref 136–145)
WBC # BLD AUTO: 4.1 10E3/UL (ref 4–11)

## 2022-11-13 PROCEDURE — 36415 COLL VENOUS BLD VENIPUNCTURE: CPT | Performed by: EMERGENCY MEDICINE

## 2022-11-13 PROCEDURE — 93971 EXTREMITY STUDY: CPT | Mod: LT

## 2022-11-13 PROCEDURE — 99284 EMERGENCY DEPT VISIT MOD MDM: CPT | Mod: 25

## 2022-11-13 PROCEDURE — 93971 EXTREMITY STUDY: CPT | Mod: 26 | Performed by: STUDENT IN AN ORGANIZED HEALTH CARE EDUCATION/TRAINING PROGRAM

## 2022-11-13 PROCEDURE — 80048 BASIC METABOLIC PNL TOTAL CA: CPT | Performed by: EMERGENCY MEDICINE

## 2022-11-13 PROCEDURE — 99284 EMERGENCY DEPT VISIT MOD MDM: CPT | Performed by: EMERGENCY MEDICINE

## 2022-11-13 PROCEDURE — 85025 COMPLETE CBC W/AUTO DIFF WBC: CPT | Performed by: EMERGENCY MEDICINE

## 2022-11-13 RX ORDER — SULFAMETHOXAZOLE/TRIMETHOPRIM 800-160 MG
2 TABLET ORAL 2 TIMES DAILY
Qty: 28 TABLET | Refills: 0 | Status: ON HOLD | OUTPATIENT
Start: 2022-11-13 | End: 2022-11-16

## 2022-11-13 ASSESSMENT — ENCOUNTER SYMPTOMS
COLOR CHANGE: 1
HEADACHES: 0
FEVER: 0
DIFFICULTY URINATING: 0
NECK STIFFNESS: 0
LIGHT-HEADEDNESS: 0
WOUND: 1
COUGH: 0
FATIGUE: 0
POLYDIPSIA: 0
ARTHRALGIAS: 0
VOMITING: 0
CONFUSION: 0
NAUSEA: 0
NECK PAIN: 0
ADENOPATHY: 0
CHILLS: 0
SHORTNESS OF BREATH: 0
ABDOMINAL PAIN: 0
EYE REDNESS: 0
BRUISES/BLEEDS EASILY: 0

## 2022-11-13 ASSESSMENT — ACTIVITIES OF DAILY LIVING (ADL): ADLS_ACUITY_SCORE: 33

## 2022-11-13 NOTE — ED PROVIDER NOTES
ED Provider Note  [facility]      History     Chief Complaint   Patient presents with     Wound Check     HPI    Thomas Gonzales is a 57 year old male with history of MS, DMII w/ peripheral neuropathy, and right foot cellulitis/osteomyelitis, as well as prior LLE cellulitis who presents to the Emergency Department complaining of left leg swelling and redness.  Patient states that he accidentally injured his left leg while washing a car 2 weeks ago.  Abrasions have healed, however, over the last several days he has noticed redness and swelling of the affected area.  He denies any changes though he states that there is some mild, throbbing, nonradiating, constant pain.  He denies trying medications for symptomatic relief.  He denies any fevers cough, chest pain, shortness of breath.  No other concerns or complaints.      This part of the medical record was transcribed by Renetta Zamora, Medical Scribe, from a dictation done by Doreen Haley MD.       Past Medical History  Past Medical History:   Diagnosis Date     CAD (coronary artery disease)     S/p stenting of left circumflex     Diabetes mellitus (H)      Multiple sclerosis (H)      Peripheral neuropathy      Past Surgical History:   Procedure Laterality Date     APPENDECTOMY  1/8/2016     CATARACT IOL, RT/LT       CHOLECYSTECTOMY  1/8/2016     COLONOSCOPY N/A 9/1/2021    Procedure: COLONOSCOPY, WITH POLYPECTOMY AND BIOPSY;  Surgeon: Kamran Rainey DO;  [place] [facility] [place]     ESOPHAGOSCOPY, GASTROSCOPY, DUODENOSCOPY (EGD), COMBINED N/A 9/1/2021    Procedure: ESOPHAGOGASTRODUODENOSCOPY, WITH BIOPSY;  Surgeon: Kamran Rainey DO;  [place] [facility] [place]     penile implant       LEON EN Y BOWEL  1/8/2016    for small bowel ischemia     sulfamethoxazole-trimethoprim (BACTRIM DS) 800-160 MG tablet  ASPIRIN LOW DOSE 81 MG EC tablet  atorvastatin (LIPITOR) 40 MG tablet  bismuth subsalicylate 262 MG TABS  Cholecalciferol (VITAMIN D) 2000 UNITS  CAPS  FLUoxetine (PROZAC) 20 MG capsule  multivitamin, therapeutic with minerals (MULTI-VITAMIN) TABS  order for DME  Probiotic Product (PROBIOTIC DAILY) CAPS  triamcinolone (KENALOG) 0.1 % external ointment      Allergies   Allergen Reactions     Shellfish-Derived Products Anaphylaxis     Adhesive Tape      Paper tape is okay  Tegarderm is okay     Fructose      Gluten Meal      Celiac     Lactose      Reglan [Metoclopramide] Hives     Family History  Family History   Problem Relation Age of Onset     Arrhythmia Mother         bradycardia- pacemaker     Coronary Artery Disease Father      Social History   Social History     Tobacco Use     Smoking status: Never     Smokeless tobacco: Never   Substance Use Topics     Alcohol use: Yes     Comment: occ     Drug use: No      Past medical history, past surgical history, medications, allergies, family history, and social history were reviewed with the patient. No additional pertinent items.       Review of Systems   Constitutional: Negative for chills, fatigue and fever.   HENT: Negative for congestion.    Eyes: Negative for redness.   Respiratory: Negative for cough and shortness of breath.    Cardiovascular: Positive for leg swelling ( left). Negative for chest pain.   Gastrointestinal: Negative for abdominal pain, nausea and vomiting.   Endocrine: Negative for polydipsia and polyuria.   Genitourinary: Negative for difficulty urinating.   Musculoskeletal: Negative for arthralgias, neck pain and neck stiffness.   Skin: Positive for color change and wound.   Neurological: Negative for light-headedness and headaches.   Hematological: Negative for adenopathy. Does not bruise/bleed easily.   Psychiatric/Behavioral: Negative for confusion.   All other systems reviewed and are negative.    A complete review of systems was performed with pertinent positives and negatives noted in the HPI, and all other systems negative.    Physical Exam   BP: 129/76  Pulse: 80  Temp: 98.1  F  (36.7  C)  Resp: 20  SpO2: 98 %  Physical Exam  Vitals and nursing note reviewed.   Constitutional:       General: He is not in acute distress.     Appearance: Normal appearance. He is not ill-appearing, toxic-appearing or diaphoretic.   HENT:      Head: Normocephalic and atraumatic.      Mouth/Throat:      Mouth: Oropharynx is clear and moist.   Eyes:      General: No scleral icterus.     Extraocular Movements: Extraocular movements intact.      Conjunctiva/sclera: Conjunctivae normal.   Cardiovascular:      Rate and Rhythm: Normal rate.      Pulses: Normal pulses and intact distal pulses.           Dorsalis pedis pulses are 2+ on the left side.        Posterior tibial pulses are 2+ on the left side.      Heart sounds: Normal heart sounds.   Pulmonary:      Effort: Pulmonary effort is normal. No respiratory distress.      Breath sounds: Normal breath sounds.   Abdominal:      Palpations: Abdomen is soft.      Tenderness: There is no abdominal tenderness.   Musculoskeletal:         General: Swelling ( LLE) and signs of injury present. No tenderness or deformity. Normal range of motion.      Cervical back: Normal range of motion and neck supple.      Right lower leg: No edema.      Left lower leg: Edema present.   Skin:     General: Skin is warm.      Capillary Refill: Capillary refill takes less than 2 seconds.      Coloration: Skin is not pale.      Findings: Erythema present. No rash.      Comments: Healing abrasions on left lateral distal leg.  There is an area of erythema approximately 6 cm x 7 cm over lateral, distal left leg with mild tenderness to palpation.  Area is also indurated without fluctuance.   Neurological:      General: No focal deficit present.      Mental Status: He is alert and oriented to person, place, and time.      Cranial Nerves: No cranial nerve deficit.      Coordination: Coordination normal.   Psychiatric:         Mood and Affect: Mood normal.         Behavior: Behavior normal.          Thought Content: Thought content normal.         Judgment: Judgment normal.         ED Course      Procedures                     Results for orders placed or performed during the hospital encounter of 11/13/22   US Lower Extremity Venous Duplex Left     Status: None    Narrative    EXAMINATION: DOPPLER VENOUS ULTRASOUND OF THE LEFT LOWER EXTREMITY,  11/13/2022 1:00 PM     COMPARISON: None.    HISTORY: swelling, redness    TECHNIQUE:  Gray-scale evaluation with compression, spectral flow, and  color Doppler assessment of the deep venous system of the left leg  from groin to knee, and then at the ankle.    FINDINGS:  In the left lower extremity, the common femoral, femoral, popliteal  and posterior tibial veins demonstrate normal compressibility and  blood flow. There is subcutaneous edema overlying the left ankle with  overlying skin thickening. No drainable fluid collection.      Impression    IMPRESSION:    1.  No evidence left lower extremity deep venous thrombosis.   2.  Increased subcutaneous soft tissue thickening at the ankle may  represent subcutaneous edema versus cellulitis in the appropriate  setting.    I have personally reviewed the examination and initial interpretation  and I agree with the findings.    ESTUARDO TABOR MD         SYSTEM ID:  [id number]   [place] Draw     Status: None    Narrative    The following orders were created for panel order [place] Draw.  Procedure                               Abnormality         Status                     ---------                               -----------         ------                     Extra Blue Top Tube[[id number]]                              Final result               Extra Red Top Tube[[id number]]                               Final result               Extra Green Top (Lithium...[[id number]]                      Final result               Extra Purple Top Tube[[id number]]                            Final result                 Please view results for  these tests on the individual orders.   Extra Blue Top Tube     Status: None   Result Value Ref Range    Hold Specimen JIC    Extra Red Top Tube     Status: None   Result Value Ref Range    Hold Specimen JIC    Extra Green Top (Lithium Heparin) Tube     Status: None   Result Value Ref Range    Hold Specimen JIC    Extra Purple Top Tube     Status: None   Result Value Ref Range    Hold Specimen JIC    [place] Draw     Status: None    Narrative    The following orders were created for panel order [place] Draw.  Procedure                               Abnormality         Status                     ---------                               -----------         ------                     Extra Blood Culture Bottle[[id number]]                       Final result                 Please view results for these tests on the individual orders.   Extra Blood Culture Bottle     Status: None   Result Value Ref Range    Hold Specimen JIC    Basic metabolic panel     Status: Abnormal   Result Value Ref Range    Sodium 139 136 - 145 mmol/L    Potassium 4.7 3.4 - 5.3 mmol/L    Chloride 106 98 - 107 mmol/L    Carbon Dioxide (CO2) 24 22 - 29 mmol/L    Anion Gap 9 7 - 15 mmol/L    Urea Nitrogen 21.9 (H) 6.0 - 20.0 mg/dL    Creatinine 1.43 (H) 0.67 - 1.17 mg/dL    Calcium 8.8 8.6 - 10.0 mg/dL    Glucose 210 (H) 70 - 99 mg/dL    GFR Estimate 58 (L) >60 mL/min/1.73m2   CBC with platelets and differential     Status: Abnormal   Result Value Ref Range    WBC Count 4.1 4.0 - 11.0 10e3/uL    RBC Count 4.53 4.40 - 5.90 10e6/uL    Hemoglobin 12.9 (L) 13.3 - 17.7 g/dL    Hematocrit 38.1 (L) 40.0 - 53.0 %    MCV 84 78 - 100 fL    MCH 28.5 26.5 - 33.0 pg    MCHC 33.9 31.5 - 36.5 g/dL    RDW 13.3 10.0 - 15.0 %    Platelet Count 148 (L) 150 - 450 10e3/uL    % Neutrophils 62 %    % Lymphocytes 28 %    % Monocytes 5 %    % Eosinophils 4 %    % Basophils 1 %    % Immature Granulocytes 0 %    NRBCs per 100 WBC 0 <1 /100    Absolute Neutrophils 2.6 1.6 - 8.3  10e3/uL    Absolute Lymphocytes 1.1 0.8 - 5.3 10e3/uL    Absolute Monocytes 0.2 0.0 - 1.3 10e3/uL    Absolute Eosinophils 0.2 0.0 - 0.7 10e3/uL    Absolute Basophils 0.0 0.0 - 0.2 10e3/uL    Absolute Immature Granulocytes 0.0 <=0.4 10e3/uL    Absolute NRBCs 0.0 10e3/uL   CBC with platelets differential     Status: Abnormal    Narrative    The following orders were created for panel order CBC with platelets differential.  Procedure                               Abnormality         Status                     ---------                               -----------         ------                     CBC with platelets and d...[[id number]]  Abnormal            Final result                 Please view results for these tests on the individual orders.     Medications - No data to display     Assessments & Plan (with Medical Decision Making)   57-year-old man presenting with left leg redness and swelling.  Differential diagnosis: Cellulitis, DVT, unlikely subcutaneous abscess.    After thorough history and physical exam patient appears to be no acute distress.  I will obtain laboratory studies, left lower extremity ultrasound for further diagnostic evaluation.  Patient agrees with the plan.    Laboratory studies returned with no leukocytosis, WBC is normal at 4100.  There is mild anemia with hemoglobin of 12.9 which is close to patient's baseline.  Electrolytes show slightly elevated creatinine of 1.43.  I reviewed patient's left lower extremity ultrasound and I read the radiology report; no evidence of DVT or subcutaneous abscess.  At this point patient stable for discharge with prescription for Bactrim.  He agrees with the plan and he agrees to return if his symptoms worsen otherwise he will follow-up with his primary care physician.      This part of the medical record was transcribed by Renetta Zamora, Medical Scribe, from a dictation done by Leighann Haley MD.     I have reviewed the nursing notes. I have reviewed the  findings, diagnosis, plan and need for follow up with the patient.    Discharge Medication List as of 11/13/2022  2:10 PM      START taking these medications    Details   sulfamethoxazole-trimethoprim (BACTRIM DS) 800-160 MG tablet Take 2 tablets by mouth 2 times daily for 7 days, Disp-28 tablet, R-0, E-Prescribe             Final diagnoses:   Cellulitis of left lower extremity   I was physically present and have reviewed and verified the accuracy of this note documented by my scribe.    Doreen Haley MD        --  Doreen Haley MD  [facility] EMERGENCY DEPARTMENT  11/13/2022     Doreen Haley MD  11/13/22 1197

## 2022-11-13 NOTE — DISCHARGE INSTRUCTIONS
Please make an appointment to follow up with Your Primary Care Provider in 2-3 days if you have any concerns.  If your symptoms are worsening despite taking the prescribed antibiotic please come back to the emergency department.

## 2022-11-13 NOTE — ED TRIAGE NOTES
Pt arrives ambulatory to the ED  Reports slipping a couple weeks ago and scraping his left lower leg - site painful when walking and to touch    Hx; cellulitus

## 2022-11-15 ENCOUNTER — HOSPITAL ENCOUNTER (OUTPATIENT)
Facility: CLINIC | Age: 57
Setting detail: OBSERVATION
Discharge: HOME | End: 2022-11-16
Attending: EMERGENCY MEDICINE | Admitting: NURSE PRACTITIONER
Payer: COMMERCIAL

## 2022-11-15 DIAGNOSIS — Z20.822 LAB TEST NEGATIVE FOR COVID-19 VIRUS: ICD-10-CM

## 2022-11-15 DIAGNOSIS — L03.116 CELLULITIS OF LEFT LOWER EXTREMITY: ICD-10-CM

## 2022-11-15 LAB
ALBUMIN SERPL BCG-MCNC: 4.2 G/DL (ref 3.5–5.2)
ALP SERPL-CCNC: 144 U/L (ref 40–129)
ALT SERPL W P-5'-P-CCNC: 63 U/L (ref 10–50)
ANION GAP SERPL CALCULATED.3IONS-SCNC: 14 MMOL/L (ref 7–15)
AST SERPL W P-5'-P-CCNC: 57 U/L (ref 10–50)
BASOPHILS # BLD AUTO: 0 10E3/UL (ref 0–0.2)
BASOPHILS NFR BLD AUTO: 0 %
BILIRUB SERPL-MCNC: 0.4 MG/DL
BUN SERPL-MCNC: 34.9 MG/DL (ref 6–20)
CALCIUM SERPL-MCNC: 9.1 MG/DL (ref 8.6–10)
CHLORIDE SERPL-SCNC: 104 MMOL/L (ref 98–107)
CREAT SERPL-MCNC: 2.65 MG/DL (ref 0.67–1.17)
DEPRECATED HCO3 PLAS-SCNC: 19 MMOL/L (ref 22–29)
EOSINOPHIL # BLD AUTO: 0.2 10E3/UL (ref 0–0.7)
EOSINOPHIL NFR BLD AUTO: 3 %
ERYTHROCYTE [DISTWIDTH] IN BLOOD BY AUTOMATED COUNT: 13.5 % (ref 10–15)
GFR SERPL CREATININE-BSD FRML MDRD: 27 ML/MIN/1.73M2
GLUCOSE SERPL-MCNC: 157 MG/DL (ref 70–99)
HCT VFR BLD AUTO: 41.7 % (ref 40–53)
HGB BLD-MCNC: 13.9 G/DL (ref 13.3–17.7)
IMM GRANULOCYTES # BLD: 0 10E3/UL
IMM GRANULOCYTES NFR BLD: 0 %
LYMPHOCYTES # BLD AUTO: 1.1 10E3/UL (ref 0.8–5.3)
LYMPHOCYTES NFR BLD AUTO: 19 %
MCH RBC QN AUTO: 28.3 PG (ref 26.5–33)
MCHC RBC AUTO-ENTMCNC: 33.3 G/DL (ref 31.5–36.5)
MCV RBC AUTO: 85 FL (ref 78–100)
MONOCYTES # BLD AUTO: 0.4 10E3/UL (ref 0–1.3)
MONOCYTES NFR BLD AUTO: 6 %
NEUTROPHILS # BLD AUTO: 4.2 10E3/UL (ref 1.6–8.3)
NEUTROPHILS NFR BLD AUTO: 72 %
NRBC # BLD AUTO: 0 10E3/UL
NRBC BLD AUTO-RTO: 0 /100
PLATELET # BLD AUTO: 157 10E3/UL (ref 150–450)
POTASSIUM SERPL-SCNC: 5.2 MMOL/L (ref 3.4–5.3)
PROT SERPL-MCNC: 7.1 G/DL (ref 6.4–8.3)
RBC # BLD AUTO: 4.91 10E6/UL (ref 4.4–5.9)
SARS-COV-2 RNA RESP QL NAA+PROBE: NEGATIVE
SODIUM SERPL-SCNC: 137 MMOL/L (ref 136–145)
WBC # BLD AUTO: 5.9 10E3/UL (ref 4–11)

## 2022-11-15 PROCEDURE — 99219 PR INITIAL OBSERVATION CARE,LEVEL II: CPT | Performed by: NURSE PRACTITIONER

## 2022-11-15 PROCEDURE — 258N000003 HC RX IP 258 OP 636: Performed by: EMERGENCY MEDICINE

## 2022-11-15 PROCEDURE — 85025 COMPLETE CBC W/AUTO DIFF WBC: CPT | Performed by: EMERGENCY MEDICINE

## 2022-11-15 PROCEDURE — C9803 HOPD COVID-19 SPEC COLLECT: HCPCS | Performed by: EMERGENCY MEDICINE

## 2022-11-15 PROCEDURE — 96366 THER/PROPH/DIAG IV INF ADDON: CPT

## 2022-11-15 PROCEDURE — 99285 EMERGENCY DEPT VISIT HI MDM: CPT | Performed by: EMERGENCY MEDICINE

## 2022-11-15 PROCEDURE — 96367 TX/PROPH/DG ADDL SEQ IV INF: CPT

## 2022-11-15 PROCEDURE — 36415 COLL VENOUS BLD VENIPUNCTURE: CPT | Performed by: EMERGENCY MEDICINE

## 2022-11-15 PROCEDURE — 96365 THER/PROPH/DIAG IV INF INIT: CPT | Performed by: EMERGENCY MEDICINE

## 2022-11-15 PROCEDURE — U0005 INFEC AGEN DETEC AMPLI PROBE: HCPCS | Performed by: EMERGENCY MEDICINE

## 2022-11-15 PROCEDURE — 99285 EMERGENCY DEPT VISIT HI MDM: CPT | Mod: 25 | Performed by: EMERGENCY MEDICINE

## 2022-11-15 PROCEDURE — G0378 HOSPITAL OBSERVATION PER HR: HCPCS

## 2022-11-15 PROCEDURE — 80053 COMPREHEN METABOLIC PANEL: CPT | Performed by: EMERGENCY MEDICINE

## 2022-11-15 PROCEDURE — 87040 BLOOD CULTURE FOR BACTERIA: CPT | Performed by: EMERGENCY MEDICINE

## 2022-11-15 PROCEDURE — 96361 HYDRATE IV INFUSION ADD-ON: CPT

## 2022-11-15 PROCEDURE — 250N000011 HC RX IP 250 OP 636: Performed by: EMERGENCY MEDICINE

## 2022-11-15 RX ORDER — VANCOMYCIN HYDROCHLORIDE 1 G/200ML
1000 INJECTION, SOLUTION INTRAVENOUS ONCE
Status: DISCONTINUED | OUTPATIENT
Start: 2022-11-15 | End: 2022-11-15

## 2022-11-15 RX ORDER — CEFTRIAXONE 1 G/1
1 INJECTION, POWDER, FOR SOLUTION INTRAMUSCULAR; INTRAVENOUS ONCE
Status: COMPLETED | OUTPATIENT
Start: 2022-11-15 | End: 2022-11-15

## 2022-11-15 RX ORDER — SODIUM CHLORIDE 9 MG/ML
INJECTION, SOLUTION INTRAVENOUS CONTINUOUS
Status: DISCONTINUED | OUTPATIENT
Start: 2022-11-15 | End: 2022-11-16 | Stop reason: HOSPADM

## 2022-11-15 RX ADMIN — VANCOMYCIN HYDROCHLORIDE 2000 MG: 1 INJECTION, POWDER, LYOPHILIZED, FOR SOLUTION INTRAVENOUS at 20:46

## 2022-11-15 RX ADMIN — CEFTRIAXONE SODIUM 1 G: 1 INJECTION, POWDER, FOR SOLUTION INTRAMUSCULAR; INTRAVENOUS at 18:22

## 2022-11-15 RX ADMIN — SODIUM CHLORIDE 1000 ML: 9 INJECTION, SOLUTION INTRAVENOUS at 23:09

## 2022-11-15 ASSESSMENT — ACTIVITIES OF DAILY LIVING (ADL)
ADLS_ACUITY_SCORE: 33

## 2022-11-15 NOTE — ED TRIAGE NOTES
Pt presents with complaints of worsening cellulitis on left calf. Pt was diagnosed two days ago and started on Bactrim. Pt denies fevers.

## 2022-11-16 VITALS
SYSTOLIC BLOOD PRESSURE: 135 MMHG | OXYGEN SATURATION: 99 % | HEART RATE: 71 BPM | DIASTOLIC BLOOD PRESSURE: 48 MMHG | RESPIRATION RATE: 18 BRPM | TEMPERATURE: 97.8 F

## 2022-11-16 LAB
ALBUMIN SERPL BCG-MCNC: 3.4 G/DL (ref 3.5–5.2)
ALP SERPL-CCNC: 116 U/L (ref 40–129)
ALT SERPL W P-5'-P-CCNC: 47 U/L (ref 10–50)
ANION GAP SERPL CALCULATED.3IONS-SCNC: 11 MMOL/L (ref 7–15)
AST SERPL W P-5'-P-CCNC: 41 U/L (ref 10–50)
BILIRUB SERPL-MCNC: 0.5 MG/DL
BUN SERPL-MCNC: 32.1 MG/DL (ref 6–20)
CALCIUM SERPL-MCNC: 8.6 MG/DL (ref 8.6–10)
CHLORIDE SERPL-SCNC: 111 MMOL/L (ref 98–107)
CREAT SERPL-MCNC: 2 MG/DL (ref 0.67–1.17)
DEPRECATED HCO3 PLAS-SCNC: 16 MMOL/L (ref 22–29)
ERYTHROCYTE [DISTWIDTH] IN BLOOD BY AUTOMATED COUNT: 13.4 % (ref 10–15)
GFR SERPL CREATININE-BSD FRML MDRD: 38 ML/MIN/1.73M2
GLUCOSE BLDC GLUCOMTR-MCNC: 103 MG/DL (ref 70–99)
GLUCOSE BLDC GLUCOMTR-MCNC: 108 MG/DL (ref 70–99)
GLUCOSE SERPL-MCNC: 117 MG/DL (ref 70–99)
HCT VFR BLD AUTO: 37.4 % (ref 40–53)
HGB BLD-MCNC: 12.5 G/DL (ref 13.3–17.7)
MCH RBC QN AUTO: 28 PG (ref 26.5–33)
MCHC RBC AUTO-ENTMCNC: 33.4 G/DL (ref 31.5–36.5)
MCV RBC AUTO: 84 FL (ref 78–100)
PLATELET # BLD AUTO: 124 10E3/UL (ref 150–450)
POTASSIUM SERPL-SCNC: 5.1 MMOL/L (ref 3.4–5.3)
PROT SERPL-MCNC: 5.7 G/DL (ref 6.4–8.3)
RBC # BLD AUTO: 4.47 10E6/UL (ref 4.4–5.9)
SODIUM SERPL-SCNC: 138 MMOL/L (ref 136–145)
WBC # BLD AUTO: 4.8 10E3/UL (ref 4–11)

## 2022-11-16 PROCEDURE — 99217 PR OBSERVATION CARE DISCHARGE: CPT

## 2022-11-16 PROCEDURE — 250N000013 HC RX MED GY IP 250 OP 250 PS 637: Performed by: NURSE PRACTITIONER

## 2022-11-16 PROCEDURE — 36415 COLL VENOUS BLD VENIPUNCTURE: CPT | Performed by: NURSE PRACTITIONER

## 2022-11-16 PROCEDURE — 85027 COMPLETE CBC AUTOMATED: CPT | Performed by: NURSE PRACTITIONER

## 2022-11-16 PROCEDURE — G0378 HOSPITAL OBSERVATION PER HR: HCPCS

## 2022-11-16 PROCEDURE — 258N000003 HC RX IP 258 OP 636: Performed by: EMERGENCY MEDICINE

## 2022-11-16 PROCEDURE — 80053 COMPREHEN METABOLIC PANEL: CPT | Performed by: NURSE PRACTITIONER

## 2022-11-16 PROCEDURE — 258N000003 HC RX IP 258 OP 636: Performed by: NURSE PRACTITIONER

## 2022-11-16 PROCEDURE — 82962 GLUCOSE BLOOD TEST: CPT

## 2022-11-16 PROCEDURE — 99214 OFFICE O/P EST MOD 30 MIN: CPT | Performed by: STUDENT IN AN ORGANIZED HEALTH CARE EDUCATION/TRAINING PROGRAM

## 2022-11-16 RX ORDER — ONDANSETRON 2 MG/ML
4 INJECTION INTRAMUSCULAR; INTRAVENOUS EVERY 6 HOURS PRN
Status: DISCONTINUED | OUTPATIENT
Start: 2022-11-16 | End: 2022-11-16 | Stop reason: HOSPADM

## 2022-11-16 RX ORDER — LIDOCAINE 40 MG/G
CREAM TOPICAL
Status: DISCONTINUED | OUTPATIENT
Start: 2022-11-16 | End: 2022-11-16 | Stop reason: HOSPADM

## 2022-11-16 RX ORDER — BISMUTH SUBSALICYLATE 262 MG/1
524 TABLET, CHEWABLE ORAL
Status: DISCONTINUED | OUTPATIENT
Start: 2022-11-16 | End: 2022-11-16 | Stop reason: HOSPADM

## 2022-11-16 RX ORDER — ATORVASTATIN CALCIUM 40 MG/1
40 TABLET, FILM COATED ORAL DAILY
Status: DISCONTINUED | OUTPATIENT
Start: 2022-11-16 | End: 2022-11-16 | Stop reason: HOSPADM

## 2022-11-16 RX ORDER — ONDANSETRON 4 MG/1
4 TABLET, ORALLY DISINTEGRATING ORAL EVERY 6 HOURS PRN
Status: DISCONTINUED | OUTPATIENT
Start: 2022-11-16 | End: 2022-11-16 | Stop reason: HOSPADM

## 2022-11-16 RX ORDER — ASPIRIN 81 MG/1
81 TABLET ORAL DAILY
Status: DISCONTINUED | OUTPATIENT
Start: 2022-11-16 | End: 2022-11-16 | Stop reason: HOSPADM

## 2022-11-16 RX ORDER — MULTIPLE VITAMINS W/ MINERALS TAB 9MG-400MCG
1 TAB ORAL DAILY
Status: DISCONTINUED | OUTPATIENT
Start: 2022-11-16 | End: 2022-11-16 | Stop reason: HOSPADM

## 2022-11-16 RX ORDER — DOXYCYCLINE 100 MG/1
100 CAPSULE ORAL 2 TIMES DAILY
Qty: 20 CAPSULE | Refills: 0 | Status: SHIPPED | OUTPATIENT
Start: 2022-11-16 | End: 2022-11-26

## 2022-11-16 RX ADMIN — ASPIRIN 81 MG: 81 TABLET ORAL at 08:13

## 2022-11-16 RX ADMIN — Medication 1 TABLET: at 08:13

## 2022-11-16 RX ADMIN — SODIUM CHLORIDE: 9 INJECTION, SOLUTION INTRAVENOUS at 08:10

## 2022-11-16 RX ADMIN — SODIUM CHLORIDE: 9 INJECTION, SOLUTION INTRAVENOUS at 00:09

## 2022-11-16 RX ADMIN — ATORVASTATIN CALCIUM 40 MG: 40 TABLET, FILM COATED ORAL at 08:12

## 2022-11-16 RX ADMIN — FLUOXETINE 20 MG: 20 CAPSULE ORAL at 08:12

## 2022-11-16 ASSESSMENT — ACTIVITIES OF DAILY LIVING (ADL)
ADLS_ACUITY_SCORE: 35

## 2022-11-16 ASSESSMENT — ENCOUNTER SYMPTOMS
DIETARY ISSUES: ADEQUATE INTAKE
NO PATIENT REPORTED PAIN: 1
ACTIVITY IMPAIRMENT: NORMAL

## 2022-11-16 NOTE — CONSULTS
GENERAL ID SERVICE: NEW CONSULTATION    Patient:  Thomas Gonzales, Date of birth 1965, Medical record number [id number]  Date of Admission: 11/15/2022  Date of Visit:  11/16/2022  Requesting Provider:          Assessment and Recommendations:   Problem List:  Left lower extremity cellulitis  T2DM c/b peripheral neuropathy  Multiple sclerosis  CAD s/p stenting  S/p Castro-en-Y bypass    Discussion:  Thomas Gonzales is a 57 year old male with PMHx T2DM c/b peripheral neuropathy and is s/p csatro-en-Y gastric bypass presenting with worsening LLE cellulitis despite 3 days treatment of oral TMP-SMX. DVT was ruled out. The wound is nonpurulent with receding margins, though there is induration and possible fluctuance concerning for resolving inflammation vs early abscess forming. Most likely organisms include staph and strep, though with nonpurulence it is more likely strep. Additionally, he was only treated for 3 days, which may not have been enough treatment, so he could have been receiving an adequate amount despite his RXY surgery, and we would not consider this a failure of treatment. Regardless, given its appearance we would like to treat with oral doxycycline for 10 days, based on clinical resolution and follow up with PCP.     Recommendations:  - Discharge with oral doxycycline and follow up with PCP    Recommendations discussed with Dr. Saenz, ID attending.    Thank you for this consult. ID will continue to follow this patient. Please feel free to call with any questions.     Matt Enrique, [employer] medical student; Pager [phone]    ID Staff Assessment and Recommendations:  This patient was seen, examined and evaluated by me. I personally reviewed the medical records, vital signs, medications, labs and imaging studies. I agree with the documentation as above by the medical student.     Briefly, 58 yo M with hx of DM (A1c 6.1% on 10/21/2022) c/b neuropathy, MS, s/p Castro-en-Y gastric bypass (2016), CAD s/p PCI w/  minda (2015), who presented with LLE cellulitis, nonpurulent, in the setting of recent trauma few days ago. Hx of worsening pain and swelling, more prominent around LLE nabil-lateral aspect, no purulent drainage, +swelling, +++induration. LLE USG ruled out DVT 11/13. Overall afebrile, hemodynamically stable otherwise. On Bactrim x3 days, w/ minimal improvement, admitted and started on iv vancomycin.  Concern if severe soft tissues inflammation vs abscess in early stage of development. Recommend po Doxycycline 100mg BID x10 days, with close outpatient PCP follow up within 1 week. If develops in mature abscess then will required I&D.     The above plan is discussed with the primary team.    ID team will sign off at this time. Please reach out if any questions or concerns.     Lolis Saenz MD  Infectious Diseases Staff Physician  Pager: [phone]  Date of Service (when I saw the patient): 11/16/2022       History of Present Illness:     Thomas Gonzales is a 57 y.o. M we were consulted to see for discharge antibiotic recommendations for a left lower extremity cellulitis. He has a past medical history of T2DM c/b peripheral neuropathy, MS, CAD s/p stenting in 2015, and is s/o Castro-en-Y gastric bypass. The patient had injured his left leg washing his truck 11/29, and had some pain and redness from the injury, but noticed worsened swelling and redness around Thursday of last week. On Sunday he presented to the ED where he was prescribed oral Bactrim two pills twice daily for 7 days. On the third day he noticed he was not improving and presented again to the ED.     Of note, he reports a history of cellulitis in his left leg and elbow. He reports that Bactrim typically does not help, and that when he has had vancomycin it usually does help. 2 weeks prior to this injury he was on Bactrim for a UTI. He denies tobacco use. He drinks <1 drink per week. Denies other drug use. He works as a tech consultant. Lives at home with  family. Denies fevers/chills, chest pain, SOB, n/v, denies diarrhea.     In the ED, he is afebrile, VSS. Cr elevated to 2. WBC 4.8. US of LE negative for DVT. BCx NGTD.          Review of Systems:   Complete 12 point review of systems negative except as noted in HPI.        Past Medical History:     Past Medical History:   Diagnosis Date     CAD (coronary artery disease)     S/p stenting of left circumflex     Diabetes mellitus (H)      Multiple sclerosis (H)      Peripheral neuropathy          Allergies:      Allergies   Allergen Reactions     Shellfish-Derived Products Anaphylaxis     Adhesive Tape      Paper tape is okay  Tegarderm is okay     Fructose      Gluten Meal      Celiac     Lactose      Reglan [Metoclopramide] Hives           Recent Antimicrobials::   PO TMP--160 mg 2 pills twice daily.     Family History:   Reviewed and noncontributory.   Family History   Problem Relation Age of Onset     Arrhythmia Mother         bradycardia- pacemaker     Coronary Artery Disease Father             Social History:     Social History     Socioeconomic History     Marital status:      Spouse name: Not on file     Number of children: Not on file     Years of education: Not on file     Highest education level: Not on file   Occupational History     Not on file   Tobacco Use     Smoking status: Never     Smokeless tobacco: Never   Substance and Sexual Activity     Alcohol use: Yes     Comment: occ     Drug use: No     Sexual activity: Yes     Partners: Female   Other Topics Concern     Parent/sibling w/ CABG, MI or angioplasty before 65F 55M? Not Asked   Social History Narrative     Not on file     Social Determinants of Health     Financial Resource Strain: Not on file   Food Insecurity: Not on file   Transportation Needs: Not on file   Physical Activity: Not on file   Stress: Not on file   Social Connections: Not on file   Intimate Partner Violence: Not on file   Housing Stability: Not on file               Physical Exam:   /74 (BP Location: Left arm)   Pulse 69   Temp 97.7  F (36.5  C) (Oral)   Resp 18   SpO2 99%    Exam:  GENERAL:  Well-developed, well-nourished, sitting in bed in no acute distress.   ENT:  Head is normocephalic, atraumatic. Oropharynx is moist without exudates or ulcers.  EYES:  Eyes have anicteric sclerae.    NECK:  Supple.  LUNGS:  Clear to auscultation.  CARDIOVASCULAR:  Regular rate and rhythm with no murmurs, gallops or rubs.  ABDOMEN:  Normal bowel sounds, soft, nontender.  EXT: Left lower leg has redness and swelling with induration and possible fluctuance on anterior surface, measuring around 15 x 5 cm. Non tender to palpation. Nonpurulent.   SKIN:  No acute rashes.  NEUROLOGIC:  Grossly nonfocal.         Laboratory Data:     Creatinine   Date Value Ref Range Status   11/16/2022 2.00 (H) 0.67 - 1.17 mg/dL Final   11/15/2022 2.65 (H) 0.67 - 1.17 mg/dL Final   11/13/2022 1.43 (H) 0.67 - 1.17 mg/dL Final   10/21/2022 1.40 (H) 0.66 - 1.25 mg/dL Final   09/23/2021 1.11 0.66 - 1.25 mg/dL Final   03/06/2021 1.12 0.66 - 1.25 mg/dL Final   02/25/2021 1.59 (H) 0.66 - 1.25 mg/dL Final   02/23/2021 2.24 (H) 0.66 - 1.25 mg/dL Final   01/20/2021 0.90 0.66 - 1.25 mg/dL Final   01/19/2021 0.77 0.66 - 1.25 mg/dL Final     WBC   Date Value Ref Range Status   02/23/2021 4.2 4.0 - 11.0 10e9/L Final   01/20/2021 4.2 4.0 - 11.0 10e9/L Final   01/19/2021 4.9 4.0 - 11.0 10e9/L Final   01/18/2021 4.6 4.0 - 11.0 10e9/L Final   01/17/2021 4.9 4.0 - 11.0 10e9/L Final     WBC Count   Date Value Ref Range Status   11/16/2022 4.8 4.0 - 11.0 10e3/uL Final   11/15/2022 5.9 4.0 - 11.0 10e3/uL Final   11/13/2022 4.1 4.0 - 11.0 10e3/uL Final   08/24/2021 4.8 4.0 - 11.0 10e3/uL Final     Hemoglobin   Date Value Ref Range Status   11/16/2022 12.5 (L) 13.3 - 17.7 g/dL Final   02/23/2021 12.2 (L) 13.3 - 17.7 g/dL Final     Platelet Count   Date Value Ref Range Status   11/16/2022 124 (L) 150 - 450 10e3/uL Final    02/23/2021 126 (L) 150 - 450 10e9/L Final     Comment:     Reviewed: OK with previous     Lab Results   Component Value Date     11/16/2022    BUN 32.1 (H) 11/16/2022    CO2 16 (L) 11/16/2022     CRP Inflammation   Date Value Ref Range Status   02/23/2021 <2.9 0.0 - 8.0 mg/L Final   01/20/2021 12.0 (H) 0.0 - 8.0 mg/L Final   01/19/2021 15.0 (H) 0.0 - 8.0 mg/L Final   01/18/2021 26.0 (H) 0.0 - 8.0 mg/L Final   01/17/2021 51.0 (H) 0.0 - 8.0 mg/L Final           Pertinent Recent Microbiology Data:   Blood cultures NGTD.          Imaging:   No results found for this or any previous visit (from the past 48 hour(s)).

## 2022-11-16 NOTE — DISCHARGE SUMMARY
[facility]  Ed Observation Discharge Summary      Date of Admission:  11/15/2022  Date of Discharge:  11/16/2022  Discharging Provider: DAVE Ding CNP  Discharge Service: ED Observation    Discharge Diagnoses   LLE Cellulitis     Follow-ups Needed After Discharge   Follow-up Appointments     Follow Up and recommended labs and tests      Follow up with primary care provider, Anni Serrano, within 7 days for   hospital follow- up.  We recommend you have your creatinine rechecked at   this time to ensure improvement. You need to follow up with your primary   care doctor within 1 week (before completing antibiotics) to be sure the   infection has not progressed and no abscess has formed.           Unresulted Labs Ordered in the Past 30 Days of this Admission     Date and Time Order Name Status Description    11/15/2022  5:58 PM Blood Culture Peripheral Blood Preliminary     11/15/2022  5:58 PM Blood Culture Line, Other Preliminary       These results will be followed up by PCP    Discharge Disposition   Discharged to home  Condition at discharge: Stable      Hospital Course     Thomas Gonzales is a 57 year old male admitted on 11/15/2022. He Has a past medical history significant for MS, diabetes, liver steatosis, abnormal LFTs, HLD, Depression, IBS, and is s/p Castro-en-Y gastric bypass, and presents to the emergency department for evaluation of cellulitis to the left lower extremity.     #Cellulitis  57-year-old adult male who presents with left lower extremity cellulitis.  Patient was started on Bactrim 3 days ago and reports a worsening of swelling and pain since that time.  In the ED, VSS.  Labs show elevated creatinine, 2.65, up from baseline about 1.4.  No leukocytosis, WBC 5.9.  Blood cultures collected x2.  Medications: Ceftriaxone 1 g IV x1, 1000 mL IVF bolus x1, vancomycin 2000 mg IV.  Plan: [place] to ED observation for further monitoring  and management of cellulitis. No events overnight. VSS. Area of cellulitis to LLE is significantly improved from the markings from yesterday. Patient reports he noticed improvement after receiving the vanco. Improvement in tenderness and warmth of the area. No signs of sepsis. We will await ID input of discharge recommendations. Labs shows improved liver enzymes, normal electrolytes. Creatinine improved to 2.0. We will continue fluids and recheck in AM if still admitted. CBC unremarkable. Patient was evaluated by ID who recommended discharge with Doxy 100mg BID for 10 days and strict instructions to follow up with PCP within 1 week of discharge (before completing abx) to ensure improvement and that no abscess has formed. Patient agreeable to place. At the time of discharge, patient VSS, labwork stable, tolerating PO, consults completing and no pain. Ambulating independently and infection appears to be improving. Patient verbalizes understanding of follow up and discharge instructions.      #ERICK  Patient's baseline creatinine appears to be about 1.4.  In the ED tonight, creatinine 2.6. Recheck today is 2.0. Recheck Cr with PCP within 1 week of discharge.     #Abnormal LFTs  #Celiac Disease  Per chart review, patient closely follows with [facility] in [place], most recently for chronic diarrhea. Patient has a history of celiac disease and is on a gluten-free diet as well as FODMAP diet. Last EGD 9/2021, last colonoscopy 9/2021. Liver enzymes WNL this morning.      #T2DM:   Per chart review, last hemoglobin A1c: 6.1, on 10/21/2022.  Patient reports he is currently on no medications for management of his diabetes. Continue Diet and regular follow up with PCP.      #HLD:   --Continue prior to admission statin     #Depression:   --Continue prior to admission fluoxetine    Consultations This Hospital Stay   PHARMACY TO DOSE VANCO  INFECTIOUS DISEASE GENERAL ADULT IP CONSULT  INFECTIOUS DISEASE  GENERAL ADULT IP CONSULT    Code Status   Full Code    Time Spent on this Encounter   I, DAVE Ding CNP, personally saw the patient today and spent less than or equal to 30 minutes discharging this patient.       DAVE Ding CNP  [facility] [address] [address]  Phone: [phone]  Fax: [phone]  ______________________________________________________________________    Physical Exam   Vital Signs: Temp: 97.8  F (36.6  C) Temp src: Oral BP: 135/48 Pulse: 71   Resp: 18 SpO2: 99 % O2 Device: None (Room air)    Weight: 0 lbs 0 oz    Constitutional: awake, alert, cooperative, no apparent distress, and appears stated age  Eyes: Lids and lashes normal, pupils equal, round and reactive to light, extra ocular muscles intact, sclera clear, conjunctiva normal  ENT: Normocephalic, atraumatic, external ears without lesions, oral pharynx with moist mucous membranes.  Respiratory: No increased work of breathing, on room air  Cardiovascular: Normal apical impulse, regular rate and rhythm.  Abdomen: Normal bowel sounds, soft, non-distended, non-tender  Skin: There is erythema, swelling, and tenderness to the lateral aspect of the left lower extremity (improved from yesterday)   Neurologic: Awake, alert, oriented to name, place and time.  Cranial nerves II-XII are grossly intact.  Motor is 5 out of 5 bilaterally.         Primary Care Physician   Anni Serrano    Discharge Orders      Reason for your hospital stay    Cellulitis     Activity    Your activity upon discharge: activity as tolerated     Follow Up and recommended labs and tests    Follow up with primary care provider, Anni Serrano, within 7 days for hospital follow- up.  We recommend you have your creatinine rechecked at this time to ensure improvement. You need to follow up with your primary care doctor within 1 week (before completing antibiotics) to be sure  the infection has not progressed and no abscess has formed.     When to contact your care team    Return to the ED with fever, increased warmth/redness/or swelling, uncontrolled nausea, vomiting, unrelieved pain, bleeding not relieved with pressure, dizziness, chest pain, shortness of breath, loss of consciousness, and any new or concerning symptoms.     Discharge Instructions    You were admitted to ED observation for evaluation of LLE cellulitis. In the ED, your vital signs were stable. No fever. Your lab work shows no leukocytosis. Blood cultures show no growth thus far. Your creatinine was elevated. We gave you some fluids and this improved to 2.0. We recommend you follow up with your PCP within 1 week to have it rechecked to make sure it is improving. You were given IV vanco in the ED and there was significant improvement in the LLE. ID was consulted and recommended stop the bactim and start Doxy 100mg twice a day for 10 days. You need to follow up with your primary care doctor within 1 week (before completing antibiotics) to be sure the infection has not progressed and no abscess has formed.  At discharge, we recommend you take the antibiotics as prescribed and continue your prior to admission medications as previously prescribed.     Diet    Follow this diet upon discharge: Regular       Significant Results and Procedures   Most Recent 3 CBC's:Recent Labs   Lab Test 11/16/22  0657 11/15/22  1811 11/13/22  1110   WBC 4.8 5.9 4.1   HGB 12.5* 13.9 12.9*   MCV 84 85 84   * 157 148*     Most Recent 3 BMP's:Recent Labs   Lab Test 11/16/22  1356 11/16/22  0822 11/16/22  0657 11/15/22  1811 11/13/22  1110   NA  --   --  138 137 139   POTASSIUM  --   --  5.1 5.2 4.7   CHLORIDE  --   --  111* 104 106   CO2  --   --  16* 19* 24   BUN  --   --  32.1* 34.9* 21.9*   CR  --   --  2.00* 2.65* 1.43*   ANIONGAP  --   --  11 14 9   DIONICIO  --   --  8.6 9.1 8.8   * 108* 117* 157* 210*     Most Recent 2 LFT's:Recent  Labs   Lab Test 11/16/22  0657 11/15/22  1811   AST 41 57*   ALT 47 63*   ALKPHOS 116 144*   BILITOTAL 0.5 0.4     Most Recent 3 INR's:Recent Labs   Lab Test 01/16/21  1544   INR 1.18*     Most Recent INR's and Anticoagulation Dosing History:  Anticoagulation Dose History     Recent Dosing and Labs Latest Ref Rng & Units 1/16/2021    INR 0.86 - 1.14 1.18(H)        Most Recent 3 Creatinines:Recent Labs   Lab Test 11/16/22  0657 11/15/22  1811 11/13/22  1110   CR 2.00* 2.65* 1.43*     Most Recent 3 Hemoglobins:Recent Labs   Lab Test 11/16/22  0657 11/15/22  1811 11/13/22  1110   HGB 12.5* 13.9 12.9*     Most Recent 3 Troponin's:No lab results found.  Most Recent 3 BNP's:No lab results found.  Most Recent D-dimer:No lab results found.  Most Recent Cholesterol Panel:Recent Labs   Lab Test 10/21/22  0729   CHOL 168   *   HDL 43   TRIG 97     7-Day Micro Results     Collected Updated Procedure Result Status      11/15/2022 2055 11/15/2022 2215 Asymptomatic COVID-19 Virus (Coronavirus) by PCR Nose [[id number]]    Swab from Nose    Final result Component Value   SARS CoV2 PCR Negative   NEGATIVE: SARS-CoV-2 (COVID-19) RNA not detected, presumed negative.            11/15/2022 1811 11/16/2022 0731 Blood Culture Line, Other [[id number]]   Blood from Line, Other    Preliminary result Component Value   Culture No growth after 12 hours  [P]                11/15/2022 1811 11/16/2022 0731 Blood Culture Peripheral Blood [[id number]]   Peripheral Blood    Preliminary result Component Value   Culture No growth after 12 hours  [P]                    Most Recent TSH and T4:Recent Labs   Lab Test 06/22/20  1106   TSH 1.84     Most Recent Hemoglobin A1c:Recent Labs   Lab Test 10/21/22  0729   A1C 6.1*     Most Recent 6 glucoses:Recent Labs   Lab Test 11/16/22  1356 11/16/22  0822 11/16/22  0657 11/15/22  1811 11/13/22  1110 10/21/22  0729   * 108* 117* 157* 210* 162*   ,   Results for orders placed or performed during  the hospital encounter of 11/13/22   US Lower Extremity Venous Duplex Left    Narrative    EXAMINATION: DOPPLER VENOUS ULTRASOUND OF THE LEFT LOWER EXTREMITY,  11/13/2022 1:00 PM     COMPARISON: None.    HISTORY: swelling, redness    TECHNIQUE:  Gray-scale evaluation with compression, spectral flow, and  color Doppler assessment of the deep venous system of the left leg  from groin to knee, and then at the ankle.    FINDINGS:  In the left lower extremity, the common femoral, femoral, popliteal  and posterior tibial veins demonstrate normal compressibility and  blood flow. There is subcutaneous edema overlying the left ankle with  overlying skin thickening. No drainable fluid collection.      Impression    IMPRESSION:    1.  No evidence left lower extremity deep venous thrombosis.   2.  Increased subcutaneous soft tissue thickening at the ankle may  represent subcutaneous edema versus cellulitis in the appropriate  setting.    I have personally reviewed the examination and initial interpretation  and I agree with the findings.    ESTUARDO ATBOR MD         SYSTEM ID:  [id number]       Discharge Medications   Current Discharge Medication List      START taking these medications    Details   doxycycline hyclate (VIBRAMYCIN) 100 MG capsule Take 1 capsule (100 mg) by mouth 2 times daily for 10 days  Qty: 20 capsule, Refills: 0    Associated Diagnoses: Cellulitis of left lower extremity         CONTINUE these medications which have NOT CHANGED    Details   ASPIRIN LOW DOSE 81 MG EC tablet Take 1 tablet by mouth daily.  Qty: 90 tablet, Refills: 2    Associated Diagnoses: Coronary arteriosclerosis in native artery      atorvastatin (LIPITOR) 40 MG tablet Take 1 tablet (40 mg) by mouth daily  Qty: 90 tablet, Refills: 3    Comments: Profile Rx: patient will contact pharmacy when needed  Associated Diagnoses: Coronary arteriosclerosis in native artery      bismuth subsalicylate 262 MG TABS Take 4-5 tablets by mouth daily        Cholecalciferol (VITAMIN D) 2000 UNITS CAPS Take 2,000 Units by mouth daily       FLUoxetine (PROZAC) 20 MG capsule Take 1 capsule (20 mg) by mouth daily  Qty: 90 capsule, Refills: 1    Associated Diagnoses: Mild episode of recurrent major depressive disorder (H)      multivitamin w/minerals (THERA-VIT-M) tablet Take 1 tablet by mouth daily      order for DME Equipment being ordered: custom orthotic inserts for shoes  Qty: 1 each, Refills: 1    Associated Diagnoses: Diabetic polyneuropathy associated with type 2 diabetes mellitus (H)      Probiotic Product (PROBIOTIC DAILY) CAPS Take 1 capsule by mouth daily       triamcinolone (KENALOG) 0.1 % external ointment Apply topically 2 times daily Do not use more than 2 weeks per month  Qty: 30 g, Refills: 0    Associated Diagnoses: Pruritic erythematous rash         STOP taking these medications       sulfamethoxazole-trimethoprim (BACTRIM DS) 800-160 MG tablet Comments:   Reason for Stopping:             Allergies   Allergies   Allergen Reactions     Shellfish-Derived Products Anaphylaxis     Adhesive Tape      Paper tape is okay  Tegarderm is okay     Fructose      Gluten Meal      Celiac     Lactose      Reglan [Metoclopramide] Hives

## 2022-11-16 NOTE — PHARMACY-VANCOMYCIN DOSING SERVICE
Pharmacy Vancomycin Initial Note  Date of Service November 15, 2022  Patient's  1965  57 year old, male    Indication: Skin and Soft Tissue Infection    Current estimated CrCl = Estimated Creatinine Clearance: 41.6 mL/min (A) (based on SCr of 2.65 mg/dL (H)).    Creatinine for last 3 days  2022: 11:10 AM Creatinine 1.43 mg/dL  11/15/2022:  6:11 PM Creatinine 2.65 mg/dL    Recent Vancomycin Level(s) for last 3 days  No results found for requested labs within last 72 hours.      Vancomycin IV Administrations (past 72 hours)      No vancomycin orders with administrations in past 72 hours.                Nephrotoxins and other renal medications (From now, onward)    Start     Dose/Rate Route Frequency Ordered Stop    11/15/22 2000  vancomycin (VANCOCIN) 2,000 mg in sodium chloride 0.9 % 500 mL intermittent infusion         2,000 mg  over 120 Minutes Intravenous ONCE 11/15/22 1938            Contrast Orders - past 72 hours (72h ago, onward)    None                  Plan:  1. Give vancomycin 2000mg IV x1 now. Will dose intermittently based on levels for now given ERICK on admit  2. Vancomycin monitoring method: Trough (Method 2 = manual dose calculation)  3. Vancomycin therapeutic monitoring goal: 10-15 mg/L  4. Pharmacy will check vancomycin levels as appropriate in 1-3 Days.    5. Serum creatinine levels will be ordered daily for the first week of therapy and at least twice weekly for subsequent weeks.      Juanpablo Conway, ParvinD, BCPS

## 2022-11-16 NOTE — PROGRESS NOTES
[facility]    Medicine Progress Note - ED Observation   Date of Admission:  11/15/2022    Assessment & Plan     Thomas Gonzales is a 57 year old male admitted on 11/15/2022. He Has a past medical history significant for MS, diabetes, liver steatosis, abnormal LFTs, HLD, Depression, IBS, and is s/p Castro-en-Y gastric bypass, and presents to the emergency department for evaluation of cellulitis to the left lower extremity.     #Cellulitis  57-year-old adult male who presents with left lower extremity cellulitis.  Patient was started on Bactrim 3 days ago and reports a worsening of swelling and pain since that time.  In the ED, VSS.  Labs show elevated creatinine, 2.65, up from baseline about 1.4.  No leukocytosis, WBC 5.9.  Blood cultures collected x2.  Medications: Ceftriaxone 1 g IV x1, 1000 mL IVF bolus x1, vancomycin 2000 mg IV.  Plan: [place] to ED observation for further monitoring and management of cellulitis. No events overnight. VSS. Area of cellulitis to LLE is significantly improved from the markings from yesterday. Patient reports he noticed improvement after receiving the vanco. Improvement in tenderness and warmth of the area. No signs of sepsis. We will await ID input of discharge recommendations. Labs shows improved liver enzymes, normal electrolytes. Creatinine improved to 2.0. We will continue fluids and recheck in AM if still admitted. CBC unremarkable  - Pharmacy to dose vancomycin  - ID consult, appreciate recs  - Margins of erythema marked -improved today  - IVF  - Recheck BMP in AM     #ERICK  Patient's baseline creatinine appears to be about 1.4.  In the ED tonight, creatinine 2.6. Recheck today is 2.0.  -Continue IVF  -Recheck Cr with PCP within 1 week of discharge.     #Abnormal LFTs  #Celiac Disease  Per chart review, patient closely follows with [facility] in [place], most recently for chronic diarrhea. Patient has a  "history of celiac disease and is on a gluten-free diet as well as FODMAP diet. Last EGD 9/2021, last colonoscopy 9/2021.  - CMP in a.m.     #T2DM:   Per chart review, last hemoglobin A1c: 6.1, on 10/21/2022.  Patient reports he is currently on no medications for management of his diabetes.  - Glucose checks twice daily     #HLD:   --Continue prior to admission statin     #Depression:   --Continue prior to admission fluoxetine       Diet: Regular Diet Adult    DVT Prophylaxis: Low Risk/Ambulatory with no VTE prophylaxis indicated  Martínez Catheter: Not present  Central Lines: None  Cardiac Monitoring: None  Code Status: Full Code      Disposition Plan    Likely discharge today pending ID recommendations    The patient's care was discussed with the Attending Physician, Dr. Hale, Bedside Nurse, Patient and ID Team.    DAVE Ding CNP  ED Observation   [facility]  Securely message with the Vocera Web Console (learn more here)  Text page via [employer] Paging/Directory         Clinically Significant Risk Factors Present on Admission              # Hypoalbuminemia: Lowest albumin = 3.4 g/dL (Ref range: 3.5-5.2) at 11/16/2022  6:57 AM, will monitor as appropriate   # Thrombocytopenia: Lowest platelets = 124 (Ref range: 150-450) in last 2 days, will monitor for bleeding       # Obesity: Estimated body mass index is 31.97 kg/m  as calculated from the following:    Height as of 10/21/22: 1.88 m (6' 2\").    Weight as of 10/21/22: 112.9 kg (249 lb).           ______________________________________________________________________    Interval History   No events overnight. VSS. Area of cellulitis to LLE is significantly improved from the markings from yesterday. Patient reports he noticed improvement after receiving the vanco. Improvement in tenderness and warmth of the area. No signs of sepsis. We will await ID input of discharge recommendations.     Data reviewed today: " I reviewed all medications, new labs and imaging results over the last 24 hours.     Physical Exam   Vital Signs: Temp: 97.9  F (36.6  C) Temp src: Oral BP: 139/79 Pulse: 67   Resp: 18 SpO2: 100 % O2 Device: None (Room air)    Weight: 0 lbs 0 oz     Constitutional: awake, alert, cooperative, no apparent distress, and appears stated age  Eyes: Lids and lashes normal, pupils equal, round and reactive to light, extra ocular muscles intact, sclera clear, conjunctiva normal  ENT: Normocephalic, atraumatic, external ears without lesions, oral pharynx with moist mucous membranes.  Respiratory: No increased work of breathing, on room air  Cardiovascular: Normal apical impulse, regular rate and rhythm.  Abdomen: Normal bowel sounds, soft, non-distended, non-tender  Skin: There is erythema, swelling, and tenderness to the lateral aspect of the left lower extremity (improved from yesterday)   Neurologic: Awake, alert, oriented to name, place and time.  Cranial nerves II-XII are grossly intact.  Motor is 5 out of 5 bilaterally.           Data   Recent Labs   Lab 11/16/22  0822 11/16/22  0657 11/15/22  1811 11/13/22  1110   WBC  --  4.8 5.9 4.1   HGB  --  12.5* 13.9 12.9*   MCV  --  84 85 84   PLT  --  124* 157 148*   NA  --  138 137 139   POTASSIUM  --  5.1 5.2 4.7   CHLORIDE  --  111* 104 106   CO2  --  16* 19* 24   BUN  --  32.1* 34.9* 21.9*   CR  --  2.00* 2.65* 1.43*   ANIONGAP  --  11 14 9   DIONICIO  --  8.6 9.1 8.8   * 117* 157* 210*   ALBUMIN  --  3.4* 4.2  --    PROTTOTAL  --  5.7* 7.1  --    BILITOTAL  --  0.5 0.4  --    ALKPHOS  --  116 144*  --    ALT  --  47 63*  --    AST  --  41 57*  --      No results found for this or any previous visit (from the past 24 hour(s)).  Medications     sodium chloride 125 mL/hr at 11/16/22 0810       aspirin  81 mg Oral Daily     atorvastatin  40 mg Oral Daily     bismuth subsalicylate  524 mg Oral 4x Daily AC & HS     FLUoxetine  20 mg Oral Daily     multivitamin w/minerals  1  tablet Oral Daily     sodium chloride (PF)  3 mL Intracatheter Q8H     vancomycin place clancy - receiving intermittent dosing  1 each Intravenous See Admin Instructions

## 2022-11-16 NOTE — H&P
[facility]    History and Physical - ED Observation       Date of Admission:  11/15/2022    Assessment & Plan      Thomas Gonzales is a 57 year old male admitted on 11/15/2022. He Has a past medical history significant for MS, diabetes, liver steatosis, abnormal LFTs, HLD, Depression, IBS, and is s/p Castro-en-Y gastric bypass, and presents to the emergency department for evaluation of cellulitis to the left lower extremity.    #Cellulitis  57-year-old adult male who presents with left lower extremity cellulitis.  Patient was started on Bactrim 3 days ago and reports a worsening of swelling and pain since that time.  In the ED, VSS.  Labs show elevated creatinine, 2.65, up from baseline about 1.4.  No leukocytosis, WBC 5.9.  Blood cultures collected x2.  Medications: Ceftriaxone 1 g IV x1, 1000 mL IVF bolus x1, vancomycin 2000 mg IV.  Plan: [place] to ED observation for further monitoring and management of cellulitis.  - Pharmacy to dose vancomycin  - Consider infectious disease consult  - Margins of erythema marked  - IVF  - CBC, CMP in a.m.    #ERICK  Patient's baseline creatinine appears to be about 1.4.  In the ED tonight, creatinine 2.6.   - IVF  - CMP in a.m.    #Abnormal LFTs  #Celiac Disease  Per chart review, patient closely follows with [facility] in [place], most recently for chronic diarrhea. Patient has a history of celiac disease and is on a gluten-free diet as well as FODMAP diet. Last EGD 9/2021, last colonoscopy 9/2021.  - CMP in a.m.    #T2DM:   Per chart review, last hemoglobin A1c: 6.1, on 10/21/2022.  Patient reports he is currently on no medications for management of his diabetes.  - Glucose checks twice daily    #HLD:   --Continue prior to admission statin    #Depression:   --Continue prior to admission fluoxetine     Diet: Regular Diet Adult    DVT Prophylaxis: Low Risk/Ambulatory with no VTE prophylaxis indicated  Tanya  "Catheter: Not present  Central Lines: None  Cardiac Monitoring: None  Code Status: Full Code      Clinically Significant Risk Factors Present on Admission                 # Acute Kidney Injury, unspecified: based on a >150% or 0.3 mg/dL increase in last creatinine compared to past 90 day average, will monitor renal function      # Obesity: Estimated body mass index is 31.97 kg/m  as calculated from the following:    Height as of 10/21/22: 1.88 m (6' 2\").    Weight as of 10/21/22: 112.9 kg (249 lb).           Disposition Plan      Expected Discharge Date: 11/16/2022                The patient's care was discussed with the Bedside Nurse, Patient and ED MD, Dr Ryan.    Mleissa Wu, CNP  Hospitalist Service  [facility]  ED Observation, Ascom Phone: *[phone]  ______________________________________________________________________    Chief Complaint   Cellulitis    History is obtained from the patient    History of Present Illness   Per ED, \"Thomas Gonzales is a 57 year old male who presents with left lower extremity cellulitis.  Patient had an injury to his left lower extremity on 10/29/2022 but developed a cellulitis over the weekend.  He was seen on 11/13/2022 and started on Bactrim.  He has been taking these since that time but he notes worsening of his symptoms.  He has increased size in the area of redness as well as increased pain.  No fever chills or systemic symptoms.  He has had similar episodes in the past.  No other symptoms noted.\"    Review of Systems    A complete review of systems was performed with pertinent positives and negatives noted in the HPI, and all other systems negative.    Past Medical History    I have reviewed this patient's medical history and updated it with pertinent information if needed.   Past Medical History:   Diagnosis Date     CAD (coronary artery disease)     S/p stenting of left circumflex     Diabetes mellitus (H)      Multiple " sclerosis (H)      Peripheral neuropathy        Past Surgical History   I have reviewed this patient's surgical history and updated it with pertinent information if needed.  Past Surgical History:   Procedure Laterality Date     APPENDECTOMY  1/8/2016     CATARACT IOL, RT/LT       CHOLECYSTECTOMY  1/8/2016     COLONOSCOPY N/A 9/1/2021    Procedure: COLONOSCOPY, WITH POLYPECTOMY AND BIOPSY;  Surgeon: Kamran Rainey DO;  [place] [facility] [place]     ESOPHAGOSCOPY, GASTROSCOPY, DUODENOSCOPY (EGD), COMBINED N/A 9/1/2021    Procedure: ESOPHAGOGASTRODUODENOSCOPY, WITH BIOPSY;  Surgeon: Kamran Rainey DO;  [place] [facility] [place]     penile implant       LEON EN Y BOWEL  1/8/2016    for small bowel ischemia       Social History   I have reviewed this patient's social history and updated it with pertinent information if needed.  Social History     Tobacco Use     Smoking status: Never     Smokeless tobacco: Never   Substance Use Topics     Alcohol use: Yes     Comment: occ     Drug use: No       Family History   I have reviewed this patient's family history and updated it with pertinent information if needed.  Family History   Problem Relation Age of Onset     Arrhythmia Mother         bradycardia- pacemaker     Coronary Artery Disease Father        Prior to Admission Medications   Prior to Admission Medications   Prescriptions Last Dose Informant Patient Reported? Taking?   ASPIRIN LOW DOSE 81 MG EC tablet 11/15/2022 at am Self No Yes   Sig: Take 1 tablet by mouth daily.   Cholecalciferol (VITAMIN D) 2000 UNITS CAPS 11/14/2022 at unknown Self Yes Yes   Sig: Take 2,000 Units by mouth daily    FLUoxetine (PROZAC) 20 MG capsule 11/15/2022 at am Self No Yes   Sig: Take 1 capsule (20 mg) by mouth daily   Probiotic Product (PROBIOTIC DAILY) CAPS 11/15/2022 at am Self Yes Yes   Sig: Take 1 capsule by mouth daily    atorvastatin (LIPITOR) 40 MG tablet 11/15/2022 at am Self No Yes   Sig: Take 1 tablet (40 mg) by mouth daily   bismuth  subsalicylate 262 MG TABS 11/15/2022 at am Self Yes Yes   Sig: Take 4-5 tablets by mouth daily    multivitamin w/minerals (THERA-VIT-M) tablet 11/15/2022 at am Self Yes Yes   Sig: Take 1 tablet by mouth daily   order for DME Unknown at unknown Self No Yes   Sig: Equipment being ordered: custom orthotic inserts for shoes   sulfamethoxazole-trimethoprim (BACTRIM DS) 800-160 MG tablet 11/15/2022 at am Self No Yes   Sig: Take 2 tablets by mouth 2 times daily for 7 days   triamcinolone (KENALOG) 0.1 % external ointment Past Week at unknown Self No Yes   Sig: Apply topically 2 times daily Do not use more than 2 weeks per month      Facility-Administered Medications: None     Allergies   Allergies   Allergen Reactions     Shellfish-Derived Products Anaphylaxis     Adhesive Tape      Paper tape is okay  Tegarderm is okay     Fructose      Gluten Meal      Celiac     Lactose      Reglan [Metoclopramide] Hives       Physical Exam   Vital Signs: Temp: 98.1  F (36.7  C) Temp src: Oral BP: 94/61 Pulse: 74   Resp: 19 SpO2: 97 % O2 Device: None (Room air)    Weight: 0 lbs 0 oz    Constitutional: awake, alert, cooperative, no apparent distress, and appears stated age  Eyes: Lids and lashes normal, pupils equal, round and reactive to light, extra ocular muscles intact, sclera clear, conjunctiva normal  ENT: Normocephalic, atraumatic, external ears without lesions, oral pharynx with moist mucous membranes.  Respiratory: No increased work of breathing, on room air  Cardiovascular: Normal apical impulse, regular rate and rhythm.  Abdomen: Normal bowel sounds, soft, non-distended, non-tender  Skin: normal skin color, texture, turgor and no redness, warmth, or swelling  Musculoskeletal: There is erythema, swelling, and tenderness to the lateral aspect of the left lower extremity.       Neurologic: Awake, alert, oriented to name, place and time.  Cranial nerves II-XII are grossly intact.  Motor is 5 out of 5 bilaterally.      Data   Data  reviewed today: I reviewed all medications, new labs and imaging results over the last 24 hours. I personally reviewed no images or EKG's today.    Recent Labs   Lab 11/15/22  1811 11/13/22  1110   WBC 5.9 4.1   HGB 13.9 12.9*   MCV 85 84    148*    139   POTASSIUM 5.2 4.7   CHLORIDE 104 106   CO2 19* 24   BUN 34.9* 21.9*   CR 2.65* 1.43*   ANIONGAP 14 9   DIONICIO 9.1 8.8   * 210*   ALBUMIN 4.2  --    PROTTOTAL 7.1  --    BILITOTAL 0.4  --    ALKPHOS 144*  --    ALT 63*  --    AST 57*  --      No results found for this or any previous visit (from the past 24 hour(s)).

## 2022-11-16 NOTE — PROGRESS NOTES
Observation Goals    -Tolerating oral antibiotics or has plans for home infusion set up: Met  - Vital signs normal or at patient baseline: Met  - Adequate pain control on oral analgesia: Met  - Infection is improving: Progressing  - Color, warmth, movement, sensation of affected area or limb is intact or at baseline: Met  - Return to baseline functional status: Progressing  - Safe disposition plan has been identified: Not met   - Nurse to notify provider when observation goals have been met and patient is ready for discharge.

## 2022-11-16 NOTE — PLAN OF CARE
Goal Outcome Evaluation:  - Tolerating oral antibiotics or has plans for home infusion set up. Yes  - Vital signs normal or at patient baseline. Yes  - Adequate pain control on oral analgesia. Yes  - Infection is improving. Yes  - Color, warmth, movement, sensation of affected area or limb is intact or at baseline. Yes  - Return to baseline functional status. Yes  - Safe disposition plan has been identified. Yes

## 2022-11-16 NOTE — ED PROVIDER NOTES
ED Provider Note  [facility]      History     Chief Complaint   Patient presents with     Cellulitis     HPI  Thomas Gonzales is a 57 year old male who presents with left lower extremity cellulitis.  Patient had an injury to his left lower extremity on 10/29/2022 but developed a cellulitis over the weekend.  He was seen on 11/13/2022 and started on Bactrim.  He has been taking these since that time but he notes worsening of his symptoms.  He has increased size in the area of redness as well as increased pain.  No fever chills or systemic symptoms.  He has had similar episodes in the past.  No other symptoms noted.    Past Medical History  Past Medical History:   Diagnosis Date     CAD (coronary artery disease)     S/p stenting of left circumflex     Diabetes mellitus (H)      Multiple sclerosis (H)      Peripheral neuropathy      Past Surgical History:   Procedure Laterality Date     APPENDECTOMY  1/8/2016     CATARACT IOL, RT/LT       CHOLECYSTECTOMY  1/8/2016     COLONOSCOPY N/A 9/1/2021    Procedure: COLONOSCOPY, WITH POLYPECTOMY AND BIOPSY;  Surgeon: Kamran Rainey DO;  Location: [facility] [place]     ESOPHAGOSCOPY, GASTROSCOPY, DUODENOSCOPY (EGD), COMBINED N/A 9/1/2021    Procedure: ESOPHAGOGASTRODUODENOSCOPY, WITH BIOPSY;  Surgeon: Kamran Rainey DO;  Location: [facility] [place]     penile implant       LEON EN Y BOWEL  1/8/2016    for small bowel ischemia     ASPIRIN LOW DOSE 81 MG EC tablet  atorvastatin (LIPITOR) 40 MG tablet  bismuth subsalicylate 262 MG TABS  Cholecalciferol (VITAMIN D) 2000 UNITS CAPS  FLUoxetine (PROZAC) 20 MG capsule  multivitamin, therapeutic with minerals (MULTI-VITAMIN) TABS  order for DME  Probiotic Product (PROBIOTIC DAILY) CAPS  sulfamethoxazole-trimethoprim (BACTRIM DS) 800-160 MG tablet  triamcinolone (KENALOG) 0.1 % external ointment      Allergies   Allergen Reactions     Shellfish-Derived Products Anaphylaxis     Adhesive Tape      Paper tape is lamberto Gant  is okay     Fructose      Gluten Meal      Celiac     Lactose      Reglan [Metoclopramide] Hives     Family History  Family History   Problem Relation Age of Onset     Arrhythmia Mother         bradycardia- pacemaker     Coronary Artery Disease Father      Social History   Social History     Tobacco Use     Smoking status: Never     Smokeless tobacco: Never   Substance Use Topics     Alcohol use: Yes     Comment: occ     Drug use: No      Past medical history, past surgical history, medications, allergies, family history, and social history were reviewed with the patient. No additional pertinent items.       Review of Systems  A complete review of systems was performed with pertinent positives and negatives noted in the HPI, and all other systems negative.    Physical Exam   BP: 94/61  Pulse: 74  Temp: 98.1  F (36.7  C)  Resp: 19  SpO2: 97 %  Physical Exam  Vitals and nursing note reviewed.   Constitutional:       General: He is not in acute distress.     Appearance: He is well-developed and well-nourished. He is not diaphoretic.   HENT:      Head: Normocephalic and atraumatic.      Mouth/Throat:      Pharynx: No oropharyngeal exudate.   Eyes:      General: No scleral icterus.        Right eye: No discharge.         Left eye: No discharge.      Pupils: Pupils are equal, round, and reactive to light.   Cardiovascular:      Rate and Rhythm: Normal rate and regular rhythm.      Pulses: Intact distal pulses.      Heart sounds: Normal heart sounds. No murmur heard.    No friction rub. No gallop.   Pulmonary:      Effort: Pulmonary effort is normal. No respiratory distress.      Breath sounds: Normal breath sounds. No wheezing.   Chest:      Chest wall: No tenderness.   Abdominal:      General: Bowel sounds are normal. There is no distension.      Palpations: Abdomen is soft.      Tenderness: There is no abdominal tenderness.   Musculoskeletal:         General: No tenderness, deformity or edema. Normal range of motion.       Cervical back: Normal range of motion and neck supple.        Legs:    Skin:     General: Skin is warm and dry.      Coloration: Skin is not pale.      Findings: No erythema or rash.   Neurological:      Mental Status: He is alert and oriented to person, place, and time.      Cranial Nerves: No cranial nerve deficit.   Psychiatric:         Mood and Affect: Mood and affect normal.         ED Course      Procedures                     No results found for any visits on 11/15/22.  Medications   cefTRIAXone (ROCEPHIN) 1 g vial to attach to  mL bag for ADULTS or NS 50 mL bag for PEDS (has no administration in time range)        Assessments & Plan (with Medical Decision Making)   This is a 57-year-old male who presents with left lower extremity cellulitis.  Patient was started on Bactrim for this but has had worsening of his symptoms since that time.  He has an area of erythema and tenderness to the lateral aspect of the left lower extremity.  This does appear to be cellulitic.  There is no overt fluid collection.  Patient was started on ceftriaxone and vancomycin.  Lab work shows slight increase in creatinine at 2.65 up from a baseline of approximately 1.4.  We will admit to Observation for IV antibiotics.    I have reviewed the nursing notes. I have reviewed the findings, diagnosis, plan and need for follow up with the patient.    New Prescriptions    No medications on file       Final diagnoses:   None       --  Juanito Ryan DO  [facility] EMERGENCY DEPARTMENT  11/15/2022     Juanito Ryan,   11/15/22 2036

## 2022-11-16 NOTE — PROGRESS NOTES
Thomas Gonzales is a 57 year old male patient.  1. Cellulitis of left lower extremity      Past Medical History:   Diagnosis Date     CAD (coronary artery disease)     S/p stenting of left circumflex     Diabetes mellitus (H)      Multiple sclerosis (H)      Peripheral neuropathy      Current Outpatient Medications   Medication Sig Dispense Refill     ASPIRIN LOW DOSE 81 MG EC tablet Take 1 tablet by mouth daily. 90 tablet 2     atorvastatin (LIPITOR) 40 MG tablet Take 1 tablet (40 mg) by mouth daily 90 tablet 3     bismuth subsalicylate 262 MG TABS Take 4-5 tablets by mouth daily        Cholecalciferol (VITAMIN D) 2000 UNITS CAPS Take 2,000 Units by mouth daily        FLUoxetine (PROZAC) 20 MG capsule Take 1 capsule (20 mg) by mouth daily 90 capsule 1     multivitamin w/minerals (THERA-VIT-M) tablet Take 1 tablet by mouth daily       order for DME Equipment being ordered: custom orthotic inserts for shoes 1 each 1     Probiotic Product (PROBIOTIC DAILY) CAPS Take 1 capsule by mouth daily        sulfamethoxazole-trimethoprim (BACTRIM DS) 800-160 MG tablet Take 2 tablets by mouth 2 times daily for 7 days 28 tablet 0     triamcinolone (KENALOG) 0.1 % external ointment Apply topically 2 times daily Do not use more than 2 weeks per month 30 g 0     Allergies   Allergen Reactions     Shellfish-Derived Products Anaphylaxis     Adhesive Tape      Paper tape is okay  Tegarderm is okay     Fructose      Gluten Meal      Celiac     Lactose      Reglan [Metoclopramide] Hives     Active Problems:    * No active hospital problems. *    Blood pressure 139/79, pulse 67, temperature 97.9  F (36.6  C), temperature source Oral, resp. rate 18, SpO2 100 %.    Subjective:  Symptoms:  Improved.  (Left leg pain and swelling ).    Diet:  Adequate intake.    Activity level: Normal.    Pain:  He reports no pain.      Objective:  General Appearance:  Comfortable.    Vital signs: (most recent): Blood pressure 139/79, pulse 67, temperature  97.9  F (36.6  C), temperature source Oral, resp. rate 18, SpO2 100 %.  Vital signs are normal.    Output: Producing urine.    HEENT: Normal HEENT exam.    Lungs:  Normal effort and normal respiratory rate.  Breath sounds clear to auscultation.    Heart: Normal rate.  Regular rhythm.  S1 normal and S2 normal.    Abdomen: Abdomen is soft.  Bowel sounds are normal.   There is no abdominal tenderness.     Extremities: Normal range of motion.  There is local swelling (left leg improving).    Pulses: Distal pulses are intact.    Neurological: Patient is alert.    Pupils:  Pupils are equal, round, and reactive to light.    Skin:  Warm.      Assessment:    Condition: In stable condition.  Improving.   (57 yof presents with recurrent left leg cellulitis-improving, no suggestion of sepsis. Awaiting ID iuput, possible discharge later today.).     The pt was seen and examined by myself. The case was reviewed and the plan was discussed with the OLIVIA.    Xiao Hale MD, MD  11/16/2022

## 2022-11-20 LAB
BACTERIA BLD CULT: NO GROWTH
BACTERIA BLD CULT: NO GROWTH

## 2022-11-23 ENCOUNTER — OFFICE VISIT (OUTPATIENT)
Dept: FAMILY MEDICINE | Facility: CLINIC | Age: 57
End: 2022-11-23
Payer: COMMERCIAL

## 2022-11-23 VITALS
HEART RATE: 73 BPM | WEIGHT: 260 LBS | RESPIRATION RATE: 16 BRPM | BODY MASS INDEX: 33.38 KG/M2 | SYSTOLIC BLOOD PRESSURE: 128 MMHG | OXYGEN SATURATION: 98 % | TEMPERATURE: 97.1 F | DIASTOLIC BLOOD PRESSURE: 78 MMHG

## 2022-11-23 DIAGNOSIS — D47.09 MAST CELL DISEASE: ICD-10-CM

## 2022-11-23 DIAGNOSIS — L03.116 CELLULITIS OF LEFT LEG: Primary | ICD-10-CM

## 2022-11-23 DIAGNOSIS — E11.42 DIABETIC POLYNEUROPATHY ASSOCIATED WITH TYPE 2 DIABETES MELLITUS (H): ICD-10-CM

## 2022-11-23 LAB
ANION GAP SERPL CALCULATED.3IONS-SCNC: 10 MMOL/L (ref 7–15)
BUN SERPL-MCNC: 25.7 MG/DL (ref 6–20)
CALCIUM SERPL-MCNC: 8.7 MG/DL (ref 8.6–10)
CHLORIDE SERPL-SCNC: 109 MMOL/L (ref 98–107)
CREAT SERPL-MCNC: 1.4 MG/DL (ref 0.67–1.17)
DEPRECATED HCO3 PLAS-SCNC: 20 MMOL/L (ref 22–29)
GFR SERPL CREATININE-BSD FRML MDRD: 59 ML/MIN/1.73M2
GLUCOSE SERPL-MCNC: 236 MG/DL (ref 70–99)
POTASSIUM SERPL-SCNC: 5.1 MMOL/L (ref 3.4–5.3)
SODIUM SERPL-SCNC: 139 MMOL/L (ref 136–145)

## 2022-11-23 PROCEDURE — 99213 OFFICE O/P EST LOW 20 MIN: CPT | Performed by: PHYSICIAN ASSISTANT

## 2022-11-23 PROCEDURE — 36415 COLL VENOUS BLD VENIPUNCTURE: CPT | Performed by: PHYSICIAN ASSISTANT

## 2022-11-23 PROCEDURE — 80048 BASIC METABOLIC PNL TOTAL CA: CPT | Performed by: PHYSICIAN ASSISTANT

## 2022-11-23 ASSESSMENT — ENCOUNTER SYMPTOMS
COLOR CHANGE: 1
ACTIVITY CHANGE: 1
FEVER: 0
CHILLS: 0

## 2022-11-23 NOTE — PROGRESS NOTES
"  Assessment & Plan     Cellulitis of left leg  Elevate warm compresses continue oral doxycycline recheck again early next week prior if the swelling is more intense any fever chills blood sugar irregularities    Diabetic polyneuropathy associated with type 2 diabetes mellitus (H)  Labs pending today  - Basic metabolic panel  (Ca, Cl, CO2, Creat, Gluc, K, Na, BUN)  - Basic metabolic panel  (Ca, Cl, CO2, Creat, Gluc, K, Na, BUN)    Mast cell disease  Stable             MED REC REQUIRED  Post Medication Reconciliation Status:   BMI:   Estimated body mass index is 33.38 kg/m  as calculated from the following:    Height as of 10/21/22: 1.88 m (6' 2\").    Weight as of this encounter: 117.9 kg (260 lb).           No follow-ups on file.    TRUE Miranda  [facility]    Bonnie Moseley is a 57 year old, presenting for the following health issues:  ER F/U (Cellulitis of left lower leg. Worsening. )      57-year-old male parental clinic follow-up evaluation for cellulitis left lower extremity.  He has not had fever or chills he noticed a little more swelling but slightly less erythema he was hospitalized at [facility] he was discharged on oral doxycycline we will need to recheck his chemistries he states his blood sugars have been stable his recent hospitalization stay records were reviewed             Review of Systems   Constitutional: Positive for activity change. Negative for chills and fever.   Skin: Positive for color change.            Objective    /78 (BP Location: Right arm, Patient Position: Sitting)   Pulse 73   Temp 97.1  F (36.2  C) (Tympanic)   Resp 16   Wt 117.9 kg (260 lb)   SpO2 98%   BMI 33.38 kg/m    Body mass index is 33.38 kg/m .  Physical Exam left lower extremity he has a 8 x 5 cm area of erythema in the central area about 3 x 4 area of soft tissue fullness it is not indurated there is no drainage its not warm to touch it is not really " painful

## 2022-11-25 ENCOUNTER — E-VISIT (OUTPATIENT)
Dept: PEDIATRICS | Facility: CLINIC | Age: 57
End: 2022-11-25
Payer: COMMERCIAL

## 2022-11-25 DIAGNOSIS — L03.116 CELLULITIS OF LEFT LOWER EXTREMITY: Primary | ICD-10-CM

## 2022-11-25 PROCEDURE — 99421 OL DIG E/M SVC 5-10 MIN: CPT | Performed by: STUDENT IN AN ORGANIZED HEALTH CARE EDUCATION/TRAINING PROGRAM

## 2022-11-25 RX ORDER — CEPHALEXIN 500 MG/1
500 CAPSULE ORAL 4 TIMES DAILY
Qty: 28 CAPSULE | Refills: 0 | Status: ON HOLD | OUTPATIENT
Start: 2022-11-25 | End: 2022-12-03

## 2022-11-25 NOTE — TELEPHONE ENCOUNTER
Provider E-Visit time total (minutes): 5    Patient hospitalized for cellulitis at [facility]. Discharged on doxycycline. Had follow up appointment in person. Evisit reports worsening symptoms.  Prescription for cephalexin (Keflex) for better Strep coverage sent. Recommend in person visit if not improving.  Left lower extremity ultrasound on 11/13 negative for DVT and consistent with cellulitis.    Elizabeth Thomas MD  Internal Medicine & Pediatrics  [facility]  She/her

## 2022-11-25 NOTE — PATIENT INSTRUCTIONS
Thank you for choosing us for your care. I have placed an order for a prescription so that you can start treatment. View your full visit summary for details by clicking on the link below. Your pharmacist will able to address any questions you may have about the medication.     I am one of Dr. Sage's partners and am covering for her while she is out of the office.  I sent in a prescription for antibiotics called Cephalexin (Keflex). If your symptoms don't improve I do recommend being seen in person in clinic or urgent care.      If you're not feeling better within 3-5 days, please schedule an appointment.  You can schedule an appointment right here in [facility], or call [phone]  If the visit is for the same symptoms as your eVisit, we'll refund the cost of your eVisit if seen within seven days.      Elizabeth Thomas MD  Internal Medicine & Pediatrics  [facility]  She/her

## 2022-12-02 ENCOUNTER — HOSPITAL ENCOUNTER (OUTPATIENT)
Facility: CLINIC | Age: 57
Setting detail: OBSERVATION
Discharge: HOME | End: 2022-12-03
Attending: EMERGENCY MEDICINE | Admitting: NURSE PRACTITIONER
Payer: COMMERCIAL

## 2022-12-02 DIAGNOSIS — E11.21 DIABETIC NEPHROPATHY ASSOCIATED WITH TYPE 2 DIABETES MELLITUS (H): ICD-10-CM

## 2022-12-02 DIAGNOSIS — R19.7 DIARRHEA, UNSPECIFIED TYPE: ICD-10-CM

## 2022-12-02 DIAGNOSIS — L03.116 CELLULITIS OF LEFT LEG: ICD-10-CM

## 2022-12-02 DIAGNOSIS — F32.A DEPRESSION, UNSPECIFIED DEPRESSION TYPE: ICD-10-CM

## 2022-12-02 DIAGNOSIS — L02.419 CELLULITIS AND ABSCESS OF LEG: ICD-10-CM

## 2022-12-02 DIAGNOSIS — L02.416 CUTANEOUS ABSCESS OF LEFT LOWER EXTREMITY: ICD-10-CM

## 2022-12-02 DIAGNOSIS — L03.116 CELLULITIS OF LEFT LOWER EXTREMITY: Primary | ICD-10-CM

## 2022-12-02 DIAGNOSIS — E11.21 TYPE 2 DIABETES MELLITUS WITH DIABETIC NEPHROPATHY, WITHOUT LONG-TERM CURRENT USE OF INSULIN (H): ICD-10-CM

## 2022-12-02 DIAGNOSIS — N17.9 ACUTE RENAL FAILURE, UNSPECIFIED ACUTE RENAL FAILURE TYPE (H): ICD-10-CM

## 2022-12-02 DIAGNOSIS — Z20.822 CONTACT WITH AND (SUSPECTED) EXPOSURE TO COVID-19: ICD-10-CM

## 2022-12-02 DIAGNOSIS — N18.9 ACUTE ON CHRONIC RENAL INSUFFICIENCY: ICD-10-CM

## 2022-12-02 DIAGNOSIS — E11.22 TYPE 2 DIABETES MELLITUS WITH CHRONIC KIDNEY DISEASE, WITHOUT LONG-TERM CURRENT USE OF INSULIN, UNSPECIFIED CKD STAGE (H): ICD-10-CM

## 2022-12-02 DIAGNOSIS — K90.0 CELIAC DISEASE: ICD-10-CM

## 2022-12-02 DIAGNOSIS — N28.9 ACUTE RENAL INSUFFICIENCY: ICD-10-CM

## 2022-12-02 DIAGNOSIS — N28.9 ACUTE ON CHRONIC RENAL INSUFFICIENCY: ICD-10-CM

## 2022-12-02 DIAGNOSIS — N18.9 CHRONIC KIDNEY DISEASE, UNSPECIFIED CKD STAGE: ICD-10-CM

## 2022-12-02 DIAGNOSIS — K76.0 HEPATIC STEATOSIS: ICD-10-CM

## 2022-12-02 DIAGNOSIS — E78.5 HYPERLIPIDEMIA, UNSPECIFIED HYPERLIPIDEMIA TYPE: ICD-10-CM

## 2022-12-02 DIAGNOSIS — L03.119 CELLULITIS AND ABSCESS OF LEG: ICD-10-CM

## 2022-12-02 LAB
ALBUMIN SERPL BCG-MCNC: 4.2 G/DL (ref 3.5–5.2)
ALP SERPL-CCNC: 125 U/L (ref 40–129)
ALT SERPL W P-5'-P-CCNC: 72 U/L (ref 10–50)
ANION GAP SERPL CALCULATED.3IONS-SCNC: 12 MMOL/L (ref 7–15)
AST SERPL W P-5'-P-CCNC: 64 U/L (ref 10–50)
BASOPHILS # BLD AUTO: 0 10E3/UL (ref 0–0.2)
BASOPHILS NFR BLD AUTO: 0 %
BILIRUB SERPL-MCNC: 0.3 MG/DL
BUN SERPL-MCNC: 29.9 MG/DL (ref 6–20)
CALCIUM SERPL-MCNC: 9 MG/DL (ref 8.6–10)
CHLORIDE SERPL-SCNC: 110 MMOL/L (ref 98–107)
CREAT SERPL-MCNC: 2.21 MG/DL (ref 0.67–1.17)
CRP SERPL-MCNC: <3 MG/L
DEPRECATED HCO3 PLAS-SCNC: 19 MMOL/L (ref 22–29)
EOSINOPHIL # BLD AUTO: 0.3 10E3/UL (ref 0–0.7)
EOSINOPHIL NFR BLD AUTO: 5 %
ERYTHROCYTE [DISTWIDTH] IN BLOOD BY AUTOMATED COUNT: 13.4 % (ref 10–15)
ERYTHROCYTE [SEDIMENTATION RATE] IN BLOOD BY WESTERGREN METHOD: 9 MM/HR (ref 0–20)
GFR SERPL CREATININE-BSD FRML MDRD: 34 ML/MIN/1.73M2
GLUCOSE SERPL-MCNC: 104 MG/DL (ref 70–99)
HCT VFR BLD AUTO: 37.9 % (ref 40–53)
HGB BLD-MCNC: 12.8 G/DL (ref 13.3–17.7)
IMM GRANULOCYTES # BLD: 0 10E3/UL
IMM GRANULOCYTES NFR BLD: 0 %
LYMPHOCYTES # BLD AUTO: 1.1 10E3/UL (ref 0.8–5.3)
LYMPHOCYTES NFR BLD AUTO: 23 %
MCH RBC QN AUTO: 28.6 PG (ref 26.5–33)
MCHC RBC AUTO-ENTMCNC: 33.8 G/DL (ref 31.5–36.5)
MCV RBC AUTO: 85 FL (ref 78–100)
MONOCYTES # BLD AUTO: 0.4 10E3/UL (ref 0–1.3)
MONOCYTES NFR BLD AUTO: 9 %
NEUTROPHILS # BLD AUTO: 3.1 10E3/UL (ref 1.6–8.3)
NEUTROPHILS NFR BLD AUTO: 63 %
NRBC # BLD AUTO: 0 10E3/UL
NRBC BLD AUTO-RTO: 0 /100
PLATELET # BLD AUTO: 144 10E3/UL (ref 150–450)
POTASSIUM SERPL-SCNC: 5 MMOL/L (ref 3.4–5.3)
PROT SERPL-MCNC: 7 G/DL (ref 6.4–8.3)
RBC # BLD AUTO: 4.47 10E6/UL (ref 4.4–5.9)
SARS-COV-2 RNA RESP QL NAA+PROBE: NEGATIVE
SODIUM SERPL-SCNC: 141 MMOL/L (ref 136–145)
WBC # BLD AUTO: 4.9 10E3/UL (ref 4–11)

## 2022-12-02 PROCEDURE — 82040 ASSAY OF SERUM ALBUMIN: CPT | Performed by: EMERGENCY MEDICINE

## 2022-12-02 PROCEDURE — 250N000009 HC RX 250: Performed by: NURSE PRACTITIONER

## 2022-12-02 PROCEDURE — 258N000003 HC RX IP 258 OP 636: Performed by: PHYSICIAN ASSISTANT

## 2022-12-02 PROCEDURE — 99220 PR INITIAL OBSERVATION CARE,LEVEL III: CPT | Performed by: PHYSICIAN ASSISTANT

## 2022-12-02 PROCEDURE — 258N000003 HC RX IP 258 OP 636: Performed by: EMERGENCY MEDICINE

## 2022-12-02 PROCEDURE — 10061 I&D ABSCESS COMP/MULTIPLE: CPT | Performed by: NURSE PRACTITIONER

## 2022-12-02 PROCEDURE — 96366 THER/PROPH/DIAG IV INF ADDON: CPT

## 2022-12-02 PROCEDURE — G0378 HOSPITAL OBSERVATION PER HR: HCPCS

## 2022-12-02 PROCEDURE — U0005 INFEC AGEN DETEC AMPLI PROBE: HCPCS | Performed by: EMERGENCY MEDICINE

## 2022-12-02 PROCEDURE — 80053 COMPREHEN METABOLIC PANEL: CPT | Performed by: EMERGENCY MEDICINE

## 2022-12-02 PROCEDURE — 85652 RBC SED RATE AUTOMATED: CPT | Performed by: EMERGENCY MEDICINE

## 2022-12-02 PROCEDURE — 76882 US LMTD JT/FCL EVL NVASC XTR: CPT | Performed by: EMERGENCY MEDICINE

## 2022-12-02 PROCEDURE — 87205 SMEAR GRAM STAIN: CPT | Performed by: EMERGENCY MEDICINE

## 2022-12-02 PROCEDURE — 76882 US LMTD JT/FCL EVL NVASC XTR: CPT | Mod: 26 | Performed by: EMERGENCY MEDICINE

## 2022-12-02 PROCEDURE — 87070 CULTURE OTHR SPECIMN AEROBIC: CPT | Mod: XS | Performed by: EMERGENCY MEDICINE

## 2022-12-02 PROCEDURE — 96365 THER/PROPH/DIAG IV INF INIT: CPT

## 2022-12-02 PROCEDURE — 250N000011 HC RX IP 250 OP 636: Performed by: EMERGENCY MEDICINE

## 2022-12-02 PROCEDURE — 99285 EMERGENCY DEPT VISIT HI MDM: CPT | Mod: 25 | Performed by: EMERGENCY MEDICINE

## 2022-12-02 PROCEDURE — 86140 C-REACTIVE PROTEIN: CPT | Performed by: EMERGENCY MEDICINE

## 2022-12-02 PROCEDURE — 87040 BLOOD CULTURE FOR BACTERIA: CPT | Performed by: EMERGENCY MEDICINE

## 2022-12-02 PROCEDURE — 85025 COMPLETE CBC W/AUTO DIFF WBC: CPT | Performed by: EMERGENCY MEDICINE

## 2022-12-02 PROCEDURE — 36415 COLL VENOUS BLD VENIPUNCTURE: CPT | Performed by: EMERGENCY MEDICINE

## 2022-12-02 PROCEDURE — C9803 HOPD COVID-19 SPEC COLLECT: HCPCS | Performed by: EMERGENCY MEDICINE

## 2022-12-02 PROCEDURE — 96367 TX/PROPH/DG ADDL SEQ IV INF: CPT | Mod: XU

## 2022-12-02 RX ORDER — AMPICILLIN AND SULBACTAM 2; 1 G/1; G/1
3 INJECTION, POWDER, FOR SOLUTION INTRAMUSCULAR; INTRAVENOUS ONCE
Status: COMPLETED | OUTPATIENT
Start: 2022-12-02 | End: 2022-12-02

## 2022-12-02 RX ORDER — ATORVASTATIN CALCIUM 40 MG/1
40 TABLET, FILM COATED ORAL DAILY
Status: DISCONTINUED | OUTPATIENT
Start: 2022-12-03 | End: 2022-12-03 | Stop reason: HOSPADM

## 2022-12-02 RX ORDER — ASPIRIN 81 MG/1
81 TABLET ORAL DAILY
Status: DISCONTINUED | OUTPATIENT
Start: 2022-12-03 | End: 2022-12-03 | Stop reason: HOSPADM

## 2022-12-02 RX ORDER — LACTOBACILLUS RHAMNOSUS GG 10B CELL
1 CAPSULE ORAL DAILY
Status: DISCONTINUED | OUTPATIENT
Start: 2022-12-03 | End: 2022-12-03 | Stop reason: HOSPADM

## 2022-12-02 RX ORDER — SODIUM CHLORIDE 9 MG/ML
INJECTION, SOLUTION INTRAVENOUS CONTINUOUS
Status: DISCONTINUED | OUTPATIENT
Start: 2022-12-02 | End: 2022-12-03 | Stop reason: HOSPADM

## 2022-12-02 RX ADMIN — SODIUM CHLORIDE 1000 ML: 9 INJECTION, SOLUTION INTRAVENOUS at 23:08

## 2022-12-02 RX ADMIN — AMPICILLIN SODIUM AND SULBACTAM SODIUM 3 G: 2; 1 INJECTION, POWDER, FOR SOLUTION INTRAMUSCULAR; INTRAVENOUS at 20:43

## 2022-12-02 RX ADMIN — LIDOCAINE HYDROCHLORIDE 10 ML: 10 INJECTION, SOLUTION EPIDURAL; INFILTRATION; INTRACAUDAL; PERINEURAL at 21:44

## 2022-12-02 RX ADMIN — VANCOMYCIN HYDROCHLORIDE 1750 MG: 1 INJECTION, POWDER, LYOPHILIZED, FOR SOLUTION INTRAVENOUS at 21:45

## 2022-12-02 ASSESSMENT — ENCOUNTER SYMPTOMS
SORE THROAT: 0
DIFFICULTY URINATING: 0
DIAPHORESIS: 0
CHILLS: 0
VOMITING: 0
COLOR CHANGE: 1
WOUND: 1
ARTHRALGIAS: 0
NAUSEA: 0
CONFUSION: 0
ABDOMINAL PAIN: 0
FEVER: 0
HEADACHES: 0
SHORTNESS OF BREATH: 0
DIARRHEA: 1
RHINORRHEA: 0
COUGH: 0
NECK STIFFNESS: 0

## 2022-12-02 ASSESSMENT — ACTIVITIES OF DAILY LIVING (ADL)
ADLS_ACUITY_SCORE: 35
ADLS_ACUITY_SCORE: 35
ADLS_ACUITY_SCORE: 33
ADLS_ACUITY_SCORE: 33

## 2022-12-02 NOTE — ED PROVIDER NOTES
[place] EMERGENCY DEPARTMENT ([facility])  12/02/22  History     Chief Complaint   Patient presents with     Cellulitis     Of left lateral leg     The history is provided by the patient and medical records.     Thomas Gonzales is a 57 year old male with a history notable for multiple sclerosis, DM II, CAD s/p PCI, liver steatosis, HLD, and Castro-en-Y gastric bypass who presents to the ED for evaluation of pain and redness of left leg. The patient has first noticed cellulitis on his lower anterior left leg 3 weeks ago. He had injured himself after slipping off his pickup and striking his leg on the concrete. There was a wound/abrasion on the leg that did heal, but he then began to notice redness of the area suggestive of cellulitis.The patient was started on a dose of Bactrim but the cellulitis did not improve. He was then admitted to ED obs and his symptoms improved with IV antibiotics - he was discharged with doxycycline which he did take but this did not seem to be effective.  The patient then began on Keflex on 11/25 and has been compliant with his medication. Over the past week the patient feels the cellulitis is worsening. The patient notes the area has been swollen since its onset. He denies drainage from the site. The patient notes pain in his left left with walking and palpating the area. The patient has experienced frequent episodes of diarrhea since beginning the antibiotics. He denies fever, chills, diaphoresis, cough, shortness of breath, chest pain or abdominal pain. No nausea of vomiting. The patient has been hospitalized two times in the past for cellulitis on his other leg and his elbow.      I have reviewed the Medications, Allergies, Past Medical and Surgical History, and Social History in the [facility] system.  PAST MEDICAL HISTORY:   Past Medical History:   Diagnosis Date     CAD (coronary artery disease)     S/p stenting of left circumflex     Diabetes mellitus (H)      Multiple  sclerosis (H)      Peripheral neuropathy        PAST SURGICAL HISTORY:   Past Surgical History:   Procedure Laterality Date     APPENDECTOMY  1/8/2016     CATARACT IOL, RT/LT       CHOLECYSTECTOMY  1/8/2016     COLONOSCOPY N/A 9/1/2021    Procedure: COLONOSCOPY, WITH POLYPECTOMY AND BIOPSY;  Surgeon: Kamran Rainey DO;  Location: [facility] OR     ESOPHAGOSCOPY, GASTROSCOPY, DUODENOSCOPY (EGD), COMBINED N/A 9/1/2021    Procedure: ESOPHAGOGASTRODUODENOSCOPY, WITH BIOPSY;  Surgeon: Kamran Rainey DO;  Location: [facility] OR     penile implant       LEON EN Y BOWEL  1/8/2016    for small bowel ischemia       Past medical history, past surgical history, medications, and allergies were reviewed with the patient. Additional pertinent items: None    FAMILY HISTORY:   Family History   Problem Relation Age of Onset     Arrhythmia Mother         bradycardia- pacemaker     Coronary Artery Disease Father        SOCIAL HISTORY:   Social History     Tobacco Use     Smoking status: Never     Smokeless tobacco: Never   Substance Use Topics     Alcohol use: Yes     Comment: occ     Social history was reviewed with the patient. Additional pertinent items: None      Patient's Medications   New Prescriptions    No medications on file   Previous Medications    ASPIRIN LOW DOSE 81 MG EC TABLET    Take 1 tablet by mouth daily.    ATORVASTATIN (LIPITOR) 40 MG TABLET    Take 1 tablet (40 mg) by mouth daily    BISMUTH SUBSALICYLATE 262 MG TABS    Take 4-5 tablets by mouth daily     CEPHALEXIN (KEFLEX) 500 MG CAPSULE    Take 1 capsule (500 mg) by mouth 4 times daily for 7 days    CHOLECALCIFEROL (VITAMIN D) 2000 UNITS CAPS    Take 2,000 Units by mouth daily     FLUOXETINE (PROZAC) 20 MG CAPSULE    Take 1 capsule (20 mg) by mouth daily    MULTIVITAMIN W/MINERALS (THERA-VIT-M) TABLET    Take 1 tablet by mouth daily    ORDER FOR DME    Equipment being ordered: custom orthotic inserts for shoes    PROBIOTIC PRODUCT (PROBIOTIC DAILY) CAPS    Take 1 capsule  "by mouth daily     TRIAMCINOLONE (KENALOG) 0.1 % EXTERNAL OINTMENT    Apply topically 2 times daily Do not use more than 2 weeks per month   Modified Medications    No medications on file   Discontinued Medications    No medications on file          Allergies   Allergen Reactions     Shellfish-Derived Products Anaphylaxis     Adhesive Tape      Paper tape is okay  Tegarderm is okay     Gluten Meal      Celiac     Reglan [Metoclopramide] Hives        Review of Systems   Constitutional: Negative for chills, diaphoresis and fever.   HENT: Negative for congestion, rhinorrhea and sore throat.    Respiratory: Negative for cough and shortness of breath.    Cardiovascular: Negative for chest pain.   Gastrointestinal: Positive for diarrhea. Negative for abdominal pain, nausea and vomiting.   Genitourinary: Negative for difficulty urinating.   Musculoskeletal: Negative for arthralgias and neck stiffness.   Skin: Positive for color change and wound (Left LE).   Neurological: Negative for headaches.   Psychiatric/Behavioral: Negative for confusion.   All other systems reviewed and are negative.    A complete review of systems was performed with pertinent positives and negatives noted in the HPI, and all other systems negative.    Physical Exam   BP: 124/63  Pulse: 74  Temp: 97.7  F (36.5  C)  Resp: 16  Height: 188 cm (6' 2\")  Weight: 117.9 kg (260 lb)  SpO2: 98 %      Physical Exam  Vitals and nursing note reviewed.   Constitutional:       General: He is not in acute distress.     Appearance: He is not diaphoretic.      Comments: Adult male, alert, cooperative, NAD   HENT:      Head: Atraumatic.      Mouth/Throat:      Mouth: Mucous membranes are moist.      Pharynx: Oropharynx is clear. No oropharyngeal exudate.   Eyes:      General: No scleral icterus.     Pupils: Pupils are equal, round, and reactive to light.   Cardiovascular:      Rate and Rhythm: Normal rate.      Pulses: Normal pulses.      Heart sounds: No murmur " heard.  Pulmonary:      Effort: No respiratory distress.      Breath sounds: Normal breath sounds.   Abdominal:      General: Bowel sounds are normal.      Palpations: Abdomen is soft.      Tenderness: There is no abdominal tenderness.   Musculoskeletal:         General: Swelling and tenderness present.      Comments: LLE: there is some discoloration and faint erythema of the anterior L lower leg. There does appear to be fluctuance with a large 4 cm x 2 cm area which is easily palpable and tender to palpation   Skin:     General: Skin is warm.      Findings: No rash.   Neurological:      General: No focal deficit present.   Psychiatric:         Mood and Affect: Mood normal.             ED Course   4:32 PM  The patient was seen and examined by Dr. Alayna Herron in Room [facility].        Procedures  Results for orders placed during the hospital encounter of 12/02/22    POC US SOFT TISSUE    Impression  Limited Soft Tissue Ultrasound, performed and interpreted by me.    Indication:  Skin redness warmth pain. Evaluate for cellulitis vs abscess.    Body location: left lower extremity    Findings:  There is no cobblestoning suggestive of cellulitis in the evaluated area. There is a fluid collection measuring 4x2 cm to suggest abscess. No foreign body identified    IMPRESSION: Abscess         The medical record was reviewed and interpreted.   Labs reviewed.     Results for orders placed or performed during the hospital encounter of 12/02/22 (from the past 24 hour(s))   CBC with Platelets & Differential    Narrative    The following orders were created for panel order CBC with Platelets & Differential.  Procedure                               Abnormality         Status                     ---------                               -----------         ------                     CBC with platelets and d...[[id number]]  Abnormal            Final result                 Please view results for these tests on the individual orders.    Comprehensive metabolic panel   Result Value Ref Range    Sodium 141 136 - 145 mmol/L    Potassium 5.0 3.4 - 5.3 mmol/L    Chloride 110 (H) 98 - 107 mmol/L    Carbon Dioxide (CO2) 19 (L) 22 - 29 mmol/L    Anion Gap 12 7 - 15 mmol/L    Urea Nitrogen 29.9 (H) 6.0 - 20.0 mg/dL    Creatinine 2.21 (H) 0.67 - 1.17 mg/dL    Calcium 9.0 8.6 - 10.0 mg/dL    Glucose 104 (H) 70 - 99 mg/dL    Alkaline Phosphatase 125 40 - 129 U/L    AST 64 (H) 10 - 50 U/L    ALT 72 (H) 10 - 50 U/L    Protein Total 7.0 6.4 - 8.3 g/dL    Albumin 4.2 3.5 - 5.2 g/dL    Bilirubin Total 0.3 <=1.2 mg/dL    GFR Estimate 34 (L) >60 mL/min/1.73m2   Erythrocyte sedimentation rate auto   Result Value Ref Range    Erythrocyte Sedimentation Rate 9 0 - 20 mm/hr   CRP inflammation   Result Value Ref Range    CRP Inflammation <3.00 <5.00 mg/L   CBC with platelets and differential   Result Value Ref Range    WBC Count 4.9 4.0 - 11.0 10e3/uL    RBC Count 4.47 4.40 - 5.90 10e6/uL    Hemoglobin 12.8 (L) 13.3 - 17.7 g/dL    Hematocrit 37.9 (L) 40.0 - 53.0 %    MCV 85 78 - 100 fL    MCH 28.6 26.5 - 33.0 pg    MCHC 33.8 31.5 - 36.5 g/dL    RDW 13.4 10.0 - 15.0 %    Platelet Count 144 (L) 150 - 450 10e3/uL    % Neutrophils 63 %    % Lymphocytes 23 %    % Monocytes 9 %    % Eosinophils 5 %    % Basophils 0 %    % Immature Granulocytes 0 %    NRBCs per 100 WBC 0 <1 /100    Absolute Neutrophils 3.1 1.6 - 8.3 10e3/uL    Absolute Lymphocytes 1.1 0.8 - 5.3 10e3/uL    Absolute Monocytes 0.4 0.0 - 1.3 10e3/uL    Absolute Eosinophils 0.3 0.0 - 0.7 10e3/uL    Absolute Basophils 0.0 0.0 - 0.2 10e3/uL    Absolute Immature Granulocytes 0.0 <=0.4 10e3/uL    Absolute NRBCs 0.0 10e3/uL   POC US SOFT TISSUE    Impression    Limited Soft Tissue Ultrasound, performed and interpreted by me.    Indication:  Skin redness warmth pain. Evaluate for cellulitis vs abscess.     Body location: left lower extremity    Findings:  There is no cobblestoning suggestive of cellulitis in the evaluated  area. There is a fluid collection measuring 4x2 cm to suggest abscess. No foreign body identified    IMPRESSION: Abscess         Wound Aerobic Bacterial Culture Routine with Gram Stain    Specimen: Leg, left; Wound   Result Value Ref Range    Gram Stain Result No organisms seen     Gram Stain Result No white blood cells seen    Asymptomatic COVID-19 Virus (Coronavirus) by PCR Nasopharyngeal    Specimen: Nasopharyngeal; Swab   Result Value Ref Range    SARS CoV2 PCR Negative Negative    Narrative    Testing was performed using the Xpert Xpress SARS-CoV-2 Assay on the Cepheid Gene-Xpert Instrument Systems. Additional information about this Emergency Use Authorization (EUA) assay can be found via the Lab Guide. This test should be ordered for the detection of SARS-CoV-2 in individuals who meet SARS-CoV-2 clinical and/or epidemiological criteria as well as from individuals without symptoms or other reasons to suspect COVID-19. Test performance for asymptomatic patients has only been established in anterior nasal swab specimens. This test is for in vitro diagnostic use under the FDA EUA for laboratories certified under CLIA to perform high complexity testing. This test has not been FDA cleared or approved. A negative result does not rule out the presence of PCR inhibitors in the specimen or target RNA concentration below the limit of detection for the assay. The possibility of a false negative should be considered if the patient's recent exposure or clinical presentation suggests COVID-19. This test was validated by [facility]. These Laboratories are certified under the Clinical Laboratory Improvement Amendments (CLIA) as qualified to perform high complexity testing.     -Incision/Drainage    Narrative    Matt Herron APRN CNP     12/2/2022 10:04 PM  [facility]    PROCEDURE: -Incision/Drainage    Date/Time: 12/2/2022 10:00 PM  Performed by: Gopal  DAVE Gutierrez CNP  Authorized by: Matt Herron APRN CNP     Risks, benefits and alternatives discussed.      LOCATION:      Type:  Abscess    Size:  4cm x 2cm    Location:  Lower extremity    Lower extremity location:  Leg    PRE-PROCEDURE DETAILS:     Skin preparation:  Chloraprep    PROCEDURE TYPE:     Complexity:  Complex    ANESTHESIA (see MAR for exact dosages):     Anesthesia method:  Local infiltration    Local anesthetic:  Lidocaine 1% WITH epi and lidocaine 1% w/o epi    PROCEDURE DETAILS:     Needle aspiration: no      Incision types:  Single straight    Incision depth:  Dermal    Scalpel blade:  11    Wound management:  Probed and deloculated    Drainage:  Bloody    Drainage amount:  Moderate    Wound treatment:  Wound left open    Packing materials:  1/4 in iodoform gauze    PROCEDURE  Describe Procedure: Left lower leg abscess was cleansed with alcohol and   chlorhexidine and was anesthetized with 1% lidocaine without epi.    Following anesthetization an approximately 2.5 linear cm incision was made   with a #11 blade scalpel.  This returned a moderate amount of bloody   drainage.  Area was probed with clamp and loculations broken up with more   bloody drainage returned  Following this iodoform was placed for packing.    Nursing staff used skin marker to outline area of induration as well as   possible cellulitis of the lower leg.  Patient Tolerance:  Patient tolerated the procedure well with no immediate   complications     Medications   0.9% sodium chloride BOLUS (1,000 mLs Intravenous New Bag 12/2/22 9829)   sodium chloride 0.9% infusion (has no administration in time range)   vancomycin (VANCOCIN) 1,750 mg in sodium chloride 0.9 % 500 mL intermittent infusion (1,750 mg Intravenous New Bag 12/2/22 9318)   vancomycin place clancy - receiving intermittent dosing (has no administration in time range)   pharmacy alert - intermittent dosing (has no administration in time range)   lidocaine 1 % 10 mL (10  mLs Intradermal Given 12/2/22 2144)   ampicillin-sulbactam (UNASYN) 3 g vial to attach to  mL bag (0 g Intravenous Stopped 12/2/22 2155)             Assessments & Plan (with Medical Decision Making)   Patient presents to the ED today with c/o worsening cellulitis of LLE with increasing pain. Has been on multiple courses of PO antibiotics without improvement. On exam he appears to have an abscess - see pic above. This could definitely explain why he is not improving.     We did establish IV access and we did draw blood for laboratory analysis.  CBC shows a normal white count of 4.9, hemoglobin is 12.8, platelets are 144.  Sed rate and CRP are within normal limits.  Comprehensive metabolic panel demonstrates normal electrolytes, creatinine is 2.21. 9 days ago creatinine was 1.4, though it had been as high as 2.65 as recently as 2 weeks ago.     We did do a bedside ultrasound which does show an obvious fluid collection potentially consistent with abscess.  We subsequently did do an I and D of the abscess. Cultures were taken. Much of the drainage that was obtained during the I and D was bloody. It is possible that this was all hematoma - cultures will be helpful in guiding any need for further antibiotics therapy.    We will start treatment with IV unasyn and vanco for now - will admit to ED observation at this time. Discussed with observation unit OLIVIA who is in agreement with plan.     I have reviewed the nursing notes.    I have reviewed the findings, diagnosis, plan and need for follow up with the patient.    New Prescriptions    No medications on file       Final diagnoses:   Cellulitis and abscess of leg   Type 2 diabetes mellitus with diabetic nephropathy, without long-term current use of insulin (H)   Acute on chronic renal insufficiency     I, Abhay Segovia, am serving as a trained medical scribe to document services personally performed by Alayna Herron MD, based on the provider's statements to me.       I, Alayna Herron MD, was physically present and have reviewed and verified the accuracy of this note documented by Abhay Segovia.     12/2/2022   [facility] EMERGENCY DEPARTMENT     Alayna Herron MD  12/02/22 1657

## 2022-12-02 NOTE — ED TRIAGE NOTES
Pt arrives w/ c/o worsening cellulitis. Pt recently diagnosed w/ cellulitis after falling from truck ~11/30. Since then pt has been on ~3 different abx. Endorses worsening redness, pain.      Triage Assessment     Row Name 12/02/22 7194       Triage Assessment (Adult)    Airway WDL WDL       Respiratory WDL    Respiratory WDL WDL       Skin Circulation/Temperature WDL    Skin Circulation/Temperature WDL X       Cardiac WDL    Cardiac WDL WDL       Peripheral/Neurovascular WDL    Peripheral Neurovascular WDL WDL       Cognitive/Neuro/Behavioral WDL    Cognitive/Neuro/Behavioral WDL WDL

## 2022-12-03 VITALS
SYSTOLIC BLOOD PRESSURE: 150 MMHG | HEART RATE: 64 BPM | DIASTOLIC BLOOD PRESSURE: 71 MMHG | BODY MASS INDEX: 32.71 KG/M2 | RESPIRATION RATE: 18 BRPM | OXYGEN SATURATION: 99 % | WEIGHT: 254.9 LBS | TEMPERATURE: 98.1 F | HEIGHT: 74 IN

## 2022-12-03 LAB
ALBUMIN SERPL BCG-MCNC: 3.2 G/DL (ref 3.5–5.2)
ALP SERPL-CCNC: 103 U/L (ref 40–129)
ALT SERPL W P-5'-P-CCNC: 82 U/L (ref 10–50)
ANION GAP SERPL CALCULATED.3IONS-SCNC: 9 MMOL/L (ref 7–15)
AST SERPL W P-5'-P-CCNC: 116 U/L (ref 10–50)
BASOPHILS # BLD AUTO: 0 10E3/UL (ref 0–0.2)
BASOPHILS NFR BLD AUTO: 1 %
BILIRUB SERPL-MCNC: 0.4 MG/DL
BUN SERPL-MCNC: 23.1 MG/DL (ref 6–20)
C DIFF TOX B STL QL: NEGATIVE
CALCIUM SERPL-MCNC: 7.9 MG/DL (ref 8.6–10)
CHLORIDE SERPL-SCNC: 114 MMOL/L (ref 98–107)
CREAT SERPL-MCNC: 1.88 MG/DL (ref 0.67–1.17)
CRP SERPL-MCNC: <3 MG/L
DEPRECATED HCO3 PLAS-SCNC: 18 MMOL/L (ref 22–29)
EOSINOPHIL # BLD AUTO: 0.1 10E3/UL (ref 0–0.7)
EOSINOPHIL NFR BLD AUTO: 5 %
ERYTHROCYTE [DISTWIDTH] IN BLOOD BY AUTOMATED COUNT: 13.5 % (ref 10–15)
ERYTHROCYTE [SEDIMENTATION RATE] IN BLOOD BY WESTERGREN METHOD: 9 MM/HR (ref 0–20)
GFR SERPL CREATININE-BSD FRML MDRD: 41 ML/MIN/1.73M2
GLUCOSE SERPL-MCNC: 94 MG/DL (ref 70–99)
HCT VFR BLD AUTO: 32.7 % (ref 40–53)
HGB BLD-MCNC: 10.9 G/DL (ref 13.3–17.7)
IMM GRANULOCYTES # BLD: 0 10E3/UL
IMM GRANULOCYTES NFR BLD: 0 %
LYMPHOCYTES # BLD AUTO: 0.5 10E3/UL (ref 0.8–5.3)
LYMPHOCYTES NFR BLD AUTO: 17 %
MCH RBC QN AUTO: 28.5 PG (ref 26.5–33)
MCHC RBC AUTO-ENTMCNC: 33.3 G/DL (ref 31.5–36.5)
MCV RBC AUTO: 86 FL (ref 78–100)
MONOCYTES # BLD AUTO: 0.3 10E3/UL (ref 0–1.3)
MONOCYTES NFR BLD AUTO: 10 %
NEUTROPHILS # BLD AUTO: 1.9 10E3/UL (ref 1.6–8.3)
NEUTROPHILS NFR BLD AUTO: 67 %
NRBC # BLD AUTO: 0 10E3/UL
NRBC BLD AUTO-RTO: 0 /100
PLATELET # BLD AUTO: 103 10E3/UL (ref 150–450)
POTASSIUM SERPL-SCNC: 4.7 MMOL/L (ref 3.4–5.3)
PROCALCITONIN SERPL IA-MCNC: 0.07 NG/ML
PROT SERPL-MCNC: 5.4 G/DL (ref 6.4–8.3)
RBC # BLD AUTO: 3.82 10E6/UL (ref 4.4–5.9)
SODIUM SERPL-SCNC: 141 MMOL/L (ref 136–145)
WBC # BLD AUTO: 2.9 10E3/UL (ref 4–11)

## 2022-12-03 PROCEDURE — G0378 HOSPITAL OBSERVATION PER HR: HCPCS

## 2022-12-03 PROCEDURE — 87493 C DIFF AMPLIFIED PROBE: CPT | Performed by: PHYSICIAN ASSISTANT

## 2022-12-03 PROCEDURE — 96376 TX/PRO/DX INJ SAME DRUG ADON: CPT

## 2022-12-03 PROCEDURE — 80053 COMPREHEN METABOLIC PANEL: CPT | Performed by: PHYSICIAN ASSISTANT

## 2022-12-03 PROCEDURE — 36415 COLL VENOUS BLD VENIPUNCTURE: CPT | Performed by: PHYSICIAN ASSISTANT

## 2022-12-03 PROCEDURE — 99217 PR OBSERVATION CARE DISCHARGE: CPT | Mod: FS | Performed by: EMERGENCY MEDICINE

## 2022-12-03 PROCEDURE — 250N000013 HC RX MED GY IP 250 OP 250 PS 637: Performed by: PHYSICIAN ASSISTANT

## 2022-12-03 PROCEDURE — 85025 COMPLETE CBC W/AUTO DIFF WBC: CPT | Performed by: PHYSICIAN ASSISTANT

## 2022-12-03 PROCEDURE — 258N000003 HC RX IP 258 OP 636: Performed by: PHYSICIAN ASSISTANT

## 2022-12-03 PROCEDURE — 86140 C-REACTIVE PROTEIN: CPT | Performed by: PHYSICIAN ASSISTANT

## 2022-12-03 PROCEDURE — 84145 PROCALCITONIN (PCT): CPT | Performed by: PHYSICIAN ASSISTANT

## 2022-12-03 PROCEDURE — 250N000011 HC RX IP 250 OP 636: Performed by: PHYSICIAN ASSISTANT

## 2022-12-03 PROCEDURE — 96375 TX/PRO/DX INJ NEW DRUG ADDON: CPT

## 2022-12-03 PROCEDURE — 85652 RBC SED RATE AUTOMATED: CPT | Performed by: PHYSICIAN ASSISTANT

## 2022-12-03 PROCEDURE — 82040 ASSAY OF SERUM ALBUMIN: CPT | Performed by: PHYSICIAN ASSISTANT

## 2022-12-03 RX ORDER — PIPERACILLIN SODIUM, TAZOBACTAM SODIUM 3; .375 G/15ML; G/15ML
3.38 INJECTION, POWDER, LYOPHILIZED, FOR SOLUTION INTRAVENOUS EVERY 6 HOURS
Status: DISCONTINUED | OUTPATIENT
Start: 2022-12-03 | End: 2022-12-03 | Stop reason: HOSPADM

## 2022-12-03 RX ORDER — ONDANSETRON 2 MG/ML
4 INJECTION INTRAMUSCULAR; INTRAVENOUS EVERY 6 HOURS PRN
Status: DISCONTINUED | OUTPATIENT
Start: 2022-12-03 | End: 2022-12-03 | Stop reason: HOSPADM

## 2022-12-03 RX ORDER — ACETAMINOPHEN 325 MG/1
650 TABLET ORAL EVERY 6 HOURS PRN
Status: DISCONTINUED | OUTPATIENT
Start: 2022-12-03 | End: 2022-12-03 | Stop reason: HOSPADM

## 2022-12-03 RX ORDER — CEPHALEXIN 500 MG/1
500 CAPSULE ORAL 4 TIMES DAILY
Qty: 28 CAPSULE | Refills: 0 | Status: SHIPPED | OUTPATIENT
Start: 2022-12-03 | End: 2022-12-10

## 2022-12-03 RX ORDER — ACETAMINOPHEN 650 MG/1
650 SUPPOSITORY RECTAL EVERY 6 HOURS PRN
Status: DISCONTINUED | OUTPATIENT
Start: 2022-12-03 | End: 2022-12-03 | Stop reason: HOSPADM

## 2022-12-03 RX ORDER — ACETAMINOPHEN 325 MG/1
650 TABLET ORAL EVERY 6 HOURS PRN
COMMUNITY
Start: 2022-12-03

## 2022-12-03 RX ORDER — LIDOCAINE 40 MG/G
CREAM TOPICAL
Status: DISCONTINUED | OUTPATIENT
Start: 2022-12-03 | End: 2022-12-03 | Stop reason: HOSPADM

## 2022-12-03 RX ORDER — CEPHALEXIN 500 MG/1
500 CAPSULE ORAL 4 TIMES DAILY
Qty: 7 CAPSULE | Refills: 0 | Status: SHIPPED | OUTPATIENT
Start: 2022-12-03 | End: 2022-12-03

## 2022-12-03 RX ORDER — IBUPROFEN 600 MG/1
600 TABLET, FILM COATED ORAL EVERY 6 HOURS PRN
COMMUNITY
Start: 2022-12-03 | End: 2022-12-15

## 2022-12-03 RX ORDER — IBUPROFEN 600 MG/1
600 TABLET, FILM COATED ORAL EVERY 6 HOURS PRN
Status: DISCONTINUED | OUTPATIENT
Start: 2022-12-03 | End: 2022-12-03 | Stop reason: HOSPADM

## 2022-12-03 RX ORDER — ONDANSETRON 4 MG/1
4 TABLET, ORALLY DISINTEGRATING ORAL EVERY 6 HOURS PRN
Status: DISCONTINUED | OUTPATIENT
Start: 2022-12-03 | End: 2022-12-03 | Stop reason: HOSPADM

## 2022-12-03 RX ADMIN — ASPIRIN 81 MG: 81 TABLET ORAL at 08:21

## 2022-12-03 RX ADMIN — PIPERACILLIN AND TAZOBACTAM 3.38 G: 3; .375 INJECTION, POWDER, LYOPHILIZED, FOR SOLUTION INTRAVENOUS at 08:23

## 2022-12-03 RX ADMIN — PIPERACILLIN AND TAZOBACTAM 3.38 G: 3; .375 INJECTION, POWDER, LYOPHILIZED, FOR SOLUTION INTRAVENOUS at 02:54

## 2022-12-03 RX ADMIN — SODIUM CHLORIDE: 9 INJECTION, SOLUTION INTRAVENOUS at 00:01

## 2022-12-03 RX ADMIN — Medication 1 CAPSULE: at 08:44

## 2022-12-03 RX ADMIN — FLUOXETINE 20 MG: 20 CAPSULE ORAL at 08:22

## 2022-12-03 RX ADMIN — SODIUM CHLORIDE: 9 INJECTION, SOLUTION INTRAVENOUS at 09:26

## 2022-12-03 RX ADMIN — ATORVASTATIN CALCIUM 40 MG: 40 TABLET, FILM COATED ORAL at 08:21

## 2022-12-03 ASSESSMENT — ACTIVITIES OF DAILY LIVING (ADL)
ADLS_ACUITY_SCORE: 31
ADLS_ACUITY_SCORE: 35
ADLS_ACUITY_SCORE: 31

## 2022-12-03 NOTE — PROGRESS NOTES
-Tolerating oral antibiotics or has plans for home infusion set up: Met   - Vital signs normal or at patient baseline: Met    - Adequate pain control on oral analgesia: Met  - Infection is improving: N/A   - Color, warmth, movement, sensation of affected area or limb is intact or at baseline: Met  - Return to baseline functional status: Met    - Safe disposition plan has been identified: Met

## 2022-12-03 NOTE — ED PROVIDER NOTES
ED Provider Note  [facility]    PROCEDURE: -Incision/Drainage    Date/Time: 12/2/2022 10:00 PM  Performed by: Matt Herron APRN CNP  Authorized by: Matt Herron APRN CNP     Risks, benefits and alternatives discussed.      LOCATION:      Type:  Abscess    Size:  4cm x 2cm    Location:  Lower extremity    Lower extremity location:  Leg    PRE-PROCEDURE DETAILS:     Skin preparation:  Chloraprep    PROCEDURE TYPE:     Complexity:  Complex    ANESTHESIA (see MAR for exact dosages):     Anesthesia method:  Local infiltration    Local anesthetic:  Lidocaine 1% WITH epi and lidocaine 1% w/o epi    PROCEDURE DETAILS:     Needle aspiration: no      Incision types:  Single straight    Incision depth:  Dermal    Scalpel blade:  11    Wound management:  Probed and deloculated    Drainage:  Bloody    Drainage amount:  Moderate    Wound treatment:  Wound left open    Packing materials:  1/4 in iodoform gauze    PROCEDURE  Describe Procedure: Left lower leg abscess was cleansed with alcohol and chlorhexidine and was anesthetized with 1% lidocaine without epi.  Following anesthetization an approximately 2.5 linear cm incision was made with a #11 blade scalpel.  This returned a moderate amount of bloody drainage.  Area was probed with clamp and loculations broken up with more bloody drainage returned  Following this iodoform was placed for packing.  Nursing staff used skin marker to outline area of induration as well as possible cellulitis of the lower leg.  Patient Tolerance:  Patient tolerated the procedure well with no immediate complications    --  DAVE Grimaldo CNP  [facility] EMERGENCY DEPARTMENT  12/2/2022     Matt Herron APRN CNP  12/02/22 [id number]

## 2022-12-03 NOTE — PROGRESS NOTES
Patient's After Visit Summary was reviewed with patient and/or self.   Patient verbalized understanding of After Visit Summary, recommended follow up and was given an opportunity to ask questions.   Discharge medications sent home with patient/family: YES   Discharged with other:self

## 2022-12-03 NOTE — PLAN OF CARE
"Goal Outcome Evaluation:BP (!) 142/65 (BP Location: Right arm)   Pulse 65   Temp 97.4  F (36.3  C) (Oral)   Resp 16   Ht 1.88 m (6' 2\")   Wt 115.6 kg (254 lb 14.4 oz)   SpO2 98%   BMI 32.73 kg/m      Tolerating oral antibiotics or has plans for home infusion set up:Not met   - Vital signs normal or at patient baseline:No met    - Adequate pain control on oral analgesia:Met  - Infection is improving:In progress   - Color, warmth, movement, sensation of affected area or limb is intact or at baseline:Met  - Return to baseline functional status:In progress    - Safe disposition plan has been identified:Not met                "

## 2022-12-03 NOTE — H&P
[facility]    History and Physical - ED Observation Service      Date of Admission:  12/2/2022    Assessment & Plan      Thomas Gonzales is a 57 year old male admitted on 12/2/2022. He has a history of multiple sclerosis, DM II c/b peripheral neuropathy, celiac disease, CAD s/p PCI, HLD, liver steatosis, intestinal ischemia, Castro-en-Y gastric bypass c/b strictures and multiple abdominal abscesses, right foot cellulitis/osteomyelitis as well as prior LLE cellulitis  who presents to the ED for evaluation of pain and redness of left leg.     ##. LLE Abscess s/p I&D:  Patient reports accidentally injured left leg while washing his car 2 weeks prior to initial ED presentation 11/13/22. Slipped off truck step cleaning window and fell on pavement. Abrasions healed, however, developed some redness/swelling of affected area that has worsened. Reports non radiating constant throbbing pain and tightness as well as swelling and redness. Reports episodes of diarrhea since beginning antibiotics. Denies drainage, fevers, chills, nausea, vomiting, lightheadedness, headache. Seen in ED 11/13/22, neg LLE US, and discharged with Bactrim x 7 days for cellulitis. Admitted to ED Obs 11/15/22 with ERICK (Cr 2.65) and worsening cellulitis, initially on vanco/ceftriaxone and switched to doxy x 10 days per ID recs and discharged. OP f/u 11/23/22 noted improvement. E-visit 11/25/22 with worsening cellulitis and started on Keflex. Today comes in with worsening symptoms. H/o recurrent cellulitis and osteomyelitis - right foot and ankle cellulitis (2009), left elbow cellulitis (10/20/15) w/ I&D of olecranon bursa, right great toe cellulitis and possible osteomyelitis (1/16/21-1/21/21) requiring 6 week course of Bactrim DS x2 BID and Cipro 750mg Q12h per ID recommendations. Today in ED, HR 60's-70's, 's-160's/60's, RR 16, SaO2 98% on RA, Temp 97.7  F.  CBC with H/H 12.8/37.9, platelet 144,  otherwise normal. CRP <3.0. ESR 9. Blood culture x 2 sent and pending. Bedside US showed a 4 x 2 cm area of fluid collection suggesting an abscess. Abscess was I&D'ed and cultures were sent. Covid 19 PCR negative. In ED patient given Unasyn and Vancomycin. Right foot wound culture 1/16/21 had strains of MSSA as well as pseudomonas, actinomyces.   - Continue Vancomycin  - Pharmacy to dose vancomycin  - Start Zosyn for pseudomonas coverage  - Infectious Disease consult  - Continue PTA probiotics  - Strict I/O  - MIVF with NS at 125ml/hr  - Follow Blood cultures and wound cultures  - Repeat CBC, CMP, CRP, ESR in a.m.     ##. ERICK: Patient's baseline creatinine appears to be about 1.4.  Labs show BUN 29.9, Cr 2.21, GFR 34.  - MIVF with NS 125ml/hr  - NS 1L x 1 now  - CMP in a.m.     ##. Diarrhea: non bloody. ~ 2 a day.   - Send stool for Cdiff    ##. Liver Steatosis: ALT 72, AST 64. Similar to previous.   - CMP in a.m.    ##. T2DM: Hbg A1c: 6.1 on 10/21/2022.  Patient reports he is currently on no medications for management of his diabetes.  - Glucose checks twice daily     ##. HLD: -Continue prior to admission Lipitor     ##. Depression: -Continue prior to admission fluoxetine    ##. Celiac Disease: Per chart review, patient closely follows with [facility] in [place], most recently for chronic diarrhea. Patient has a history of celiac disease and is on a gluten-free diet as well as FODMAP diet. Last EGD 9/2021, last colonoscopy 9/2021.  - Gluten Free diet    ##. MS: Mild and not on medication. Symptoms primarily consist of fatigue, temperature dysregulation. Managed outpatient.     Diet: Combination Diet Gluten Free Diet; Moderate Consistent Carb (60 g CHO per Meal) Diet  DVT Prophylaxis: Ambulate every shift  Martínez Catheter: Not present  Central Lines: None  Cardiac Monitoring: None  Code Status: Full Code    Clinically Significant Risk Factors Present on Admission                 # Acute  "Kidney Injury, unspecified: based on a >150% or 0.3 mg/dL increase in last creatinine compared to past 90 day average, will monitor renal function      # Obesity: Estimated body mass index is 32.73 kg/m  as calculated from the following:    Height as of this encounter: 1.88 m (6' 2\").    Weight as of this encounter: 115.6 kg (254 lb 14.4 oz).           Disposition Plan      Expected Discharge Date: 12/03/2022                The patient's care was discussed with the Attending Physician, Dr. Wang.    TRUE Thorpe   ED Observation Service   [facility]  Securely message with the [employer] Web Console (learn more here)  Text page via [facility] Paging/Directory   ______________________________________________________________________    Chief Complaint   LLE pain, swelling, redness    History is obtained from the patient    History of Present Illness   Thomas Gonzales is a 57 year old male with a history notable for multiple sclerosis, DM II c/b peripheral neuropathy, celiac disease, CAD s/p PCI, HLD, liver steatosis, intestinal ischemia, Castro-en-Y gastric bypass c/b strictures and multiple abdominal abscesses, right foot cellulitis/osteomyelitis as well as prior LLE cellulitis  who presents to the ED for evaluation of pain and redness of left leg.     Patient reports he accidentally injured his left leg while washing his car 2 weeks prior to his initial ED presentation on 11/13/22.  He was standing on side step cleaning truck when he slipped falling on the pavement. Abrasions healed, however, developed some redness and swelling of the affected area. Reports non radiating constant throbbing pain and tightness as well as swelling and redness. He denies any drainage from the site. Reports episodes of diarrhea since beginning the antibiotics, non bloody. Denies any fevers, chills, nausea, vomiting, lightheadedness, headache, cough, chest pain, shortness of breath. " He reports being hospitalized twice in the past for cellulitis of his right leg and left elbow.     Per chart review patient was in the ED on 11/13/22 with left left pain, swelling, and redness. Labs w/o leukocytosis; mild anemia w/ hgb 12.9, Cr 1.43.  Left lower extremity ultrasound had no evidence of DVT or subcutaneous abscess. He was discharged on Bactrim x 7 days. He came back to the ED on 11/15/22 with worsening symptoms. There was noted area of erythema and tenderness to the lateral aspect of the left lower extremity with no overt fluid collection. Creatinine was 2.65 up from a baseline of approximately 1.4. Patient was started on ceftriaxone and vancomycin and admitted to ED Observation for IV antibiotics. ID was consulted and recommended discharge with Doxycycline 100mg BID for 10 days and strict instructions to follow up with PCP within 1 week of discharge to ensure improvement and that no abscess has formed. Creatinine improved to 2.0 with IV hydration. He was seen as an outpatient on 11/23/22 at which time he a little more swelling but slightly less erythema; on exam noted left lower extremity with 8 x 5 cm area of erythema in the central area about 3 x 4 area of soft tissue fullness, no induration, drainage, or warmth. On 11/25/22 had an E-visit with concerns for worsening cellulitis. He was prescribed Keflex at that time for better strep coverage. Patient states he has not completed Keflex as of yet.     Per chart review it is noted that in 10/2009 he was admitted for right foot and ankle cellulitis. He had failed outpatient management with keflex and bactrim and was admitted and placed on vancomycin and zosyn and was eventually discharged on oral vanco, augmentin and prednisone. 10/20/15 it appears that he was admitted with cellulitis of left elbow. It is noted that he had failed outpatient treatment. He was admitted and initially started on clindamycin IV and then eventually switched to Keflex after  he was seen by Infectious Disease. He underwent I&D of the olecranon bursa. He also developed a fluid collection of his left upper arm and had an ultrasound-guided aspiration of the swelling with very little fluid that was aspirated. Both of these cultures returned with MSSA. He was also admitted 1/16/21-1/21/21 with right great toe cellulitis with possible osteomyelitis. He was started on IV vancomycin and zosyn in the ED. CT imaging was done instead of MRI due to penile prosthesis with showed osteomyelitis in distil phalanx or R great toe as well as nondisplaced intra-articular fracture of distil phalayx R great toe. Wound culture showed staph aureus, staph epidermidis, and probable pseudomonas along with acinetobacter. CRP continued to trend down. He was discharged to complete a 6 week course of Bactrim DS x2 BID and Cipro 750mg Q12h per ID recommendations.     In the ED, HR 60's-70's, 's-160's/60's, RR 16, SaO2 98% on RA, Temp 97.7  F . Labs show CMP with Cl 110, bicarb 19, BUN 29.9, Cr 2.21, GFR 34, ALT 72, AST 64 otherwise normal. CBC with H/H 12.8/37.9, platelet 144, otherwise normal. CRP <3.0. ESR 9. Blood culture x 2 sent and pending. Bedside US showed a 4 x 2 cm area of fluid collection suggesting an abscess. Abscess was I&D'ed and cultures were sent. Covid 19 PCR negative. In the ED the patient was given Unasyn and Vancomycin.     Review of Systems    All other ROS negative except those mentioned in above note.      Past Medical History    I have reviewed this patient's medical history and updated it with pertinent information if needed.   Past Medical History:   Diagnosis Date     CAD (coronary artery disease)     S/p stenting of left circumflex     Diabetes mellitus (H)      Multiple sclerosis (H)      Peripheral neuropathy        Past Surgical History   I have reviewed this patient's surgical history and updated it with pertinent information if needed.  Past Surgical History:   Procedure  Laterality Date     APPENDECTOMY  1/8/2016     CATARACT IOL, RT/LT       CHOLECYSTECTOMY  1/8/2016     COLONOSCOPY N/A 9/1/2021    Procedure: COLONOSCOPY, WITH POLYPECTOMY AND BIOPSY;  Surgeon: Kamran Rainey DO;  [place]: [facility] [place]     ESOPHAGOSCOPY, GASTROSCOPY, DUODENOSCOPY (EGD), COMBINED N/A 9/1/2021    Procedure: ESOPHAGOGASTRODUODENOSCOPY, WITH BIOPSY;  Surgeon: Kamran Rainey DO;  Location: [facility] [place]     penile implant       LEON EN Y BOWEL  1/8/2016    for small bowel ischemia       Social History   I have reviewed this patient's social history and updated it with pertinent information if needed.  Social History     Tobacco Use     Smoking status: Never     Smokeless tobacco: Never   Vaping Use     Vaping Use: Never used   Substance Use Topics     Alcohol use: Yes     Comment: occ     Drug use: No       Family History   I have reviewed this patient's family history and updated it with pertinent information if needed.  Family History   Problem Relation Age of Onset     Arrhythmia Mother         bradycardia- pacemaker     Coronary Artery Disease Father        Prior to Admission Medications   Prior to Admission Medications   Prescriptions Last Dose Informant Patient Reported? Taking?   ASPIRIN LOW DOSE 81 MG EC tablet  Self No No   Sig: Take 1 tablet by mouth daily.   Cholecalciferol (VITAMIN D) 2000 UNITS CAPS  Self Yes No   Sig: Take 2,000 Units by mouth daily    FLUoxetine (PROZAC) 20 MG capsule  Self No No   Sig: Take 1 capsule (20 mg) by mouth daily   Probiotic Product (PROBIOTIC DAILY) CAPS  Self Yes No   Sig: Take 1 capsule by mouth daily    atorvastatin (LIPITOR) 40 MG tablet  Self No No   Sig: Take 1 tablet (40 mg) by mouth daily   bismuth subsalicylate 262 MG TABS  Self Yes No   Sig: Take 4-5 tablets by mouth daily    cephALEXin (KEFLEX) 500 MG capsule   No No   Sig: Take 1 capsule (500 mg) by mouth 4 times daily for 7 days   multivitamin w/minerals (THERA-VIT-M) tablet  Self Yes No   Sig: Take 1 tablet  by mouth daily   order for DME  Self No No   Sig: Equipment being ordered: custom orthotic inserts for shoes   triamcinolone (KENALOG) 0.1 % external ointment  Self No No   Sig: Apply topically 2 times daily Do not use more than 2 weeks per month      Facility-Administered Medications: None     Allergies   Allergies   Allergen Reactions     Shellfish-Derived Products Anaphylaxis     Adhesive Tape      Paper tape is okay  Tegarderm is okay     Gluten Meal      Celiac     Reglan [Metoclopramide] Hives       Physical Exam   Vital Signs: Temp: 97.4  F (36.3  C) Temp src: Oral BP: (!) 163/65 Pulse: 66   Resp: 16 SpO2: 98 % O2 Device: None (Room air)    Weight: 254 lbs 14.4 oz    Constitutional: awake, alert, cooperative, no apparent distress, and appears stated age  Eyes: Lids and lashes normal, pupils equal, round and reactive to light, extra ocular muscles intact, sclera clear, conjunctiva normal  ENT: Normocephalic, without obvious abnormality, atraumatic, sinuses nontender on palpation, external ears without lesions, oral pharynx with moist mucous membranes, tonsils without erythema or exudates, gums normal and good dentition.  Hematologic / Lymphatic: no cervical lymphadenopathy  Respiratory: No increased work of breathing, good air exchange, clear to auscultation bilaterally, no crackles or wheezing  Cardiovascular: Normal apical impulse, regular rate and rhythm, normal S1 and S2, no S3 or S4, and no murmur noted  GI: No scars, normal bowel sounds, soft, non-distended, non-tender, no masses palpated, no hepatosplenomegally  Skin: left lateral leg abscess I&D . Covered with gauze with some drainage but not soaked through  Musculoskeletal: There is no redness, warmth, or swelling of the joints.  Full range of motion noted.  Motor strength is 5 out of 5 all extremities bilaterally.  Tone is normal.  Neurologic: Awake, alert, oriented to name, place and time.  Cranial nerves II-XII are grossly intact.  Motor is 5 out  of 5 bilaterally.  Cerebellar finger to nose, heel to shin intact.  Sensory is intact.  Babinski down going, Romberg negative, and gait is normal.  Neuropsychiatric: General: normal, calm and normal eye contact     Data   Data reviewed today: I reviewed all medications, new labs and imaging results over the last 24 hours. I personally reviewed  Recent Labs   Lab 12/02/22 1647   WBC 4.9   HGB 12.8*   MCV 85   *      POTASSIUM 5.0   CHLORIDE 110*   CO2 19*   BUN 29.9*   CR 2.21*   ANIONGAP 12   DIONICIO 9.0   *   ALBUMIN 4.2   PROTTOTAL 7.0   BILITOTAL 0.3   ALKPHOS 125   ALT 72*   AST 64*     Most Recent 3 CBC's:Recent Labs   Lab Test 12/02/22 1647 11/16/22  0657 11/15/22  1811   WBC 4.9 4.8 5.9   HGB 12.8* 12.5* 13.9   MCV 85 84 85   * 124* 157     Most Recent 3 BMP's:Recent Labs   Lab Test 12/02/22 1647 11/23/22  1042 11/16/22  1356 11/16/22  0822 11/16/22  0657    139  --   --  138   POTASSIUM 5.0 5.1  --   --  5.1   CHLORIDE 110* 109*  --   --  111*   CO2 19* 20*  --   --  16*   BUN 29.9* 25.7*  --   --  32.1*   CR 2.21* 1.40*  --   --  2.00*   ANIONGAP 12 10  --   --  11   DIONICIO 9.0 8.7  --   --  8.6   * 236* 103*   < > 117*    < > = values in this interval not displayed.     Most Recent 2 LFT's:Recent Labs   Lab Test 12/02/22  1647 11/16/22  0657   AST 64* 41   ALT 72* 47   ALKPHOS 125 116   BILITOTAL 0.3 0.5     Most Recent Hemoglobin A1c:Recent Labs   Lab Test 10/21/22  0729   A1C 6.1*     Most Recent 6 glucoses:Recent Labs   Lab Test 12/02/22  1647 11/23/22  1042 11/16/22  1356 11/16/22  0822 11/16/22  0657 11/15/22  1811   * 236* 103* 108* 117* 157*     Most Recent ESR & CRP:Recent Labs   Lab Test 12/02/22  1647   SED 9   CRP <3.00     4.9    \    12.8 (L)    /    144 (L)   N 63    L N/A    141    110 (H)    29.9 (H) /   ------------------------------------ 104 (H)   ALT 72 (H)   AST 64 (H)      ALB 4.2   Ca 9.0  5.0    19 (L)    2.21 (H) \    % RETIC N/A     LDH N/A  Troponin N/A    BNP N/A    CK N/A  INR N/A   PTT N/A    D-dimer N/A    Fibrinogen N/A    Antithrombin N/A  Ferritin N/A  CRP <3.00    IL-6 N/A  Recent Results (from the past 24 hour(s))   POC US SOFT TISSUE    Impression    Limited Soft Tissue Ultrasound, performed and interpreted by me.    Indication:  Skin redness warmth pain. Evaluate for cellulitis vs abscess.     Body location: left lower extremity    Findings:  There is no cobblestoning suggestive of cellulitis in the evaluated area. There is a fluid collection measuring 4x2 cm to suggest abscess. No foreign body identified    IMPRESSION: Abscess

## 2022-12-03 NOTE — UTILIZATION REVIEW
Concurrent stay review; Secondary Review Determination     [facility]          Under the authority of the Utilization Management Committee, the utilization review process indicated a secondary review on the above patient.  The review outcome is based on review of the medical records, discussions with staff, and applying clinical experience noted on the date of the review.          (x) Observation Status Appropriate - Concurrent stay review    RATIONALE FOR DETERMINATION     57 year old male admitted on 12/2/2022. He has a history of multiple sclerosis, DM II c/b peripheral neuropathy, celiac disease, CAD s/p PCI, HLD, liver steatosis, intestinal ischemia, Castro-en-Y gastric bypass c/b strictures and multiple abdominal abscesses, right foot cellulitis/osteomyelitis as well as prior LLE cellulitis  who presents to the ED for evaluation of pain and redness of left leg.  The redness improved from the ER markings, no discharge.  The patient remain afebrile.  No growth on blood culture.  No leukocytosis.   No convincing evidence for cellulitis.  It was considered that the symptoms were related with hematoma from unknown injury about a month ago.    Patient is clinically improving and there is no clear indication to change patient's status to inpatient. The severity of illness, intensity of service provided, expected LOS and risk for adverse outcome make the care appropriate for observation.      This document was produced using voice recognition software       The information on this document is developed by the utilization review team in order for the business office to ensure compliance.  This only denotes the appropriateness of proper admission status and does not reflect the quality of care rendered.         The definitions of Inpatient Status and Observation Status used in making the determination above are those provided in the CMS Coverage Manual, Chapter 1 and Chapter 6, section 70.4.       Sincerely,     MARLENI DODD MD   Utilization Review  Physician Advisor  [facility]

## 2022-12-03 NOTE — PROGRESS NOTES
[facility]    ED Observation Progress Note - ED Observation   Date of Admission:  12/2/2022    Assessment & Plan     Thomas Gonzales is a 57 year old male admitted on 12/2/2022. He has a history of multiple sclerosis, DM II c/b peripheral neuropathy, celiac disease, CAD s/p PCI, HLD, liver steatosis, intestinal ischemia, Castro-en-Y gastric bypass c/b strictures and multiple abdominal abscesses, right foot cellulitis/osteomyelitis as well as prior LLE cellulitis  who presents to the ED for evaluation of pain and redness of left leg.      ##. LLE Abscess s/p I&D:  Patient reports accidentally injured left leg while washing his car 2 weeks prior to initial ED presentation 11/13/22. Slipped off truck step cleaning window and fell on pavement. Abrasions healed, however, developed some redness/swelling of affected area that has worsened. Reports non radiating constant throbbing pain and tightness as well as swelling and redness. Reports episodes of diarrhea since beginning antibiotics. Denies drainage, fevers, chills, nausea, vomiting, lightheadedness, headache. Seen in ED 11/13/22, neg LLE US, and discharged with Bactrim x 7 days for cellulitis. Admitted to ED Obs 11/15/22 with ERICK (Cr 2.65) and worsening cellulitis, initially on vanco/ceftriaxone and switched to doxy x 10 days per ID recs and discharged. OP f/u 11/23/22 noted improvement. E-visit 11/25/22 with worsening cellulitis and started on Keflex. Today comes in with worsening symptoms. H/o recurrent cellulitis and osteomyelitis - right foot and ankle cellulitis (2009), left elbow cellulitis (10/20/15) w/ I&D of olecranon bursa, right great toe cellulitis and possible osteomyelitis (1/16/21-1/21/21) requiring 6 week course of Bactrim DS x2 BID and Cipro 750mg Q12h per ID recommendations. Today in ED, HR 60's-70's, 's-160's/60's, RR 16, SaO2 98% on RA, Temp 97.7  F.  CBC with H/H 12.8/37.9, platelet 144,  otherwise normal. CRP <3.0. ESR 9. Blood culture x 2 sent and pending. Bedside US showed a 4 x 2 cm area of fluid collection suggesting an abscess. Abscess was I&D'ed and cultures were sent. Covid 19 PCR negative. In ED patient given Unasyn and Vancomycin. Right foot wound culture 1/16/21 had strains of MSSA as well as pseudomonas, actinomyces. No events overnight. eft lateral leg abscess I&D . Covered with gauze with some drainage but not soaked through. Redness appears to have improved from markings from ED yesterday. Afebrile. No growth on blood cultures or I&D culture at this time. Plan for ID consult, appreciate recommendations. Procal 0.07. CRP <3.00, CMP pending.   - Continue Vancomycin  - Pharmacy to dose vancomycin  - Start Zosyn for pseudomonas coverage  - Infectious Disease consult  - Continue PTA probiotics  - Strict I/O  - MIVF with NS at 125ml/hr  - Follow Blood cultures and wound cultures  - Morning CMP pending.     ##. ERICK: Patient's baseline creatinine appears to be about 1.4.  Labs show BUN 29.9, Cr 2.21, GFR 34.  - MIVF with NS 125ml/hr  - NS 1L x 1 now  - CMP pending      ##. Diarrhea: non bloody. ~ 2 a day.   - Cdiff stool pending      ##. Liver Steatosis: ALT 72, AST 64. Similar to previous.   - CMP pending      ##. T2DM: Hbg A1c: 6.1 on 10/21/2022.  Patient reports he is currently on no medications for management of his diabetes.  - Glucose checks twice daily     ##. HLD: -Continue prior to admission Lipitor     ##. Depression: -Continue prior to admission fluoxetine     ##. Celiac Disease: Per chart review, patient closely follows with [facility] in [place], most recently for chronic diarrhea. Patient has a history of celiac disease and is on a gluten-free diet as well as FODMAP diet. Last EGD 9/2021, last colonoscopy 9/2021.  - Gluten Free diet     ##. MS: Mild and not on medication. Symptoms primarily consist of fatigue, temperature dysregulation. Managed  "outpatient.          Diet: Combination Diet Gluten Free Diet; Moderate Consistent Carb (60 g CHO per Meal) Diet    DVT Prophylaxis: Ambulate every shift  Martínez Catheter: Not present  Central Lines: None  Cardiac Monitoring: None  Code Status: Full Code      Disposition Plan    Discharge pending ID recommendations     The patient's care was discussed with the Attending Physician, Dr. Wang, Bedside Nurse, Patient and ID Team.    DAVE Ding CNP  ED Observation   [facility]  Securely message with the Vocera Web Console (learn more here)  Text page via MobileIron Paging/Directory         Clinically Significant Risk Factors Present on Admission                # Thrombocytopenia: Lowest platelets = 103 (Ref range: 150-450) in last 2 days, will monitor for bleeding  # Acute Kidney Injury, unspecified: based on a >150% or 0.3 mg/dL increase in last creatinine compared to past 90 day average, will monitor renal function      # Obesity: Estimated body mass index is 32.73 kg/m  as calculated from the following:    Height as of this encounter: 1.88 m (6' 2\").    Weight as of this encounter: 115.6 kg (254 lb 14.4 oz).           ______________________________________________________________________    Interval History   No events overnight. eft lateral leg abscess I&D . Covered with gauze with some drainage but not soaked through. Redness appears to have improved from markings from ED yesterday. Afebrile. No growth on blood cultures or I&D culture at this time. Plan for ID consult, appreciate recommendations.     Data reviewed today: I reviewed all medications, new labs and imaging results over the last 24 hours.     Physical Exam   Vital Signs: Temp: 98.1  F (36.7  C) Temp src: Oral BP: (!) 150/67 Pulse: 59   Resp: 16 SpO2: 99 % O2 Device: None (Room air)    Weight: 254 lbs 14.4 oz       Constitutional: awake, alert, cooperative, no apparent distress, and appears " stated age  Eyes: Lids and lashes normal, pupils equal, round and reactive to light, extra ocular muscles intact, sclera clear, conjunctiva normal  ENT: Normocephalic, without obvious abnormality, atraumatic, sinuses nontender on palpation, external ears without lesions, oral pharynx with moist mucous membranes, tonsils without erythema or exudates, gums normal and good dentition.  Hematologic / Lymphatic: no cervical lymphadenopathy  Respiratory: No increased work of breathing, good air exchange, clear to auscultation bilaterally, no crackles or wheezing  Cardiovascular: Normal apical impulse, regular rate and rhythm, normal S1 and S2, no S3 or S4, and no murmur noted  GI: No scars, normal bowel sounds, soft, non-distended, non-tender, no masses palpated, no hepatosplenomegally  Skin: left lateral leg abscess I&D . Covered with gauze with some drainage but not soaked through. Redness appears to have improved from markings from ED yesterday  Musculoskeletal:  mild BLE. Patient reports this is his baseline and worsens without compression socks. 1+ Full range of motion noted.  Motor strength is 5 out of 5 all extremities bilaterally.  Tone is normal.  Neurologic: Awake, alert, oriented to name, place and time.  Cranial nerves II-XII are grossly intact.  Motor is 5 out of 5 bilaterally.  Cerebellar finger to nose, heel to shin intact.  Sensory is intact.  Babinski down going, Romberg negative, and gait is normal.  Neuropsychiatric: General: normal, calm and normal eye contact       Data   Recent Labs   Lab 12/03/22  0601 12/02/22  1647   WBC 2.9* 4.9   HGB 10.9* 12.8*   MCV 86 85   * 144*    141   POTASSIUM 4.7 5.0   CHLORIDE 114* 110*   CO2  --  19*   BUN  --  29.9*   CR  --  2.21*   ANIONGAP  --  12   DIONICIO  --  9.0   GLC  --  104*   ALBUMIN  --  4.2   PROTTOTAL  --  7.0   BILITOTAL  --  0.3   ALKPHOS  --  125   ALT  --  72*   AST  --  64*     Recent Results (from the past 24 hour(s))   POC US SOFT TISSUE     Impression    Limited Soft Tissue Ultrasound, performed and interpreted by me.    Indication:  Skin redness warmth pain. Evaluate for cellulitis vs abscess.     Body location: left lower extremity    Findings:  There is no cobblestoning suggestive of cellulitis in the evaluated area. There is a fluid collection measuring 4x2 cm to suggest abscess. No foreign body identified    IMPRESSION: Abscess           Medications     sodium chloride 125 mL/hr at 12/03/22 0926       aspirin  81 mg Oral Daily     atorvastatin  40 mg Oral Daily     FLUoxetine  20 mg Oral Daily     lactobacillus rhamnosus (GG)  1 capsule Oral Daily     piperacillin-tazobactam  3.375 g Intravenous Q6H     sodium chloride (PF)  3 mL Intracatheter Q8H     vancomycin place clancy - receiving intermittent dosing  1 each Intravenous See Admin Instructions

## 2022-12-03 NOTE — PROGRESS NOTES
12:17 PM 57-year-old male with left lower leg pain and swelling this occurred after a mild traumatic injury about a month ago.  It remained swollen fluctuant and red and has been treated with antibiotics without any improvement diagnostic I&D was done yesterday that shows blood confirms that hematoma will apply a dressing and send him home on 5 or 7 days of Keflex apply a thank you Ace wrap for some compression and advised him it will take weeks if not months to resolve this hematoma but he does not need any more antibiotics unless there is a dramatic change the antibiotics today are mostly prophylactic for the open wound.     On examination today he is awake and alert in no distress the left lower extremity dressing was removed there is no drainage no hemorrhage there is still mild fluctuance.  No convincing evidence for cellulitis.  Plan was explained to the patient this includes elevation short course of antibiotics.  He did have mild elevation of his LFTs I will not recommend rechecking these in his clinic next week.

## 2022-12-04 LAB
BACTERIA WND CULT: NO GROWTH
GRAM STAIN RESULT: NORMAL
GRAM STAIN RESULT: NORMAL

## 2022-12-07 LAB
BACTERIA BLD CULT: NO GROWTH
BACTERIA BLD CULT: NO GROWTH

## 2022-12-10 ENCOUNTER — HEALTH MAINTENANCE LETTER (OUTPATIENT)
Age: 57
End: 2022-12-10

## 2022-12-15 ENCOUNTER — OFFICE VISIT (OUTPATIENT)
Dept: FAMILY MEDICINE | Facility: CLINIC | Age: 57
End: 2022-12-15
Payer: COMMERCIAL

## 2022-12-15 VITALS
BODY MASS INDEX: 32.6 KG/M2 | HEART RATE: 71 BPM | RESPIRATION RATE: 14 BRPM | SYSTOLIC BLOOD PRESSURE: 126 MMHG | WEIGHT: 254 LBS | OXYGEN SATURATION: 98 % | DIASTOLIC BLOOD PRESSURE: 72 MMHG | TEMPERATURE: 97.4 F | HEIGHT: 74 IN

## 2022-12-15 DIAGNOSIS — T14.8XXA HEMATOMA OF SKIN: Primary | ICD-10-CM

## 2022-12-15 DIAGNOSIS — N18.31 CHRONIC KIDNEY DISEASE, STAGE 3A (H): ICD-10-CM

## 2022-12-15 PROBLEM — L03.116 CELLULITIS OF LEFT LOWER EXTREMITY: Status: RESOLVED | Noted: 2021-01-16 | Resolved: 2022-12-15

## 2022-12-15 PROCEDURE — 99213 OFFICE O/P EST LOW 20 MIN: CPT | Performed by: FAMILY MEDICINE

## 2022-12-15 ASSESSMENT — ANXIETY QUESTIONNAIRES
4. TROUBLE RELAXING: NOT AT ALL
7. FEELING AFRAID AS IF SOMETHING AWFUL MIGHT HAPPEN: NOT AT ALL
5. BEING SO RESTLESS THAT IT IS HARD TO SIT STILL: NOT AT ALL
GAD7 TOTAL SCORE: 1
2. NOT BEING ABLE TO STOP OR CONTROL WORRYING: NOT AT ALL
GAD7 TOTAL SCORE: 1
7. FEELING AFRAID AS IF SOMETHING AWFUL MIGHT HAPPEN: NOT AT ALL
8. IF YOU CHECKED OFF ANY PROBLEMS, HOW DIFFICULT HAVE THESE MADE IT FOR YOU TO DO YOUR WORK, TAKE CARE OF THINGS AT HOME, OR GET ALONG WITH OTHER PEOPLE?: NOT DIFFICULT AT ALL
IF YOU CHECKED OFF ANY PROBLEMS ON THIS QUESTIONNAIRE, HOW DIFFICULT HAVE THESE PROBLEMS MADE IT FOR YOU TO DO YOUR WORK, TAKE CARE OF THINGS AT HOME, OR GET ALONG WITH OTHER PEOPLE: NOT DIFFICULT AT ALL
6. BECOMING EASILY ANNOYED OR IRRITABLE: SEVERAL DAYS
3. WORRYING TOO MUCH ABOUT DIFFERENT THINGS: NOT AT ALL
1. FEELING NERVOUS, ANXIOUS, OR ON EDGE: NOT AT ALL
GAD7 TOTAL SCORE: 1

## 2022-12-15 ASSESSMENT — PATIENT HEALTH QUESTIONNAIRE - PHQ9
10. IF YOU CHECKED OFF ANY PROBLEMS, HOW DIFFICULT HAVE THESE PROBLEMS MADE IT FOR YOU TO DO YOUR WORK, TAKE CARE OF THINGS AT HOME, OR GET ALONG WITH OTHER PEOPLE: NOT DIFFICULT AT ALL
SUM OF ALL RESPONSES TO PHQ QUESTIONS 1-9: 3
SUM OF ALL RESPONSES TO PHQ QUESTIONS 1-9: 3

## 2022-12-15 ASSESSMENT — PAIN SCALES - GENERAL: PAINLEVEL: NO PAIN (0)

## 2022-12-15 NOTE — PATIENT INSTRUCTIONS
What is Chronic Kidney Disease  Chronic kidney disease (CKD) is the permanent loss of kidney function.  When the kidneys are damaged, they do not remove wastes and extra water from the blood as well as they should.  The most common causes of CKD are high blood pressure and diabetes.   It can also run in families, so you may be at higher risk if you have a blood relative with kidney failure.  CKD is a silent condition (having few or no symptoms) and develops so slowly that most people do not realize they are sick until the disease is advanced.  Left undetected and untreated CKD can eventually lead to further problems including hypertension, anemia, weak bones, poor nutrition, nerve damage, and in severe cases kidney failure (requiring dialysis or transplant).  CKD also increases a patient s risk for developing diseases of the heart and blood vessels.    We can diagnose CKD through blood and urine tests.  By detecting CKD in its early stages we can prevent or delay the complications of CKD, including kidney failure and heart disease.  Stages of CKD:  There are five stages of chronic kidney disease.  Your stage of kidney damage is based on the presence of kidney damage (often in the form of urinary proteins) and your glomerular filtration rate (GFR), which is a measure of your level of kidney function.  Things you can do to slow down or avoid kidney failure:  If you have diabetes, control your blood sugar.  Studies show that keeping tight control of blood sugar can delay or prevent kidney failure  If you have high blood pressure, it is important to lower your blood pressure.  Medications called ACE (angiotensin-converting enzyme) inhibitors or ARBs (angiotensin receptor blockers) are used to keep your kidney disease from getting worse.  Be active by including exercise in your daily routine.  Eat a well balanced diet.   We may have you watch the amount of protein you eat.  Too much protein can make the kidneys work too  hard.  Quit smoking.  Smoking damages the kidneys.  It also raises blood pressure.  Discuss all of your medications, including over-the-counter medications, with your doctor.  You should  avoid certain medications, including popular medications such as Advil, Motrin, Aleve (and other  NSAIDs ) which can further damage your kidneys.  For more information on Chronic Kidney Disease, please see the National Kidney Foundation www.kidney.org.

## 2022-12-15 NOTE — PROGRESS NOTES
"  Assessment & Plan   Hematoma of skin  Improving and continue with dressing changes.    Chronic kidney disease, stage 3a (H)  Decreased GFR lately of unclear cause.  Blood pressure and diabetes are well controlled.  Continue to follow-up with primary and may need nephrology consult.  Will discontinue ibuprofen      BMI:   Estimated body mass index is 32.61 kg/m  as calculated from the following:    Height as of this encounter: 1.88 m (6' 2\").    Weight as of this encounter: 115.2 kg (254 lb).         Return in about 3 months (around 3/15/2023) for wellness exam with fasting labs.      Otilio Barbosa MD      [facility]  [address] [place] [id number]  [email or web address]   Office: [phone]       Bonnie Moseley is a 57 year old, presenting for the following health issues:  Hospital F/U      [facility]       Hospital Follow-up Visit:    Hospital/Nursing Home/IP Rehab Facility: [facility]  Date of Admission: 12/2/22   Date of Discharge: 12/3/22  Reason(s) for Admission: Hematoma on the left lower lateral leg -initial trauma 10/30/22 - initially it was thought it may have been cellulitis and was treated with antibiotics but then switched and had a drainage of the subsequent hematoma from her initial trauma.  Doing well, no fevers, slight serous drainage yet.        Was your hospitalization related to COVID-19? No   Problems taking medications regularly:  None  Medication changes since discharge:    none    Problems adhering to non-medication therapy:  None    Summary of hospitalization:  [facility] discharge summary reviewed  Diagnostic Tests/Treatments reviewed.  Follow up needed: none  Other Healthcare Providers Involved in Patient s Care:         None  Update since discharge: improved.         Plan of care communicated with patient             Review of Systems         Objective    /72   Pulse 71   Temp 97.4  F (36.3 " " C) (Tympanic)   Resp 14   Ht 1.88 m (6' 2\")   Wt 115.2 kg (254 lb)   SpO2 98%   BMI 32.61 kg/m    Body mass index is 32.61 kg/m .  Physical Exam   GENERAL: healthy, alert and no distress  NECK: no adenopathy, no asymmetry, masses, or scars and thyroid normal to palpation  RESP: lungs clear to auscultation - no rales, rhonchi or wheezes  CV: regular rate and rhythm, normal S1 S2, no S3 or S4, no murmur, click or rub, no peripheral edema and peripheral pulses strong  ABDOMEN: soft, nontender, no hepatosplenomegaly, no masses and bowel sounds normal  MS: no gross musculoskeletal defects noted, no edema  SKIN: Left distal lateral shin has a 4 x 8 cm area of mild increased redness and central drainage laceration that has some serous drainage, no signs of cellulitis otherwise no suspicious lesions or rashes  NEURO: Normal strength and tone, mentation intact and speech normal  Diabetic foot exam: normal DP and PT pulses, no trophic changes or ulcerative lesions, normal sensory exam and reduced sensation at both soles      Lab Results   Component Value Date    A1C 6.1 10/21/2022    A1C 5.2 09/23/2021    A1C 5.7 01/17/2021    A1C 5.5 06/22/2020    A1C 5.4 02/01/2019    A1C 5.0 05/01/2018    A1C 6.2 03/09/2017                   "

## 2023-02-08 ENCOUNTER — OFFICE VISIT (OUTPATIENT)
Dept: URGENT CARE | Facility: URGENT CARE | Age: 58
End: 2023-02-08
Payer: COMMERCIAL

## 2023-02-08 VITALS
DIASTOLIC BLOOD PRESSURE: 80 MMHG | SYSTOLIC BLOOD PRESSURE: 165 MMHG | HEART RATE: 62 BPM | WEIGHT: 221 LBS | BODY MASS INDEX: 28.37 KG/M2 | OXYGEN SATURATION: 100 % | TEMPERATURE: 96.7 F

## 2023-02-08 DIAGNOSIS — L03.116 LEFT LEG CELLULITIS: Primary | ICD-10-CM

## 2023-02-08 DIAGNOSIS — S81.802A: ICD-10-CM

## 2023-02-08 DIAGNOSIS — S71.102A: ICD-10-CM

## 2023-02-08 PROCEDURE — 99214 OFFICE O/P EST MOD 30 MIN: CPT | Performed by: PHYSICIAN ASSISTANT

## 2023-02-08 RX ORDER — DOXYCYCLINE HYCLATE 100 MG
100 TABLET ORAL 2 TIMES DAILY
Qty: 20 TABLET | Refills: 0 | Status: SHIPPED | OUTPATIENT
Start: 2023-02-08 | End: 2023-02-18

## 2023-02-08 ASSESSMENT — PAIN SCALES - GENERAL: PAINLEVEL: MILD PAIN (2)

## 2023-02-08 NOTE — PROGRESS NOTES
Assessment & Plan     1. Left leg cellulitis  When compared to the pictures from when he was hospitalized, he has increased redness, swelling and edema.  Additionally, he has several shallow ulcers.  Treatment today with doxycycline twice a day for 10 days.  Encouraged using bacitracin or Aquaphor on the area and covering with a nonstick bandage and using an Ace bandage over top.  Referral to wound care was given for him to follow-up on, as he continues to have more wounds in his left lower extremity.  Additionally he has a dermatology referral which she is following up on.  Discussed indications to be seen sooner including fever, chills, significantly worsening redness or swelling, wound extending into fat layer etc.  - Wound Care Referral; Future  - doxycycline hyclate (VIBRA-TABS) 100 MG tablet; Take 1 tablet (100 mg) by mouth 2 times daily for 10 days  Dispense: 20 tablet; Refill: 0    2. Open wound multiple sites one lower limb and thigh with complication, left, initial encounter          No follow-ups on file.    Diagnosis and treatment plan was reviewed with patient and/or family.   We went over any labs or imaging. Discussed worsening symptoms or little to no relief despite treatment plan to follow-up with PCP or return to clinic.  Patient verbalizes understanding. All questions were addressed and answered.     Reyna Marshall PA-C  [facility]    CHIEF COMPLAINT:   Chief Complaint   Patient presents with     Urgent Care     Patient states he is Diabetic and has some sores on his left leg  x 3.5-4 months. Patient states he has been in and out of wound care at the [facility]. Patient states his skin is very irritated by the bandages. Patient states he has edema in his left ankle.       Subjective     Pradip is a 57 year old male who presents to clinic today for evaluation of left leg pain redness and open wound. He was hospitalized 12/15 for cellulitis of LLE. I&D c/w hematoma.  Treated with abx, incision continued to heal.  For the past one month, he has had increased redness, swelling and several superficial ulcers  Patient denies having fever, chills, numbness, tingling, pale or cold extremity.       Past Medical History:   Diagnosis Date     CAD (coronary artery disease)     S/p stenting of left circumflex     Depressive disorder 2006     Diabetes mellitus (H)      Multiple sclerosis (H)      Peripheral neuropathy      Past Surgical History:   Procedure Laterality Date     ABDOMEN SURGERY  1/2016, 5/2016     APPENDECTOMY  01/08/2016     BIOPSY  2/2016    To have addl skin excised at [facility]     CATARACT IOL, RT/LT       CHOLECYSTECTOMY  01/08/2016     COLONOSCOPY N/A 09/01/2021    Procedure: COLONOSCOPY, WITH POLYPECTOMY AND BIOPSY;  Surgeon: Kamran Rainey DO;  Location: [facility] OR     ESOPHAGOSCOPY, GASTROSCOPY, DUODENOSCOPY (EGD), COMBINED N/A 09/01/2021    Procedure: ESOPHAGOGASTRODUODENOSCOPY, WITH BIOPSY;  Surgeon: Kamran Rainey DO;  Location: [facility] OR     penile implant       LEON EN Y BOWEL  01/08/2016    for small bowel ischemia     Social History     Tobacco Use     Smoking status: Never     Smokeless tobacco: Never   Substance Use Topics     Alcohol use: Yes     Comment: occ     Current Outpatient Medications   Medication     acetaminophen (TYLENOL) 325 MG tablet     ASPIRIN LOW DOSE 81 MG EC tablet     atorvastatin (LIPITOR) 40 MG tablet     bismuth subsalicylate 262 MG TABS     Cholecalciferol (VITAMIN D) 2000 UNITS CAPS     doxycycline hyclate (VIBRA-TABS) 100 MG tablet     FLUoxetine (PROZAC) 20 MG capsule     multivitamin w/minerals (THERA-VIT-M) tablet     order for DME     Probiotic Product (PROBIOTIC DAILY) CAPS     triamcinolone (KENALOG) 0.1 % external ointment     No current facility-administered medications for this visit.     Allergies   Allergen Reactions     Shellfish-Derived Products Anaphylaxis     Adhesive Tape      Paper tape is okay  Tegarderm is okay      Gluten Meal      Celiac     Reglan [Metoclopramide] Hives       10 point ROS of systems were all negative except for pertinent positives noted in my HPI.      Exam:   BP (!) 165/80   Pulse 62   Temp (!) 96  F (35.6  C) (Tympanic)   Wt 100.2 kg (221 lb)   SpO2 100%   BMI 28.37 kg/m    Gen: healthy,alert,no distress  Extremity: Left lateral lower extremity has several shallow ulcers with surrounding erythema and TTP.  There is not compromise to the distal circulation.  Pulses are +2 and CRT is brisk  EXTREMITIES: peripheral pulses normal  SKIN: no suspicious lesions or rashes  NEURO: Normal strength and tone, sensory exam grossly normal, mentation intact and speech normal

## 2023-02-09 DIAGNOSIS — E11.21 TYPE 2 DIABETES MELLITUS WITH DIABETIC NEPHROPATHY, WITHOUT LONG-TERM CURRENT USE OF INSULIN (H): Primary | ICD-10-CM

## 2023-02-09 NOTE — PATIENT INSTRUCTIONS
Start taking doxycycline for infection  Use bacitracin or aquaphor on the area and use a non stick bandage  Follow-up with Dermatology and wound referral.

## 2023-02-14 ENCOUNTER — OFFICE VISIT (OUTPATIENT)
Dept: VASCULAR SURGERY | Facility: CLINIC | Age: 58
End: 2023-02-14
Attending: PHYSICIAN ASSISTANT
Payer: COMMERCIAL

## 2023-02-14 VITALS
OXYGEN SATURATION: 99 % | SYSTOLIC BLOOD PRESSURE: 138 MMHG | HEART RATE: 89 BPM | WEIGHT: 256.9 LBS | DIASTOLIC BLOOD PRESSURE: 80 MMHG | TEMPERATURE: 98.2 F | RESPIRATION RATE: 16 BRPM | BODY MASS INDEX: 32.98 KG/M2

## 2023-02-14 DIAGNOSIS — E11.622 TYPE 2 DIABETES MELLITUS WITH OTHER SKIN ULCER, WITHOUT LONG-TERM CURRENT USE OF INSULIN (H): ICD-10-CM

## 2023-02-14 DIAGNOSIS — S81.802A OPEN WOUND OF LOWER LIMB, LEFT, INITIAL ENCOUNTER: Primary | ICD-10-CM

## 2023-02-14 DIAGNOSIS — R60.9 DEPENDENT EDEMA: ICD-10-CM

## 2023-02-14 DIAGNOSIS — L03.116 LEFT LEG CELLULITIS: ICD-10-CM

## 2023-02-14 PROCEDURE — 11042 DBRDMT SUBQ TIS 1ST 20SQCM/<: CPT | Performed by: NURSE PRACTITIONER

## 2023-02-14 PROCEDURE — G0463 HOSPITAL OUTPT CLINIC VISIT: HCPCS | Mod: 25 | Performed by: NURSE PRACTITIONER

## 2023-02-14 PROCEDURE — 99204 OFFICE O/P NEW MOD 45 MIN: CPT | Mod: 25 | Performed by: NURSE PRACTITIONER

## 2023-02-14 ASSESSMENT — PAIN SCALES - GENERAL: PAINLEVEL: NO PAIN (0)

## 2023-02-14 NOTE — LETTER
[facility]  [address] [place] [address] [address], [place] [address]  [phone]      Fax [phone]    [facility]           Fax: [phone]            Customer Service: [phone]        Account #: [id number]    Wound Dressing Rx and Order Form  Order Status: new  Verbal: Yamini  Date: 2023     Thomas BRIGITTE Goznales  Gender: male  : 1965  [address] [place] [id number] [phone] (home)     Medical Record: [id number]  Primary Care Provider: Anni Sage      ICD-10-CM    1. Open wound of lower limb, left, initial encounter  S81.802A DEBRIDE SKIN/SUBQ TISSUE      2. Left leg cellulitis  L03.116 Wound Care Referral     DEBRIDE SKIN/SUBQ TISSUE      3. Type 2 diabetes mellitus with other skin ulcer, without long-term current use of insulin (H)  E11.622 DEBRIDE SKIN/SUBQ TISSUE      4. Dependent edema  R60.9 DEBRIDE SKIN/SUBQ TISSUE            Insurance Info:  INSURER: Payor: MEDICA / Plan: MEDICA CHOICE / Product Type: Indemnity /   Policy ID#:  [id number]  SECONDARY INSURANCE:    Secondary Policy ID#:  N/A        Physician Info:   Name:  NICK LION     Dept Address/Phones:   [address] [place] [address], [facility] [address] [id number] [phone]  Fax: [phone]    Lymphedema circumferential measurements (in cm):  Circumferential Measures 2023   Left Ankle 24.2   Left Widest Calf 23   Left Thigh Up 10cm 39.8         Wound info:  Wound Leg (Active)   Number of days: 90       VASC Wound Left lateral leg proximal (Active)   Pre Size Length 1 23 0800   Pre Size Width 1.7 23 0800   Pre Size Depth 0.1 23 0800   Pre Total Sq cm 1.7 23 0800   Number of days: 0       VASC Wound Left lateral leg (Active)   Pre Size Length 1 23 0800   Pre Size Width 0.3 23 0800   Pre Size Depth 0.2 23 0800   Pre Total Sq cm 0.3 23 0800   Number of days: 0       VASC Wound Left lateral leg distal (Active)   Pre Size  "Length 0.8 02/14/23 0800   Pre Size Width 0.5 02/14/23 0800   Pre Size Depth 0.1 02/14/23 0800   Pre Total Sq cm 0.4 02/14/23 0800   Number of days: 0        Drainage: moderate  Thickness:  full  Duration of Need: 30 DAYS  Days Supply: 30 DAYS  Start Date: 2/14/2023  Starter Kit: Ancillary Kit (saline, gloves, gauze)  Qualifying wound/Debridement: Yes      Dressing Type Brand Size Frequency of change -or- Quantity   Primary silvercel  4.25\"x4.25\" Every 3 days and as needed   Send 2 pieces   Secondary ABD pad  5\"x9\"  Every 3 days and as needed   Send 5 pieces    sof form roll gauze  4\"x75\"  Every 3 days and as needed   Send 10 pieces   Tape paper  1\" Every 3 days and as needed      No substitutions preferred. Call [phone].     Wound Care Instructions    Every 3 days , Cleanse your left leg wound(s) with Dilute hibiclens 30cc in 500cc NS.    Pat Dry with non-sterile gauze    Apply Lotion to the intact skin surrounding your wound and other dry skin locations. Some good lotions include: Remedy Skin Repair Cream, Sarna, Vanicream or Cetaphil    Primary Dressing: Apply silvercel 4.25\"x4.25\" into/onto the wounds; cut to fit to the size of your wounds    Secondary dressing: Cover with ABD 5\"x9\"    Secure with non-sterile roll gauze (4\" x 75\" roll) and tape (1\" roll tape) as needed; avoid adhesive directly on the skin    Compression: double tubular compression    OK to forward to covered supplier.    Electronically Signed Physician:  NICK LION             Date: February 14, 2023    "

## 2023-02-14 NOTE — PATIENT INSTRUCTIONS
"Wound care supplies were ordered today through [employer] and if you are not receiving your supplies or have a question on your bill please contact Gloria Nelson at [phone]. Please allow 2-5 business days for delivery of supplies. You may get a call from a [phone] if there are additional information Corey needs. It is important to  or return their call. PLEASE NOTE: If you need to return your supplies, you MUST call customer service within 15 days of delivery date.         Wound Care Instructions    Every 3 days , Cleanse your left leg wound(s) with Dilute hibiclens 30cc in 500cc NS.    Pat Dry with non-sterile gauze    Apply Lotion to the intact skin surrounding your wound and other dry skin locations. Some good lotions include: Remedy Skin Repair Cream, Sarna, Vanicream or Cetaphil    Primary Dressing: Apply silvercel into/onto the wounds; cut to fit to the size of your wounds    Secondary dressing: Cover with ABD    Secure with non-sterile roll gauze (4\" x 75\" roll) and tape (1\" roll tape) as needed; avoid adhesive directly on the skin    Compression: double tubular compression    It is not ok to get your wound wet in the bath or shower      If for some reason you are not able to get your dressing(s) changed as outlined above (due to illness, lack of supplies, lack of help) please do the following: remove old, soiled dressings; wash the wounds with saline; pat dry; apply ABD pad or other absorbant pad and secure with rolled gauze; avoid tape directly on your skin; Call the clinic as soon as possible to let us know what the current issues are in receiving wound care [phone].      SEEK MEDICAL CARE IF:  You have an increase in swelling, pain, or redness around the wound.  You have an increase in the amount of pus coming from the wound.  There is a bad smell coming from the wound.  The wound appears to be worsening/enlarging  You have a fever greater than 101.5 F      It is ok to continue current " wound care treatment/products for the next 2-3 days until new wound care supplies are ordered and arrive. If longer than this please contact our office at [phone].    If you have a 2 layer or 4 layer compression wrap on these are safe to have on for ONLY 7 days. If for some reason you are not able to get the wrap(s) changed (due to illness; lack of supplies, lack of help, lack of transportation) please do the following: unwrap the old 2 or 4 layer compression wrap; avoid using scissors as you could cut your skin and cause wounds; use tubular compression when available. Call to reschedule your home care or clinic visit appointment as soon as possible.    Please NOTE: if you are 15 minutes late to your clinic appointment you will have to be rescheduled. Please call our clinic as soon as possible if you know you will not be able to get to your appointment at [phone].    If you fail to show up to 3 scheduled clinic appointments you will be dismissed from our clinic.              We want to hear from you!  In the next few weeks, you should receive a call or email to complete a survey about your visit at [facility]. Please help us improve your appointment experience by letting us know how we did today. We strive to make your experience good and value any ways in which we could do better.      We value your input and suggestions.    Thank you for choosing the [facility]!           It is recommended that you do not get your ulcer wet when showering.  Listed below are several ways of keeping it dry when you shower.     1. Wrap it with Press and Seal plastic wrap.  It can be found in the stores where the plastic wraps or tin foil is kept.               2.  Some people take a bath and hang their leg/foot out of the tub.                        3  Put your leg in a plastic bag and tape it on.           4. You can purchase a shower cover for casts at some pharmacies and through the  Internet.            5. Take a Bed Bath or wash up at the sink

## 2023-02-14 NOTE — PROGRESS NOTES
[facility] Consult Note    Date of Service: February 14, 2023     Requesting Provider: Reyna RUIZ    Chief Complaint: LLE cellulitis; LLE swelling and LLE wounds    History of Present Illness: Thomas Gonzales is being seen at [facility] today regarding LLE cellulitis and swelling and LLE wounds. They arrive to the clinic today alone; he was able to walk on his own; he multiple sclerosis. The patient reports that the left leg ulcers began when he was washing his truck 10/29/22; slipped off the step and struck the left shin; developed infection; the area was incised and drain; just blood was expressed; he has developed additional wounds from the adhesive from the bandages. He is currently on doxycyline; tolerating well; has 5 days left. Was currently/previously using telfa. Reports pain of 0/10; currently using apap for pain. Has used compression stockings as compression in the past, is currently using compression stocking and ace wrap on top for compression. 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 Sleeps in a bed/recliner with legs elevated.  Denies history of DVT, Joint Replacement and Vein Procedures. Positive history of Cellulitis. I personally reviewed outside imaging, lab work, and progress noted through Care Everywhere and outside records. Pt with type 2 diabetes; checks fs a few times per month only when he is feeling, last a1c was 6.1% done 10/2022. Weigh stable. Never smoker. He is taking MVI and vitamin d supplement. 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 Sitting for most hours of the day. History of ischemic colitis; underwent bowel resection and cande en y; had complication of a staple causing leakage of stool in the abdomen; this was repaired.      Review of Systems:   Constitutional:  Negative   EENTM:  negative for glasses;  negative [place]  GI:  negative for nausea/vomiting;   negative for constipation   + diarrhea;   negative for fecal incontinence  negative weight loss  :   negative dysuria,  negative incontinence  MS: patient is ambulatory;  does not use assistive devices  Cardiac: positive MI; s/p CAD stenting  Respiratory:  negative SOB  Endocrine:  negative  diabetes  Psych: negative  depression/anxiety    Past Medical History:   Past Medical History:   Diagnosis Date     CAD (coronary artery disease)     S/p stenting of left circumflex     Depressive disorder 2006     Diabetes mellitus (H)      Multiple sclerosis (H)      Peripheral neuropathy         Surgical History:   Past Surgical History:   Procedure Laterality Date     ABDOMEN SURGERY  1/2016, 5/2016     APPENDECTOMY  01/08/2016     BIOPSY  2/2016    To have addl skin excised at [facility]     CATARACT IOL, RT/LT       CHOLECYSTECTOMY  01/08/2016     COLONOSCOPY N/A 09/01/2021    Procedure: COLONOSCOPY, WITH POLYPECTOMY AND BIOPSY;  Surgeon: Kamran Rainey DO;  [place] [facility] [place]     ESOPHAGOSCOPY, GASTROSCOPY, DUODENOSCOPY (EGD), COMBINED N/A 09/01/2021    Procedure: ESOPHAGOGASTRODUODENOSCOPY, WITH BIOPSY;  Surgeon: Kamran Rainey DO;  [place] [facility] [place]     penile implant       LEON EN Y BOWEL  01/08/2016    for small bowel ischemia        Medications:   Current Outpatient Medications   Medication Sig     acetaminophen (TYLENOL) 325 MG tablet Take 2 tablets (650 mg) by mouth every 6 hours as needed for mild pain (and adjunct with moderate or severe pain or per patient request)     ASPIRIN LOW DOSE 81 MG EC tablet Take 1 tablet by mouth daily.     atorvastatin (LIPITOR) 40 MG tablet Take 1 tablet (40 mg) by mouth daily     bismuth subsalicylate 262 MG TABS Take 4-5 tablets by mouth daily      Cholecalciferol (VITAMIN D) 2000 UNITS CAPS Take 2,000 Units by mouth daily      doxycycline hyclate (VIBRA-TABS) 100 MG tablet Take 1 tablet (100 mg) by mouth  2 times daily for 10 days     FLUoxetine (PROZAC) 20 MG capsule Take 1 capsule (20 mg) by mouth daily     multivitamin w/minerals (THERA-VIT-M) tablet Take 1 tablet by mouth daily     order for DME Equipment being ordered: custom orthotic inserts for shoes     Probiotic Product (PROBIOTIC DAILY) CAPS Take 1 capsule by mouth daily      triamcinolone (KENALOG) 0.1 % external ointment Apply topically 2 times daily Do not use more than 2 weeks per month     No current facility-administered medications for this visit.       Allergies:   Allergies   Allergen Reactions     Shellfish-Derived Products Anaphylaxis     Adhesive Tape      Paper tape is okay  Tegarderm is okay     Gluten Meal      Celiac     Reglan [Metoclopramide] Hives       Family history:   Family History   Problem Relation Age of Onset     Arrhythmia Mother         bradycardia- pacemaker     Mental Illness Mother      Obesity Mother      Coronary Artery Disease Father      Diabetes Father      Obesity Father         Social History:   Social History     Socioeconomic History     Marital status:      Spouse name: Not on file     Number of children: Not on file     Years of education: Not on file     Highest education level: Not on file   Occupational History     Not on file   Tobacco Use     Smoking status: Never     Smokeless tobacco: Never   Vaping Use     Vaping Use: Never used   Substance and Sexual Activity     Alcohol use: Yes     Comment: occ     Drug use: No     Sexual activity: Yes     Partners: Female     Birth control/protection: None   Other Topics Concern     Parent/sibling w/ CABG, MI or angioplasty before 65F 55M? Yes     Comment: Father   Social History Narrative     Not on file     Social Determinants of Health     Financial Resource Strain: Not on file   Food Insecurity: Not on file   Transportation Needs: Not on file   Physical Activity: Not on file   Stress: Not on file   Social Connections: Not on file   Intimate Partner Violence:  Not on file   Housing Stability: Not on file        Physical Exam  Vitals: Resp 16   Wt 256 lb 14.4 oz (116.5 kg)   BMI 32.98 kg/m    Weight is 256 lbs 14.4 oz          Body mass index is 32.98 kg/m .  General: This is a 57 year old male who appears their reported age, not in acute distress  Head: normocephalic, Atraumatic; not wearing glasses; non-icteric sclera; no exudate; no hearing loss  Respiratory: Clear throughout all lung fields; unlabored breathing; no cough   Cardiac: Regular, Rate and Rhythm, no murmurs appreciated   Skin: Uniformly warm and dry  Psych: Alert and oriented x4; calm and cooperative throughout exam  Extremities: left lateral leg with scattered partial thickness ulcers from the adhesive skin stripping injuries; the wound is measuring 1x0.3x0.3cm; slough; +epibole; edges were trimmed back; no erythema; surrounding scar tissue; no edema in the leg there was mild swelling in the left ankle; strength testing revealed 4/4 to BLEs.    Sensation: Decreased to pinprick and light touch in a stocking distribution bilaterally     Peripheral Vascular: abnormal dorsalis pedis, posterior tibial pulses to left foot , using a handheld doppler these were easily found but monophasic in nature.  Good capillary refill. No unusual venous distention. Positive for spider veins, telangiectasias, hemosiderin deposition or hyperpigmentation and fibrosis or scarring           Circumferential volume measures:    Circumferential Measures (cm)  Left Ankle: 24.2  Left Widest Calf: 23  Left Thigh Up 10cm: 39.8   Circumferential Measures 2/14/2023   Left Ankle 24.2   Left Widest Calf 23   Left Thigh Up 10cm 39.8       Ulceration(s)/Wound(s):     VASC Wound Left lateral leg proximal (Active)   Pre Size Length 1 02/14/23 0800   Pre Size Width 1.7 02/14/23 0800   Pre Size Depth 0.1 02/14/23 0800   Pre Total Sq cm 1.7 02/14/23 0800       VASC Wound Left lateral leg distal (Active)   Pre Size Length 1 02/14/23 0800   Pre Size  Width 0.3 02/14/23 0800   Pre Size Depth 0.2 02/14/23 0800   Pre Total Sq cm 0.3 02/14/23 0800       Laboratory studies:     I personally reviewed the following lab results today and those on care everywhere, if indicated     CRP Inflammation   Date Value Ref Range Status   02/23/2021 <2.9 0.0 - 8.0 mg/L Final      Sed Rate   Date Value Ref Range Status   01/17/2021 16 0 - 20 mm/h Final     Erythrocyte Sedimentation Rate   Date Value Ref Range Status   12/03/2022 9 0 - 20 mm/hr Final      Last Renal Panel:  Sodium   Date Value Ref Range Status   12/03/2022 141 136 - 145 mmol/L Final   03/06/2021 143 133 - 144 mmol/L Final     Potassium   Date Value Ref Range Status   12/03/2022 4.7 3.4 - 5.3 mmol/L Final   10/21/2022 4.9 3.4 - 5.3 mmol/L Final   03/06/2021 4.8 3.4 - 5.3 mmol/L Final     Chloride   Date Value Ref Range Status   12/03/2022 114 (H) 98 - 107 mmol/L Final   10/21/2022 112 (H) 94 - 109 mmol/L Final   03/06/2021 112 (H) 94 - 109 mmol/L Final     Carbon Dioxide   Date Value Ref Range Status   03/06/2021 26 20 - 32 mmol/L Final     Carbon Dioxide (CO2)   Date Value Ref Range Status   12/03/2022 18 (L) 22 - 29 mmol/L Final   10/21/2022 24 20 - 32 mmol/L Final     Anion Gap   Date Value Ref Range Status   12/03/2022 9 7 - 15 mmol/L Final   10/21/2022 4 3 - 14 mmol/L Final   03/06/2021 5 3 - 14 mmol/L Final     Glucose   Date Value Ref Range Status   12/03/2022 94 70 - 99 mg/dL Final   10/21/2022 162 (H) 70 - 99 mg/dL Final   03/06/2021 146 (H) 70 - 99 mg/dL Final     Comment:     Non Fasting     GLUCOSE BY METER POCT   Date Value Ref Range Status   11/16/2022 103 (H) 70 - 99 mg/dL Final     Urea Nitrogen   Date Value Ref Range Status   12/03/2022 23.1 (H) 6.0 - 20.0 mg/dL Final   10/21/2022 29 7 - 30 mg/dL Final   03/06/2021 28 7 - 30 mg/dL Final     Creatinine   Date Value Ref Range Status   12/03/2022 1.88 (H) 0.67 - 1.17 mg/dL Final   03/06/2021 1.12 0.66 - 1.25 mg/dL Final     GFR Estimate   Date Value Ref  Range Status   12/03/2022 41 (L) >60 mL/min/1.73m2 Final     Comment:     Effective December 21, 2021 eGFRcr in adults is calculated using the 2021 CKD-EPI creatinine equation which includes age and gender (Violeta melgar al., NEBRIGITTEM, DOI: 10.1056/IZXVae3071382)   03/06/2021 74 >60 mL/min/[1.73_m2] Final     Comment:     Non  GFR Calc  Starting 12/18/2018, serum creatinine based estimated GFR (eGFR) will be   calculated using the Chronic Kidney Disease Epidemiology Collaboration   (CKD-EPI) equation.       Calcium   Date Value Ref Range Status   12/03/2022 7.9 (L) 8.6 - 10.0 mg/dL Final   03/06/2021 8.7 8.5 - 10.1 mg/dL Final     Phosphorus   Date Value Ref Range Status   06/22/2020 3.7 2.5 - 4.5 mg/dL Final     Albumin   Date Value Ref Range Status   12/03/2022 3.2 (L) 3.5 - 5.2 g/dL Final   09/23/2021 3.3 (L) 3.4 - 5.0 g/dL Final   03/06/2021 3.4 3.4 - 5.0 g/dL Final      Lab Results   Component Value Date    WBC 2.9 12/03/2022    WBC 4.2 02/23/2021     Lab Results   Component Value Date    RBC 3.82 12/03/2022    RBC 4.37 02/23/2021     Lab Results   Component Value Date    HGB 10.9 12/03/2022    HGB 12.2 02/23/2021     Lab Results   Component Value Date    HCT 32.7 12/03/2022    HCT 37.8 02/23/2021     No components found for: MCT  Lab Results   Component Value Date    MCV 86 12/03/2022    MCV 87 02/23/2021     Lab Results   Component Value Date    MCH 28.5 12/03/2022    MCH 27.9 02/23/2021     Lab Results   Component Value Date    MCHC 33.3 12/03/2022    MCHC 32.3 02/23/2021     Lab Results   Component Value Date    RDW 13.5 12/03/2022    RDW 13.6 02/23/2021     Lab Results   Component Value Date     12/03/2022     02/23/2021      Lab Results   Component Value Date    A1C 6.1 10/21/2022    A1C 5.2 09/23/2021    A1C 5.7 01/17/2021    A1C 5.5 06/22/2020    A1C 5.4 02/01/2019    A1C 5.0 05/01/2018    A1C 6.2 03/09/2017      TSH   Date Value Ref Range Status   06/22/2020 1.84 0.40 - 4.00 mU/L  Final      Lab Results   Component Value Date    VITDT 11 (L) 01/17/2021    VITDT 20 06/22/2020    VITDT 20 02/01/2019    VITDT 24 07/03/2017          Impression:    Encounter Diagnoses   Name Primary?     Left leg cellulitis                      Assessment/Plan:  1. Debridement: After discussion of risk factors and verbal consent was obtained 2% Lidocaine HCL jelly was applied, under clean conditions, the left lateral leg ulceration(s) were debrided using currette. Devitalized and nonviable tissue, along with any fibrin and slough, was removed to improve granulation tissue formation, stimulate wound healing, decrease overall bacteria load, disrupt biofilm formation and decrease edge senescence.  Total excisional debridement was <5 sq cm from the epidermis/dermis area or into the subcutaneous tissue with a depth of 0.3 cm.   Ulcers were improved afterwards and .  Measures were as noted on the flow sheet.    2. Treatment: wound treatment will include irrigation and dressings to promote autolytic debridement which will include: will stop the aquaphor; stop getting wet in the shower; stop telfa; instead will have him use dilute hibiclens to wash; then silvercel; ABD; rolled gauze; change every 3 days; TAMANNA scheduled next week    If for some reason the patient is not able to get your dressing(s) changed as outlined above (due to illness, lack of supplies, lack of help) please do the following: remove old, soiled dressings; wash the wounds with saline; pat dry; apply ABD pad or other absorbant pad and secure with rolled gauze; avoid tape directly on your skin; Patient instructed to call the clinic as soon as possible to let us know what the current issues are in receiving wound care.    3. Edema. Will use double tubular compression    If a 2 layer or 4 layer compression wrap is being used; these are safe to have on for ONLY 7 days. If for some reason the patient is not able to get the wrap(s) changed (due to  illness; lack of supplies, lack of help, lack of transportation) please do the following: unwrap the old 2 or 4 layer compression wrap; avoid using scissors as you could cut your skin and cause wounds; use tubular compression when available. Call to reschedule your home care or clinic visit appointment as soon as possible.    4. Offloading: keep pressure off; he sleeps on his back    5. Nutrition: historically low vitamin d is on supplementation; has diabetes well controlled.     Patient to return to clinic in 2 week(s) for re-evaluation. They were instructed to call the clinic sooner with any further questions or concerns. Answered all questions.          Benita Shaikh NP   [facility]  [phone]      This note was electronically signed by Benita Shaikh NP

## 2023-02-22 ENCOUNTER — ANCILLARY PROCEDURE (OUTPATIENT)
Dept: VASCULAR ULTRASOUND | Facility: CLINIC | Age: 58
End: 2023-02-22
Attending: NURSE PRACTITIONER
Payer: COMMERCIAL

## 2023-02-22 DIAGNOSIS — E11.21 TYPE 2 DIABETES MELLITUS WITH DIABETIC NEPHROPATHY, WITHOUT LONG-TERM CURRENT USE OF INSULIN (H): ICD-10-CM

## 2023-02-22 PROCEDURE — 93923 UPR/LXTR ART STDY 3+ LVLS: CPT

## 2023-02-24 ENCOUNTER — TELEPHONE (OUTPATIENT)
Dept: VASCULAR SURGERY | Facility: CLINIC | Age: 58
End: 2023-02-24
Payer: COMMERCIAL

## 2023-02-24 NOTE — TELEPHONE ENCOUNTER
----- Message from Benita Shaikh NP sent at 2/24/2023 10:52 AM CST -----  Team - please call the patient and let him know that his TAMANNA study was normal;good blood flow for healing.    KAYDEN

## 2023-02-28 ENCOUNTER — OFFICE VISIT (OUTPATIENT)
Dept: VASCULAR SURGERY | Facility: CLINIC | Age: 58
End: 2023-02-28
Attending: NURSE PRACTITIONER
Payer: COMMERCIAL

## 2023-02-28 VITALS
TEMPERATURE: 97.6 F | DIASTOLIC BLOOD PRESSURE: 76 MMHG | SYSTOLIC BLOOD PRESSURE: 128 MMHG | RESPIRATION RATE: 14 BRPM | HEART RATE: 87 BPM | BODY MASS INDEX: 32.48 KG/M2 | OXYGEN SATURATION: 97 % | WEIGHT: 253 LBS

## 2023-02-28 DIAGNOSIS — R23.4 FISSURE IN SKIN OF FOOT: Primary | ICD-10-CM

## 2023-02-28 PROCEDURE — 11042 DBRDMT SUBQ TIS 1ST 20SQCM/<: CPT | Performed by: NURSE PRACTITIONER

## 2023-02-28 ASSESSMENT — PAIN SCALES - GENERAL: PAINLEVEL: NO PAIN (0)

## 2023-02-28 NOTE — PATIENT INSTRUCTIONS
"Wound care supplies were ordered today through [employer] and if you are not receiving your supplies or have a question on your bill please contact Gloria Nelson at [phone]. Please allow 2-5 business days for delivery of supplies. You may get a call from a [phone] if there are additional information Corey needs. It is important to  or return their call. PLEASE NOTE: If you need to return your supplies, you MUST call customer service within 15 days of delivery date.     For the right great toe  Use light humphrey board or pumice stone Monday, Wednesday and Friday  Apply Dab of aquaphor nightly; no dressing; with cotton socks      Wound Care Instructions    Every 3 days , Cleanse your left leg wound(s) with Dilute hibiclens 30cc in 500cc NS.    Pat Dry with non-sterile gauze    Apply Lotion to the intact skin surrounding your wound and other dry skin locations. Some good lotions include: Remedy Skin Repair Cream, Sarna, Vanicream or Cetaphil    Primary Dressing: Apply silvercel into/onto the wounds; cut to fit to the size of your wounds    Secondary dressing: Cover with ABD    Secure with non-sterile roll gauze (4\" x 75\" roll) and tape (1\" roll tape) as needed; avoid adhesive directly on the skin    Compression: double tubular compression    It is not ok to get your wound wet in the bath or shower      If for some reason you are not able to get your dressing(s) changed as outlined above (due to illness, lack of supplies, lack of help) please do the following: remove old, soiled dressings; wash the wounds with saline; pat dry; apply ABD pad or other absorbant pad and secure with rolled gauze; avoid tape directly on your skin; Call the clinic as soon as possible to let us know what the current issues are in receiving wound care [phone].      SEEK MEDICAL CARE IF:  You have an increase in swelling, pain, or redness around the wound.  You have an increase in the amount of pus coming from the wound.  There is a " bad smell coming from the wound.  The wound appears to be worsening/enlarging  You have a fever greater than 101.5 F      It is ok to continue current wound care treatment/products for the next 2-3 days until new wound care supplies are ordered and arrive. If longer than this please contact our office at [phone].    If you have a 2 layer or 4 layer compression wrap on these are safe to have on for ONLY 7 days. If for some reason you are not able to get the wrap(s) changed (due to illness; lack of supplies, lack of help, lack of transportation) please do the following: unwrap the old 2 or 4 layer compression wrap; avoid using scissors as you could cut your skin and cause wounds; use tubular compression when available. Call to reschedule your home care or clinic visit appointment as soon as possible.    Please NOTE: if you are 15 minutes late to your clinic appointment you will have to be rescheduled. Please call our clinic as soon as possible if you know you will not be able to get to your appointment at [phone].    If you fail to show up to 3 scheduled clinic appointments you will be dismissed from our clinic.              We want to hear from you!  In the next few weeks, you should receive a call or email to complete a survey about your visit at [facility]. Please help us improve your appointment experience by letting us know how we did today. We strive to make your experience good and value any ways in which we could do better.      We value your input and suggestions.    Thank you for choosing the [facility]!           It is recommended that you do not get your ulcer wet when showering.  Listed below are several ways of keeping it dry when you shower.     1. Wrap it with Press and Seal plastic wrap.  It can be found in the stores where the plastic wraps or tin foil is kept.               2.  Some people take a bath and hang their leg/foot out of the tub.                         3  Put your leg in a plastic bag and tape it on.           4. You can purchase a shower cover for casts at some pharmacies and through the Internet.            5. Take a Bed Bath or wash up at the sink

## 2023-02-28 NOTE — PROGRESS NOTES
Follow up Vascular Visit       Date of Service:02/28/23      Chief Complaint: LLE ulcers and edema      Pt returns to [facility] with regards to their LLE ulcers and edema.  They arrive today a;pme. They are currently using silvercel; gauze; rolled gauze to the wounds. This is being done by pt every 3 days. They are using tubular compression for compression. They are feeling well today. Denies fevers, chills. No shortness of breath. His wife is concerned about area on the right great toe and would like this evaluated today.    Allergies:   Allergies   Allergen Reactions     Shellfish-Derived Products Anaphylaxis     Adhesive Tape      Paper tape is okay  Tegarderm is okay     Gluten Meal      Celiac     Reglan [Metoclopramide] Hives       Medications:   Current Outpatient Medications:      acetaminophen (TYLENOL) 325 MG tablet, Take 2 tablets (650 mg) by mouth every 6 hours as needed for mild pain (and adjunct with moderate or severe pain or per patient request), Disp: , Rfl:      ASPIRIN LOW DOSE 81 MG EC tablet, Take 1 tablet by mouth daily., Disp: 90 tablet, Rfl: 2     atorvastatin (LIPITOR) 40 MG tablet, Take 1 tablet (40 mg) by mouth daily, Disp: 90 tablet, Rfl: 3     bismuth subsalicylate 262 MG TABS, Take 4-5 tablets by mouth daily , Disp: , Rfl:      Cholecalciferol (VITAMIN D) 2000 UNITS CAPS, Take 2,000 Units by mouth daily , Disp: , Rfl:      FLUoxetine (PROZAC) 20 MG capsule, Take 1 capsule (20 mg) by mouth daily, Disp: 90 capsule, Rfl: 1     multivitamin w/minerals (THERA-VIT-M) tablet, Take 1 tablet by mouth daily, Disp: , Rfl:      order for DME, Equipment being ordered: custom orthotic inserts for shoes, Disp: 1 each, Rfl: 1     Probiotic Product (PROBIOTIC DAILY) CAPS, Take 1 capsule by mouth daily , Disp: , Rfl:      triamcinolone (KENALOG) 0.1 % external ointment, Apply topically 2 times daily Do not use more than 2 weeks per month, Disp: 30 g, Rfl: 0    History:   Past  Medical History:   Diagnosis Date     CAD (coronary artery disease)     S/p stenting of left circumflex     Cancer (H)     melanoma     Depressive disorder 2006     Diabetes mellitus (H)      Multiple sclerosis (H)      Peripheral neuropathy        Physical Exam:    /76   Pulse 87   Temp 97.6  F (36.4  C)   Resp 14   Wt 253 lb (114.8 kg)   SpO2 97%   BMI 32.48 kg/m      General:  Patient presents to clinic in no apparent distress.  Head: normocephalic atraumatic  Psychiatric:  Alert and oriented x3.   Respiratory: unlabored breathing; no cough  Integumentary:  Skin is uniformly warm, dry and pink.    Ulcer #1 Location: left lateral leg  Size: 0.7L x 0.2W x 0.1depth.  No sinus tract present, Wound base: slough +epibole  noundermining present. Ulcer is full thickness. There is moderate drainage. Periwound: no denudement, erythema, induration, maceration or warmth.      Other x2 wounds now healed    No swelling    Right plantar hallux with mild cracking; and thickened skin appears intact    Wound Leg (Active)   Number of days: 104       VASC Wound Left lateral leg proximal (Active)   Pre Size Length 1 02/14/23 0800   Pre Size Width 1.7 02/14/23 0800   Pre Size Depth 0.1 02/14/23 0800   Pre Total Sq cm 1.7 02/14/23 0800   Description scab 02/28/23 0800   Number of days: 14       VASC Wound Left lateral leg (Active)   Pre Size Length 0.7 02/28/23 0800   Pre Size Width 0.2 02/28/23 0800   Pre Size Depth 0.1 02/28/23 0800   Pre Total Sq cm 0.14 02/28/23 0800   Number of days: 14       VASC Wound Left lateral leg distal (Active)   Pre Size Length 0.8 02/14/23 0800   Pre Size Width 0.5 02/14/23 0800   Pre Size Depth 0.1 02/14/23 0800   Pre Total Sq cm 0.4 02/14/23 0800   Description scab 02/28/23 0800   Number of days: 14            Circumferential volume measures:      Circumferential Measures 2/14/2023 2/28/2023   Right just above MTP - 23.4   Right Ankle - 23.6   Right Widest Calf - 37.5   Left Ankle 24.2 23.9    Left Widest Calf 23 22.8   Left Thigh Up 10cm 39.8 36.5       Labs:    I personally reviewed the following lab results today and those on care everywhere    CRP Inflammation   Date Value Ref Range Status   02/23/2021 <2.9 0.0 - 8.0 mg/L Final      Sed Rate   Date Value Ref Range Status   01/17/2021 16 0 - 20 mm/h Final     Erythrocyte Sedimentation Rate   Date Value Ref Range Status   12/03/2022 9 0 - 20 mm/hr Final      Last Renal Panel:  Sodium   Date Value Ref Range Status   12/03/2022 141 136 - 145 mmol/L Final   03/06/2021 143 133 - 144 mmol/L Final     Potassium   Date Value Ref Range Status   12/03/2022 4.7 3.4 - 5.3 mmol/L Final   10/21/2022 4.9 3.4 - 5.3 mmol/L Final   03/06/2021 4.8 3.4 - 5.3 mmol/L Final     Chloride   Date Value Ref Range Status   12/03/2022 114 (H) 98 - 107 mmol/L Final   10/21/2022 112 (H) 94 - 109 mmol/L Final   03/06/2021 112 (H) 94 - 109 mmol/L Final     Carbon Dioxide   Date Value Ref Range Status   03/06/2021 26 20 - 32 mmol/L Final     Carbon Dioxide (CO2)   Date Value Ref Range Status   12/03/2022 18 (L) 22 - 29 mmol/L Final   10/21/2022 24 20 - 32 mmol/L Final     Anion Gap   Date Value Ref Range Status   12/03/2022 9 7 - 15 mmol/L Final   10/21/2022 4 3 - 14 mmol/L Final   03/06/2021 5 3 - 14 mmol/L Final     Glucose   Date Value Ref Range Status   12/03/2022 94 70 - 99 mg/dL Final   10/21/2022 162 (H) 70 - 99 mg/dL Final   03/06/2021 146 (H) 70 - 99 mg/dL Final     Comment:     Non Fasting     GLUCOSE BY METER POCT   Date Value Ref Range Status   11/16/2022 103 (H) 70 - 99 mg/dL Final     Urea Nitrogen   Date Value Ref Range Status   12/03/2022 23.1 (H) 6.0 - 20.0 mg/dL Final   10/21/2022 29 7 - 30 mg/dL Final   03/06/2021 28 7 - 30 mg/dL Final     Creatinine   Date Value Ref Range Status   12/03/2022 1.88 (H) 0.67 - 1.17 mg/dL Final   03/06/2021 1.12 0.66 - 1.25 mg/dL Final     GFR Estimate   Date Value Ref Range Status   12/03/2022 41 (L) >60 mL/min/1.73m2 Final      Comment:     Effective December 21, 2021 eGFRcr in adults is calculated using the 2021 CKD-EPI creatinine equation which includes age and gender (Violeta et al., NEJ, DOI: 10.1056/IFKCdi0808310)   03/06/2021 74 >60 mL/min/[1.73_m2] Final     Comment:     Non  GFR Calc  Starting 12/18/2018, serum creatinine based estimated GFR (eGFR) will be   calculated using the Chronic Kidney Disease Epidemiology Collaboration   (CKD-EPI) equation.       Calcium   Date Value Ref Range Status   12/03/2022 7.9 (L) 8.6 - 10.0 mg/dL Final   03/06/2021 8.7 8.5 - 10.1 mg/dL Final     Phosphorus   Date Value Ref Range Status   06/22/2020 3.7 2.5 - 4.5 mg/dL Final     Albumin   Date Value Ref Range Status   12/03/2022 3.2 (L) 3.5 - 5.2 g/dL Final   09/23/2021 3.3 (L) 3.4 - 5.0 g/dL Final   03/06/2021 3.4 3.4 - 5.0 g/dL Final      Lab Results   Component Value Date    WBC 2.9 12/03/2022    WBC 4.2 02/23/2021     Lab Results   Component Value Date    RBC 3.82 12/03/2022    RBC 4.37 02/23/2021     Lab Results   Component Value Date    HGB 10.9 12/03/2022    HGB 12.2 02/23/2021     Lab Results   Component Value Date    HCT 32.7 12/03/2022    HCT 37.8 02/23/2021     No components found for: MCT  Lab Results   Component Value Date    MCV 86 12/03/2022    MCV 87 02/23/2021     Lab Results   Component Value Date    MCH 28.5 12/03/2022    MCH 27.9 02/23/2021     Lab Results   Component Value Date    MCHC 33.3 12/03/2022    MCHC 32.3 02/23/2021     Lab Results   Component Value Date    RDW 13.5 12/03/2022    RDW 13.6 02/23/2021     Lab Results   Component Value Date     12/03/2022     02/23/2021      Lab Results   Component Value Date    A1C 6.1 10/21/2022    A1C 5.2 09/23/2021    A1C 5.7 01/17/2021    A1C 5.5 06/22/2020    A1C 5.4 02/01/2019    A1C 5.0 05/01/2018    A1C 6.2 03/09/2017      TSH   Date Value Ref Range Status   06/22/2020 1.84 0.40 - 4.00 mU/L Final      Lab Results   Component Value Date    VITDT 11 (L)  01/17/2021    VITDT 20 06/22/2020    VITDT 20 02/01/2019    VITDT 24 07/03/2017                   Impression:  Encounter Diagnoses   Name Primary?     Fissure in skin of foot Yes          2/8/23 LLE        2/28/2023 LLE          2/28/2023 BLE             Are any of these ulcers new today: No; Location: na    Assessment/Plan:          1. Debridement: After discussion of risk factors and verbal consent was obtained 2% Lidocaine HCL jelly was applied, under clean conditions, the LLE ulceration(s) were debrided using currette. Devitalized and nonviable tissue, along with any fibrin and slough, was removed to improve granulation tissue formation, stimulate wound healing, decrease overall bacteria load, disrupt biofilm formation and decrease edge senescence.  Total excisional debridement was 0.14 sq cm from the epidermis/dermis area and into the subcutaneous tissue with a depth of 0.1 cm.   Ulcers were improved afterwards and .  Measures were unchanged after debridement.       2.  Ulcer treatment: ulcer treatment will include irrigation and dressings to promote autolytic debridement which will include:will use silvercel; gauze; rolled gauze; change every 3 days; for the right hallux recommend light humphrey board M, W, F with aquaphor nightly and cotton socks.  If for some reason the patient is not able to get their dressing(s) changed as outlined above (due to illness, lack of supplies, lack of help) please do the following: remove old, soiled dressings; wash the ulcers with saline; pat dry; apply ABD pad or other absorbant pad and secure with rolled gauze; avoid tape directly on your skin; patient instructed to call the clinic as soon as possible to let us know what the current issues are in receiving ulcer care. Stable to improved             3. Edema: will continue with elevation and tubular compression. The compression wraps were applied today in clinic.     If a 2 layer or 4 layer compression wrap is being used;  these are safe to have on for ONLY 7 days. If for some reason the patient is not able to get the wrap(s) changed (due to illness; lack of supplies, lack of help, lack of transportation) please do the following: unwrap the old 2 or 4 layer compression wrap; avoid using scissors as you could cut your skin and cause ulcers; use tubular compression when available. Call to reschedule your home care or clinic visit appointment as soon as possible.  Stable            4. Nutrition: taking vitamin d supplements; having good diabetes control           5. Offloading: keep pressure off wound area     Patient will follow up with me in 4 weeks for reevaluation. They were instructed to call the clinic sooner with any signs or symptoms of infection or any further questions/concerns. Answered all questions.          Benita Shaikh DNP, RN, CNP, CWOCN, CFCN, CLT  [facility]   [phone]        This note was electronically signed by Benita Shaikh NP

## 2023-06-01 ENCOUNTER — HEALTH MAINTENANCE LETTER (OUTPATIENT)
Age: 58
End: 2023-06-01

## 2023-06-16 ASSESSMENT — ANXIETY QUESTIONNAIRES
4. TROUBLE RELAXING: SEVERAL DAYS
5. BEING SO RESTLESS THAT IT IS HARD TO SIT STILL: SEVERAL DAYS
3. WORRYING TOO MUCH ABOUT DIFFERENT THINGS: SEVERAL DAYS
6. BECOMING EASILY ANNOYED OR IRRITABLE: SEVERAL DAYS
IF YOU CHECKED OFF ANY PROBLEMS ON THIS QUESTIONNAIRE, HOW DIFFICULT HAVE THESE PROBLEMS MADE IT FOR YOU TO DO YOUR WORK, TAKE CARE OF THINGS AT HOME, OR GET ALONG WITH OTHER PEOPLE: SOMEWHAT DIFFICULT
7. FEELING AFRAID AS IF SOMETHING AWFUL MIGHT HAPPEN: SEVERAL DAYS
1. FEELING NERVOUS, ANXIOUS, OR ON EDGE: SEVERAL DAYS
2. NOT BEING ABLE TO STOP OR CONTROL WORRYING: SEVERAL DAYS
GAD7 TOTAL SCORE: 7

## 2023-06-20 ASSESSMENT — ANXIETY QUESTIONNAIRES
GAD7 TOTAL SCORE: 7
2. NOT BEING ABLE TO STOP OR CONTROL WORRYING: SEVERAL DAYS
7. FEELING AFRAID AS IF SOMETHING AWFUL MIGHT HAPPEN: SEVERAL DAYS
3. WORRYING TOO MUCH ABOUT DIFFERENT THINGS: SEVERAL DAYS
5. BEING SO RESTLESS THAT IT IS HARD TO SIT STILL: SEVERAL DAYS
1. FEELING NERVOUS, ANXIOUS, OR ON EDGE: SEVERAL DAYS
6. BECOMING EASILY ANNOYED OR IRRITABLE: SEVERAL DAYS
8. IF YOU CHECKED OFF ANY PROBLEMS, HOW DIFFICULT HAVE THESE MADE IT FOR YOU TO DO YOUR WORK, TAKE CARE OF THINGS AT HOME, OR GET ALONG WITH OTHER PEOPLE?: SOMEWHAT DIFFICULT
7. FEELING AFRAID AS IF SOMETHING AWFUL MIGHT HAPPEN: SEVERAL DAYS
IF YOU CHECKED OFF ANY PROBLEMS ON THIS QUESTIONNAIRE, HOW DIFFICULT HAVE THESE PROBLEMS MADE IT FOR YOU TO DO YOUR WORK, TAKE CARE OF THINGS AT HOME, OR GET ALONG WITH OTHER PEOPLE: SOMEWHAT DIFFICULT
GAD7 TOTAL SCORE: 7
4. TROUBLE RELAXING: SEVERAL DAYS

## 2023-06-26 ENCOUNTER — OFFICE VISIT (OUTPATIENT)
Dept: PEDIATRICS | Facility: CLINIC | Age: 58
End: 2023-06-26
Payer: COMMERCIAL

## 2023-06-26 ENCOUNTER — TELEPHONE (OUTPATIENT)
Dept: PEDIATRICS | Facility: CLINIC | Age: 58
End: 2023-06-26

## 2023-06-26 VITALS
WEIGHT: 256.6 LBS | RESPIRATION RATE: 14 BRPM | SYSTOLIC BLOOD PRESSURE: 114 MMHG | DIASTOLIC BLOOD PRESSURE: 54 MMHG | OXYGEN SATURATION: 99 % | HEART RATE: 64 BPM | TEMPERATURE: 97.6 F | BODY MASS INDEX: 32.95 KG/M2

## 2023-06-26 DIAGNOSIS — F33.0 MILD EPISODE OF RECURRENT MAJOR DEPRESSIVE DISORDER (H): ICD-10-CM

## 2023-06-26 DIAGNOSIS — E11.40 TYPE 2 DIABETES MELLITUS WITH SENSORY NEUROPATHY (H): ICD-10-CM

## 2023-06-26 DIAGNOSIS — M54.12 CERVICAL RADICULOPATHY: ICD-10-CM

## 2023-06-26 DIAGNOSIS — R07.89 CHEST TIGHTNESS: Primary | ICD-10-CM

## 2023-06-26 DIAGNOSIS — R82.90 ABNORMAL URINALYSIS: ICD-10-CM

## 2023-06-26 LAB
ALBUMIN UR-MCNC: 100 MG/DL
APPEARANCE UR: ABNORMAL
BACTERIA #/AREA URNS HPF: ABNORMAL /HPF
BILIRUB UR QL STRIP: NEGATIVE
COLOR UR AUTO: YELLOW
GLUCOSE UR STRIP-MCNC: NEGATIVE MG/DL
HBA1C MFR BLD: 6.9 % (ref 0–5.6)
HGB UR QL STRIP: NEGATIVE
KETONES UR STRIP-MCNC: NEGATIVE MG/DL
LEUKOCYTE ESTERASE UR QL STRIP: ABNORMAL
NITRATE UR QL: NEGATIVE
PH UR STRIP: 5.5 [PH] (ref 5–7)
RBC #/AREA URNS AUTO: ABNORMAL /HPF
SP GR UR STRIP: 1.02 (ref 1–1.03)
SQUAMOUS #/AREA URNS AUTO: ABNORMAL /LPF
UROBILINOGEN UR STRIP-ACNC: 0.2 E.U./DL
WBC #/AREA URNS AUTO: ABNORMAL /HPF
WBC CLUMPS #/AREA URNS HPF: PRESENT /HPF

## 2023-06-26 PROCEDURE — 81001 URINALYSIS AUTO W/SCOPE: CPT | Performed by: PHYSICIAN ASSISTANT

## 2023-06-26 PROCEDURE — 36415 COLL VENOUS BLD VENIPUNCTURE: CPT | Performed by: PHYSICIAN ASSISTANT

## 2023-06-26 PROCEDURE — 87086 URINE CULTURE/COLONY COUNT: CPT | Performed by: PHYSICIAN ASSISTANT

## 2023-06-26 PROCEDURE — 84443 ASSAY THYROID STIM HORMONE: CPT | Performed by: PHYSICIAN ASSISTANT

## 2023-06-26 PROCEDURE — 93000 ELECTROCARDIOGRAM COMPLETE: CPT | Mod: 76 | Performed by: PHYSICIAN ASSISTANT

## 2023-06-26 PROCEDURE — 83036 HEMOGLOBIN GLYCOSYLATED A1C: CPT | Performed by: PHYSICIAN ASSISTANT

## 2023-06-26 PROCEDURE — 85025 COMPLETE CBC W/AUTO DIFF WBC: CPT | Performed by: PHYSICIAN ASSISTANT

## 2023-06-26 PROCEDURE — 87088 URINE BACTERIA CULTURE: CPT | Performed by: PHYSICIAN ASSISTANT

## 2023-06-26 PROCEDURE — 80048 BASIC METABOLIC PNL TOTAL CA: CPT | Performed by: PHYSICIAN ASSISTANT

## 2023-06-26 PROCEDURE — 87186 SC STD MICRODIL/AGAR DIL: CPT | Performed by: PHYSICIAN ASSISTANT

## 2023-06-26 PROCEDURE — 99214 OFFICE O/P EST MOD 30 MIN: CPT | Performed by: PHYSICIAN ASSISTANT

## 2023-06-26 RX ORDER — FLUOXETINE 10 MG/1
10 CAPSULE ORAL DAILY
Qty: 90 CAPSULE | Refills: 0 | Status: SHIPPED | OUTPATIENT
Start: 2023-06-26 | End: 2023-07-19

## 2023-06-26 RX ORDER — CIPROFLOXACIN 500 MG/1
500 TABLET, FILM COATED ORAL 2 TIMES DAILY
Qty: 14 TABLET | Refills: 0 | Status: SHIPPED | OUTPATIENT
Start: 2023-06-26 | End: 2023-07-19

## 2023-06-26 ASSESSMENT — ANXIETY QUESTIONNAIRES: GAD7 TOTAL SCORE: 7

## 2023-06-26 NOTE — TELEPHONE ENCOUNTER
Per appt notes pt has tingling in left arm    Call placed to pt to get more information    Nurse Triage SBAR    Is this a 2nd Level Triage? YES, LICENSED PRACTITIONER REVIEW IS REQUIRED    Situation: Pt reports that his left arm will start tingling and feel limpish 3-5 times a day last a few minutes for the last few weeks.    Background: Pt states that he is able to lift his arm, his strength is equal on both sides when writer asked him to make a fist. His entire arm will feel tingly down to his fingers. If he didn't have an arm rest rest his arm would hang limp, but he is able lift it up without difficulty.  Pt also reports occasional chest pain. This is not at the same time as the arm tingling    Assessment: Appt today    Protocol Recommended Disposition:   No disposition on file.    Recommendation: Advised pt to be here at 1510    Thank you  James Briseno RN on 6/26/2023 at 9:40 AM     Routed to provider    Does the patient meet one of the following criteria for ADS visit consideration? 16+ years old, with an [facility] PCP     TIP  Providers, please consider if this condition is appropriate for management at one of our Acute and Diagnostic Services sites.     If patient is a good candidate, please use dotphrase <dot>triageresponse and select Refer to ADS to document.

## 2023-06-26 NOTE — PROGRESS NOTES
Assessment & Plan   (R07.89) Chest tightness  (primary encounter diagnosis)  Comment: proceed with stress test given symptoms and changes in EKG.   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 (will take a 20 mg and 10 mg capsule)  Plan: FLUoxetine (PROZAC) 10 MG capsule          (R82.90) Abnormal urinalysis  Comment: UC pending. Begin antibiotics.   Plan: Urine Culture Aerobic Bacterial - lab collect,         ciprofloxacin (CIPRO) 500 MG tablet          (M54.12) Cervical radiculopathy  Comment: patient will let us know if he would like a referral to therapy.  Plan:   Depression Screening Follow Up        6/27/2023     2:22 PM   PHQ   PHQ-9 Total Score 9   Q9: Thoughts of better off dead/self-harm past 2 weeks Several days      Jay López PA-C  [facility] Moseley is a 57 year old, presenting for the following health issues:  Chest Pain (Chest Tightness/Pain)    Chest Pain     History of Present Illness       CKD: He uses over the counter pain medication, including tylenol, a few times a week.    Mental Health Follow-up:  Patient presents to follow-up on Depression & Anxiety.Patient's depression since last visit has been:  Worse  The patient is not having other symptoms associated with depression.  Patient's anxiety since last visit has been:  Worse  The patient is not having other symptoms  associated with anxiety.  Any significant life events: relationship concerns, job concerns, financial concerns and health concerns  Patient is not feeling anxious or having panic attacks.  Patient has no concerns about alcohol or drug use.    Vascular Disease:  He presents for follow up of vascular disease.  He never takes nitroglycerin. He is not taking daily aspirin.    Reason for visit:  Medicine review, tingling in arm, gurgling in urine, and tightness in chest.    He eats 0-1 servings of fruits and vegetables daily.He consumes 1 sweetened beverage(s) daily.He exercises with enough effort to increase his heart rate 9 or less minutes per day.  He exercises with enough effort to increase his heart rate 3 or less days per week. He is missing 1 dose(s) of medications per week.  He is not taking prescribed medications regularly due to remembering to take.  Today's TIGIST-7 Score: 7    PATIENT HAS MULTIPLE CONCERNS TODAY:   1. CHEST PAIN  History of HLD, diabetes--well controlled without medication  No history of HTN, nonsmoker. History of CAD--stents placed. History of TIAs.     Pain History:  When did you first notice your pain? July 2022. Then 3 or 4 times in the past couple months   Have you seen anyone else for your pain? No  Where in your body do you have pain? Chest Pain  Description:   Location: mid chest to left/shoulder  Character: chest tightness/pain  Radiation:  Tingling into left arm   Duration: chest tightness of 5 minutes    His entire arm will feel tingly down to his fingers. If he didn't have an arm rest rest his arm would hang limp, but he is able lift it up without difficulty.  IProgression of Symptoms: waxing and waning  Accompanying Signs & Symptoms:  Shortness of breath: No  Sweating: No  Nausea/vomiting: No  Lightheadedness: No  Palpitations: No  Fever/Chills: No  Cough: No           Heartburn: No  History:   Family history of heart disease: YES  Tobacco use: No  Previous similar symptoms: no    Precipitating factors:   Worse with exertion: No  Worse with deep breaths: Yes. pt has some pain when he takes a deep breath            Related to eating: No           Better with burping: No  Alleviating factors: none  Therapies tried and outcome: none    2. URINE SYMPTOMS   Odorous urine on/off x 6 months. Gurgling when starting to urinate. Stream ok. No slow stream. Able to empty bladder fully. Frequency. Urgency. No hematuria.     3. DEPRESSION  Not as well controlled as it used to be. On prozac 20 mg daily. Sleeping well at night. Symptoms vary--unmotivated, stressed, mentally drained, more fatigued. No panic attacks. Lower tolerance and more irritable.   No anxiety.     4.  TINGLING IN THE LEFT ARM  Occurs constantly x 2 weeks.     Review of Systems   Cardiovascular: Positive for chest pain.      Constitutional, HEENT, cardiovascular, pulmonary, gi and gu systems are negative, except as otherwise noted.      Objective    /54 (BP Location: Right arm, Patient Position: Sitting, Cuff Size: Adult Large)   Pulse 64   Temp 97.6  F (36.4  C) (Temporal)   Resp 14   Wt 116.4 kg (256 lb 9.6 oz)   SpO2 99%   BMI 32.95 kg/m    Body mass index is 32.95 kg/m .  Physical Exam   GENERAL: alert and no distress  Psych:  mentation appears normal and affect normal/bright  EYES: Eyes grossly normal to inspection, PERRL and conjunctivae and sclerae normal  HENT: ear canals and TM's normal, nose and mouth without ulcers or lesions  NECK: tightness in the left posterior cervical region. Full ROM.  RESP: lungs clear to auscultation - no rales, rhonchi or wheezes  CV: regular rate and rhythm, normal S1 S2, no S3 or S4  ABDOMEN: soft, nontender  LE: no edema    EKG reveals changes compared to previous ekg. Flipped Twaves in V1 and flipped QRS complexes in lead AVF    Results for orders placed or performed in visit on 06/26/23   UA Macroscopic with reflex to Microscopic and Culture     Status: Abnormal    Specimen: Urine,  Midstream   Result Value Ref Range    Color Urine Yellow Colorless, Straw, Light Yellow, Yellow    Appearance Urine Cloudy (A) Clear    Glucose Urine Negative Negative mg/dL    Bilirubin Urine Negative Negative    Ketones Urine Negative Negative mg/dL    Specific Gravity Urine 1.025 1.003 - 1.035    Blood Urine Negative Negative    pH Urine 5.5 5.0 - 7.0    Protein Albumin Urine 100 (A) Negative mg/dL    Urobilinogen Urine 0.2 0.2, 1.0 E.U./dL    Nitrite Urine Negative Negative    Leukocyte Esterase Urine Moderate (A) Negative   Urine Microscopic Exam     Status: Abnormal   Result Value Ref Range    Bacteria Urine Many (A) None Seen /HPF    RBC Urine 2-5 (A) 0-2 /HPF /HPF    WBC Urine  (A) 0-5 /HPF /HPF    Squamous Epithelials Urine Few (A) None Seen /LPF    WBC Clumps Urine Present (A) None Seen /HPF   Basic metabolic panel  (Ca, Cl, CO2, Creat, Gluc, K, Na, BUN)     Status: Abnormal   Result Value Ref Range    Sodium 141 136 - 145 mmol/L    Potassium 5.6 (H) 3.4 - 5.3 mmol/L    Chloride 111 (H) 98 - 107 mmol/L    Carbon Dioxide (CO2) 17 (L) 22 - 29 mmol/L    Anion Gap 13 7 - 15 mmol/L    Urea Nitrogen 35.4 (H) 6.0 - 20.0 mg/dL    Creatinine 1.63 (H) 0.67 - 1.17 mg/dL    Calcium 8.4 (L) 8.6 - 10.0 mg/dL    Glucose 260 (H) 70 - 99 mg/dL    GFR Estimate 49 (L) >60 mL/min/1.73m2   Hemoglobin A1c     Status: Abnormal   Result Value Ref Range    Hemoglobin A1C 6.9 (H) 0.0 - 5.6 %   TSH with free T4 reflex     Status: Normal   Result Value Ref Range    TSH 2.48 0.30 - 4.20 uIU/mL   CBC with platelets and differential     Status: Abnormal   Result Value Ref Range    WBC Count 4.8 4.0 - 11.0 10e3/uL    RBC Count 4.43 4.40 - 5.90 10e6/uL    Hemoglobin 12.3 (L) 13.3 - 17.7 g/dL    Hematocrit 37.2 (L) 40.0 - 53.0 %    MCV 84 78 - 100 fL    MCH 27.8 26.5 - 33.0 pg    MCHC 33.1 31.5 - 36.5 g/dL    RDW 14.0 10.0 - 15.0 %    Platelet Count 160 150 - 450 10e3/uL    % Neutrophils 63 %    % Lymphocytes 27 %    % Monocytes 5 %     % Eosinophils 4 %    % Basophils 1 %    % Immature Granulocytes 0 %    Absolute Neutrophils 3.0 1.6 - 8.3 10e3/uL    Absolute Lymphocytes 1.3 0.8 - 5.3 10e3/uL    Absolute Monocytes 0.2 0.0 - 1.3 10e3/uL    Absolute Eosinophils 0.2 0.0 - 0.7 10e3/uL    Absolute Basophils 0.0 0.0 - 0.2 10e3/uL    Absolute Immature Granulocytes 0.0 <=0.4 10e3/uL   Urine Culture Aerobic Bacterial - lab collect     Status: None (Preliminary result)    Specimen: Urine, Midstream   Result Value Ref Range    Culture Culture in progress    CBC with platelets and differential     Status: Abnormal    Narrative    The following orders were created for panel order CBC with platelets and differential.  Procedure                               Abnormality         Status                     ---------                               -----------         ------                     CBC with platelets and d...[[id number]]  Abnormal            Final result                 Please view results for these tests on the individual orders.

## 2023-06-26 NOTE — TELEPHONE ENCOUNTER
Please triage chest symptoms. Then have patient come in at 3:10 (3:30 appt time) to allow full work up.    Benita Arango on 6/26/2023 at 9:11 AM

## 2023-06-26 NOTE — PATIENT INSTRUCTIONS
Begin antibiotics   Increase prozac to 30 mg daily  Urine culture pending  Labs pending  Stress test and they will contact you tomorrow   Send me a message on neck treatment

## 2023-06-27 LAB
ANION GAP SERPL CALCULATED.3IONS-SCNC: 13 MMOL/L (ref 7–15)
BASOPHILS # BLD AUTO: 0 10E3/UL (ref 0–0.2)
BASOPHILS NFR BLD AUTO: 1 %
BUN SERPL-MCNC: 35.4 MG/DL (ref 6–20)
CALCIUM SERPL-MCNC: 8.4 MG/DL (ref 8.6–10)
CHLORIDE SERPL-SCNC: 111 MMOL/L (ref 98–107)
CREAT SERPL-MCNC: 1.63 MG/DL (ref 0.67–1.17)
DEPRECATED HCO3 PLAS-SCNC: 17 MMOL/L (ref 22–29)
EOSINOPHIL # BLD AUTO: 0.2 10E3/UL (ref 0–0.7)
EOSINOPHIL NFR BLD AUTO: 4 %
ERYTHROCYTE [DISTWIDTH] IN BLOOD BY AUTOMATED COUNT: 14 % (ref 10–15)
GFR SERPL CREATININE-BSD FRML MDRD: 49 ML/MIN/1.73M2
GLUCOSE SERPL-MCNC: 260 MG/DL (ref 70–99)
HCT VFR BLD AUTO: 37.2 % (ref 40–53)
HGB BLD-MCNC: 12.3 G/DL (ref 13.3–17.7)
IMM GRANULOCYTES # BLD: 0 10E3/UL
IMM GRANULOCYTES NFR BLD: 0 %
LYMPHOCYTES # BLD AUTO: 1.3 10E3/UL (ref 0.8–5.3)
LYMPHOCYTES NFR BLD AUTO: 27 %
MCH RBC QN AUTO: 27.8 PG (ref 26.5–33)
MCHC RBC AUTO-ENTMCNC: 33.1 G/DL (ref 31.5–36.5)
MCV RBC AUTO: 84 FL (ref 78–100)
MONOCYTES # BLD AUTO: 0.2 10E3/UL (ref 0–1.3)
MONOCYTES NFR BLD AUTO: 5 %
NEUTROPHILS # BLD AUTO: 3 10E3/UL (ref 1.6–8.3)
NEUTROPHILS NFR BLD AUTO: 63 %
PLATELET # BLD AUTO: 160 10E3/UL (ref 150–450)
POTASSIUM SERPL-SCNC: 5.6 MMOL/L (ref 3.4–5.3)
RBC # BLD AUTO: 4.43 10E6/UL (ref 4.4–5.9)
SODIUM SERPL-SCNC: 141 MMOL/L (ref 136–145)
TSH SERPL DL<=0.005 MIU/L-ACNC: 2.48 UIU/ML (ref 0.3–4.2)
WBC # BLD AUTO: 4.8 10E3/UL (ref 4–11)

## 2023-06-27 ASSESSMENT — PATIENT HEALTH QUESTIONNAIRE - PHQ9: SUM OF ALL RESPONSES TO PHQ QUESTIONS 1-9: 9

## 2023-06-28 LAB — BACTERIA UR CULT: ABNORMAL

## 2023-06-30 ENCOUNTER — HOSPITAL ENCOUNTER (OUTPATIENT)
Dept: CARDIOLOGY | Facility: CLINIC | Age: 58
Discharge: HOME | End: 2023-06-30
Attending: PHYSICIAN ASSISTANT | Admitting: PHYSICIAN ASSISTANT
Payer: COMMERCIAL

## 2023-06-30 DIAGNOSIS — R07.89 CHEST TIGHTNESS: ICD-10-CM

## 2023-06-30 PROCEDURE — 93017 CV STRESS TEST TRACING ONLY: CPT

## 2023-06-30 PROCEDURE — 93018 CV STRESS TEST I&R ONLY: CPT | Performed by: INTERNAL MEDICINE

## 2023-06-30 PROCEDURE — 93016 CV STRESS TEST SUPVJ ONLY: CPT | Performed by: INTERNAL MEDICINE

## 2023-07-13 ENCOUNTER — TELEPHONE (OUTPATIENT)
Dept: PEDIATRICS | Facility: CLINIC | Age: 58
End: 2023-07-13
Payer: COMMERCIAL

## 2023-07-19 ENCOUNTER — OFFICE VISIT (OUTPATIENT)
Dept: PEDIATRICS | Facility: CLINIC | Age: 58
End: 2023-07-19
Payer: COMMERCIAL

## 2023-07-19 VITALS
DIASTOLIC BLOOD PRESSURE: 64 MMHG | TEMPERATURE: 97.4 F | HEIGHT: 74 IN | HEART RATE: 69 BPM | RESPIRATION RATE: 20 BRPM | SYSTOLIC BLOOD PRESSURE: 118 MMHG | WEIGHT: 256.1 LBS | OXYGEN SATURATION: 99 % | BODY MASS INDEX: 32.87 KG/M2

## 2023-07-19 DIAGNOSIS — N18.31 CHRONIC KIDNEY DISEASE, STAGE 3A (H): ICD-10-CM

## 2023-07-19 DIAGNOSIS — F33.0 MILD EPISODE OF RECURRENT MAJOR DEPRESSIVE DISORDER (H): ICD-10-CM

## 2023-07-19 DIAGNOSIS — E11.40 TYPE 2 DIABETES MELLITUS WITH SENSORY NEUROPATHY (H): ICD-10-CM

## 2023-07-19 DIAGNOSIS — G35 MS (MULTIPLE SCLEROSIS) (H): ICD-10-CM

## 2023-07-19 DIAGNOSIS — Z00.00 ROUTINE GENERAL MEDICAL EXAMINATION AT A HEALTH CARE FACILITY: Primary | ICD-10-CM

## 2023-07-19 LAB
ALBUMIN SERPL BCG-MCNC: 4.2 G/DL (ref 3.5–5.2)
ALP SERPL-CCNC: 136 U/L (ref 40–129)
ALT SERPL W P-5'-P-CCNC: 76 U/L (ref 0–70)
ANION GAP SERPL CALCULATED.3IONS-SCNC: 10 MMOL/L (ref 7–15)
AST SERPL W P-5'-P-CCNC: 50 U/L (ref 0–45)
BILIRUB SERPL-MCNC: 0.5 MG/DL
BUN SERPL-MCNC: 29.3 MG/DL (ref 6–20)
CALCIUM SERPL-MCNC: 8.7 MG/DL (ref 8.6–10)
CHLORIDE SERPL-SCNC: 107 MMOL/L (ref 98–107)
CHOLEST SERPL-MCNC: 122 MG/DL
CREAT SERPL-MCNC: 1.49 MG/DL (ref 0.67–1.17)
DEPRECATED HCO3 PLAS-SCNC: 22 MMOL/L (ref 22–29)
GFR SERPL CREATININE-BSD FRML MDRD: 55 ML/MIN/1.73M2
GLUCOSE SERPL-MCNC: 343 MG/DL (ref 70–99)
HDLC SERPL-MCNC: 44 MG/DL
LDLC SERPL CALC-MCNC: 56 MG/DL
NONHDLC SERPL-MCNC: 78 MG/DL
POTASSIUM SERPL-SCNC: 5.2 MMOL/L (ref 3.4–5.3)
PROT SERPL-MCNC: 6.9 G/DL (ref 6.4–8.3)
SODIUM SERPL-SCNC: 139 MMOL/L (ref 136–145)
TRIGL SERPL-MCNC: 109 MG/DL

## 2023-07-19 PROCEDURE — 36415 COLL VENOUS BLD VENIPUNCTURE: CPT | Performed by: STUDENT IN AN ORGANIZED HEALTH CARE EDUCATION/TRAINING PROGRAM

## 2023-07-19 PROCEDURE — 80053 COMPREHEN METABOLIC PANEL: CPT | Performed by: STUDENT IN AN ORGANIZED HEALTH CARE EDUCATION/TRAINING PROGRAM

## 2023-07-19 PROCEDURE — 80061 LIPID PANEL: CPT | Performed by: STUDENT IN AN ORGANIZED HEALTH CARE EDUCATION/TRAINING PROGRAM

## 2023-07-19 PROCEDURE — 99396 PREV VISIT EST AGE 40-64: CPT | Mod: GC | Performed by: STUDENT IN AN ORGANIZED HEALTH CARE EDUCATION/TRAINING PROGRAM

## 2023-07-19 RX ORDER — FLUOXETINE 10 MG/1
10 CAPSULE ORAL DAILY
Qty: 90 CAPSULE | Refills: 3 | Status: SHIPPED | OUTPATIENT
Start: 2023-07-19 | End: 2023-10-26

## 2023-07-19 RX ORDER — SODIUM BICARBONATE 650 MG/1
650 TABLET ORAL 2 TIMES DAILY
Qty: 180 TABLET | Refills: 1 | Status: SHIPPED | OUTPATIENT
Start: 2023-07-19 | End: 2023-10-26

## 2023-07-19 SDOH — HEALTH STABILITY: PHYSICAL HEALTH: ON AVERAGE, HOW MANY MINUTES DO YOU ENGAGE IN EXERCISE AT THIS LEVEL?: 30 MIN

## 2023-07-19 SDOH — ECONOMIC STABILITY: INCOME INSECURITY: IN THE LAST 12 MONTHS, WAS THERE A TIME WHEN YOU WERE NOT ABLE TO PAY THE MORTGAGE OR RENT ON TIME?: NO

## 2023-07-19 SDOH — ECONOMIC STABILITY: FOOD INSECURITY: WITHIN THE PAST 12 MONTHS, YOU WORRIED THAT YOUR FOOD WOULD RUN OUT BEFORE YOU GOT MONEY TO BUY MORE.: NEVER TRUE

## 2023-07-19 SDOH — ECONOMIC STABILITY: FOOD INSECURITY: WITHIN THE PAST 12 MONTHS, THE FOOD YOU BOUGHT JUST DIDN'T LAST AND YOU DIDN'T HAVE MONEY TO GET MORE.: NEVER TRUE

## 2023-07-19 SDOH — ECONOMIC STABILITY: TRANSPORTATION INSECURITY
IN THE PAST 12 MONTHS, HAS THE LACK OF TRANSPORTATION KEPT YOU FROM MEDICAL APPOINTMENTS OR FROM GETTING MEDICATIONS?: NO

## 2023-07-19 SDOH — ECONOMIC STABILITY: TRANSPORTATION INSECURITY
IN THE PAST 12 MONTHS, HAS LACK OF TRANSPORTATION KEPT YOU FROM MEETINGS, WORK, OR FROM GETTING THINGS NEEDED FOR DAILY LIVING?: NO

## 2023-07-19 SDOH — ECONOMIC STABILITY: INCOME INSECURITY: HOW HARD IS IT FOR YOU TO PAY FOR THE VERY BASICS LIKE FOOD, HOUSING, MEDICAL CARE, AND HEATING?: NOT HARD AT ALL

## 2023-07-19 SDOH — HEALTH STABILITY: PHYSICAL HEALTH: ON AVERAGE, HOW MANY DAYS PER WEEK DO YOU ENGAGE IN MODERATE TO STRENUOUS EXERCISE (LIKE A BRISK WALK)?: 1 DAY

## 2023-07-19 ASSESSMENT — LIFESTYLE VARIABLES
HOW MANY STANDARD DRINKS CONTAINING ALCOHOL DO YOU HAVE ON A TYPICAL DAY: 1 OR 2
HOW OFTEN DO YOU HAVE SIX OR MORE DRINKS ON ONE OCCASION: NEVER
AUDIT-C TOTAL SCORE: 2
HOW OFTEN DO YOU HAVE A DRINK CONTAINING ALCOHOL: 2-4 TIMES A MONTH
SKIP TO QUESTIONS 9-10: 1

## 2023-07-19 ASSESSMENT — ENCOUNTER SYMPTOMS
FEVER: 0
PALPITATIONS: 0
JOINT SWELLING: 0
ARTHRALGIAS: 0
HEADACHES: 0
WEAKNESS: 1
DYSURIA: 0
EYE PAIN: 0
HEMATOCHEZIA: 0
COUGH: 0
MYALGIAS: 0
HEMATURIA: 0
DIARRHEA: 1
SORE THROAT: 0
CONSTIPATION: 0
NERVOUS/ANXIOUS: 1
CHILLS: 0
DIZZINESS: 1
SHORTNESS OF BREATH: 0
PARESTHESIAS: 1
ABDOMINAL PAIN: 0
FREQUENCY: 1
NAUSEA: 0
HEARTBURN: 0

## 2023-07-19 ASSESSMENT — SOCIAL DETERMINANTS OF HEALTH (SDOH)
IN A TYPICAL WEEK, HOW MANY TIMES DO YOU TALK ON THE PHONE WITH FAMILY, FRIENDS, OR NEIGHBORS?: ONCE A WEEK
HOW OFTEN DO YOU ATTEND CHURCH OR RELIGIOUS SERVICES?: MORE THAN 4 TIMES PER YEAR
DO YOU BELONG TO ANY CLUBS OR ORGANIZATIONS SUCH AS CHURCH GROUPS UNIONS, FRATERNAL OR ATHLETIC GROUPS, OR SCHOOL GROUPS?: NO
HOW OFTEN DO YOU GET TOGETHER WITH FRIENDS OR RELATIVES?: ONCE A WEEK

## 2023-07-19 ASSESSMENT — PAIN SCALES - GENERAL: PAINLEVEL: NO PAIN (0)

## 2023-07-19 NOTE — PATIENT INSTRUCTIONS
Great to meet you in clinic!    Arm/leg tingling and weakness:  - Possibly MS related  - Referral to neurology (can also see [facility] if needed)    Diabetes:  - A1C rising, keep working on the diet and exercise  - Re-check A1C in 3 months    Kidney disease:  - Check blood pressure intermittently, if > 140/90, may need to consider medication  - Keep working on diabetes control  - Start bicarbonate       Preventive Health Recommendations  Male Ages 50 - 64    Yearly exam:             See your health care provider every year in order to  o   Review health changes.   o   Discuss preventive care.    o   Review your medicines if your doctor has prescribed any.   Have a cholesterol test every 5 years, or more frequently if you are at risk for high cholesterol/heart disease.   Have a diabetes test (fasting glucose) every three years. If you are at risk for diabetes, you should have this test more often.   Have a colonoscopy at age 50, or have a yearly FIT test (stool test). These exams will check for colon cancer.    Talk with your health care provider about whether or not a prostate cancer screening test (PSA) is right for you.  You should be tested each year for STDs (sexually transmitted diseases), if you re at risk.     Shots: Get a flu shot each year. Get a tetanus shot every 10 years.     Nutrition:  Eat at least 5 servings of fruits and vegetables daily.   Eat whole-grain bread, whole-wheat pasta and brown rice instead of white grains and rice.   Get adequate Calcium and Vitamin D.     Lifestyle  Exercise for at least 150 minutes a week (30 minutes a day, 5 days a week). This will help you control your weight and prevent disease.   Limit alcohol to one drink per day.   No smoking.   Wear sunscreen to prevent skin cancer.   See your dentist every six months for an exam and cleaning.   See your eye doctor every 1 to 2 years.

## 2023-07-19 NOTE — PROGRESS NOTES
SUBJECTIVE:   CC: Pradip is an 57 year old who presents for preventative health visit.     Overall doing fine. UTI symptoms have resolved on antibiotics. Leg wounds are improving. Diet and exercise less good recently, thinks this is responsible for increase in A1C. Mental health has been improving on higher selective serotonin reuptake inhibitor dose.     Has been having episodes of left arm and left leg numbness and tingling and weakness, has had several episodes now.    Healthy Habits:     Getting at least 3 servings of Calcium per day:  NO    Bi-annual eye exam:  Yes    Dental care twice a year:  Yes    Sleep apnea or symptoms of sleep apnea:  Daytime drowsiness    Diet:  Gluten-free/reduced    Frequency of exercise:  None    Taking medications regularly:  Yes    Medication side effects:  None    Additional concerns today:  Yes        Social History     Tobacco Use     Smoking status: Never     Smokeless tobacco: Never   Substance Use Topics     Alcohol use: Yes     Comment: occ             7/19/2023     8:48 AM   Alcohol Use   Prescreen: >3 drinks/day or >7 drinks/week? No       Last PSA: No results found for: PSA    Reviewed orders with patient. Reviewed health maintenance and updated orders accordingly - Yes  Patient Active Problem List   Diagnosis     Abnormal liver function tests     Celiac disease     Coronary arteriosclerosis in native artery     Type 2 diabetes mellitus with diabetic nephropathy (H)     Mild nonproliferative diabetic retinopathy (H)     Stricture of duodenum     Steatosis of liver     Microcytic anemia     Multiple-type hyperlipidemia     Body mass index (BMI) greater than 30 in adult     MS (multiple sclerosis) (H)     Diabetic polyneuropathy associated with type 2 diabetes mellitus (H)     Vitamin B12 deficiency (non anemic)     Microscopic colitis, unspecified microscopic colitis type     History of ischemic bowel disease     History of melanoma     History of abdominal abscess      Diabetic ulcer of toe of left foot associated with type 2 diabetes mellitus, unspecified ulcer stage (H)     Loose stools     Abdominal bloating     Full incontinence of feces     Irritable bowel syndrome with diarrhea     Chronic kidney disease, stage 3a (H)     Past Surgical History:   Procedure Laterality Date     ABDOMEN SURGERY  1/2016, 5/2016     APPENDECTOMY  01/08/2016     BIOPSY  2/2016    To have addl skin excised at [facility]     CATARACT IOL, RT/LT       CHOLECYSTECTOMY  01/08/2016     COLONOSCOPY N/A 09/01/2021    Procedure: COLONOSCOPY, WITH POLYPECTOMY AND BIOPSY;  Surgeon: Kamran Rainey DO;  Location: [facility] [place]     ESOPHAGOSCOPY, GASTROSCOPY, DUODENOSCOPY (EGD), COMBINED N/A 09/01/2021    Procedure: ESOPHAGOGASTRODUODENOSCOPY, WITH BIOPSY;  Surgeon: Kamran Rainey DO;  Location: [facility] [place]     penile implant       LEON EN Y BOWEL  01/08/2016    for small bowel ischemia       Social History     Tobacco Use     Smoking status: Never     Smokeless tobacco: Never   Substance Use Topics     Alcohol use: Yes     Comment: occ     Family History   Problem Relation Age of Onset     Arrhythmia Mother         bradycardia- pacemaker     Mental Illness Mother      Obesity Mother      Coronary Artery Disease Father      Diabetes Father      Obesity Father            Reviewed and updated as needed this visit by clinical staff   Tobacco  Allergies  Meds              Reviewed and updated as needed this visit by Provider                     Review of Systems   Constitutional: Negative for chills and fever.   HENT: Negative for congestion, ear pain, hearing loss and sore throat.    Eyes: Negative for pain and visual disturbance.   Respiratory: Negative for cough and shortness of breath.    Cardiovascular: Positive for chest pain and peripheral edema. Negative for palpitations.   Gastrointestinal: Positive for diarrhea. Negative for abdominal pain, constipation, heartburn, hematochezia and nausea.   Genitourinary:  "Positive for frequency, impotence and urgency. Negative for dysuria, genital sores, hematuria and penile discharge.   Musculoskeletal: Negative for arthralgias, joint swelling and myalgias.   Skin: Positive for rash.   Neurological: Positive for dizziness, weakness and paresthesias. Negative for headaches.   Psychiatric/Behavioral: Positive for mood changes. The patient is nervous/anxious.          OBJECTIVE:   /64   Pulse 69   Temp 97.4  F (36.3  C)   Resp 20   Ht 1.87 m (6' 1.62\")   Wt 116.2 kg (256 lb 1.6 oz)   SpO2 99%   BMI 33.22 kg/m      Physical Exam  GENERAL: healthy, alert and no distress  EYES: Eyes grossly normal to inspection, PERRL and conjunctivae and sclerae normal  HENT: Nose and mouth without ulcers or lesions  RESP: lungs clear to auscultation - no rales, rhonchi or wheezes  CV: regular rate and rhythm, normal S1 S2, no S3 or S4, no murmur, click or rub, no peripheral edema and peripheral pulses strong  ABDOMEN: soft, nontender, no hepatosplenomegaly, no masses   MS: Healing wound on left lateral lower leg, right big toe with inflamed toe nail but no purulence, no gross musculoskeletal defects noted, no edema  SKIN: no suspicious lesions or rashes  NEURO: Normal strength and tone, mentation intact and speech normal  PSYCH: mentation appears normal, affect normal/bright    Diagnostic Test Results:  Labs reviewed in Epic    ASSESSMENT/PLAN:   Thomas was seen today for physical.    Diagnoses and all orders for this visit:    Routine general medical examination at a health care facility  Doing well. Leg wound improving, he will schedule follow up with wound team for inflamed right first toe.    Mild episode of recurrent major depressive disorder (H)  Doing better on higher dose, interested in seeing therapist.   -     FLUoxetine (PROZAC) 30 MG capsule daily  -     Adult Mental Health  Referral; Future    Type 2 diabetes mellitus with sensory neuropathy (H)  Diet controlled, A1C " "rising. Would like to try improving diet and exercise. Plan to recheck in 3 months, with existing CKD should treat promptly if not improving.  -     Hemoglobin A1c; Future  -     Lipid panel reflex to direct LDL Non-fasting    MS (multiple sclerosis) (H)  Left arm and leg episodes of numbness, tingling, and weakness concerning for MS flare. Previously followed at [facility], would like to transfer care to [place].    -     Adult Neurology  Referral; Future    Chronic kidney disease, stage 3a (H)  Metabolic acidosis  Cr stable. For last several month has had bicarb <20, likely from CKD, will treat with NaBicarb tablets for benefit in slowing CKD progression. Last potassium mildly elevated, recheck today.   -     Comprehensive metabolic panel (BMP + Alb, Alk Phos, ALT, AST, Total. Bili, TP); Future  -     sodium bicarbonate 650 MG tablet; Take 1 tablet (650 mg) by mouth 2 times daily  - BMP in 3 months.             COUNSELING:   Reviewed preventive health counseling, as reflected in patient instructions       Regular exercise       Healthy diet/nutrition       Vision screening       Colorectal cancer screening       Prostate cancer screening      BMI:   Estimated body mass index is 33.22 kg/m  as calculated from the following:    Height as of this encounter: 1.87 m (6' 1.62\").    Weight as of this encounter: 116.2 kg (256 lb 1.6 oz).   Weight management plan: Discussed healthy diet and exercise guidelines      He reports that he has never smoked. He has never used smokeless tobacco.            Kishor Tim MD  [facility]    STAFF NOTE:  I have seen the patient, discussed with the resident, was present during critical portion of visit, and was available to furnish services throughout the visit.  I agree with the history, physical and plan as documented above.    Anni Sage MD  Internal Medicine-Pediatrics      "

## 2023-07-20 PROBLEM — M86.171 OTHER ACUTE OSTEOMYELITIS, RIGHT ANKLE AND FOOT (H): Status: RESOLVED | Noted: 2022-10-21 | Resolved: 2023-07-20

## 2023-07-25 ENCOUNTER — HOSPITAL ENCOUNTER (OUTPATIENT)
Dept: CARDIOLOGY | Facility: CLINIC | Age: 58
Discharge: HOME | End: 2023-07-25
Attending: PHYSICIAN ASSISTANT
Payer: COMMERCIAL

## 2023-07-25 ENCOUNTER — HOSPITAL ENCOUNTER (OUTPATIENT)
Dept: NUCLEAR MEDICINE | Facility: CLINIC | Age: 58
Setting detail: NUCLEAR MEDICINE
Discharge: HOME | End: 2023-07-25
Attending: PHYSICIAN ASSISTANT
Payer: COMMERCIAL

## 2023-07-25 VITALS — SYSTOLIC BLOOD PRESSURE: 125 MMHG | HEART RATE: 60 BPM | DIASTOLIC BLOOD PRESSURE: 67 MMHG

## 2023-07-25 DIAGNOSIS — R07.89 CHEST TIGHTNESS: ICD-10-CM

## 2023-07-25 DIAGNOSIS — I25.10 CORONARY ARTERIOSCLEROSIS IN NATIVE ARTERY: ICD-10-CM

## 2023-07-25 LAB
CV STRESS MAX HR HE: 77
RATE PRESSURE PRODUCT: NORMAL
STRESS ECHO BASELINE DIASTOLIC HE: 59
STRESS ECHO BASELINE HR: 59 BPM
STRESS ECHO BASELINE SYSTOLIC BP: 124
STRESS ECHO CALCULATED PERCENT HR: 47 %
STRESS ECHO LAST STRESS DIASTOLIC BP: 58
STRESS ECHO LAST STRESS SYSTOLIC BP: 148
STRESS ECHO TARGET HR: 164

## 2023-07-25 PROCEDURE — 78452 HT MUSCLE IMAGE SPECT MULT: CPT

## 2023-07-25 PROCEDURE — 78452 HT MUSCLE IMAGE SPECT MULT: CPT | Mod: 26 | Performed by: INTERNAL MEDICINE

## 2023-07-25 PROCEDURE — 343N000001 HC RX 343: Performed by: PHYSICIAN ASSISTANT

## 2023-07-25 PROCEDURE — 93017 CV STRESS TEST TRACING ONLY: CPT

## 2023-07-25 PROCEDURE — 250N000011 HC RX IP 250 OP 636: Mod: JZ | Performed by: INTERNAL MEDICINE

## 2023-07-25 PROCEDURE — A9502 TC99M TETROFOSMIN: HCPCS | Performed by: PHYSICIAN ASSISTANT

## 2023-07-25 PROCEDURE — 93018 CV STRESS TEST I&R ONLY: CPT | Performed by: INTERNAL MEDICINE

## 2023-07-25 PROCEDURE — 93016 CV STRESS TEST SUPVJ ONLY: CPT | Performed by: INTERNAL MEDICINE

## 2023-07-25 RX ORDER — REGADENOSON 0.08 MG/ML
0.4 INJECTION, SOLUTION INTRAVENOUS ONCE
Status: COMPLETED | OUTPATIENT
Start: 2023-07-25 | End: 2023-07-25

## 2023-07-25 RX ORDER — ALBUTEROL SULFATE 90 UG/1
2 AEROSOL, METERED RESPIRATORY (INHALATION) EVERY 5 MIN PRN
Status: DISCONTINUED | OUTPATIENT
Start: 2023-07-25 | End: 2023-07-26 | Stop reason: HOSPADM

## 2023-07-25 RX ORDER — CAFFEINE CITRATE 20 MG/ML
60 SOLUTION INTRAVENOUS
Status: DISCONTINUED | OUTPATIENT
Start: 2023-07-25 | End: 2023-07-26 | Stop reason: HOSPADM

## 2023-07-25 RX ORDER — ACYCLOVIR 200 MG/1
0-1 CAPSULE ORAL
Status: DISCONTINUED | OUTPATIENT
Start: 2023-07-25 | End: 2023-07-26 | Stop reason: HOSPADM

## 2023-07-25 RX ORDER — AMINOPHYLLINE 25 MG/ML
50-100 INJECTION, SOLUTION INTRAVENOUS
Status: DISCONTINUED | OUTPATIENT
Start: 2023-07-25 | End: 2023-07-26 | Stop reason: HOSPADM

## 2023-07-25 RX ADMIN — REGADENOSON 0.4 MG: 0.08 INJECTION, SOLUTION INTRAVENOUS at 13:40

## 2023-07-25 RX ADMIN — TETROFOSMIN 38.7 MILLICURIE: 1.38 INJECTION, POWDER, LYOPHILIZED, FOR SOLUTION INTRAVENOUS at 13:42

## 2023-07-25 RX ADMIN — TETROFOSMIN 10.3 MILLICURIE: 1.38 INJECTION, POWDER, LYOPHILIZED, FOR SOLUTION INTRAVENOUS at 12:28

## 2023-08-07 ENCOUNTER — LAB (OUTPATIENT)
Dept: LAB | Facility: CLINIC | Age: 58
End: 2023-08-07
Payer: COMMERCIAL

## 2023-08-07 ENCOUNTER — OFFICE VISIT (OUTPATIENT)
Dept: NEUROLOGY | Facility: CLINIC | Age: 58
End: 2023-08-07
Attending: INTERNAL MEDICINE
Payer: COMMERCIAL

## 2023-08-07 VITALS — SYSTOLIC BLOOD PRESSURE: 185 MMHG | HEART RATE: 62 BPM | DIASTOLIC BLOOD PRESSURE: 96 MMHG

## 2023-08-07 DIAGNOSIS — G35 MS (MULTIPLE SCLEROSIS) (H): ICD-10-CM

## 2023-08-07 DIAGNOSIS — G35 MS (MULTIPLE SCLEROSIS) (H): Primary | ICD-10-CM

## 2023-08-07 DIAGNOSIS — Z13.21 ENCOUNTER FOR VITAMIN DEFICIENCY SCREENING: ICD-10-CM

## 2023-08-07 DIAGNOSIS — Z98.84 HISTORY OF ROUX-EN-Y GASTRIC BYPASS: ICD-10-CM

## 2023-08-07 DIAGNOSIS — R40.4 NONSPECIFIC PAROXYSMAL SPELL: ICD-10-CM

## 2023-08-07 LAB — FOLATE SERPL-MCNC: 10.4 NG/ML (ref 4.6–34.8)

## 2023-08-07 PROCEDURE — 82607 VITAMIN B-12: CPT

## 2023-08-07 PROCEDURE — 99205 OFFICE O/P NEW HI 60 MIN: CPT | Performed by: PSYCHIATRY & NEUROLOGY

## 2023-08-07 PROCEDURE — 82746 ASSAY OF FOLIC ACID SERUM: CPT

## 2023-08-07 PROCEDURE — 36415 COLL VENOUS BLD VENIPUNCTURE: CPT

## 2023-08-07 PROCEDURE — 82306 VITAMIN D 25 HYDROXY: CPT

## 2023-08-07 RX ORDER — LORAZEPAM 0.5 MG/1
TABLET ORAL
Qty: 3 TABLET | Refills: 0 | Status: SHIPPED | OUTPATIENT
Start: 2023-08-07

## 2023-08-07 NOTE — NURSING NOTE
Chief Complaint   Patient presents with    Consult     MS  Referred  by Dr. Chu Warren MA on 8/7/2023 at 2:59 PM

## 2023-08-07 NOTE — PATIENT INSTRUCTIONS
The left arm spells are like due to a short circuit in your spinal cord     This can happen from a new ms lesion OR from an old ms lesion     Update imaging     *we need images from [facility] for comparison     We discussed the option of trying tegretol or trileptal to prevent these spells   Steroids are an option if you have a new lesion on MRI     Blood work today     Follow up after MRI is completed

## 2023-08-07 NOTE — LETTER
8/7/2023         RE: Thomas Gonzales  [address] [place] [address]        Dear Colleague,    Thank you for referring your patient, Thomas Gonzales, to the [facility] NEUROLOGY CLINIC [facility]. Please see a copy of my visit note below.    Date of Service: 8/7/2023    [facility] Neurology   MS Clinic Evaluation    Subjective: 57-year-old left-handed man with history of diabetes, history of obesity but now status post Castro-en-Y, he did suffer post Castro-en-Y complications, autonomic failure, and multiple sclerosis who presents for evaluation of multiple sclerosis.    Per review of clinic records I suspect that his multiple sclerosis disease started in his mid 30s.  At that time he was experiencing spells of symptoms including vertigo and episodes of left hand dysfunction.  MRI is reported to be positive for white matter lesions at that time, though multiple sclerosis was not entertained as a possible etiology.  He was also having spells of shaking during sleep and underwent work-up for seizure, but seizure testing was negative.    In 2016 he suffered complications from a Castro-en-Y.  This led to recurrent hospitalizations and ultimately he presented to [facility] for evaluation.  He was found to have a leak in the duodenum from his surgery.  During his evaluation at [facility] he was seen by neurology.  It was there that they diagnosed him with autonomic failure.  Etiology was thought to be secondary to demyelinating disease.  MRI was remarkable for a dorsal cord lesion in the upper cervical spine as well as a small lesion in the thoracic spine.  He was seen by the neuro immunology clinic, though it does not seem that disease modifying therapy was recommended at that time.    Around the time of his hospitalization he does recall having a couple month episode of difficulty using his left hand.  Symptoms did gradually improve.  However subsequent to this event he has had some  difficulty with fine motor skills of the left hand.  When he tries to write the writing is more sloppy and it is painful for him to write.    He presents today because he is experiencing some new symptoms.  He recalls that in late May he started to experience paroxysmal spells that affected the left upper extremity.  He describes the spells as a sensation of a towel being wrapped around his left upper arm and then being pulled down the arm.  This was followed by a chill like sensation and then a period of weakness.  Symptoms would last only a few minutes at the most and then would resolve without intervention.  Initially the symptoms were quite sporadic and rare in nature, but over time they have become more frequent.  I have also started to affect the left torso and left leg.  He estimates that they can occur up to 10-15 times per day.    He denies experiencing Lhermitte's phenomenon.  He does not report any significant impairment in balance, though does have to be cautious with position changes due to his autonomic dysfunction.  He notes that he has good days and bad days with regard to multiple sclerosis, but is generally able to manage fatigue.  He is aware that if he pushes too hard he will suffer from fatigue.    He denies any gait limitation or symptoms of motor fatigue in the left lower extremity.    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    He is not taking a B12 supplement.  He does take a multivitamin and does take a vitamin D3 supplement ?1000 international units daily    Allergies   Allergen Reactions     Shellfish-Derived Products Anaphylaxis     Adhesive Tape      Paper tape is okay  Tegarderm is okay     Gluten Meal      Celiac     Reglan [Metoclopramide] Hives       Current Outpatient Medications   Medication     ACE/ARB/ARNI NOT PRESCRIBED (INTENTIONAL)     acetaminophen (TYLENOL) 325 MG tablet     aspirin (ASPIRIN LOW DOSE) 81 MG EC tablet     atorvastatin (LIPITOR) 40 MG  tablet     bismuth subsalicylate 262 MG TABS     Cholecalciferol (VITAMIN D) 2000 UNITS CAPS     FLUoxetine (PROZAC) 10 MG capsule     FLUoxetine (PROZAC) 20 MG capsule     LORazepam (ATIVAN) 0.5 MG tablet     multivitamin w/minerals (THERA-VIT-M) tablet     order for DME     Probiotic Product (PROBIOTIC DAILY) CAPS     sodium bicarbonate 650 MG tablet     triamcinolone (KENALOG) 0.1 % external ointment     UNABLE TO FIND     No current facility-administered medications for this visit.        Past medical, surgical, social and family history was personally reviewed. Pertinent details noted above.     Physical Examination:   BP (!) 185/96 (BP Location: Right arm, Patient Position: Sitting)   Pulse 62     General: no acute distress  Cranial nerves:   VFFC  PERRL w/no RAPD  EOM full w/no JONI   Face symmetric  Hearing intact  No dysarthria   Motor:   Tone is normal   Bulk is normal     R L  Deltoid  5 5  Biceps  5 5  Triceps  5 5  Wrist ext 5 5  Finger ext 5 5-  Finger abd 5 5-    Hip flexion 5 5-  Knee flexion 5 5  Knee ext 5 5  Ankle d/f 5 5    Reflexes: 1+ and symmetric throughout, babinski absent bilaterally  Sensory: vibration is absent in the toes, absent in the right ankle, mod reduced in the left ankle, mild reduced at the knees, JPS severely impaired in the left toe, moderate in the right toe  Romberg is present  Coordination: no ataxia or dysmetria  Gait: sl widened base, tandem is moderately impaired, able to balance on one foot x 3-5 seconds    Tests/Imaging:     Vitamin D 11  JCV Ab n/d      MRI Brain  Reported + multiple demyelinating lesions, will obtain images for review    MRI Cervical spine   N/a    MRI Thoracic spine   N/a    Assessment: 57-year-old man with a history of multiple sclerosis who has fortunately not had frequent spells concerning for relapse, but has experienced recent episodes consistent with paroxysmal spells likely originating from the cervical spine.  We discussed today how the  spells could be secondary to a new lesion from multiple sclerosis or an old lesion.  Updated imaging is required to decipher between the 2 scenarios.    We discussed the option of starting an antiseizure medication to help prevent the spells.  Rationale was discussed in detail.  Common risks and side effects were discussed.  At present time he prefers to observe and will consider treatment if the spells become more frequent or disabling in nature.  If MRI does confirm the presence of a new lesion, steroids could be considered.  Of note, he reports that his diabetes is now controlled without medication.    I have recommended that he undergo testing for vitamin deficiency given his history of Castro-en-Y.    Plan:   -Folate, B12, vitamin D  - MRI brain, cervical and thoracic spine  - Follow-up to discuss results    Note was completed with the assistance of Dragon Fluency software which can often result in accidental word substitutions.     A total of 60 minutes on the date of service were spent in the care of this patient.   Cheli Crenshaw MD on 8/7/2023 at 4:09 PM        Again, thank you for allowing me to participate in the care of your patient.        Sincerely,        Cheli Crenshaw MD

## 2023-08-07 NOTE — PROGRESS NOTES
Date of Service: 8/7/2023    [facility] Neurology   MS Clinic Evaluation    Subjective: 57-year-old left-handed man with history of diabetes, history of obesity but now status post Castro-en-Y, he did suffer post Castro-en-Y complications, autonomic failure, and multiple sclerosis who presents for evaluation of multiple sclerosis.    Per review of clinic records I suspect that his multiple sclerosis disease started in his mid 30s.  At that time he was experiencing spells of symptoms including vertigo and episodes of left hand dysfunction.  MRI is reported to be positive for white matter lesions at that time, though multiple sclerosis was not entertained as a possible etiology.  He was also having spells of shaking during sleep and underwent work-up for seizure, but seizure testing was negative.    In 2016 he suffered complications from a Castro-en-Y.  This led to recurrent hospitalizations and ultimately he presented to [facility] for evaluation.  He was found to have a leak in the duodenum from his surgery.  During his evaluation at [facility] he was seen by neurology.  It was there that they diagnosed him with autonomic failure.  Etiology was thought to be secondary to demyelinating disease.  MRI was remarkable for a dorsal cord lesion in the upper cervical spine as well as a small lesion in the thoracic spine.  He was seen by the neuro immunology clinic, though it does not seem that disease modifying therapy was recommended at that time.    Around the time of his hospitalization he does recall having a couple month episode of difficulty using his left hand.  Symptoms did gradually improve.  However subsequent to this event he has had some difficulty with fine motor skills of the left hand.  When he tries to write the writing is more sloppy and it is painful for him to write.    He presents today because he is experiencing some new symptoms.  He recalls that in late May he started to experience paroxysmal spells that  affected the left upper extremity.  He describes the spells as a sensation of a towel being wrapped around his left upper arm and then being pulled down the arm.  This was followed by a chill like sensation and then a period of weakness.  Symptoms would last only a few minutes at the most and then would resolve without intervention.  Initially the symptoms were quite sporadic and rare in nature, but over time they have become more frequent.  I have also started to affect the left torso and left leg.  He estimates that they can occur up to 10-15 times per day.    He denies experiencing Lhermitte's phenomenon.  He does not report any significant impairment in balance, though does have to be cautious with position changes due to his autonomic dysfunction.  He notes that he has good days and bad days with regard to multiple sclerosis, but is generally able to manage fatigue.  He is aware that if he pushes too hard he will suffer from fatigue.    He denies any gait limitation or symptoms of motor fatigue in the left lower extremity.    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    He is not taking a B12 supplement.  He does take a multivitamin and does take a vitamin D3 supplement ?1000 international units daily    Allergies   Allergen Reactions    Shellfish-Derived Products Anaphylaxis    Adhesive Tape      Paper tape is okay  Tegarderm is okay    Gluten Meal      Celiac    Reglan [Metoclopramide] Hives       Current Outpatient Medications   Medication    ACE/ARB/ARNI NOT PRESCRIBED (INTENTIONAL)    acetaminophen (TYLENOL) 325 MG tablet    aspirin (ASPIRIN LOW DOSE) 81 MG EC tablet    atorvastatin (LIPITOR) 40 MG tablet    bismuth subsalicylate 262 MG TABS    Cholecalciferol (VITAMIN D) 2000 UNITS CAPS    FLUoxetine (PROZAC) 10 MG capsule    FLUoxetine (PROZAC) 20 MG capsule    LORazepam (ATIVAN) 0.5 MG tablet    multivitamin w/minerals (THERA-VIT-M) tablet    order for DME    Probiotic Product  (PROBIOTIC DAILY) CAPS    sodium bicarbonate 650 MG tablet    triamcinolone (KENALOG) 0.1 % external ointment    UNABLE TO FIND     No current facility-administered medications for this visit.        Past medical, surgical, social and family history was personally reviewed. Pertinent details noted above.     Physical Examination:   BP (!) 185/96 (BP Location: Right arm, Patient Position: Sitting)   Pulse 62     General: no acute distress  Cranial nerves:   VFFC  PERRL w/no RAPD  EOM full w/no JONI   Face symmetric  Hearing intact  No dysarthria   Motor:   Tone is normal   Bulk is normal     R L  Deltoid  5 5  Biceps  5 5  Triceps  5 5  Wrist ext 5 5  Finger ext 5 5-  Finger abd 5 5-    Hip flexion 5 5-  Knee flexion 5 5  Knee ext 5 5  Ankle d/f 5 5    Reflexes: 1+ and symmetric throughout, babinski absent bilaterally  Sensory: vibration is absent in the toes, absent in the right ankle, mod reduced in the left ankle, mild reduced at the knees, JPS severely impaired in the left toe, moderate in the right toe  Romberg is present  Coordination: no ataxia or dysmetria  Gait: sl widened base, tandem is moderately impaired, able to balance on one foot x 3-5 seconds    Tests/Imaging:     Vitamin D 11  JCV Ab n/d      MRI Brain  Reported + multiple demyelinating lesions, will obtain images for review    MRI Cervical spine   N/a    MRI Thoracic spine   N/a    Assessment: 57-year-old man with a history of multiple sclerosis who has fortunately not had frequent spells concerning for relapse, but has experienced recent episodes consistent with paroxysmal spells likely originating from the cervical spine.  We discussed today how the spells could be secondary to a new lesion from multiple sclerosis or an old lesion.  Updated imaging is required to decipher between the 2 scenarios.    We discussed the option of starting an antiseizure medication to help prevent the spells.  Rationale was discussed in detail.  Common risks and side  effects were discussed.  At present time he prefers to observe and will consider treatment if the spells become more frequent or disabling in nature.  If MRI does confirm the presence of a new lesion, steroids could be considered.  Of note, he reports that his diabetes is now controlled without medication.    I have recommended that he undergo testing for vitamin deficiency given his history of Castro-en-Y.    Plan:   -Folate, B12, vitamin D  - MRI brain, cervical and thoracic spine  - Follow-up to discuss results    Note was completed with the assistance of Dragon Fluency software which can often result in accidental word substitutions.     A total of 60 minutes on the date of service were spent in the care of this patient.   Cheli Crenshaw MD on 8/7/2023 at 4:09 PM

## 2023-08-08 LAB — VIT B12 SERPL-MCNC: 700 PG/ML (ref 232–1245)

## 2023-08-09 LAB — DEPRECATED CALCIDIOL+CALCIFEROL SERPL-MC: 18 UG/L (ref 20–75)

## 2023-08-22 ENCOUNTER — TRANSFERRED RECORDS (OUTPATIENT)
Dept: HEALTH INFORMATION MANAGEMENT | Facility: CLINIC | Age: 58
End: 2023-08-22
Payer: COMMERCIAL

## 2023-08-22 LAB — RETINOPATHY: POSITIVE

## 2023-08-29 ENCOUNTER — OFFICE VISIT (OUTPATIENT)
Dept: PODIATRY | Facility: CLINIC | Age: 58
End: 2023-08-29
Payer: COMMERCIAL

## 2023-08-29 VITALS — SYSTOLIC BLOOD PRESSURE: 140 MMHG | TEMPERATURE: 98.2 F | DIASTOLIC BLOOD PRESSURE: 60 MMHG | RESPIRATION RATE: 16 BRPM

## 2023-08-29 DIAGNOSIS — L60.2 ONYCHAUXIS: Primary | ICD-10-CM

## 2023-08-29 DIAGNOSIS — E11.9 TYPE 2 DIABETES MELLITUS WITHOUT COMPLICATION, WITHOUT LONG-TERM CURRENT USE OF INSULIN (H): ICD-10-CM

## 2023-08-29 DIAGNOSIS — M21.6X1 PRONATION DEFORMITY OF BOTH FEET: ICD-10-CM

## 2023-08-29 DIAGNOSIS — M21.6X2 PRONATION DEFORMITY OF BOTH FEET: ICD-10-CM

## 2023-08-29 PROCEDURE — 99203 OFFICE O/P NEW LOW 30 MIN: CPT | Performed by: PODIATRIST

## 2023-08-29 ASSESSMENT — PAIN SCALES - GENERAL: PAINLEVEL: NO PAIN (0)

## 2023-08-29 NOTE — PROGRESS NOTES
FOOT AND ANKLE SURGERY/PODIATRY CONSULT NOTE         ASSESSMENT:   DM 2  Pronation deformity  Onychauxis right great toenail      TREATMENT:  I informed the patient that I will recommend a new pair of orthotics to control her pronation.  He is to return to the clinic as needed.        HPI: Thomas Gonzales today for an annual diabetic foot check.  The patient indicated that he does have some mild discomfort periodically with the right great toenail.  He does have a difficult time trimming the nail due to the thickness of the nail.  He denies any trauma to the right great toenail.  The patient also has a history of wearing orthotics for flat feet.  He has not had a new pair of orthotics for the past 18 months.  He stated he feels as though his current orthotics are quite worn and is interested in having a new pair.  He he does have some numbness of both feet.  His symptoms are extremely mild.  He denies any open wounds.  He denies any trauma to his feet.  He has not had any redness or swelling involving his feet.      Past Medical History:   Diagnosis Date    CAD (coronary artery disease)     S/p stenting of left circumflex    Cancer (H)     melanoma    Depressive disorder 2006    Diabetes mellitus (H)     Multiple sclerosis (H)     Peripheral neuropathy        Social History     Socioeconomic History    Marital status:      Spouse name: Not on file    Number of children: Not on file    Years of education: Not on file    Highest education level: Not on file   Occupational History    Not on file   Tobacco Use    Smoking status: Never    Smokeless tobacco: Never   Vaping Use    Vaping Use: Never used   Substance and Sexual Activity    Alcohol use: Yes     Comment: occ    Drug use: No    Sexual activity: Yes     Partners: Female     Birth control/protection: None   Other Topics Concern    Parent/sibling w/ CABG, MI or angioplasty before 65F 55M? Yes     Comment: Father   Social History Narrative    Not on file      Social Determinants of Health     Financial Resource Strain: Low Risk  (7/19/2023)    Overall Financial Resource Strain (CARDIA)     Difficulty of Paying Living Expenses: Not hard at all   Food Insecurity: No Food Insecurity (7/19/2023)    Hunger Vital Sign     Worried About Running Out of Food in the Last Year: Never true     Ran Out of Food in the Last Year: Never true   Transportation Needs: No Transportation Needs (7/19/2023)    PRAPARE - Transportation     Lack of Transportation (Medical): No     Lack of Transportation (Non-Medical): No   Physical Activity: Insufficiently Active (7/19/2023)    Exercise Vital Sign     Days of Exercise per Week: 1 day     Minutes of Exercise per Session: 30 min   Stress: No Stress Concern Present (7/19/2023)    [employer] [place] of Occupational Health - Occupational Stress Questionnaire     Feeling of Stress : Only a little   Social Connections: Moderately Isolated (7/19/2023)    Social Connection and Isolation Panel [NHANES]     Frequency of Communication with Friends and Family: Once a week     Frequency of Social Gatherings with Friends and Family: Once a week     Attends Orthodoxy Services: More than 4 times per year     Active Member of Clubs or Organizations: No     Attends Club or Organization Meetings: Not on file     Marital Status:    Intimate Partner Violence: Not on file   Housing Stability: Low Risk  (7/19/2023)    Housing Stability Vital Sign     Unable to Pay for Housing in the Last Year: No     Number of Places Lived in the Last Year: 1     Unstable Housing in the Last Year: No          Allergies   Allergen Reactions    Shellfish-Derived Products Anaphylaxis    Adhesive Tape      Paper tape is okay  Tegarderm is okay    Gluten Meal      Celiac    Reglan [Metoclopramide] Hives          Current Outpatient Medications:     ACE/ARB/ARNI NOT PRESCRIBED (INTENTIONAL), Please choose reason not prescribed from choices below., Disp: , Rfl:     acetaminophen  (TYLENOL) 325 MG tablet, Take 2 tablets (650 mg) by mouth every 6 hours as needed for mild pain (and adjunct with moderate or severe pain or per patient request), Disp: , Rfl:     aspirin (ASPIRIN LOW DOSE) 81 MG EC tablet, Take 1 tablet (81 mg) by mouth daily, Disp: 90 tablet, Rfl: 2    atorvastatin (LIPITOR) 40 MG tablet, Take 1 tablet (40 mg) by mouth daily, Disp: 90 tablet, Rfl: 3    bismuth subsalicylate 262 MG TABS, Take 4-5 tablets by mouth daily , Disp: , Rfl:     Cholecalciferol (VITAMIN D) 2000 UNITS CAPS, Take 2,000 Units by mouth daily, Disp: , Rfl:     FLUoxetine (PROZAC) 10 MG capsule, Take 1 capsule (10 mg) by mouth daily Take with 20mg for daily total of 30mg., Disp: 90 capsule, Rfl: 3    FLUoxetine (PROZAC) 20 MG capsule, Take 1 capsule (20 mg) by mouth daily With 10mg for a daily total of 30mg., Disp: 90 capsule, Rfl: 3    LORazepam (ATIVAN) 0.5 MG tablet, Take 1-2 tablets 30 minutes before MRI (take at the radiology dept), take 3rd tablet if needed. No driving for 8 hours after taking., Disp: 3 tablet, Rfl: 0    multivitamin w/minerals (THERA-VIT-M) tablet, Take 1 tablet by mouth daily, Disp: , Rfl:     order for DME, Equipment being ordered: custom orthotic inserts for shoes, Disp: 1 each, Rfl: 1    Probiotic Product (PROBIOTIC DAILY) CAPS, Take 1 capsule by mouth daily , Disp: , Rfl:     sodium bicarbonate 650 MG tablet, Take 1 tablet (650 mg) by mouth 2 times daily, Disp: 180 tablet, Rfl: 1    triamcinolone (KENALOG) 0.1 % external ointment, Apply topically 2 times daily Do not use more than 2 weeks per month, Disp: 30 g, Rfl: 0    UNABLE TO FIND, MEDICATION NAME: Probiotic dark chocolate bar, Disp: , Rfl:      Family History   Problem Relation Age of Onset    Arrhythmia Mother         bradycardia- pacemaker    Mental Illness Mother     Obesity Mother     Coronary Artery Disease Father     Diabetes Father     Obesity Father         Social History     Socioeconomic History    Marital status:       Spouse name: Not on file    Number of children: Not on file    Years of education: Not on file    Highest education level: Not on file   Occupational History    Not on file   Tobacco Use    Smoking status: Never    Smokeless tobacco: Never   Vaping Use    Vaping Use: Never used   Substance and Sexual Activity    Alcohol use: Yes     Comment: occ    Drug use: No    Sexual activity: Yes     Partners: Female     Birth control/protection: None   Other Topics Concern    Parent/sibling w/ CABG, MI or angioplasty before 65F 55M? Yes     Comment: Father   Social History Narrative    Not on file     Social Determinants of Health     Financial Resource Strain: Low Risk  (7/19/2023)    Overall Financial Resource Strain (CARDIA)     Difficulty of Paying Living Expenses: Not hard at all   Food Insecurity: No Food Insecurity (7/19/2023)    Hunger Vital Sign     Worried About Running Out of Food in the Last Year: Never true     Ran Out of Food in the Last Year: Never true   Transportation Needs: No Transportation Needs (7/19/2023)    PRAPARE - Transportation     Lack of Transportation (Medical): No     Lack of Transportation (Non-Medical): No   Physical Activity: Insufficiently Active (7/19/2023)    Exercise Vital Sign     Days of Exercise per Week: 1 day     Minutes of Exercise per Session: 30 min   Stress: No Stress Concern Present (7/19/2023)    [place] [place] of Occupational Health - Occupational Stress Questionnaire     Feeling of Stress : Only a little   Social Connections: Moderately Isolated (7/19/2023)    Social Connection and Isolation Panel [NHANES]     Frequency of Communication with Friends and Family: Once a week     Frequency of Social Gatherings with Friends and Family: Once a week     Attends Jewish Services: More than 4 times per year     Active Member of Clubs or Organizations: No     Attends Club or Organization Meetings: Not on file     Marital Status:    Intimate Partner Violence: Not  on file   Housing Stability: Low Risk  (7/19/2023)    Housing Stability Vital Sign     Unable to Pay for Housing in the Last Year: No     Number of Places Lived in the Last Year: 1     Unstable Housing in the Last Year: No        Review of Systems - Patient denies fever, chills, rash, wound, stiffness, limping, numbness, weakness, heart burn, blood in stool, chest pain with activity, calf pain when walking, shortness of breath with activity, chronic cough, easy bleeding/bruising, swelling of ankles, excessive thirst, fatigue, depression, anxiety.  Patient admits to flat feet.      OBJECTIVE:  Appearance: alert, well appearing, and in no distress.    There were no vitals taken for this visit.     There is no height or weight on file to calculate BMI.     General appearance: Patient is alert and fully cooperative with history & exam.  No sign of distress is noted during the visit.  Psychiatric: Affect is pleasant & appropriate.  Patient appears motivated to improve health.  Respiratory: Breathing is regular & unlabored while sitting.  HEENT: Hearing is intact to spoken word.  Speech is clear.  No gross evidence of visual impairment that would impact ambulation.    Vascular: Dorsalis pedis and posterior tibial pulses are palpable. There is no pedal hair growth bilaterally.  CFT < 3 sec from anterior tibial surface to distal digits bilaterally. There is no appreciable edema noted.  Dermatologic: Slightly thickened and slightly discolored right great toenail.  Turgor and texture are within normal limits. No coloration or temperature changes. No primary or secondary lesions noted.  Neurologic: All epicritic and proprioceptive sensations are grossly intact bilaterally.  Musculoskeletal: All active and passive ankle, subtalar, midtarsal, and 1st MPJ range of motion are grossly intact without pain or crepitus, with the exception of none. Manual muscle strength is within normal limits bilaterally. All dorsiflexors,  plantarflexors, invertors, evertors are intact bilaterally.  No tenderness present to bilateral feet or ankles on palpation.  No tenderness to bilateral feet or ankles with range of motion.  Flattening of the medial longitudinal arch noted bilaterally.  Calf is soft/non-tender without warmth/induration    Imaging:       No images are attached to the encounter or orders placed in the encounter.     No results found.   No results found.       lawanda Boyce DPM  [facility] Foot & Ankle Surgery/Podiatry

## 2023-08-29 NOTE — Clinical Note
8/29/2023         RE: Thomas Gonzales  [address] [place] [address]        Dear Colleague,    Thank you for referring your patient, Thomas Gonzales, to the [facility]. Please see a copy of my visit note below.    No notes on file    Again, thank you for allowing me to participate in the care of your patient.        Sincerely,        Jeromy Shoemaker DPM

## 2023-08-29 NOTE — PATIENT INSTRUCTIONS
What are Prescription Custom Orthotics?  Custom orthotics are specially-made devices designed to support and comfort your feet. Prescription orthotics are crafted for you and no one else. They match the contours of your feet precisely and are designed for the way you move. Orthotics are only manufactured after a podiatrist has conducted a complete evaluation of your feet, ankles, and legs, so the orthotic can accommodate your unique foot structure and pathology.  Prescription orthotics are divided into two categories:  Functional orthotics are designed to control abnormal motion. They may be used to treat foot pain caused by abnormal motion; they can also be used to treat injuries such as shin splints or tendinitis. Functional orthotics are usually crafted of a semi-rigid material such as plastic or graphite.  Accommodative orthotics are softer and meant to provide additional cushioning and support. They can be used to treat diabetic foot ulcers, painful calluses on the bottom of the foot, and other uncomfortable conditions.  Podiatrists use orthotics to treat foot problems such as plantar fasciitis, bursitis, tendinitis, diabetic foot ulcers, and foot, ankle, and heel pain. Clinical research studies have shown that podiatrist-prescribed foot orthotics decrease foot pain and improve function.  Orthotics typically cost more than shoe inserts purchased in a retail store, but the additional cost is usually well worth it. Unlike shoe inserts, orthotics are molded to fit each individual foot, so you can be sure that your orthotics fit and do what they're supposed to do. Prescription orthotics are also made of top-notch materials and last many years when cared for properly. Insurance often helps pay for prescription orthotics.  What are Shoe Inserts?   You've seen them at the grocery store and at the mall. You've probably even seen them on TV and online. Shoe inserts are any kind of non-prescription foot support designed  to be worn inside a shoe. Pre-packaged, mass produced, arch supports are shoe inserts. So are the  custom-made  insoles and foot supports that you can order online or at retail stores. Unless the device has been prescribed by a doctor and crafted for your specific foot, it's a shoe insert, not a custom orthotic device--despite what the ads might say.  Shoe inserts can be very helpful for a variety of foot ailments, including flat arches and foot and leg pain. They can cushion your feet, provide comfort, and support your arches, but they can't correct biomechanical foot problems or cure long-standing foot issues.  The most common types of shoe inserts are:  Arch supports: Some people have high arches. Others have low arches or flat feet. Arch supports generally have a  bumped-up  appearance and are designed to support the foot's natural arch.   Insoles: Insoles slip into your shoe to provide extra cushioning and support. Insoles are often made of gel, foam, or plastic.   Heel liners: Heel liners, sometimes called heel pads or heel cups, provide extra cushioning in the heel region. They may be especially useful for patients who have foot pain caused by age-related thinning of the heels' natural fat pads.   Foot cushions: Do your shoes rub against your heel or your toes? Foot cushions come in many different shapes and sizes and can be used as a barrier between you and your shoe.  Choosing an Over-the-Counter Shoe Insert  Selecting a shoe insert from the wide variety of devices on the market can be overwhelming. Here are some podiatrist-tested tips to help you find the insert that best meets your needs:  Consider your health. Do you have diabetes? Problems with circulation? An over-the-counter insert may not be your best bet. Diabetes and poor circulation increase your risk of foot ulcers and infections, so schedule an appointment with a podiatrist. He or she can help you select a solution that won't cause additional  health problems.   Think about the purpose. Are you planning to run a marathon, or do you just need a little arch support in your work shoes? Look for a product that fits your planned level of activity.   Bring your shoes. For the insert to be effective, it has to fit into your shoes. So bring your sneakers, dress shoes, or work boots--whatever you plan to wear with your insert. Look for an insert that will fit the contours of your shoe.   Try them on. If all possible, slip the insert into your shoe and try it out. Walk around a little. How does it feel? Don't assume that feelings of pressure will go away with continued wear. (If you can't try the inserts at the store, ask about the store's return policy and hold on to your receipt.)    Please call one of the [place] locations below to schedule an appointment. If you received a prescription please bring it with you to your appointment. Some locations are limited to what they carry.    Office Locations    [facility]  [address] [place] [address]  Home Medical Equipment, [address]   Phone: [phone]   Orthotics and Prosthetics, [address]   Phone: [phone]    [facility]   Home Medical Equipment   [id number], [place] [address]  Phone :[phone]  Orthotics and Prosthetics   [address], [address], [facility] [address]  Phone:[phone]          [facility] [place]  [address]   [place] [address]   Phone: [phone]    [facility]  [address]  [place] [address]  Phone: [phone]    [facility]   [address]  [place] [address]  Phone: [phone]    [facility]  [address]  [place] [address]  Phone:  [phone]    [facility]  [address]  [place] [address]  Phone: [phone]    [place]   [address] [place] [address]   Phone: [phone]    WEARING YOUR CUSTOM FOOT ORTHOTICS   Most insurance plans cover one pair of orthotics per year. You must check with your   insurance plan to see what your payment responsibility will be. Please call your   insurance company by calling the number on the back of your insurance card.   Orthotic's are non-refundable and non-returnable.   Orthotics are made of various designs. Some orthotics are covered with material that extends beyond your toes. If your orthotic is of this design, you will likely need to trim the toe end to get a proper fit. The insole from your shoe can be used as a template. Simply overlay the shoe insert on top of the custom orthotic. Align the heel end while tracing the length of the insert onto the custom orthotic. Use a large scissor to trim the toe end until you get a proper fit in the shoe.   The orthotic needs to be pushed as far back in the shoe as possible. The heel portion should not ride forward so as not to irritate your heel.   Orthotics are designed to work with socks. Excessive perspiration will shorten the life span of the orthotics. Remove the orthotic from the shoe frequently for proper drying.   The break-in period lasts for weeks. People new to orthotics will likely experience new aches and pains. The orthotic is forcing your foot into a new position. Arch, foot and leg muscle aches and fatigue are common during these weeks. Minor discomfort can be considered normal break in phenomenon. Start wearing your orthotic around your home your first day. Limited activity for one to two hours is recommended. You can increase one or two additional hours each day provided the aches and pains are subsiding. The degree of discomfort, fatigue and problems will dictate the speed of break  in. You may require multiple weeks to work up to full time use.   Do not continue wearing your orthotics if they are creating problems such as blisters or sores. Do not hesitate to call the clinic to speak with a nurse regarding orthotic   break in, fit, trimming, etc. You may also need to see the doctor if the orthotics are   simply not working out. Adjustments are sometimes made to improve orthotic   function.     Orthotics will only work in certain styles and types of shoes. Orthotics rarely work in dress shoes. Slip-ons, clogs, sandals and heels are particularly troublesome. Specially designed orthotics may be necessary for these types of shoes. Your custom orthotic was designed for activities that require appropriate walking or running shoes. Lace up athletic shoes, walking shoes or work boots should work appropriately. You may need a wider or longer shoe. Shoes with a removable  or insert work best. In general, you want to remove an insert from the shoe before placing the orthotic into the shoe. Shoes without a removable liner may not work as well.     When purchasing new shoes, bring your orthotics along to get a proper fit. Shop at stores that are familiar with orthotics.   Frequent washing of the orthotic may shorten the life span of the top cover. The top cover can be replaced but will generally last one to five years depending on use and foot perspiration.  Podiatry Clinics that offer foot and toenail care  [facility]  [facility]  [phone]    Foot and Ankle Clinics, [facility]  [phone]    [facility]  [place] - Dr. Mendez on Tuesdays  [phone]    [facility]  [place]  [phone]    [facility]  [place]  [phone]    [facility]  [phone]    [facility]  [phone]      [facility]  *Nurse comes to  your home for nail care.  Cynthia Vance RN Foot Specialist  [phone]

## 2023-09-05 ENCOUNTER — HOSPITAL ENCOUNTER (OUTPATIENT)
Dept: MRI IMAGING | Facility: CLINIC | Age: 58
Discharge: HOME | End: 2023-09-05
Attending: PSYCHIATRY & NEUROLOGY
Payer: COMMERCIAL

## 2023-09-05 DIAGNOSIS — G35 MS (MULTIPLE SCLEROSIS) (H): ICD-10-CM

## 2023-09-05 PROCEDURE — 72156 MRI NECK SPINE W/O & W/DYE: CPT

## 2023-09-05 PROCEDURE — 255N000002 HC RX 255 OP 636: Performed by: PSYCHIATRY & NEUROLOGY

## 2023-09-05 PROCEDURE — 70553 MRI BRAIN STEM W/O & W/DYE: CPT

## 2023-09-05 PROCEDURE — 72156 MRI NECK SPINE W/O & W/DYE: CPT | Mod: 26 | Performed by: RADIOLOGY

## 2023-09-05 PROCEDURE — 72157 MRI CHEST SPINE W/O & W/DYE: CPT

## 2023-09-05 PROCEDURE — 70553 MRI BRAIN STEM W/O & W/DYE: CPT | Mod: 26 | Performed by: RADIOLOGY

## 2023-09-05 PROCEDURE — 72157 MRI CHEST SPINE W/O & W/DYE: CPT | Mod: 26 | Performed by: RADIOLOGY

## 2023-09-05 PROCEDURE — A9585 GADOBUTROL INJECTION: HCPCS | Performed by: PSYCHIATRY & NEUROLOGY

## 2023-09-05 RX ORDER — GADOBUTROL 604.72 MG/ML
10 INJECTION INTRAVENOUS ONCE
Status: COMPLETED | OUTPATIENT
Start: 2023-09-05 | End: 2023-09-05

## 2023-09-05 RX ADMIN — GADOBUTROL 10 ML: 604.72 INJECTION INTRAVENOUS at 08:35

## 2023-09-06 ENCOUNTER — TELEPHONE (OUTPATIENT)
Dept: NEUROLOGY | Facility: CLINIC | Age: 58
End: 2023-09-06
Payer: COMMERCIAL

## 2023-09-06 DIAGNOSIS — M47.12 CERVICAL SPONDYLOSIS WITH MYELOPATHY: Primary | ICD-10-CM

## 2023-09-11 NOTE — RESULT ENCOUNTER NOTE
Can you assist in getting prior images for comparison?   Cheli Crenshaw MD on 9/11/2023 at 5:27 PM

## 2023-09-12 ENCOUNTER — TELEPHONE (OUTPATIENT)
Dept: NEUROLOGY | Facility: CLINIC | Age: 58
End: 2023-09-12
Payer: COMMERCIAL

## 2023-09-12 NOTE — TELEPHONE ENCOUNTER
----- Message from Cheli Crenshaw MD sent at 9/11/2023  5:27 PM CDT -----  Can you assist in getting prior images for comparison?   Cheli Crenshaw MD on 9/11/2023 at 5:27 PM

## 2023-09-14 NOTE — TELEPHONE ENCOUNTER
Reviewed images   Perhaps a few punctate new lesions in the brain, but overall stable   Notable difference in quality     No new lesions in the spinal cord   Added message to t spine mri for patient   Cheli Crenshaw MD on 9/14/2023 at 3:58 PM

## 2023-09-15 ENCOUNTER — THERAPY VISIT (OUTPATIENT)
Dept: PHYSICAL THERAPY | Facility: REHABILITATION | Age: 58
End: 2023-09-15
Attending: PSYCHIATRY & NEUROLOGY
Payer: COMMERCIAL

## 2023-09-15 DIAGNOSIS — M47.12 CERVICAL SPONDYLOSIS WITH MYELOPATHY: Primary | ICD-10-CM

## 2023-09-15 PROCEDURE — 97161 PT EVAL LOW COMPLEX 20 MIN: CPT | Mod: GP | Performed by: PHYSICAL THERAPIST

## 2023-09-15 PROCEDURE — 97110 THERAPEUTIC EXERCISES: CPT | Mod: GP | Performed by: PHYSICAL THERAPIST

## 2023-09-15 NOTE — PROGRESS NOTES
PHYSICAL THERAPY EVALUATION  Type of Visit: Evaluation    See electronic medical record for Abuse and Falls Screening details.    Subjective       Presenting condition or subjective complaint: Tingling in arms and legs. Bulging based on recent mri  Date of onset: 09/06/23    Relevant medical history:     Dates & types of surgery:      Prior diagnostic imaging/testing results: MRI     Prior therapy history for the same diagnosis, illness or injury: No      Patient states in June began having paresthesias in left arm, feels like a wet towel wrapped around left arm and will have warm/cold and numbness/tingling sensations. This will occur a couple times per day. Numbness and tingling are symptoms most concerning to him. Symptoms do not seem to develop with any particular activity, has noticed with driving, sitting at desk working. Does not recall symptoms developing while walking. Has ongoing issue for years with hand weakness and decreased coordination. Is not sure what is causing arm symptoms, neurologist and GP thinks it could be due to pinched nerves with herniated disks.     Prior Level of Function  No paresthesias in left upper extremity with seated activity    Living Environment  Social support: With a significant other or spouse   Type of home: House   Stairs to enter the home: Yes 4 Is there a railing: Yes   Ramp: No   Stairs inside the home: Yes 14 Is there a railing: Yes   Help at home: None  Equipment owned: Straight Cane; Grab bars     Employment: Yes   Hobbies/Interests:  garden, spend time with grand kids which can be hard with his fingers.     Patient goals for therapy:  elimintate numbness/tingling in left arm, reduce neck stiffness     Objective   CERVICAL SPINE EVALUATION  PAIN: Pain Location: left arm, neck stiffness  Pain Quality: Numb and Tingling  Pain Frequency: intermittent  Pain is Exacerbated By: sitting activity  Pain is Relieved By: change in position  INTEGUMENTARY  (edema, incisions):   POSTURE: Standing Posture: Rounded shoulders, Forward head, Thoracic kyphosis increased  GAIT:   Weightbearing Status:   Assistive Device(s):   Gait Deviations:   BALANCE/PROPRIOCEPTION:   WEIGHTBEARING ALIGNMENT:   ROM:   (Degrees) Left AROM Right AROM    Cervical Flexion 60    Cervical Extension 50    Cervical Side bend      Cervical Rotation 45 45    Cervical Protrusion     Cervical Retraction Limited by 25%    Thoracic Flexion     Thoracic Extension     Thoracic Rotation       Left AROM Left PROM Right AROM Right PROM   Shoulder Flexion WNL  140 with pain at end range    Shoulder Extension       Shoulder Abduction WFL  WFL    Shoulder Adduction       Shoulder IR       Shoulder ER       Shoulder Horiz Abduction       Shoulder Horiz Adduction       Pain:   End Feel:     MYOTOMES:    Left Right   C1-2 (Neck Flexion)     C3 (Neck Side Bend)      C4 (Shrug)     C5 (Deltoid) 5- 5-   C6 (Biceps) 5- 5-   C7 (Triceps) 5- 5-   C8 (Thumb Ext) 4+ 4+   T1 (Intrinsics) 4+ 4+     DTR S:    Left Right   C5 (Biceps) 0 0   C6 (Brachioradialis)     C7 (Triceps) 0 0   L4 (Quad)     S1 (Achilles)       CORD SIGNS: Hooper negative L, Hooper negative R  DERMATOMES:   NEURAL TENSION:   FLEXIBILITY:    SPECIAL TESTS:   PALPATION:   SPINAL SEGMENTAL CONCLUSIONS:       Assessment & Plan   CLINICAL IMPRESSIONS  Medical Diagnosis: M47.12 (ICD-10-CM) - Cervical spondylosis with myelopathy    Treatment Diagnosis: decreased range of motion in cervical spine   Impression/Assessment: Patient is a 57 year old male with chief complaints of neck stiffness and left upper extremity paresthesias.  The following significant findings have been identified: Pain, Decreased ROM/flexibility, Decreased joint mobility, Decreased strength, Impaired muscle performance, Decreased activity tolerance, and Impaired posture. These impairments interfere with their ability to perform work tasks, household chores, and driving  as compared to  previous level of function.     Clinical Decision Making (Complexity):  Clinical Presentation: Stable/Uncomplicated  Clinical Presentation Rationale: based on medical and personal factors listed in PT evaluation  Clinical Decision Making (Complexity): Low complexity    PLAN OF CARE  Treatment Interventions:  Interventions: Manual Therapy, Neuromuscular Re-education, Therapeutic Activity, Therapeutic Exercise, Self-Care/Home Management    Long Term Goals     PT Goal 1  Goal Identifier: HEP  Goal Description: Patient will demonstrate >50% compliance with home exercise program to promote independence and progress towards  PT Goal 2  Goal Identifier: driving  Goal Description: Patient will be able to drive greater than 30 minutes without experiencing paresthesias in left upper extremity  Target Date: 12/08/23      Frequency of Treatment: every other week  Duration of Treatment: 12 weeks    Recommended Referrals to Other Professionals:   Education Assessment:   Learner/Method: Patient    Risks and benefits of evaluation/treatment have been explained.   Patient/Family/caregiver agrees with Plan of Care.     Evaluation Time:     PT Eval, Low Complexity Minutes (30952): 20       Signing Clinician: Diego Pablo PT

## 2023-10-02 NOTE — TELEPHONE ENCOUNTER
Called pt and relayed Dr. Crenshaw message below. Pt verbalized understanding and does not have any questions.    Isabela Warren MA on 10/2/2023 at 9:33 AM

## 2023-10-25 ASSESSMENT — PATIENT HEALTH QUESTIONNAIRE - PHQ9
SUM OF ALL RESPONSES TO PHQ QUESTIONS 1-9: 2
10. IF YOU CHECKED OFF ANY PROBLEMS, HOW DIFFICULT HAVE THESE PROBLEMS MADE IT FOR YOU TO DO YOUR WORK, TAKE CARE OF THINGS AT HOME, OR GET ALONG WITH OTHER PEOPLE: NOT DIFFICULT AT ALL
SUM OF ALL RESPONSES TO PHQ QUESTIONS 1-9: 2

## 2023-10-26 ENCOUNTER — OFFICE VISIT (OUTPATIENT)
Dept: PEDIATRICS | Facility: CLINIC | Age: 58
End: 2023-10-26
Payer: COMMERCIAL

## 2023-10-26 VITALS
DIASTOLIC BLOOD PRESSURE: 70 MMHG | RESPIRATION RATE: 16 BRPM | BODY MASS INDEX: 32.95 KG/M2 | HEART RATE: 69 BPM | WEIGHT: 254 LBS | SYSTOLIC BLOOD PRESSURE: 130 MMHG | TEMPERATURE: 97.9 F | OXYGEN SATURATION: 100 %

## 2023-10-26 DIAGNOSIS — E11.40 TYPE 2 DIABETES MELLITUS WITH SENSORY NEUROPATHY (H): ICD-10-CM

## 2023-10-26 DIAGNOSIS — F33.0 MILD EPISODE OF RECURRENT MAJOR DEPRESSIVE DISORDER (H): ICD-10-CM

## 2023-10-26 DIAGNOSIS — N18.31 CHRONIC KIDNEY DISEASE, STAGE 3A (H): Primary | ICD-10-CM

## 2023-10-26 DIAGNOSIS — G35 MS (MULTIPLE SCLEROSIS) (H): ICD-10-CM

## 2023-10-26 PROBLEM — R15.9 FULL INCONTINENCE OF FECES: Status: RESOLVED | Noted: 2021-08-10 | Resolved: 2023-10-26

## 2023-10-26 LAB
ANION GAP SERPL CALCULATED.3IONS-SCNC: 10 MMOL/L (ref 7–15)
BUN SERPL-MCNC: 34.8 MG/DL (ref 6–20)
CALCIUM SERPL-MCNC: 8.7 MG/DL (ref 8.6–10)
CHLORIDE SERPL-SCNC: 115 MMOL/L (ref 98–107)
CREAT SERPL-MCNC: 1.62 MG/DL (ref 0.67–1.17)
CREAT UR-MCNC: 87.5 MG/DL
DEPRECATED HCO3 PLAS-SCNC: 18 MMOL/L (ref 22–29)
EGFRCR SERPLBLD CKD-EPI 2021: 50 ML/MIN/1.73M2
GLUCOSE SERPL-MCNC: 131 MG/DL (ref 70–99)
HBA1C MFR BLD: 6.2 % (ref 0–5.6)
MICROALBUMIN UR-MCNC: 205 MG/L
MICROALBUMIN/CREAT UR: 234.29 MG/G CR (ref 0–17)
POTASSIUM SERPL-SCNC: 5.5 MMOL/L (ref 3.4–5.3)
SODIUM SERPL-SCNC: 143 MMOL/L (ref 135–145)

## 2023-10-26 PROCEDURE — 80048 BASIC METABOLIC PNL TOTAL CA: CPT | Performed by: INTERNAL MEDICINE

## 2023-10-26 PROCEDURE — 90471 IMMUNIZATION ADMIN: CPT | Performed by: INTERNAL MEDICINE

## 2023-10-26 PROCEDURE — 90750 HZV VACC RECOMBINANT IM: CPT | Performed by: INTERNAL MEDICINE

## 2023-10-26 PROCEDURE — 99214 OFFICE O/P EST MOD 30 MIN: CPT | Mod: 25 | Performed by: INTERNAL MEDICINE

## 2023-10-26 PROCEDURE — 36415 COLL VENOUS BLD VENIPUNCTURE: CPT | Performed by: INTERNAL MEDICINE

## 2023-10-26 PROCEDURE — 83036 HEMOGLOBIN GLYCOSYLATED A1C: CPT | Performed by: INTERNAL MEDICINE

## 2023-10-26 PROCEDURE — 90715 TDAP VACCINE 7 YRS/> IM: CPT | Performed by: INTERNAL MEDICINE

## 2023-10-26 PROCEDURE — 82043 UR ALBUMIN QUANTITATIVE: CPT | Performed by: INTERNAL MEDICINE

## 2023-10-26 PROCEDURE — 82570 ASSAY OF URINE CREATININE: CPT | Performed by: INTERNAL MEDICINE

## 2023-10-26 PROCEDURE — 90472 IMMUNIZATION ADMIN EACH ADD: CPT | Performed by: INTERNAL MEDICINE

## 2023-10-26 RX ORDER — FLUOXETINE 10 MG/1
10 CAPSULE ORAL DAILY
Qty: 90 CAPSULE | Refills: 3 | Status: SHIPPED | OUTPATIENT
Start: 2023-10-26

## 2023-10-26 RX ORDER — SODIUM BICARBONATE 650 MG/1
650 TABLET ORAL 2 TIMES DAILY
Qty: 180 TABLET | Refills: 3 | Status: SHIPPED | OUTPATIENT
Start: 2023-10-26 | End: 2023-11-15

## 2023-10-26 ASSESSMENT — PAIN SCALES - GENERAL: PAINLEVEL: NO PAIN (0)

## 2023-10-26 NOTE — PATIENT INSTRUCTIONS
Anjelica presents to [facility] with her  and daughter. Pt seen in clinic today for follow up growth ultrasound and OB visit at 29w3d gestation due pregnancy c/b EDS Type 3 (see report/notes). VSS. Pt denies bldg/lof/change in discharge/contractions/headache/vision changes/chest pain/SOB/edema. Tdap give (see notes). Dr. Sheriff and Dr. Phillips met with pt and discussed POC. Plan to return in 4 weeks for f/u growth ultrasound, OB visit and anesthesia consult. Pt given directions to outpatient lab for CBC and RPR today. Reviewed fetal kick counts, PTL s/sx, and form for preregistration given to pt. Pt discharged stable and ambulatory.     Thu Rdz RN     We could consider starting a very low dose of a blood pressure medicine called lisinopril (2.5 or 5mg) more for kidney protection than for blood pressure. I think you could tolerate this from a blood pressure stand point.     Get your second shingles vaccine in 2-6 months.     Follow-up in 6 months.

## 2023-10-26 NOTE — PROGRESS NOTES
Assessment & Plan     Chronic kidney disease, stage 3a (H)  Due for labs, discussed considering ACE-I or ARB at very low dose. Previously had not tolerated to to hypotension but BP today indicates he may be able to tolerated better now. He will consider and let us know. Would trial at 2.5mg of lisinopril to start.   - sodium bicarbonate 650 MG tablet; Take 1 tablet (650 mg) by mouth 2 times daily  - Albumin Random Urine Quantitative with Creat Ratio  - Basic metabolic panel  (Ca, Cl, CO2, Creat, Gluc, K, Na, BUN)    Mild episode of recurrent major depressive disorder (H24)  Symptoms relatively stable and well managed. Continue with fluoxetine 30mg daily.   - FLUoxetine (PROZAC) 10 MG capsule; Take 1 capsule (10 mg) by mouth daily Take with 20mg for daily total of 30mg.    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   - Hemoglobin A1c    Multiple sclerosis  Stable, now established with neurology at [facility].            Patient Instructions   We could consider starting a very low dose of a blood pressure medicine called lisinopril (2.5 or 5mg) more for kidney protection than for blood pressure. I think you could tolerate this from a blood pressure stand point.     Get your second shingles vaccine in 2-6 months.     Follow-up in 6 months.     Anni Serrano MD  [facility] VAL Moseley is a 57 year old, presenting for the following health issues:  Diabetes and Depression      10/26/2023     7:28 AM   Additional Questions   Roomed by Sharon         10/26/2023     7:28 AM   Patient Reported Additional Medications   Patient reports taking the following new medications None       History of Present Illness       Mental Health Follow-up:  Patient presents to follow-up on Depression.Patient's depression since last visit has been:  Good  The patient is  not having other symptoms associated with depression.      Any significant life events: relationship concerns  Patient is not feeling anxious or having panic attacks.  Patient has no concerns about alcohol or drug use.    Diabetes:   He presents for follow up of diabetes.    He is not checking blood glucose.        He is concerned about other.   He is having numbness in feet.            He eats 2-3 servings of fruits and vegetables daily.He consumes 0 sweetened beverage(s) daily.He exercises with enough effort to increase his heart rate 10 to 19 minutes per day.  He exercises with enough effort to increase his heart rate 4 days per week.   He is taking medications regularly.     Pradip comes in for follow-up. Since he was last seen, he has established with neurology. MRIs look relatively stable and he is working with physical therapy. Toe wound is also improved.     From a dietary standpoint he has cut back on sweet tea. Is hopeful that A1c looks better today. Has been tolerating sodium bicarb without issues.     Planning to go to [facility] with all 10 grandkids in December. Feels that mood is overall pretty good.       Review of Systems   Constitutional, HEENT, cardiovascular, pulmonary, gi and gu systems are negative, except as otherwise noted.      Objective    /70   Pulse 69   Temp 97.9  F (36.6  C)   Resp 16   Wt 115.2 kg (254 lb)   SpO2 100%   BMI 32.95 kg/m    Body mass index is 32.95 kg/m .  Physical Exam   GENERAL: healthy, alert and no distress  CV: regular rate and rhythm, normal S1 S2, no S3 or S4, no murmur, click or rub, no peripheral edema and peripheral pulses strong  PSYCH: mentation appears normal, affect normal/bright

## 2023-11-09 ENCOUNTER — TELEPHONE (OUTPATIENT)
Dept: BEHAVIORAL HEALTH | Facility: CLINIC | Age: 58
End: 2023-11-09
Payer: COMMERCIAL

## 2023-11-09 NOTE — TELEPHONE ENCOUNTER
----- Message from Renetta Wingina sent at 11/9/2023 12:06 PM CST -----  Regarding: bridge referral adult  Contact: [phone]  Transition Clinic Referral   [place]         Please Check Type of Referral Requested:       __x__THERAPY: The Transition clinic is able to schedule patients without current medical insurance; these patient will be referred to our Social Work Care Coordinator for Medical Insurance              Assistance. We are open for referral for psychotherapy. Patient is referred from:  Outpatient Intake      ____MEDICATION:  Referrals for Medication are ONLY accepted from the following areas (select):                                        Suboxone and Opioid Management Referrals are automatically denied. TC Psychiatry cannot see patient without active medical insurance.      Next level of psychiatry care must be within 12 months.  TC Psychiatry cannot accept patient with next level of care scheduled with PCP.  The transition clinic cannot follow patients who are on a restricted recipient program.    GUARDIAN: If your patient is not their own Guardian, please provide the following:    Guardian Name:  Guardian Contact Information (Phone & Email) :  Guardian Address:     FOSTER CARE PROVIDER: If your patient lives at a Licensed Foster Care, please provide the following:    Foster Provider:  Foster Provider Contact Information (Phone & Email):  Foster Provider Address:       Referring Provider Contact Name: Anni Sage; Phone Number: [phone]    Reason for Transition Clinic Referral: bridge  Mild episode of recurrent major depressive disorder (H24)    Next Level of Care Patient Will Be Transitioned To: [facility]  Provider(s)Dr Tim Aparicio  [facility]  Date/Time February 19th, 2024  11am    What Would Be Helpful from the Transition Clinic: bridge above     Needs: NO    Does Patient Have Access to Technology: yes    Patient E-mail Address: [email or web address]    Current Patient  Phone Number: [phone];     Clinician Gender Preference (if applicable): NO    Patient location preference: [place]

## 2023-11-09 NOTE — TELEPHONE ENCOUNTER
Writer spoke with pt and scheduled initial TC therapy appointment on 11/13/2023 @  11:30 am. Writer sent intake documents via [employer]. Writer will reply to referral source.Tracker completed.    Sherita Jensen  11/09/2023  144

## 2023-11-13 ENCOUNTER — VIRTUAL VISIT (OUTPATIENT)
Dept: BEHAVIORAL HEALTH | Facility: CLINIC | Age: 58
End: 2023-11-13
Payer: COMMERCIAL

## 2023-11-13 DIAGNOSIS — F33.0 MILD EPISODE OF RECURRENT MAJOR DEPRESSIVE DISORDER (H): Primary | ICD-10-CM

## 2023-11-13 ASSESSMENT — COLUMBIA-SUICIDE SEVERITY RATING SCALE - C-SSRS
1. IN THE PAST MONTH, HAVE YOU WISHED YOU WERE DEAD OR WISHED YOU COULD GO TO SLEEP AND NOT WAKE UP?: NO
6. HAVE YOU EVER DONE ANYTHING, STARTED TO DO ANYTHING, OR PREPARED TO DO ANYTHING TO END YOUR LIFE?: NO
2. HAVE YOU ACTUALLY HAD ANY THOUGHTS OF KILLING YOURSELF IN THE PAST MONTH?: NO

## 2023-11-13 NOTE — PROGRESS NOTES
Transition Clinic - Initial Visit Progress Note    Patient  Name: Thomas Gonzales, : 1965    Date:  2023       Service Type:  Mental Health Initial Visit     Visit Start Time: 0   Visit End Time:     Session Length:   TC Session Length: 45 (38-52 Minutes)    Visit #: 1    Attendees:  TC Attendees: Client alone    Service Modality:  Service Modality: Video Visit:      Provider verified identity through the following two step process.  Patient provided:  Patient     Telemedicine Visit: The patient's condition can be safely assessed and treated via synchronous audio and visual telemedicine encounter.      Reason for Telemedicine Visit: Patient has requested telehealth visit    Originating Site (Patient Location): Patient's home    Distant Site (Provider Location): Provider Remote Setting- Home Office    Consent:  The patient/guardian has verbally consented to: the potential risks and benefits of telemedicine (video visit) versus in person care; bill my insurance or make self-payment for services provided; and responsibility for payment of non-covered services.     Patient would like the video invitation sent by:  Send to e-mail at: [email or web address]    Mode of Communication:  Video Conference via [employer]    Distant Location (Provider):  Off-site    As the provider I attest to compliance with applicable laws and regulations related to telemedicine.    Informed Consent and Assessment Methods    This provider and patient discussed HIPAA, and the limits of confidentiality; including mandated reporting, the possibility of collaborative discussions with patient's primary care provider and the multidisciplinary team in the MH clinic during consultation.  Discussed the no show policy, and the benefits and possible unintended consequences of therapy.  Patient indicated understanding Transition Clinic services are short term, solution focused, until a referral can be made and patient can bridge to  long term therapy.  Patient verbally indicated understanding the informed consent.      Current Outpatient Medications   Medication    ACE/ARB/ARNI NOT PRESCRIBED (INTENTIONAL)    acetaminophen (TYLENOL) 325 MG tablet    aspirin (ASPIRIN LOW DOSE) 81 MG EC tablet    atorvastatin (LIPITOR) 40 MG tablet    bismuth subsalicylate 262 MG TABS    Cholecalciferol (VITAMIN D) 2000 UNITS CAPS    FLUoxetine (PROZAC) 10 MG capsule    FLUoxetine (PROZAC) 20 MG capsule    LORazepam (ATIVAN) 0.5 MG tablet    multivitamin w/minerals (THERA-VIT-M) tablet    order for DME    Probiotic Product (PROBIOTIC DAILY) CAPS    sodium bicarbonate 650 MG tablet    triamcinolone (KENALOG) 0.1 % external ointment    UNABLE TO FIND     No current facility-administered medications for this visit.          Presenting Concerns/  Current Stressors:  Thomas Gonzales is a 58 year old identified male who was referred to the Transition Clinic by self for Issues related to depression. Thomas Gonzales reports  That he is scheduled to see a long-term therapist in February 2024 and would like to partake in bridging therapy until then.   Patient indicated that he has experienced a number of medical issues which  has been impacting his mood and  inadvertently impacting his relationship.    He is hoping to learn strategies and techniques in order to regulate mood.  ASSESSMENT:    Required Screenings: The following assessments were completed by patient for this visit:  PHQ9:       3/10/2021     8:00 AM 6/12/2022     7:38 AM 10/21/2022     6:38 AM 12/15/2022     1:23 PM 6/27/2023     2:22 PM 10/25/2023    10:12 PM 11/13/2023     5:58 AM   PHQ-9 SCORE   PHQ-9 Total Score MyChart  4 (Minimal depression) 6 (Mild depression) 3 (Minimal depression)  2 (Minimal depression) 2 (Minimal depression)   PHQ-9 Total Score 0 4 6 3 9 2 2     GAD7:       2/1/2019     2:39 PM 10/21/2022     6:38 AM 12/15/2022     1:23 PM 6/16/2023     4:13 PM 6/20/2023     3:13 PM  11/13/2023     5:57 AM   TIGIST-7 SCORE   Total Score  5 (mild anxiety) 1 (minimal anxiety) 7 (mild anxiety) 7 (mild anxiety) 5 (mild anxiety)   Total Score 4 5 1 7    7 7 5     CAGE-AID:       11/13/2023     5:55 AM   CAGE-AID Total Score   Total Score 0   Total Score MyChart 0 (A total score of 2 or greater is considered clinically significant)     PROMIS 10-Global Health (all questions and answers displayed):       11/13/2023     5:59 AM   PROMIS 10   In general, would you say your health is: Poor   In general, would you say your quality of life is: Good   In general, how would you rate your physical health? Poor   In general, how would you rate your mental health, including your mood and your ability to think? Fair   In general, how would you rate your satisfaction with your social activities and relationships? Fair   In general, please rate how well you carry out your usual social activities and roles Fair   To what extent are you able to carry out your everyday physical activities such as walking, climbing stairs, carrying groceries, or moving a chair? Mostly   In the past 7 days, how often have you been bothered by emotional problems such as feeling anxious, depressed, or irritable? Sometimes   In the past 7 days, how would you rate your fatigue on average? Moderate   In the past 7 days, how would you rate your pain on average, where 0 means no pain, and 10 means worst imaginable pain? 3   In general, would you say your health is: 1   In general, would you say your quality of life is: 3   In general, how would you rate your physical health? 1   In general, how would you rate your mental health, including your mood and your ability to think? 2   In general, how would you rate your satisfaction with your social activities and relationships? 2   In general, please rate how well you carry out your usual social activities and roles. (This includes activities at home, at work and in your community, and  responsibilities as a parent, child, spouse, employee, friend, etc.) 2   To what extent are you able to carry out your everyday physical activities such as walking, climbing stairs, carrying groceries, or moving a chair? 4   In the past 7 days, how often have you been bothered by emotional problems such as feeling anxious, depressed, or irritable? 3   In the past 7 days, how would you rate your fatigue on average? 3   In the past 7 days, how would you rate your pain on average, where 0 means no pain, and 10 means worst imaginable pain? 3   Global Mental Health Score 10   Global Physical Health Score 12   PROMIS TOTAL - SUBSCORES 22     [place] Suicide Severity Rating Scale (Lifetime/Recent)      11/13/2023     5:58 AM   [place] Suicide Severity Rating (Lifetime/Recent)   1. Wish to be Dead (Past 1 Month) N   2. Non-Specific Active Suicidal Thoughts (Past 1 Month) N   Calculated C-SSRS Risk Score (Lifetime/Recent) No Risk Indicated     Current Outpatient Medications   Medication    ACE/ARB/ARNI NOT PRESCRIBED (INTENTIONAL)    acetaminophen (TYLENOL) 325 MG tablet    aspirin (ASPIRIN LOW DOSE) 81 MG EC tablet    atorvastatin (LIPITOR) 40 MG tablet    bismuth subsalicylate 262 MG TABS    Cholecalciferol (VITAMIN D) 2000 UNITS CAPS    FLUoxetine (PROZAC) 10 MG capsule    FLUoxetine (PROZAC) 20 MG capsule    LORazepam (ATIVAN) 0.5 MG tablet    multivitamin w/minerals (THERA-VIT-M) tablet    order for DME    Probiotic Product (PROBIOTIC DAILY) CAPS    sodium bicarbonate 650 MG tablet    triamcinolone (KENALOG) 0.1 % external ointment    UNABLE TO FIND     No current facility-administered medications for this visit.         Mental Status Assessment:  Appearance:   Appropriate   Eye Contact:   Good   Psychomotor Behavior: Normal   Attitude:   Cooperative   Orientation:   All  Speech     Rate / Production:  Normal/ Responsive     Volume:   Normal   Mood:    Normal  Affect:    Appropriate   Thought Content:  Clear   Thought  Form:  Coherent   Insight:    Good       Safety Issues and Plan for Safety and Risk Management:     Patient denies current fears or concerns for personal safety.  Patient denies current or recent suicidal ideation or behaviors.  Patient denies current or recent homicidal ideation or behaviors.  Patient denies current or recent self injurious behavior or ideation.  Patient denies other safety concerns.  Recommended that patient call 911 or go to the local ED should there be a change in any of these risk factors.  Patient reports there are no firearms in the house.     Diagnostic Criteria:  Major Depressive Disorder  A) Recurrent episode(s) - symptoms have been present during the same 2-week period and represent a change from previous functioning 5 or more symptoms (required for diagnosis)   - Diminished interest or pleasure in all, or almost all, activities.    - Significant weight gainincrease in appetite.    - Decreased sleep.    - Fatigue or loss of energy.     DSM5 Diagnoses: (Sustained by DSM5 Criteria Listed Above)  Diagnoses: 296.31 (F33.0) Major Depressive Disorder, Recurrent Episode, Mild _ and With anxious distress  Psychosocial & Contextual Factors: stressors at work and in family system.  Current Outpatient Medications   Medication    ACE/ARB/ARNI NOT PRESCRIBED (INTENTIONAL)    acetaminophen (TYLENOL) 325 MG tablet    aspirin (ASPIRIN LOW DOSE) 81 MG EC tablet    atorvastatin (LIPITOR) 40 MG tablet    bismuth subsalicylate 262 MG TABS    Cholecalciferol (VITAMIN D) 2000 UNITS CAPS    FLUoxetine (PROZAC) 10 MG capsule    FLUoxetine (PROZAC) 20 MG capsule    LORazepam (ATIVAN) 0.5 MG tablet    multivitamin w/minerals (THERA-VIT-M) tablet    order for DME    Probiotic Product (PROBIOTIC DAILY) CAPS    sodium bicarbonate 650 MG tablet    triamcinolone (KENALOG) 0.1 % external ointment    UNABLE TO FIND     No current facility-administered medications for this visit.         Intervention:   Solution Focused  Brief Therapy:   Stop  thought processing techniques and strategies   Cognitive Behavioral Therapy (CBT) - Discussed changing thoughts is the path to changing behaviors and feelings. and Solution-Focused Brief Therapy (SFBT) - Change is perpetual, focus on the problematic excerptions. Reality is subjective and social constructed through conversation.    Collateral Reports Completed:  TC Collateral: [facility] electronic medical records reviewed.    DATA:  Interactive Complexity: No  Crisis: No    PLAN: (Homework, other):  Provider will continue Diagnostic Assessment. Initial Treatment will focus on: Depressed Mood -  felt healthy cognitive patterns and beliefs about self.  Anxiety -  develop behavioral and cognitive strategies to reduce or eliminate irrational anxiety  Adjustment Difficulties related to:  medical concerns .  2.   Provider recommended the following referrals:  scheduled to see long-term therapist in February 2024.    3.   Information in this assessment was obtained from the medical record and provided by patient who is a good historian.   4.   Follow up scheduled:  1-2-weeks        Patient was given the following to do until next session:    *     He will  express the purpose of his conversation to his wife mother that is seeking advice versus expressing his thoughts and feelings  5.  Anticipated Discharge: Anticipated Discharge Time: 1 - 3 Months     Procedure Code:  Psychotherapy (with patient) - 45 [CPT 50910]    Antoinette Berry [facility]  11/13/23      Suicide Risk and Safety Concerns were assessed for. Patient meets the following risk assessment and triage: Patient denied any current/recent/lifetime history of suicidal ideation and/or behaviors.  No safety plan indicated at this time.

## 2023-11-14 ENCOUNTER — MYC MEDICAL ADVICE (OUTPATIENT)
Dept: PEDIATRICS | Facility: CLINIC | Age: 58
End: 2023-11-14
Payer: COMMERCIAL

## 2023-11-14 DIAGNOSIS — N18.31 CHRONIC KIDNEY DISEASE, STAGE 3A (H): ICD-10-CM

## 2023-11-14 NOTE — TELEPHONE ENCOUNTER
Patient sending [facility] message in response to result note from 10/26/23 labs. Patient is agreeable to increase of bicarbonate and adding lisinopril low dose. Please review and send.     William YOU RN 11/14/2023 at 3:15 PM

## 2023-11-15 RX ORDER — SODIUM BICARBONATE 650 MG/1
650 TABLET ORAL 3 TIMES DAILY
Qty: 270 TABLET | Refills: 3 | Status: SHIPPED | OUTPATIENT
Start: 2023-11-15

## 2023-11-15 RX ORDER — LISINOPRIL 2.5 MG/1
2.5 TABLET ORAL DAILY
Qty: 90 TABLET | Refills: 1 | Status: SHIPPED | OUTPATIENT
Start: 2023-11-15 | End: 2024-05-22

## 2023-12-28 ENCOUNTER — MYC REFILL (OUTPATIENT)
Dept: PEDIATRICS | Facility: CLINIC | Age: 58
End: 2023-12-28
Payer: COMMERCIAL

## 2023-12-28 DIAGNOSIS — F33.0 MILD EPISODE OF RECURRENT MAJOR DEPRESSIVE DISORDER (H): ICD-10-CM

## 2024-02-19 ENCOUNTER — VIRTUAL VISIT (OUTPATIENT)
Dept: PSYCHOLOGY | Facility: CLINIC | Age: 59
End: 2024-02-19
Payer: COMMERCIAL

## 2024-02-19 DIAGNOSIS — F33.0 MAJOR DEPRESSIVE DISORDER, RECURRENT EPISODE, MILD (H): Primary | ICD-10-CM

## 2024-02-19 PROCEDURE — 90834 PSYTX W PT 45 MINUTES: CPT | Mod: 95 | Performed by: PSYCHOLOGIST

## 2024-02-19 ASSESSMENT — PATIENT HEALTH QUESTIONNAIRE - PHQ9
SUM OF ALL RESPONSES TO PHQ QUESTIONS 1-9: 4
SUM OF ALL RESPONSES TO PHQ QUESTIONS 1-9: 4
10. IF YOU CHECKED OFF ANY PROBLEMS, HOW DIFFICULT HAVE THESE PROBLEMS MADE IT FOR YOU TO DO YOUR WORK, TAKE CARE OF THINGS AT HOME, OR GET ALONG WITH OTHER PEOPLE: NOT DIFFICULT AT ALL

## 2024-02-19 NOTE — PROGRESS NOTES
[facility]   Mental Health & Addiction Services     Progress Note - Initial Visit    Patient  Name:  Thomas Gonzales Date: 2/19/24         Service Type: Individual     Visit Start Time: 11:12am Visit End Time: 12:00pm    Visit #: 1 48 minutes    Attendees: Client attended alone    Service Modality:  Video Visit:      Provider verified identity through the following two step process.  Patient provided:  Patient photo    Telemedicine Visit: The patient's condition can be safely assessed and treated via synchronous audio and visual telemedicine encounter.      Reason for Telemedicine Visit: Services only offered telehealth    Originating Site (Patient Location): Patient's home    Distant Site (Provider Location): Provider Remote Setting- Home Office    Consent:  The patient/guardian has verbally consented to: the potential risks and benefits of telemedicine (video visit) versus in person care; bill my insurance or make self-payment for services provided; and responsibility for payment of non-covered services.     Patient would like the video invitation sent by:  Send to e-mail at: [email or web address]    Mode of Communication:  Video Conference via [facility]    Distant Location (Provider):  Off-site    As the provider I attest to compliance with applicable laws and regulations related to telemedicine.       DATA:   Interactive Complexity: No   Crisis: No     Presenting Concerns/  Current Stressors:   Client is having difficulty with spending and some symptoms of depression.      ASSESSMENT:  Mental Status Assessment:  Appearance:   Appropriate   Eye Contact:   Good   Psychomotor Behavior: Normal   Attitude:   Cooperative  Friendly Pleasant  Orientation:   All  Speech   Rate / Production: Normal/ Responsive   Volume:  Normal   Mood:    Normal  Affect:    Appropriate   Thought Content:  Clear   Thought Form:  Coherent   Insight:    Good     Assessments completed prior to this visit:  The following  assessments were completed by patient for this visit:  PHQ9:       6/12/2022     7:38 AM 10/21/2022     6:38 AM 12/15/2022     1:23 PM 6/27/2023     2:22 PM 10/25/2023    10:12 PM 11/13/2023     5:58 AM 2/19/2024     9:11 AM   PHQ-9 SCORE   PHQ-9 Total Score [place] 4 (Minimal depression) 6 (Mild depression) 3 (Minimal depression)  2 (Minimal depression) 2 (Minimal depression) 4 (Minimal depression)   PHQ-9 Total Score 4 6 3 9 2 2 4     PROMIS 10-Global Health (all questions and answers displayed):       11/13/2023     5:59 AM 2/19/2024     9:09 AM   PROMIS 10   In general, would you say your health is: Poor Fair   In general, would you say your quality of life is: Good Good   In general, how would you rate your physical health? Poor Fair   In general, how would you rate your mental health, including your mood and your ability to think? Fair Fair   In general, how would you rate your satisfaction with your social activities and relationships? Fair Fair   In general, please rate how well you carry out your usual social activities and roles Fair Good   To what extent are you able to carry out your everyday physical activities such as walking, climbing stairs, carrying groceries, or moving a chair? Mostly Mostly   In the past 7 days, how often have you been bothered by emotional problems such as feeling anxious, depressed, or irritable? Sometimes Sometimes   In the past 7 days, how would you rate your fatigue on average? Moderate Moderate   In the past 7 days, how would you rate your pain on average, where 0 means no pain, and 10 means worst imaginable pain? 3 4   In general, would you say your health is: 1 2   In general, would you say your quality of life is: 3 3   In general, how would you rate your physical health? 1 2   In general, how would you rate your mental health, including your mood and your ability to think? 2 2   In general, how would you rate your satisfaction with your social activities and  relationships? 2 2   In general, please rate how well you carry out your usual social activities and roles. (This includes activities at home, at work and in your community, and responsibilities as a parent, child, spouse, employee, friend, etc.) 2 3   To what extent are you able to carry out your everyday physical activities such as walking, climbing stairs, carrying groceries, or moving a chair? 4 4   In the past 7 days, how often have you been bothered by emotional problems such as feeling anxious, depressed, or irritable? 3 3   In the past 7 days, how would you rate your fatigue on average? 3 3   In the past 7 days, how would you rate your pain on average, where 0 means no pain, and 10 means worst imaginable pain? 3 4   Global Mental Health Score 10 10    10   Global Physical Health Score 12 12    12   PROMIS TOTAL - SUBSCORES 22 22    22         Safety Issues and Plan for Safety and Risk Management:   [place] Suicide Severity Rating Scale (Lifetime/Recent)      11/13/2023     5:58 AM 11/13/2023    11:00 AM   [place] Suicide Severity Rating (Lifetime/Recent)   Q1 Wished to be Dead (Past Month)  no   Q2 Suicidal Thoughts (Past Month)  no   Q6 Suicide Behavior (Lifetime)  no   1. Wish to be Dead (Past 1 Month) N    2. Non-Specific Active Suicidal Thoughts (Past 1 Month) N    Calculated C-SSRS Risk Score (Lifetime/Recent) No Risk Indicated      Patient denies current fears or concerns for personal safety.  Patient denies current or recent suicidal ideation or behaviors.  Patient denies current or recent homicidal ideation or behaviors.  Patient denies current or recent self injurious behavior or ideation.  Patient denies other safety concerns.  Recommended that patient call 911 or go to the local ED should there be a change in any of these risk factors.  Patient reports there are no firearms in the house.     Diagnostic Criteria:  Major Depressive Disorder  CRITERIA (A-C) REPRESENT A MAJOR DEPRESSIVE EPISODE -  SELECT THESE CRITERIA  A) Recurrent episode(s) - symptoms have been present during the same 2-week period and represent a change from previous functioning 5 or more symptoms (required for diagnosis)   - Depressed mood. Note: In children and adolescents, can be irritable mood.     - Diminished interest or pleasure in all, or almost all, activities.    - Psychomotor activity retardation.    - Fatigue or loss of energy.    - Feelings of worthlessness or excessive guilt.   B) The symptoms cause clinically significant distress or impairment in social, occupational, or other important areas of functioning  C) The episode is not attributable to the physiological effects of a substance or to another medical condition  D) The occurence of major depressive episode is not better explained by other thought / psychotic disorders  E) There has never been a manic episode or hypomanic episode      DSM5 Diagnoses: (Sustained by DSM5 Criteria Listed Above)  Diagnoses: 296.31 (F33.0) Major Depressive Disorder, Recurrent Episode, Mild With melancholic features  Psychosocial & Contextual Factors: The client works full-time and lives with his wife (second of two children moved out 3 months ago).  Intervention:   Psychoeducation; Skill review/identification & recommendation; Pattern recognition; Socratic Questioning; Active listening, validation, and other rapport building skills; Administer and explain screeners; Discussed homework; Brief description of CBT; Discussed some goals.  Collateral Reports Completed:  Not Applicable      PLAN: (Homework, other):  1. Provider will continue Diagnostic Assessment.  Patient was given the following to do until next session:  None at this time.    2. Provider recommended the following referrals: None at this time.      3.  Suicide Risk and Safety Concerns were assessed for Thomas Gonzales.    Patient meets the following risk assessment and triage: Patient denied any current/recent/lifetime  history of suicidal ideation and/or behaviors.  No safety plan indicated at this time.       Tim Aparicio PsyD  February 19, 2024

## 2024-02-26 ENCOUNTER — VIRTUAL VISIT (OUTPATIENT)
Dept: PSYCHOLOGY | Facility: CLINIC | Age: 59
End: 2024-02-26
Payer: COMMERCIAL

## 2024-02-26 DIAGNOSIS — F33.0 MAJOR DEPRESSIVE DISORDER, RECURRENT EPISODE, MILD (H): Primary | ICD-10-CM

## 2024-02-26 PROCEDURE — 90834 PSYTX W PT 45 MINUTES: CPT | Mod: 95 | Performed by: PSYCHOLOGIST

## 2024-02-26 NOTE — PROGRESS NOTES
[facility]                                     Progress Note    Patient Name: Thomas Gonzales  Date: 2/26/24         Service Type: Individual      Session Start Time: 8:03am Session End Time: 8:55am     Session Length: 52 minutes    Session #: 2    Attendees: Client attended alone    Service Modality:  Video Visit:      Provider verified identity through the following two step process.  Patient provided:  Patient photo    Telemedicine Visit: The patient's condition can be safely assessed and treated via synchronous audio and visual telemedicine encounter.      Reason for Telemedicine Visit: Services only offered telehealth    Originating Site (Patient Location): Patient's home    Distant Site (Provider Location): Provider Remote Setting- Home Office    Consent:  The patient/guardian has verbally consented to: the potential risks and benefits of telemedicine (video visit) versus in person care; bill my insurance or make self-payment for services provided; and responsibility for payment of non-covered services.     Patient would like the video invitation sent by:  Send to e-mail at: [email or web address]    Mode of Communication:  Video Conference via [facility]    Distant Location (Provider):  Off-site    As the provider I attest to compliance with applicable laws and regulations related to telemedicine.    DATA  Interactive Complexity: No  Crisis: No        Progress Since Last Session (Related to Symptoms / Goals / Homework):   Symptoms: Improving : Stable.    Homework:  N/A.      Episode of Care Goals: Satisfactory progress - PREPARATION (Decided to change - considering how); Intervened by negotiating a change plan and determining options / strategies for behavior change, identifying triggers, exploring social supports, and working towards setting a date to begin behavior change     Current / Ongoing Stressors and Concerns:   Interpersonal conflict.     Treatment Objective(s) Addressed in This  Session:   Work on Diagnostic Assessment; Assign homework; Discussed communication skills and conflict management.     Intervention:   CBT: Psychoeducation; Socratic Questioning; Pattern recognition; Communication skills; Conflict management.  Active listening, validation, and other rapport building skills.    Assessments completed prior to visit:  The following assessments were completed by patient for this visit:  None.      ASSESSMENT: Current Emotional / Mental Status (status of significant symptoms):   Risk status (Self / Other harm or suicidal ideation)   Patient denies current fears or concerns for personal safety.   Patient denies current or recent suicidal ideation or behaviors.   Patient denies current or recent homicidal ideation or behaviors.   Patient denies current or recent self injurious behavior or ideation.   Patient denies other safety concerns.   Patient reports there has been no change in risk factors since their last session.     Patient reports there has been no change in protective factors since their last session.     Recommended that patient call 911 or go to the local ED should there be a change in any of these risk factors.     Appearance:   Appropriate    Eye Contact:   Good    Psychomotor Behavior: Normal    Attitude:   Cooperative  Friendly Pleasant   Orientation:   All   Speech    Rate / Production: Normal     Volume:  Normal    Mood:    Normal   Affect:    Appropriate    Thought Content:  Clear    Thought Form:  Coherent  Logical    Insight:    Good      Medication Review:   No changes to current psychiatric medication(s)     Medication Compliance:   Yes     Changes in Health Issues:   None reported     Chemical Use Review:   Substance Use: Chemical use reviewed, no active concerns identified      Tobacco Use: No current tobacco use.      Diagnosis:  1. Major Depressive Disorder, Recurrent, With melancholic features,Mild (H24)        Collateral Reports Completed:   Not  "Applicable    PLAN: (Patient Tasks / Therapist Tasks / Other)  The client will practice, as opportunities present themselves, listening to the other person's point of view and sharing his own to help resolve issues.  The client made a follow up appointment for 2024.        Tim Aparicio PsyD  2024      ===========  PARTIAL DA BELOW  ======================        [facility]      PATIENT'S NAME: Thomas Gonzales  PREFERRED NAME: Pradip  PRONOUNS:  he/him   MRN: [id number]  : 1965  ADDRESS: [address] [place] [address]  ACCT. NUMBER:  [id number]    [place] ADULT Mental Health DIAGNOSTIC ASSESSMENT    Identifying Information:  Patient is a 58 year old,  individual.  Patient was referred for an assessment by primary care clinic.  Patient attended the session alone.    Chief Complaint:   The reason for seeking services at this time is: \"Deppression\".  The problem(s) began: Financial issues in the .  Depression, \"Sometime between , when my daughter was pre-teen to teenage years.  That was related to father-daughter and her wanting to branch out further than dad wanted her to. I remember getting into arguments and just yelling, never physical.\"    Patient has attempted to resolve these concerns in the past through medication and one session with a transition therapist .    Reason for Referral:  \"I've been on Prozac for several years but when I went to primary care last year they told me it was helping smooth out the bouts of depression.  They also said I might want to check in with somebody.  There are other issues aside from depression.  Not feeling worthy.  I don't manage my finances well and I will lie to others about how it is really going. Then they find out the truth and it starts the 'black spiral' as I call it.  I think it started in college and getting credit cards.  I didn't manage it well.  I think I've had financial " "issues since the 80's.  I felt I wasn't really worthy of the family I had.  Now it's more about financial.\"    Goals:  \"I think there are a few different things.  Whether we call it coping mechanisms, what drives some of my behavior that causes me to not be truthful with where I'm at, or what drives my behavior when I think about diet.  What drives these behaviors, or lack of behaviors, knowing how my dad .  I was doing really well before COVID.  I was 195-200, why am I 240lbs now and why can't change that behavior.\"    24:  Cl discussed resolving an argument with his wife \"in a different way.  I allowed her to finish telling me what made her upset.  I told her why I was upset.  Within 5 minutes we had it resolved.\"    Social/Family History:  Patient reported they grew up in [place], [place].  They were raised by biological parents.  Parents were always together.  Patient reported that their childhood was \"incredibly enjoyable.\"  Patient described their current relationships with family of origin as (youngest with 2 brothers and a sister): \"My sister and I do not talk at all (one of my nephews committed suicide several years ago - the son of one of my very strict brothers).  I was actually the one sibling that remained close to her until my father  (father d.2005; mother d.2018).  The three brothers, we get along marvelously.\"    The patient describes their cultural background as .  Cultural influences and impact on patient's life structure, values, norms, and healthcare: Na.  Contextual influences on patient's health include: none reported.    These factors will be addressed in the Preliminary Treatment plan. Patient identified their preferred language to be English. Patient reported they does not need the assistance of an  or other support involved in therapy.     Patient reported had no significant delays in developmental tasks.  Patient's highest education level was graduate " "school (Master of Arts and [employer]).  Patient identified the following learning problems: none reported.  Modifications will not be used to assist communication in therapy.  Patient reports they are  able to understand written materials.    Patient reported the following relationship history of \"1\" committed and significant relationships.  Patient's current relationship status is  for \"35 years\".   Patient identified their sexual orientation as heterosexual.  Patient reported having 2 child(delphine). Patient identified partner as part of their support system.  Patient identified the quality of these relationships as inconsistent.      Patient's current living/housing situation involves staying in own home/apartment.  The immediate members of family and household include John Mayes,Spouse, \"As of 3 weeks ago, my son branched out on his own\" and they report that housing is stable.    Patient is currently employed fulltime.  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  I used to work at [employer].\"  Patient reports their finances are obtained through employment. Patient does identify finances as a current stressor.      Patient reported that they have not been involved with the legal system.  Patient does not report being under probation/ parole/ jurisdiction. They are not under any current court jurisdiction. .    Patient's Strengths and Limitations:  Patient identified the following strengths or resources that will help them succeed in treatment: Mu-ism / Voodoo, family support, insight, and motivation. Things that may interfere with the patient's success in treatment include:  \"Not really being able to open up to my wife.\"      Assessments:  The following assessments were completed by patient for this visit:   None.    Personal and Family Medical History:  Patient does report a family history of mental health concerns.  Patient reports family " "history includes Arrhythmia in his mother; Coronary Artery Disease in his father; Diabetes in his father; Mental Illness in his mother; Obesity in his father and mother..     Patient does report Mental Health Diagnosis and/or Treatment.  Patient Patient reported the following previous diagnoses which include(s): Depression.  Patient reported symptoms began : (MDD, Rec, IPR): \"When my relationship with my daughter started to sour.  I started taking Prozac and, at some point, I would stop taking it.  There were times I would go into that 'death spiral,' or spiraling down.  I would go back on meds and be fine.  The worst time was our anniversary (34th) on May 21, 2022.  We were mad at each other the entire morning because of how shopping went.  She was in pain (feet hurt).  We got home, she got out of the truck, and I simply left.  I turned my phone off and just wanted to disappear.  They called my daughter to let her know something was wrong.  They called the police and they were watching for me.  I was parked at a truck stop the whole time.  I came home and it was 9:00/10:00pm.  Within a few minutes the police were here checking up on me.  That caused our relationship the most harm.\"  After this incident, the client stated \"I didn't want to be in the relationship anymore.\"  Client acknowledges getting close to a co-worker during this time.  \"This co-worker and I went on a no-work trip together to see a [employer] concert.  There was nothing ever physical.\"   Patient has received mental health services in the past:  medication .  Psychiatric Hospitalizations: None.  Patient denies a history of civil commitment.  Patient is receiving other mental health services.  These include  medication .         Provider Name/ Credentials:  Tim Aparicio PsyD, JUSTIN  February 26, 2024        "

## 2024-03-10 DIAGNOSIS — I25.10 CORONARY ARTERIOSCLEROSIS IN NATIVE ARTERY: ICD-10-CM

## 2024-03-11 ENCOUNTER — VIRTUAL VISIT (OUTPATIENT)
Dept: PSYCHOLOGY | Facility: CLINIC | Age: 59
End: 2024-03-11
Payer: COMMERCIAL

## 2024-03-11 DIAGNOSIS — F33.41 RECURRENT MAJOR DEPRESSION IN PARTIAL REMISSION (H): Primary | ICD-10-CM

## 2024-03-11 PROCEDURE — 90791 PSYCH DIAGNOSTIC EVALUATION: CPT | Mod: 95 | Performed by: PSYCHOLOGIST

## 2024-03-11 RX ORDER — ASPIRIN 81 MG/1
81 TABLET, COATED ORAL DAILY
Qty: 90 TABLET | Refills: 1 | Status: SHIPPED | OUTPATIENT
Start: 2024-03-11 | End: 2024-09-09

## 2024-03-11 NOTE — PROGRESS NOTES
[facility]    Provider Name:  Tim Aparicio PsyD     Credentials:  [facility] ADULT Mental Health DIAGNOSTIC ASSESSMENT      Patient Name:  Thomas Gonzales  Date: 3/11/24  PREFERRED NAME: Lou  PRONOUNS:       MRN: [id number]  : 1965  ADDRESS: [address] [place] [address]  ACCT. NUMBER:  [id number]  PREFERRED PHONE: [phone]  May we leave a program related message: Yes  EMERGENCY CONTACT: was confirmed: Darlyn - spouse.         Service Type: Individual      Session Start Time: 9:00am Session End Time: 9:52am     Session Length: 52 minutes    Session #: 3    Attendees: Client attended alone    Service Modality:  Video Visit:      Provider verified identity through the following two step process.  Patient provided:  Patient photo    Telemedicine Visit: The patient's condition can be safely assessed and treated via synchronous audio and visual telemedicine encounter.      Reason for Telemedicine Visit: Services only offered telehealth    Originating Site (Patient Location): Patient's home    Distant Site (Provider Location): Provider Remote Setting- Home Office    Consent:  The patient/guardian has verbally consented to: the potential risks and benefits of telemedicine (video visit) versus in person care; bill my insurance or make self-payment for services provided; and responsibility for payment of non-covered services.     Patient would like the video invitation sent by:  Send to e-mail at: [email or web address]    Mode of Communication:  Video Conference via Amwell    Distant Location (Provider):  Off-site    As the provider I attest to compliance with applicable laws and regulations related to telemedicine.    DATA  Interactive Complexity: No  Crisis: No      Identifying Information:  Patient is a 58 year old,  individual.  Patient was referred for an assessment by primary care clinic.  Patient attended the session alone.    Chief Complaint:   The reason for  "seeking services at this time is: \"Deppression\".  The problem(s) began: Financial issues in the 's.  Depression, \"Sometime between , when my daughter was pre-teen to teenage years.  That was related to father-daughter and her wanting to branch out further than dad wanted her to. I remember getting into arguments and just yelling, never physical.\"    Patient has attempted to resolve these concerns in the past through medication and one session with a transition therapist.    Reason for Referral:  \"I've been on Prozac for several years but when I went to primary care last year they told me it was helping smooth out the bouts of depression.  They also said I might want to check in with somebody.  There are other issues aside from depression.  Not feeling worthy.  I don't manage my finances well and I will lie to others about how it is really going. Then they find out the truth and it starts the 'black spiral' as I call it.  I think it started in college and getting credit cards.  I didn't manage it well.  I think I've had financial issues since the 's.  I felt I wasn't really worthy of the family I had.  Now it's more about financial.\"    Goals:  \"I think there are a few different things.  Whether we call it coping mechanisms, what drives some of my behavior that causes me to not be truthful with where I'm at, or what drives my behavior when I think about diet.  What drives these behaviors, or lack of behaviors, knowing how my dad .  I was doing really well before COVID.  I was 195-200, why am I 240lbs now and why can't change that behavior.\"    24:  Cl discussed resolving an argument with his wife \"in a different way.  I allowed her to finish telling me what made her upset.  I told her why I was upset.  Within 5 minutes we had it resolved.\"    Social/Family History:  Patient reported they grew up in [place], [place].  They were raised by biological parents.  Parents were always together.  " "Patient reported that their childhood was \"incredibly enjoyable.\"  Patient described their current relationships with family of origin as (youngest with 2 brothers and a sister): \"My sister and I do not talk at all (one of my nephews committed suicide several years ago - the son of one of my very strict brothers).  I was actually the one sibling that remained close to her until my father  (father d.2005; mother d.2018).  The three brothers, we get along marvelously.\"    The patient describes their cultural background as .  Cultural influences and impact on patient's life structure, values, norms, and healthcare: Na.  Contextual influences on patient's health include: none reported.    These factors will be addressed in the Preliminary Treatment plan. Patient identified their preferred language to be English. Patient reported they does not need the assistance of an  or other support involved in therapy.     Patient reported had no significant delays in developmental tasks.  Patient's highest education level was graduate school (Master of Arts and [employer]).  Patient identified the following learning problems: none reported.  Modifications will not be used to assist communication in therapy.  Patient reports they are  able to understand written materials.    Patient reported the following relationship history of \"1\" committed and significant relationships.  Patient's current relationship status is  for \"35 years\".   Patient identified their sexual orientation as heterosexual.  Patient reported having 2 child(delphine). Patient identified partner as part of their support system.  Patient identified the quality of these relationships as inconsistent.      Patient's current living/housing situation involves staying in own home/apartment.  The immediate members of family and household include John Mayes,Spouse, \"As of 3 weeks ago, my son branched out on his own\" and they report that housing is " "stable.    Patient is currently employed fulltime.  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  I used to work at [employer].\"  Patient reports their finances are obtained through employment. Patient does identify finances as a current stressor.      Patient reported that they have not been involved with the legal system.  Patient does not report being under probation/ parole/ jurisdiction. They are not under any current court jurisdiction. .    Patient's Strengths and Limitations:  Patient identified the following strengths or resources that will help them succeed in treatment: Gnosticism / Adventist, family support, insight, and motivation. Things that may interfere with the patient's success in treatment include: \"Not really being able to open up to my wife.\"     Assessments:  The following assessments were completed by patient for this visit:   None.    Personal and Family Medical History:  Patient does report a family history of mental health concerns.  Patient reports family history includes Arrhythmia in his mother; Coronary Artery Disease in his father; Diabetes in his father; Mental Illness in his mother; Obesity in his father and mother..     Patient does report Mental Health Diagnosis and/or Treatment.  Patient Patient reported the following previous diagnoses which include(s): Depression.  Patient reported symptoms began : (MDD, Rec, IPR): \"When my relationship with my daughter started to sour.  I started taking Prozac and, at some point, I would stop taking it.  There were times I would go into that 'death spiral,' or spiraling down.  I would go back on meds and be fine.  The worst time was our anniversary (34th) on May 21, 2022.  We were mad at each other the entire morning because of how shopping went.  She was in pain (feet hurt).  We got home, she got out of the truck, and I simply left.  I turned my phone off and just wanted to " "disappear.  They called my daughter to let her know something was wrong.  They called the police and they were watching for me.  I was parked at a truck stop the whole time.  I came home and it was 9:00/10:00pm.  Within a few minutes the police were here checking up on me.  That caused our relationship the most harm.\"  After this incident, the client stated \"I didn't want to be in the relationship anymore.\"  Client acknowledges getting close to a co-worker during this time.  \"This co-worker and I went on a no-work trip together to see a [employer] concert.  There was nothing ever physical.\"   Patient has received mental health services in the past: medication.  Psychiatric Hospitalizations: None.  Patient denies a history of civil commitment.  Patient is receiving other mental health services.  These include medication.       Patient has had a physical exam to rule out medical causes for current symptoms.  Date of last physical exam was within the past year. Client was encouraged to follow up with PCP if symptoms were to develop. The patient has a [facility] Provider, who is named Anni Sage..  Patient reports the following current medical concerns: \"The same concern I've had over the last years now, the gut issues from the surgery, and how that impacts me.\"  Patient denies any issues with pain.   There are not significant appetite / nutritional concerns / weight changes.   Patient does not report a history of head injury / trauma / cognitive impairment.    Patient reports current meds as:   No outpatient medications have been marked as taking for the 3/11/24 encounter (Appointment) with Tim Aparicio PsyD.       Medication Adherence:  Patient reports taking.    Patient Allergies:    Allergies   Allergen Reactions    Shellfish-Derived Products Anaphylaxis    Adhesive Tape      Paper tape is okay  Tegarderm is okay    Gluten Meal      Celiac    Reglan [Metoclopramide] Hives       Medical " History:    Past Medical History:   Diagnosis Date    CAD (coronary artery disease)     S/p stenting of left circumflex    Cancer (H)     melanoma    Cervical spondylosis with myelopathy 9/15/2023    Depressive disorder 2006    Diabetes mellitus (H)     Multiple sclerosis (H)     Peripheral neuropathy          Current Mental Status Exam:   Appearance:  Appropriate    Eye Contact:  Good   Psychomotor:  Normal       Gait / station:  Unable to observe via video session.  Attitude / Demeanor: Cooperative  Friendly Pleasant  Speech      Rate / Production: Talkative      Volume:  Normal  volume      Language:  intact  Mood:   Normal  Affect:   Appropriate    Thought Content: Clear   Thought Process: Coherent       Associations: No loosening of associations  Insight:   Good   Judgment:  Intact   Orientation:  All  Attention/concentration: Good    Substance Use:   Patient did not report a family history of substance use concerns; see medical history section for details.  Patient has not received chemical dependency treatment in the past.  Patient has not ever been to detox.      Patient is not currently receiving any chemical dependency treatment.           Substance History of use Age of first use Date of last use     Pattern and duration of use (include amounts and frequency)   Alcohol currently use   19 11/11/23 REPORTS SUBSTANCE USE: reports using substance 1-2 times per month and has 1 mixed drink at a time.   Patient reports heaviest use is current use.   Cannabis   never used     REPORTS SUBSTANCE USE: N/A     Amphetamines   never used     REPORTS SUBSTANCE USE: N/A   Cocaine/crack    never used       REPORTS SUBSTANCE USE: N/A   Hallucinogens never used         REPORTS SUBSTANCE USE: N/A   Inhalants never used         REPORTS SUBSTANCE USE: N/A   Heroin never used         REPORTS SUBSTANCE USE: N/A   Other Opiates never used     REPORTS SUBSTANCE USE: N/A   Benzodiazepine   never used     REPORTS SUBSTANCE USE: N/A  "  Barbiturates never used     REPORTS SUBSTANCE USE: N/A   Over the counter meds never used     REPORTS SUBSTANCE USE: N/A   Caffeine currently use ?   REPORTS SUBSTANCE USE: reports using substance 6 times per day and has 1 tea and energy drink at a time.   Patient reports heaviest use is current use.   Nicotine  never used     REPORTS SUBSTANCE USE: N/A   Other substances not listed above:  Identify:  never used     REPORTS SUBSTANCE USE: N/A     Patient reported the following problems as a result of their substance use: no problems, not applicable.    Substance Use: No symptoms    Based on the negative CAGE score and clinical interview there  are not indications of drug or alcohol abuse.    Significant Losses / Trauma / Abuse / Neglect Issues:   Patient did not serve in the .  There are indications or report of significant loss, trauma, abuse or neglect issues related to: death of \"I remember, most recently, when my mother passed away ().  It was really unexpected.  She went in for a really easy surgery on her throat to help her swallow and she  a few days later.  Certainly when my dad  ().  And going back further than that, when my dad's mother  ().\"  Concerns for possible neglect are not present.    Safety Assessment:   Patient denies current homicidal ideation and behaviors.  Patient denies current self-injurious ideation and behaviors.    Patient denied risk behaviors associated with substance use.   Patient denies any high risk behaviors associated with mental health symptoms.  Patient reports the following current concerns for their personal safety: None.  Patient reports there are not firearms in the house.    History of Safety Concerns:  Patient denied a history of homicidal ideation.     Patient denied a history of personal safety concerns.    Patient denied a history of assaultive behaviors.    Patient denied a history of sexual assault behaviors.     Patient denied a " "history of risk behaviors associated with substance use.  Patient denies any history of high risk behaviors associated with mental health symptoms.  Patient reports the following protective factors: forward or future oriented thinking; dedication to family or friends; living with other people; daily obligations    Risk Plan:  See Recommendations for Safety and Risk Management Plan    Review of Symptoms per patient report:   Depression: Change in sleep, Change in energy level, and Change in appetite  Ying:  No Symptoms  Psychosis: No Symptoms  Anxiety: Excessive worry  Panic:  No symptoms  Post Traumatic Stress Disorder:  No Symptoms   Eating Disorder: No Symptoms  ADD / ADHD:  No symptoms  Conduct Disorder: No symptoms  Autism Spectrum Disorder: No symptoms  Obsessive Compulsive Disorder: No Symptoms    Patient reports the following compulsive behaviors and treatment history:  \"Not that I'm aware of.\"       Diagnostic Criteria:   Major Depressive Disorder  CRITERIA (A-C) REPRESENT A MAJOR DEPRESSIVE EPISODE - SELECT THESE CRITERIA  A) Recurrent episode(s) - symptoms have been present during the same 2-week period and represent a change from previous functioning 5 or more symptoms (required for diagnosis)   - Decreased sleep.    - Psychomotor activity retardation.   B) The symptoms cause clinically significant distress or impairment in social, occupational, or other important areas of functioning  C) The episode is not attributable to the physiological effects of a substance or to another medical condition  D) The occurence of major depressive episode is not better explained by other thought / psychotic disorders  E) There has never been a manic episode or hypomanic episode    Functional Status:  Patient reports the following functional impairments:  relationship(s) and (\"Relationships with myself and with my wife (and other family members).\"  Nonprogrammatic care:  Patient is requesting basic services to address " current mental health concerns.    Clinical Summary:  1. Psychosocial, Cultural and Contextual Factors: Client works remotely and lives with his wife.  2. Principal DSM5 Diagnoses  (Sustained by DSM5 Criteria Listed Above):   296.35 (F33.41)  Major Depressive Disorder, Recurrent Episode, In partial remission With melancholic features.  3. Other Diagnoses that is relevant to services:   None at this time.  4. Provisional Diagnosis:  None at this time.  5. Prognosis: Return to Baseline Functioning.  6. Likely consequences of symptoms if not treated: Client's conflicts with his wife may escalate.  7. Client strengths include:  caring, empathetic, employed, goal-focused, good listener, insightful, and motivated .     Recommendations:     1. Plan for Safety and Risk Management:   Safety and Risk: Recommended that patient call 911 or go to the local ED should there be a change in any of these risk factors..          Report to child / adult protection services was NA.     2. Patient's identified no cultural infuences to address at this time.     3. Initial Treatment will focus on:    Relational Problems related to: Conflict or difficulties with partner/spouse.     4. Resources/Service Plan:    services are not indicated.   Modifications to assist communication are not indicated.   Additional disability accommodations are not indicated.      5. Collaboration:   Collaboration / coordination of treatment will be initiated with the following  support professionals: None at this time.      6.  Referrals:   The following referral(s) will be initiated: None at this time.       A Release of Information has been obtained for the following: None at this time.     Clinical Substantiation/medical necessity for the above recommendations:  N/A.    7. LANDON:    LANDON:  Discussed the general effects of drugs and alcohol on health and well-being.  Recommendations:  No issues with substance use.  Continue with individual assessment.      8. Records:   These were reviewed at time of assessment.   Information in this assessment was obtained from the medical record and  provided by patient who is a good historian.      Patient will have open access to their mental health medical record.    9.   Interactive Complexity: No    10. Safety Plan:  Patient denied any current/recent/lifetime history of suicidal ideation and/or behaviors.  No safety plan indicated at this time.     Provider Name/ Credentials:  Tim Aparicio PsyD, JUSTIN  March 11, 2024

## 2024-04-01 ENCOUNTER — VIRTUAL VISIT (OUTPATIENT)
Dept: PSYCHOLOGY | Facility: CLINIC | Age: 59
End: 2024-04-01
Payer: COMMERCIAL

## 2024-04-01 DIAGNOSIS — F33.41 RECURRENT MAJOR DEPRESSION IN PARTIAL REMISSION (H): Primary | ICD-10-CM

## 2024-04-03 NOTE — PROGRESS NOTES
As this provider logged into the session with the client, this provider's Epic program closed.  Efforts to reestablish connection via Epic failed multiple times.  Intake was called to get client's phone number.  A connection was made via phone at approximately 3:00pm.  The client was offered to hold a phone session or to reschedule.  He opted to reschedule for a time after his upcoming vacation.  He added that he did meet his goals since last session.  This writer will contact the client to get a session rescheduled.      Tim Aparicio PsyD, LP  April 3, 2024

## 2024-05-21 DIAGNOSIS — N18.31 CHRONIC KIDNEY DISEASE, STAGE 3A (H): ICD-10-CM

## 2024-05-22 RX ORDER — LISINOPRIL 2.5 MG/1
2.5 TABLET ORAL DAILY
Qty: 90 TABLET | Refills: 0 | Status: SHIPPED | OUTPATIENT
Start: 2024-05-22 | End: 2024-08-20

## 2024-05-31 ENCOUNTER — LAB (OUTPATIENT)
Dept: LAB | Facility: CLINIC | Age: 59
End: 2024-05-31
Payer: COMMERCIAL

## 2024-05-31 DIAGNOSIS — N18.31 CHRONIC KIDNEY DISEASE, STAGE 3A (H): ICD-10-CM

## 2024-05-31 PROCEDURE — 36415 COLL VENOUS BLD VENIPUNCTURE: CPT

## 2024-05-31 PROCEDURE — 80048 BASIC METABOLIC PNL TOTAL CA: CPT

## 2024-06-01 LAB
ANION GAP SERPL CALCULATED.3IONS-SCNC: 10 MMOL/L (ref 7–15)
BUN SERPL-MCNC: 45.3 MG/DL (ref 6–20)
CALCIUM SERPL-MCNC: 8.9 MG/DL (ref 8.6–10)
CHLORIDE SERPL-SCNC: 112 MMOL/L (ref 98–107)
CREAT SERPL-MCNC: 1.55 MG/DL (ref 0.67–1.17)
DEPRECATED HCO3 PLAS-SCNC: 19 MMOL/L (ref 22–29)
EGFRCR SERPLBLD CKD-EPI 2021: 52 ML/MIN/1.73M2
GLUCOSE SERPL-MCNC: 127 MG/DL (ref 70–99)
POTASSIUM SERPL-SCNC: 5.1 MMOL/L (ref 3.4–5.3)
SODIUM SERPL-SCNC: 141 MMOL/L (ref 135–145)

## 2024-06-02 ENCOUNTER — HEALTH MAINTENANCE LETTER (OUTPATIENT)
Age: 59
End: 2024-06-02

## 2024-06-07 ENCOUNTER — OFFICE VISIT (OUTPATIENT)
Dept: PEDIATRICS | Facility: CLINIC | Age: 59
End: 2024-06-07
Payer: COMMERCIAL

## 2024-06-07 VITALS
DIASTOLIC BLOOD PRESSURE: 73 MMHG | RESPIRATION RATE: 20 BRPM | TEMPERATURE: 97.2 F | HEART RATE: 69 BPM | BODY MASS INDEX: 32.87 KG/M2 | SYSTOLIC BLOOD PRESSURE: 130 MMHG | HEIGHT: 72 IN | WEIGHT: 242.7 LBS | OXYGEN SATURATION: 100 %

## 2024-06-07 DIAGNOSIS — N18.31 CHRONIC KIDNEY DISEASE, STAGE 3A (H): ICD-10-CM

## 2024-06-07 DIAGNOSIS — Z12.5 SCREENING FOR PROSTATE CANCER: ICD-10-CM

## 2024-06-07 DIAGNOSIS — I25.10 CORONARY ARTERIOSCLEROSIS IN NATIVE ARTERY: ICD-10-CM

## 2024-06-07 DIAGNOSIS — E11.42 DIABETIC POLYNEUROPATHY ASSOCIATED WITH TYPE 2 DIABETES MELLITUS (H): Primary | ICD-10-CM

## 2024-06-07 DIAGNOSIS — R19.7 DIARRHEA, UNSPECIFIED TYPE: ICD-10-CM

## 2024-06-07 DIAGNOSIS — F33.0 MILD EPISODE OF RECURRENT MAJOR DEPRESSIVE DISORDER (H): ICD-10-CM

## 2024-06-07 DIAGNOSIS — E55.9 HYPOVITAMINOSIS D: ICD-10-CM

## 2024-06-07 DIAGNOSIS — R35.1 NOCTURIA: ICD-10-CM

## 2024-06-07 DIAGNOSIS — E78.2 MULTIPLE-TYPE HYPERLIPIDEMIA: ICD-10-CM

## 2024-06-07 LAB
ALBUMIN SERPL BCG-MCNC: 4 G/DL (ref 3.5–5.2)
ALP SERPL-CCNC: 114 U/L (ref 40–150)
ALT SERPL W P-5'-P-CCNC: 89 U/L (ref 0–70)
ANION GAP SERPL CALCULATED.3IONS-SCNC: 7 MMOL/L (ref 7–15)
AST SERPL W P-5'-P-CCNC: 61 U/L (ref 0–45)
BILIRUB SERPL-MCNC: 0.4 MG/DL
BUN SERPL-MCNC: 40.3 MG/DL (ref 6–20)
CALCIUM SERPL-MCNC: 9 MG/DL (ref 8.6–10)
CHLORIDE SERPL-SCNC: 112 MMOL/L (ref 98–107)
CHOLEST SERPL-MCNC: 102 MG/DL
CREAT SERPL-MCNC: 1.54 MG/DL (ref 0.67–1.17)
CREAT UR-MCNC: 88.6 MG/DL
DEPRECATED HCO3 PLAS-SCNC: 20 MMOL/L (ref 22–29)
EGFRCR SERPLBLD CKD-EPI 2021: 52 ML/MIN/1.73M2
FASTING STATUS PATIENT QL REPORTED: YES
FASTING STATUS PATIENT QL REPORTED: YES
GLUCOSE SERPL-MCNC: 145 MG/DL (ref 70–99)
HBA1C MFR BLD: 6.3 % (ref 0–5.6)
HDLC SERPL-MCNC: 40 MG/DL
LDLC SERPL CALC-MCNC: 51 MG/DL
MICROALBUMIN UR-MCNC: 62.7 MG/L
MICROALBUMIN/CREAT UR: 70.77 MG/G CR (ref 0–17)
NONHDLC SERPL-MCNC: 62 MG/DL
POTASSIUM SERPL-SCNC: 5.5 MMOL/L (ref 3.4–5.3)
PROT SERPL-MCNC: 6.7 G/DL (ref 6.4–8.3)
PSA SERPL DL<=0.01 NG/ML-MCNC: 0.77 NG/ML (ref 0–3.5)
SODIUM SERPL-SCNC: 139 MMOL/L (ref 135–145)
TRIGL SERPL-MCNC: 54 MG/DL
VIT D+METAB SERPL-MCNC: 19 NG/ML (ref 20–50)

## 2024-06-07 PROCEDURE — 82570 ASSAY OF URINE CREATININE: CPT | Performed by: PHYSICIAN ASSISTANT

## 2024-06-07 PROCEDURE — 36415 COLL VENOUS BLD VENIPUNCTURE: CPT | Performed by: PHYSICIAN ASSISTANT

## 2024-06-07 PROCEDURE — 83036 HEMOGLOBIN GLYCOSYLATED A1C: CPT | Performed by: PHYSICIAN ASSISTANT

## 2024-06-07 PROCEDURE — 82043 UR ALBUMIN QUANTITATIVE: CPT | Performed by: PHYSICIAN ASSISTANT

## 2024-06-07 PROCEDURE — 80061 LIPID PANEL: CPT | Performed by: PHYSICIAN ASSISTANT

## 2024-06-07 PROCEDURE — 80053 COMPREHEN METABOLIC PANEL: CPT | Performed by: PHYSICIAN ASSISTANT

## 2024-06-07 PROCEDURE — 99214 OFFICE O/P EST MOD 30 MIN: CPT | Performed by: PHYSICIAN ASSISTANT

## 2024-06-07 PROCEDURE — 82306 VITAMIN D 25 HYDROXY: CPT | Performed by: PHYSICIAN ASSISTANT

## 2024-06-07 PROCEDURE — G0103 PSA SCREENING: HCPCS | Performed by: PHYSICIAN ASSISTANT

## 2024-06-07 ASSESSMENT — PAIN SCALES - GENERAL: PAINLEVEL: NO PAIN (0)

## 2024-06-07 NOTE — PROGRESS NOTES
Assessment & Plan     Diabetic polyneuropathy associated with type 2 diabetes mellitus (H)  recheck  - Hemoglobin A1c; Future  - Hemoglobin A1c    Chronic kidney disease, stage 3a (H)  recheck  - Comprehensive metabolic panel (BMP + Alb, Alk Phos, ALT, AST, Total. Bili, TP); Future  - Albumin Random Urine Quantitative with Creat Ratio; Future  - Comprehensive metabolic panel (BMP + Alb, Alk Phos, ALT, AST, Total. Bili, TP)  - Albumin Random Urine Quantitative with Creat Ratio    Coronary arteriosclerosis in native artery  Recheck. LDL <70 goal  - Lipid panel reflex to direct LDL Fasting; Future  - Lipid panel reflex to direct LDL Fasting    Multiple-type hyperlipidemia  recheck  - Lipid panel reflex to direct LDL Fasting; Future  - Lipid panel reflex to direct LDL Fasting    Screening for prostate cancer  recheck  - PSA, screen; Future  - PSA, screen    Hypovitaminosis D  recheck  - Vitamin D Deficiency; Future  - Vitamin D Deficiency    Diarrhea, unspecified type  - Adult GI  Referral - Consult Only; Future    Mild episode of recurrent major depressive disorder (H24)  Stable.  - FLUoxetine (PROZAC) 20 MG capsule; Take 1 capsule (20 mg) by mouth daily With 10mg for a daily total of 30mg.    Nocturia  Referral to urology.    Bonnie Moseley is a 58 year old, presenting for the following health issues:  Diabetes and Asthma      History of Present Illness     Asthma:  He presents for follow up of asthma.  He has some cough, some wheezing, and some shortness of breath. He is not using a relief medication.  He does not have a controller medication. Patient is aware of the following triggers: unaware of any triggers. The patient has not had a visit to the Emergency Room, Urgent Care or Hospital due to asthma since the last clinic visit.     CKD: He uses over the counter pain medication, including Tylenol, a few times a month.    Diabetes:   He presents for follow up of diabetes.    He is not checking blood  glucose.         He has no concerns regarding his diabetes at this time.  He is having numbness in feet.            Reason for visit:  Follow up    He eats 2-3 servings of fruits and vegetables daily.He consumes 0 sweetened beverage(s) daily.He exercises with enough effort to increase his heart rate 10 to 19 minutes per day.  He exercises with enough effort to increase his heart rate 4 days per week.   He is taking medications regularly.     Nocturia-frequently. No history of BPH  Prediabetes--continue to monitor.   Hyperlipidemia--on statin. Recheck today  Hypovitaminosis D--historically low  CKD --recheck. Patient on lisinopril  Diarrhea  CAD--stable  MDD--well controlled with fluoxetine  Elevated LFTs with hepatic steatosis.    Review of Systems  Constitutional, HEENT, cardiovascular, pulmonary, gi and gu systems are negative, except as otherwise noted.      Objective    /73 (BP Location: Right arm, Patient Position: Sitting, Cuff Size: Adult Regular)   Pulse 69   Temp 97.2  F (36.2  C) (Tympanic)   Resp 20   Ht 1.829 m (6')   Wt 110.1 kg (242 lb 11.2 oz)   SpO2 100%   BMI 32.92 kg/m    Body mass index is 32.92 kg/m .  Physical Exam   GENERAL: alert and no distress  EYES: Eyes grossly normal to inspection, PERRL and conjunctivae and sclerae normal  HENT: ear canals and TM's normal, nose and mouth without ulcers or lesions  NECK: no adenopathy  RESP: lungs clear to auscultation - no rales, rhonchi or wheezes  CV: regular rate and rhythm, normal S1 S2, no S3 or S4    Results for orders placed or performed in visit on 06/07/24   Comprehensive metabolic panel (BMP + Alb, Alk Phos, ALT, AST, Total. Bili, TP)     Status: Abnormal   Result Value Ref Range    Sodium 139 135 - 145 mmol/L    Potassium 5.5 (H) 3.4 - 5.3 mmol/L    Carbon Dioxide (CO2) 20 (L) 22 - 29 mmol/L    Anion Gap 7 7 - 15 mmol/L    Urea Nitrogen 40.3 (H) 6.0 - 20.0 mg/dL    Creatinine 1.54 (H) 0.67 - 1.17 mg/dL    GFR Estimate 52 (L) >60  mL/min/1.73m2    Calcium 9.0 8.6 - 10.0 mg/dL    Chloride 112 (H) 98 - 107 mmol/L    Glucose 145 (H) 70 - 99 mg/dL    Alkaline Phosphatase 114 40 - 150 U/L    AST 61 (H) 0 - 45 U/L    ALT 89 (H) 0 - 70 U/L    Protein Total 6.7 6.4 - 8.3 g/dL    Albumin 4.0 3.5 - 5.2 g/dL    Bilirubin Total 0.4 <=1.2 mg/dL    Patient Fasting > 8hrs? Yes    Lipid panel reflex to direct LDL Fasting     Status: None   Result Value Ref Range    Cholesterol 102 <200 mg/dL    Triglycerides 54 <150 mg/dL    Direct Measure HDL 40 >=40 mg/dL    LDL Cholesterol Calculated 51 <=100 mg/dL    Non HDL Cholesterol 62 <130 mg/dL    Patient Fasting > 8hrs? Yes     Narrative    Cholesterol  Desirable:  <200 mg/dL    Triglycerides  Normal:  Less than 150 mg/dL  Borderline High:  150-199 mg/dL  High:  200-499 mg/dL  Very High:  Greater than or equal to 500 mg/dL    Direct Measure HDL  Female:  Greater than or equal to 50 mg/dL   Male:  Greater than or equal to 40 mg/dL    LDL Cholesterol  Desirable:  <100mg/dL  Above Desirable:  100-129 mg/dL   Borderline High:  130-159 mg/dL   High:  160-189 mg/dL   Very High:  >= 190 mg/dL    Non HDL Cholesterol  Desirable:  130 mg/dL  Above Desirable:  130-159 mg/dL  Borderline High:  160-189 mg/dL  High:  190-219 mg/dL  Very High:  Greater than or equal to 220 mg/dL   PSA, screen     Status: Normal   Result Value Ref Range    Prostate Specific Antigen Screen 0.77 0.00 - 3.50 ng/mL    Narrative    This result is obtained using the Roche Elecsys total PSA method on the stephanie e801 immunoassay analyzer. Results obtained with different assay methods or kits cannot be used interchangeably.   Hemoglobin A1c     Status: Abnormal   Result Value Ref Range    Hemoglobin A1C 6.3 (H) 0.0 - 5.6 %   Vitamin D Deficiency     Status: Abnormal   Result Value Ref Range    Vitamin D, Total (25-Hydroxy) 19 (L) 20 - 50 ng/mL    Narrative    Season, race, dietary intake, and treatment affect the concentration of 25-hydroxy-Vitamin D.  Values may decrease during winter months and increase during summer months.    Vitamin D determination is routinely performed by an immunoassay specific for 25 hydroxyvitamin D3.  If an individual is on vitamin D2(ergocalciferol) supplementation, please specify 25 OH vitamin D2 and D3 level determination by LCMSMS test VITD23.     Albumin Random Urine Quantitative with Creat Ratio     Status: Abnormal   Result Value Ref Range    Creatinine Urine mg/dL 88.6 mg/dL    Albumin Urine mg/L 62.7 mg/L    Albumin Urine mg/g Cr 70.77 (H) 0.00 - 17.00 mg/g Cr           Signed Electronically by: Jay López PA-C

## 2024-06-09 DIAGNOSIS — I25.10 CORONARY ARTERIOSCLEROSIS IN NATIVE ARTERY: ICD-10-CM

## 2024-06-10 RX ORDER — ATORVASTATIN CALCIUM 40 MG/1
40 TABLET, FILM COATED ORAL DAILY
Qty: 90 TABLET | Refills: 3 | Status: SHIPPED | OUTPATIENT
Start: 2024-06-10

## 2024-06-19 ENCOUNTER — PATIENT OUTREACH (OUTPATIENT)
Dept: CARE COORDINATION | Facility: CLINIC | Age: 59
End: 2024-06-19
Payer: COMMERCIAL

## 2024-06-25 ENCOUNTER — ALLIED HEALTH/NURSE VISIT (OUTPATIENT)
Dept: FAMILY MEDICINE | Facility: CLINIC | Age: 59
End: 2024-06-25
Payer: COMMERCIAL

## 2024-06-25 VITALS — TEMPERATURE: 98 F

## 2024-06-25 DIAGNOSIS — Z23 NEED FOR SHINGLES VACCINE: Primary | ICD-10-CM

## 2024-06-25 PROCEDURE — 99207 PR NO CHARGE NURSE ONLY: CPT

## 2024-06-25 PROCEDURE — 90471 IMMUNIZATION ADMIN: CPT | Performed by: PHYSICIAN ASSISTANT

## 2024-06-25 PROCEDURE — 90750 HZV VACC RECOMBINANT IM: CPT | Performed by: PHYSICIAN ASSISTANT

## 2024-06-25 NOTE — PROGRESS NOTES
Prior to immunization administration, verified patients identity using patient s name and date of birth. Please see Immunization Activity for additional information.     Is the patient's temperature normal (100.5 or less)? Yes     Patient MEETS CRITERIA. PROCEED with vaccine administration.          6/25/2024   Zoster   Have you had a serious reaction to the shingles vaccine or something in the shingles vaccine? No   Do you have shingles now? No   Are you getting treatment for cancer, organ transplant, or bone marrow transplant? No   Do you have an autoimmune or inflammatory condition? !YES   Is the patient immunocompromised and wanting to complete the Shingles vaccine series in less than 2 months? No   Have you had Guillain-Dayton syndrome within 6 weeks of getting a vaccine? No            DO NOT VACCINATE. Patient is NOT eligible for vaccination. Discuss with provider at upcoming appointment if they have questions.     RN discussed with Tamika Briggs PA-C and received verbal order to proceed.      Patient instructed to remain in clinic for 15 minutes afterwards, and to report any adverse reactions.      Link to Ancillary Visit Immunization Standing Orders SmartSet     Screening performed by Benita Guzmán RN on 6/25/2024 at 4:09 PM.

## 2024-07-03 ENCOUNTER — PATIENT OUTREACH (OUTPATIENT)
Dept: CARE COORDINATION | Facility: CLINIC | Age: 59
End: 2024-07-03
Payer: COMMERCIAL

## 2024-08-19 DIAGNOSIS — N18.31 CHRONIC KIDNEY DISEASE, STAGE 3A (H): ICD-10-CM

## 2024-08-19 NOTE — LETTER
August 22, 2024      Thomas Gonzales  [address] [place] [address]        Dear Thomas,     We have tried to reach you.    Our records indicate that you will be due for an annual physical/wellness check in October.     Please schedule via [employer] or we can assist you at [phone].    We look forward to hear from you!      Sincerely,      Your [facility] team

## 2024-08-20 RX ORDER — LISINOPRIL 2.5 MG/1
2.5 TABLET ORAL DAILY
Qty: 90 TABLET | Refills: 3 | Status: SHIPPED | OUTPATIENT
Start: 2024-08-20

## 2024-09-08 DIAGNOSIS — I25.10 CORONARY ARTERIOSCLEROSIS IN NATIVE ARTERY: ICD-10-CM

## 2024-09-09 RX ORDER — ASPIRIN 81 MG/1
81 TABLET, COATED ORAL DAILY
Qty: 90 TABLET | Refills: 0 | Status: SHIPPED | OUTPATIENT
Start: 2024-09-09

## 2024-09-11 ENCOUNTER — OFFICE VISIT (OUTPATIENT)
Dept: URGENT CARE | Facility: URGENT CARE | Age: 59
End: 2024-09-11
Payer: COMMERCIAL

## 2024-09-11 ENCOUNTER — ANCILLARY PROCEDURE (OUTPATIENT)
Dept: GENERAL RADIOLOGY | Facility: CLINIC | Age: 59
End: 2024-09-11
Attending: PHYSICIAN ASSISTANT
Payer: COMMERCIAL

## 2024-09-11 VITALS
BODY MASS INDEX: 32.82 KG/M2 | WEIGHT: 242 LBS | HEART RATE: 68 BPM | DIASTOLIC BLOOD PRESSURE: 72 MMHG | TEMPERATURE: 97.9 F | SYSTOLIC BLOOD PRESSURE: 147 MMHG | RESPIRATION RATE: 20 BRPM | OXYGEN SATURATION: 100 %

## 2024-09-11 DIAGNOSIS — L97.511 SKIN ULCER OF TOE OF RIGHT FOOT, LIMITED TO BREAKDOWN OF SKIN (H): ICD-10-CM

## 2024-09-11 DIAGNOSIS — L97.512 SKIN ULCER OF TOE OF RIGHT FOOT WITH FAT LAYER EXPOSED (H): Primary | ICD-10-CM

## 2024-09-11 PROCEDURE — 73660 X-RAY EXAM OF TOE(S): CPT | Mod: TC | Performed by: RADIOLOGY

## 2024-09-11 PROCEDURE — 99214 OFFICE O/P EST MOD 30 MIN: CPT | Performed by: PHYSICIAN ASSISTANT

## 2024-09-11 RX ORDER — DOXYCYCLINE HYCLATE 100 MG
100 TABLET ORAL 2 TIMES DAILY
Qty: 20 TABLET | Refills: 0 | Status: SHIPPED | OUTPATIENT
Start: 2024-09-11 | End: 2024-09-21

## 2024-09-11 NOTE — PATIENT INSTRUCTIONS
Start taking the antibiotics as prescribed   [phone] -- Dr West    Monitor the foot two times per day (at least)    Follow-up if having fever, chills, black spots or large blisters, redness of the foot etc

## 2024-09-11 NOTE — PROGRESS NOTES
Assessment & Plan     1. Skin ulcer of toe of right foot, limited to breakdown of skin (H)  Patient with diabetic neuropathy, with toe ulcer and fat layer exposed.  MRI the choice for evaluation for osteomyelitis, however unavailable.  X-ray was obtained, does not have evidence for osteomyelitis.  I will treat him with doxycycline twice a day for 10 days.  He was given his podiatrist phone number for an emergent follow-up appointment in the next 2 days.  Discussed if he were to have fever, chills, spreading redness or any sign of necrosis or worsening symptoms to follow-up in the emergency department.  - XR Toe Right G/E 2 Views; Future      Diagnosis and treatment plan was reviewed with patient and/or family.   We went over any labs or imaging. Discussed worsening symptoms or little to no relief despite treatment plan to follow-up with PCP or return to clinic.  Patient verbalizes understanding. All questions were addressed and answered.     Reyna Marshall PA-C  [facility]    CHIEF COMPLAINT:   Chief Complaint   Patient presents with    Urgent Care     Has a sore or blister on the right foot, noticed  on Friday      Subjective     Pradip is a 58 year old male who presents to clinic today for evaluation of right 2nd toe ulcer. Noticed it on Friday, 5 days ago, but could have been there longer.    He has neuropathy, so denies pain.   Patient denies having fever, chills, pale or cold extremity.       Past Medical History:   Diagnosis Date    CAD (coronary artery disease)     S/p stenting of left circumflex    Cancer (H)     melanoma    Cervical spondylosis with myelopathy 9/15/2023    Depressive disorder 2006    Diabetes mellitus (H)     Multiple sclerosis (H)     Peripheral neuropathy      Past Surgical History:   Procedure Laterality Date    ABDOMEN SURGERY  1/2016, 5/2016    APPENDECTOMY  01/08/2016    BIOPSY  2/2016    To have addl skin excised at [facility]    CATARACT IOL, RT/LT       CHOLECYSTECTOMY  01/08/2016    COLONOSCOPY N/A 09/01/2021    Procedure: COLONOSCOPY, WITH POLYPECTOMY AND BIOPSY;  Surgeon: Kamran Rainey DO;  Location: [facility] OR    ESOPHAGOSCOPY, GASTROSCOPY, DUODENOSCOPY (EGD), COMBINED N/A 09/01/2021    Procedure: ESOPHAGOGASTRODUODENOSCOPY, WITH BIOPSY;  Surgeon: Kamran Rainey DO;  Location: [facility] OR    penile implant      LEON EN Y BOWEL  01/08/2016    for small bowel ischemia     Social History     Tobacco Use    Smoking status: Never     Passive exposure: Never    Smokeless tobacco: Never   Substance Use Topics    Alcohol use: Yes     Comment: occ     Current Outpatient Medications   Medication Sig Dispense Refill    ACE/ARB/ARNI NOT PRESCRIBED (INTENTIONAL) Please choose reason not prescribed from choices below.      acetaminophen (TYLENOL) 325 MG tablet Take 2 tablets (650 mg) by mouth every 6 hours as needed for mild pain (and adjunct with moderate or severe pain or per patient request)      aspirin (ASPIRIN LOW DOSE) 81 MG EC tablet TAKE 1 TABLET DAILY 90 tablet 0    atorvastatin (LIPITOR) 40 MG tablet TAKE 1 TABLET DAILY 90 tablet 3    bismuth subsalicylate 262 MG TABS Take 4-5 tablets by mouth daily       Cholecalciferol (VITAMIN D) 2000 UNITS CAPS Take 2,000 Units by mouth daily      doxycycline hyclate (VIBRA-TABS) 100 MG tablet Take 1 tablet (100 mg) by mouth 2 times daily for 10 days. 20 tablet 0    FLUoxetine (PROZAC) 10 MG capsule Take 1 capsule (10 mg) by mouth daily Take with 20mg for daily total of 30mg. 90 capsule 3    FLUoxetine (PROZAC) 20 MG capsule Take 1 capsule (20 mg) by mouth daily With 10mg for a daily total of 30mg. 90 capsule 3    lisinopril (ZESTRIL) 2.5 MG tablet TAKE 1 TABLET DAILY 90 tablet 3    LORazepam (ATIVAN) 0.5 MG tablet Take 1-2 tablets 30 minutes before MRI (take at the radiology dept), take 3rd tablet if needed. No driving for 8 hours after taking. 3 tablet 0    multivitamin w/minerals (THERA-VIT-M) tablet Take 1 tablet by mouth daily       order for DME Equipment being ordered: custom orthotic inserts for shoes 1 each 1    Probiotic Product (PROBIOTIC DAILY) CAPS Take 1 capsule by mouth daily       sodium bicarbonate 650 MG tablet Take 1 tablet (650 mg) by mouth 3 times daily 270 tablet 3    triamcinolone (KENALOG) 0.1 % external ointment Apply topically 2 times daily Do not use more than 2 weeks per month 30 g 0    UNABLE TO FIND MEDICATION NAME: Probiotic dark chocolate bar       No current facility-administered medications for this visit.     Allergies   Allergen Reactions    Shellfish-Derived Products Anaphylaxis    Adhesive Tape      Paper tape is okay  Tegarderm is okay    Gluten Meal      Celiac    Reglan [Metoclopramide] Hives       10 point ROS of systems were all negative except for pertinent positives noted in my HPI.      Exam:   BP (!) 147/72   Pulse 68   Temp 97.9  F (36.6  C)   Resp 20   Wt 109.8 kg (242 lb)   SpO2 100%   BMI 32.82 kg/m    Gen: healthy, alert, no distress  Extremity: Right 2nd toe has ulcer with erythema and swelling. Fat layer exposed. No bullae or necrosis. NO TTP.   There is not compromise to the distal circulation.  Pulses are +2 and CRT is brisk  EXTREMITIES: peripheral pulses normal  SKIN: no suspicious lesions or rashes  NEURO: Normal strength and tone, sensory exam grossly normal, mentation intact and speech normal          Results for orders placed or performed in visit on 09/11/24   XR Toe Right G/E 2 Views     Status: None    Narrative    EXAM: XR TOE RIGHT G/E 2 VIEWS  LOCATION: [facility]  DATE: 9/11/2024    INDICATION: toe ulcer, eval for osteomyelitis  COMPARISON: None.      Impression    IMPRESSION: The forefoot is negative for fracture. No radiographic evidence for osteomyelitis. Degenerative change first MTP joint. No subcutaneous gas.

## 2024-09-12 ENCOUNTER — TELEPHONE (OUTPATIENT)
Dept: ORTHOPEDICS | Facility: CLINIC | Age: 59
End: 2024-09-12
Payer: COMMERCIAL

## 2024-09-12 ENCOUNTER — TELEPHONE (OUTPATIENT)
Dept: WOUND CARE | Facility: CLINIC | Age: 59
End: 2024-09-12
Payer: COMMERCIAL

## 2024-09-12 NOTE — TELEPHONE ENCOUNTER
Urgent referral (ON FILE)    RIGHT foot, toe ulcer    Referred by:  Reyna Marshall PA-C in  URGENT CARE     Same Day and Sports Med do not see for this ulcer.    Podiatry of [facility], , [place] [facility]), [facility], and [facility] are all booked out.    Foot MDs for [facility] are all booked out.     Are you able to fit this pt in, today or tomorrow?    Please REVIEW and call pt to discuss and schedule. Thank you.

## 2024-09-12 NOTE — TELEPHONE ENCOUNTER
Left message for patient informing him that he has an appointment tomorrow at the  with Dr. West. Patient encouraged to call back with any questions or concerns.     Low Lanza CMA

## 2024-09-13 ENCOUNTER — OFFICE VISIT (OUTPATIENT)
Dept: PODIATRY | Facility: CLINIC | Age: 59
End: 2024-09-13
Payer: COMMERCIAL

## 2024-09-13 DIAGNOSIS — L03.031 CELLULITIS OF TOE OF RIGHT FOOT: Primary | ICD-10-CM

## 2024-09-13 DIAGNOSIS — L97.512 SKIN ULCER OF TOE OF RIGHT FOOT WITH FAT LAYER EXPOSED (H): ICD-10-CM

## 2024-09-13 PROCEDURE — 99213 OFFICE O/P EST LOW 20 MIN: CPT | Performed by: PODIATRIST

## 2024-09-13 NOTE — PROGRESS NOTES
Chief Complaint:   Chief Complaint   Patient presents with    Wound Check     Right 2nd toe.           Allergies   Allergen Reactions    Shellfish-Derived Products Anaphylaxis    Adhesive Tape      Paper tape is okay  Tegarderm is okay    Gluten Meal      Celiac    Reglan [Metoclopramide] Hives         Subjective: Thomas is a 58 year old male who presents to the clinic today for a follow up of right 2nd toe ulcer. He notes that the wound has been present for about a week. He went to  on 9/11 and was started on doxy, but has not noticed reduction of the redness. No pain to the digit    Objective  Hemoglobin A1C   Date Value Ref Range Status   06/07/2024 6.3 (H) 0.0 - 5.6 % Final     Comment:     Normal <5.7%   Prediabetes 5.7-6.4%    Diabetes 6.5% or higher     Note: Adopted from ADA consensus guidelines.   10/26/2023 6.2 (H) 0.0 - 5.6 % Final     Comment:     Normal <5.7%   Prediabetes 5.7-6.4%    Diabetes 6.5% or higher     Note: Adopted from ADA consensus guidelines.   06/26/2023 6.9 (H) 0.0 - 5.6 % Final     Comment:     Normal <5.7%   Prediabetes 5.7-6.4%    Diabetes 6.5% or higher     Note: Adopted from ADA consensus guidelines.   01/17/2021 5.7 (H) 0 - 5.6 % Final     Comment:     Normal <5.7% Prediabetes 5.7-6.4%  Diabetes 6.5% or higher - adopted from ADA   consensus guidelines.     06/22/2020 5.5 0 - 5.6 % Final     Comment:     Normal <5.7% Prediabetes 5.7-6.4%  Diabetes 6.5% or higher - adopted from ADA   consensus guidelines.     02/01/2019 5.4 0 - 5.6 % Final     Comment:     Normal <5.7% Prediabetes 5.7-6.4%  Diabetes 6.5% or higher - adopted from ADA   consensus guidelines.                 A wound is noted at right  2nd digit measuring 4cm x 2cm x 0.1cm.    Celis Classification: 2    Wound base: Pink  Yellow/Granulation  Slough    Edges: intact    Drainage: scant/serous    Odor: no    Undermining: no    Bone Exposure: No    Clinical Signs of Infection: Yes: cellulitis    After obtaining patient  consent, the wound was irrigated with copious amounts of saline.     Barriers to Healing: DM2.    Treatment Plan: Telfa    Pt Ability to Follow Plan: Good.     Assessment:   Encounter Diagnoses   Name Primary?    Cellulitis of toe of right foot Yes    Skin ulcer of toe of right foot with fat layer exposed (H)          Plan:   - Pt seen and evaluated  - For now, Telfa for dressing.  - Erythema appears worse than the  pics. Change abx to Augmentin.  - Pt to return to clinic on Tuesday.

## 2024-09-13 NOTE — LETTER
9/13/2024      Thomas Gonzales  [address] [place] [address]      Dear Colleague,    Thank you for referring your patient, Thomas Gonzales, to the [facility]. Please see a copy of my visit note below.    Chief Complaint:   Chief Complaint   Patient presents with     Wound Check     Right 2nd toe.           Allergies   Allergen Reactions     Shellfish-Derived Products Anaphylaxis     Adhesive Tape      Paper tape is okay  Tegarderm is okay     Gluten Meal      Celiac     Reglan [Metoclopramide] Hives         Subjective: Thomas is a 58 year old male who presents to the clinic today for a follow up of right 2nd toe ulcer. He notes that the wound has been present for about a week. He went to  on 9/11 and was started on doxy, but has not noticed reduction of the redness. No pain to the digit    Objective  Hemoglobin A1C   Date Value Ref Range Status   06/07/2024 6.3 (H) 0.0 - 5.6 % Final     Comment:     Normal <5.7%   Prediabetes 5.7-6.4%    Diabetes 6.5% or higher     Note: Adopted from ADA consensus guidelines.   10/26/2023 6.2 (H) 0.0 - 5.6 % Final     Comment:     Normal <5.7%   Prediabetes 5.7-6.4%    Diabetes 6.5% or higher     Note: Adopted from ADA consensus guidelines.   06/26/2023 6.9 (H) 0.0 - 5.6 % Final     Comment:     Normal <5.7%   Prediabetes 5.7-6.4%    Diabetes 6.5% or higher     Note: Adopted from ADA consensus guidelines.   01/17/2021 5.7 (H) 0 - 5.6 % Final     Comment:     Normal <5.7% Prediabetes 5.7-6.4%  Diabetes 6.5% or higher - adopted from ADA   consensus guidelines.     06/22/2020 5.5 0 - 5.6 % Final     Comment:     Normal <5.7% Prediabetes 5.7-6.4%  Diabetes 6.5% or higher - adopted from ADA   consensus guidelines.     02/01/2019 5.4 0 - 5.6 % Final     Comment:     Normal <5.7% Prediabetes 5.7-6.4%  Diabetes 6.5% or higher - adopted from ADA   consensus guidelines.                 A wound is noted at right  2nd digit measuring 4cm x 2cm  x 0.1cm.    Celis Classification: 2    Wound base: Pink  Yellow/Granulation  Slough    Edges: intact    Drainage: scant/serous    Odor: no    Undermining: no    Bone Exposure: No    Clinical Signs of Infection: Yes: cellulitis    After obtaining patient consent, the wound was irrigated with copious amounts of saline.     Barriers to Healing: DM2.    Treatment Plan: Telfa    Pt Ability to Follow Plan: Good.     Assessment:   Encounter Diagnoses   Name Primary?     Cellulitis of toe of right foot Yes     Skin ulcer of toe of right foot with fat layer exposed (H)          Plan:   - Pt seen and evaluated  - For now, Telfa for dressing.  - Erythema appears worse than the  pics. Change abx to Augmentin.  - Pt to return to clinic on Tuesday.      Again, thank you for allowing me to participate in the care of your patient.        Sincerely,        Norm West DPM

## 2024-09-17 ENCOUNTER — OFFICE VISIT (OUTPATIENT)
Dept: ORTHOPEDICS | Facility: CLINIC | Age: 59
End: 2024-09-17
Payer: COMMERCIAL

## 2024-09-17 DIAGNOSIS — L97.512 ULCER OF RIGHT SECOND TOE WITH FAT LAYER EXPOSED (H): Primary | ICD-10-CM

## 2024-09-17 PROCEDURE — 99213 OFFICE O/P EST LOW 20 MIN: CPT | Performed by: PODIATRIST

## 2024-09-17 NOTE — LETTER
9/17/2024      Thomas Gonzales  [address] [place] [address]      Dear Colleague,    Thank you for referring your patient, Thomas Gonzales, to the [facility] [place]. Please see a copy of my visit note below.    Chief Complaint:   Chief Complaint   Patient presents with     Wound Check     Right 2nd toe.           Allergies   Allergen Reactions     Shellfish-Derived Products Anaphylaxis     Adhesive Tape      Paper tape is okay  Tegarderm is okay     Gluten Meal      Celiac     Reglan [Metoclopramide] Hives         Subjective: Thomas is a 58 year old male who presents to the clinic today for a follow up of right 2nd toe ulcer. He notes that the wound has been present for about a week. He went to  on 9/11 and was started on doxy, but has not noticed reduction of the redness. No pain to the digit.    Interval hx: He is taking the Augmentin. Redness has decreased.     Objective  Hemoglobin A1C   Date Value Ref Range Status   06/07/2024 6.3 (H) 0.0 - 5.6 % Final     Comment:     Normal <5.7%   Prediabetes 5.7-6.4%    Diabetes 6.5% or higher     Note: Adopted from ADA consensus guidelines.   10/26/2023 6.2 (H) 0.0 - 5.6 % Final     Comment:     Normal <5.7%   Prediabetes 5.7-6.4%    Diabetes 6.5% or higher     Note: Adopted from ADA consensus guidelines.   06/26/2023 6.9 (H) 0.0 - 5.6 % Final     Comment:     Normal <5.7%   Prediabetes 5.7-6.4%    Diabetes 6.5% or higher     Note: Adopted from ADA consensus guidelines.   01/17/2021 5.7 (H) 0 - 5.6 % Final     Comment:     Normal <5.7% Prediabetes 5.7-6.4%  Diabetes 6.5% or higher - adopted from ADA   consensus guidelines.     06/22/2020 5.5 0 - 5.6 % Final     Comment:     Normal <5.7% Prediabetes 5.7-6.4%  Diabetes 6.5% or higher - adopted from ADA   consensus guidelines.     02/01/2019 5.4 0 - 5.6 % Final     Comment:     Normal <5.7% Prediabetes 5.7-6.4%  Diabetes 6.5% or higher - adopted from ADA   consensus  guidelines.                   A wound is noted at right  2nd digit measuring 4cm x 2cm x 0.1cm.    Celis Classification: 2    Wound base: Pink  Yellow/Granulation  Slough    Edges: intact    Drainage: scant/serous    Odor: no    Undermining: no    Bone Exposure: No    Clinical Signs of Infection: No.     After obtaining patient consent, the wound was irrigated with copious amounts of saline.     Barriers to Healing: DM2.    Treatment Plan: Iodosorb & Telfa    Pt Ability to Follow Plan: Good.     Assessment:   Encounter Diagnoses   Name Primary?     Ulcer of right second toe with fat layer exposed (H) Yes         Plan:   - Pt seen and evaluated  - Iodosorb & Telfa for dressing.  - Cont Augmentin until finished.   - Pt to return to clinic in 2 weeks.       Again, thank you for allowing me to participate in the care of your patient.        Sincerely,        Norm West DPM

## 2024-09-17 NOTE — PROGRESS NOTES
Chief Complaint:   Chief Complaint   Patient presents with    Wound Check     Right 2nd toe.           Allergies   Allergen Reactions    Shellfish-Derived Products Anaphylaxis    Adhesive Tape      Paper tape is okay  Tegarderm is okay    Gluten Meal      Celiac    Reglan [Metoclopramide] Hives         Subjective: Thomas is a 58 year old male who presents to the clinic today for a follow up of right 2nd toe ulcer. He notes that the wound has been present for about a week. He went to  on 9/11 and was started on doxy, but has not noticed reduction of the redness. No pain to the digit.    Interval hx: He is taking the Augmentin. Redness has decreased.     Objective  Hemoglobin A1C   Date Value Ref Range Status   06/07/2024 6.3 (H) 0.0 - 5.6 % Final     Comment:     Normal <5.7%   Prediabetes 5.7-6.4%    Diabetes 6.5% or higher     Note: Adopted from ADA consensus guidelines.   10/26/2023 6.2 (H) 0.0 - 5.6 % Final     Comment:     Normal <5.7%   Prediabetes 5.7-6.4%    Diabetes 6.5% or higher     Note: Adopted from ADA consensus guidelines.   06/26/2023 6.9 (H) 0.0 - 5.6 % Final     Comment:     Normal <5.7%   Prediabetes 5.7-6.4%    Diabetes 6.5% or higher     Note: Adopted from ADA consensus guidelines.   01/17/2021 5.7 (H) 0 - 5.6 % Final     Comment:     Normal <5.7% Prediabetes 5.7-6.4%  Diabetes 6.5% or higher - adopted from ADA   consensus guidelines.     06/22/2020 5.5 0 - 5.6 % Final     Comment:     Normal <5.7% Prediabetes 5.7-6.4%  Diabetes 6.5% or higher - adopted from ADA   consensus guidelines.     02/01/2019 5.4 0 - 5.6 % Final     Comment:     Normal <5.7% Prediabetes 5.7-6.4%  Diabetes 6.5% or higher - adopted from ADA   consensus guidelines.                   A wound is noted at right  2nd digit measuring 4cm x 2cm x 0.1cm.    Celis Classification: 2    Wound base: Pink  Yellow/Granulation  Slough    Edges: intact    Drainage: scant/serous    Odor: no    Undermining: no    Bone Exposure:  No    Clinical Signs of Infection: No.     After obtaining patient consent, the wound was irrigated with copious amounts of saline.     Barriers to Healing: DM2.    Treatment Plan: Iodosorb & Telfa    Pt Ability to Follow Plan: Good.     Assessment:   Encounter Diagnoses   Name Primary?    Ulcer of right second toe with fat layer exposed (H) Yes         Plan:   - Pt seen and evaluated  - Iodosorb & Telfa for dressing.  - Cont Augmentin until finished.   - Pt to return to clinic in 2 weeks.

## 2024-10-01 ENCOUNTER — OFFICE VISIT (OUTPATIENT)
Dept: ORTHOPEDICS | Facility: CLINIC | Age: 59
End: 2024-10-01
Payer: COMMERCIAL

## 2024-10-01 DIAGNOSIS — L97.512 ULCER OF RIGHT SECOND TOE WITH FAT LAYER EXPOSED (H): Primary | ICD-10-CM

## 2024-10-01 PROCEDURE — 99213 OFFICE O/P EST LOW 20 MIN: CPT | Performed by: PODIATRIST

## 2024-10-01 NOTE — LETTER
10/1/2024      Thomas Gonzales  [address] [place] [address]      Dear Colleague,    Thank you for referring your patient, Thomas Gonzales, to the [facility] [place]. Please see a copy of my visit note below.    Chief Complaint:   Chief Complaint   Patient presents with     Wound Check     Right 2nd toe.           Allergies   Allergen Reactions     Shellfish-Derived Products Anaphylaxis     Adhesive Tape      Paper tape is okay  Tegarderm is okay     Gluten Meal      Celiac     Reglan [Metoclopramide] Hives         Subjective: Thomas is a 58 year old male who presents to the clinic today for a follow up of right 2nd toe ulcer. He notes that the wound has been present for about a week. He went to  on 9/11 and was started on doxy, but has not noticed reduction of the redness. No pain to the digit.    Interval hx: No new LE complaints.     Objective  Hemoglobin A1C   Date Value Ref Range Status   06/07/2024 6.3 (H) 0.0 - 5.6 % Final     Comment:     Normal <5.7%   Prediabetes 5.7-6.4%    Diabetes 6.5% or higher     Note: Adopted from ADA consensus guidelines.   10/26/2023 6.2 (H) 0.0 - 5.6 % Final     Comment:     Normal <5.7%   Prediabetes 5.7-6.4%    Diabetes 6.5% or higher     Note: Adopted from ADA consensus guidelines.   06/26/2023 6.9 (H) 0.0 - 5.6 % Final     Comment:     Normal <5.7%   Prediabetes 5.7-6.4%    Diabetes 6.5% or higher     Note: Adopted from ADA consensus guidelines.   01/17/2021 5.7 (H) 0 - 5.6 % Final     Comment:     Normal <5.7% Prediabetes 5.7-6.4%  Diabetes 6.5% or higher - adopted from ADA   consensus guidelines.     06/22/2020 5.5 0 - 5.6 % Final     Comment:     Normal <5.7% Prediabetes 5.7-6.4%  Diabetes 6.5% or higher - adopted from ADA   consensus guidelines.     02/01/2019 5.4 0 - 5.6 % Final     Comment:     Normal <5.7% Prediabetes 5.7-6.4%  Diabetes 6.5% or higher - adopted from ADA   consensus guidelines.             A wound is  noted at right  2nd digit measuring 1cm x  0.5cm x 0.1cm.    Celis Classification: 2    Wound base: Pink  Yellow/Granulation  Slough    Edges: intact    Drainage: scant/serous    Odor: no    Undermining: no    Bone Exposure: No    Clinical Signs of Infection: No.     After obtaining patient consent, the wound was irrigated with copious amounts of saline.     Barriers to Healing: DM2.    Treatment Plan: Iodosorb & Telfa    Pt Ability to Follow Plan: Good.     Assessment:   Encounter Diagnoses   Name Primary?     Ulcer of right second toe with fat layer exposed (H) Yes             Plan:   - Pt seen and evaluated  - Iodosorb & Telfa for dressing.  - Pt to return to clinic in 3 weeks.       Again, thank you for allowing me to participate in the care of your patient.        Sincerely,        Norm West DPM

## 2024-10-01 NOTE — PROGRESS NOTES
Chief Complaint:   Chief Complaint   Patient presents with    Wound Check     Right 2nd toe.           Allergies   Allergen Reactions    Shellfish-Derived Products Anaphylaxis    Adhesive Tape      Paper tape is okay  Tegarderm is okay    Gluten Meal      Celiac    Reglan [Metoclopramide] Hives         Subjective: Thomas is a 58 year old male who presents to the clinic today for a follow up of right 2nd toe ulcer. He notes that the wound has been present for about a week. He went to  on 9/11 and was started on doxy, but has not noticed reduction of the redness. No pain to the digit.    Interval hx: No new LE complaints.     Objective  Hemoglobin A1C   Date Value Ref Range Status   06/07/2024 6.3 (H) 0.0 - 5.6 % Final     Comment:     Normal <5.7%   Prediabetes 5.7-6.4%    Diabetes 6.5% or higher     Note: Adopted from ADA consensus guidelines.   10/26/2023 6.2 (H) 0.0 - 5.6 % Final     Comment:     Normal <5.7%   Prediabetes 5.7-6.4%    Diabetes 6.5% or higher     Note: Adopted from ADA consensus guidelines.   06/26/2023 6.9 (H) 0.0 - 5.6 % Final     Comment:     Normal <5.7%   Prediabetes 5.7-6.4%    Diabetes 6.5% or higher     Note: Adopted from ADA consensus guidelines.   01/17/2021 5.7 (H) 0 - 5.6 % Final     Comment:     Normal <5.7% Prediabetes 5.7-6.4%  Diabetes 6.5% or higher - adopted from ADA   consensus guidelines.     06/22/2020 5.5 0 - 5.6 % Final     Comment:     Normal <5.7% Prediabetes 5.7-6.4%  Diabetes 6.5% or higher - adopted from ADA   consensus guidelines.     02/01/2019 5.4 0 - 5.6 % Final     Comment:     Normal <5.7% Prediabetes 5.7-6.4%  Diabetes 6.5% or higher - adopted from ADA   consensus guidelines.             A wound is noted at right  2nd digit measuring 1cm x  0.5cm x 0.1cm.    Celis Classification: 2    Wound base: Pink  Yellow/Granulation  Slough    Edges: intact    Drainage: scant/serous    Odor: no    Undermining: no    Bone Exposure: No    Clinical Signs of Infection: No.      After obtaining patient consent, the wound was irrigated with copious amounts of saline.     Barriers to Healing: DM2.    Treatment Plan: Iodosorb & Telfa    Pt Ability to Follow Plan: Good.     Assessment:   Encounter Diagnoses   Name Primary?    Ulcer of right second toe with fat layer exposed (H) Yes             Plan:   - Pt seen and evaluated  - Iodosorb & Telfa for dressing.  - Pt to return to clinic in 3 weeks.

## 2024-10-20 ENCOUNTER — HEALTH MAINTENANCE LETTER (OUTPATIENT)
Age: 59
End: 2024-10-20

## 2024-10-22 ENCOUNTER — OFFICE VISIT (OUTPATIENT)
Dept: ORTHOPEDICS | Facility: CLINIC | Age: 59
End: 2024-10-22
Payer: COMMERCIAL

## 2024-10-22 ENCOUNTER — TRANSFERRED RECORDS (OUTPATIENT)
Dept: HEALTH INFORMATION MANAGEMENT | Facility: CLINIC | Age: 59
End: 2024-10-22

## 2024-10-22 DIAGNOSIS — L97.512 ULCER OF RIGHT SECOND TOE WITH FAT LAYER EXPOSED (H): Primary | ICD-10-CM

## 2024-10-22 LAB — RETINOPATHY: POSITIVE

## 2024-10-22 PROCEDURE — 99213 OFFICE O/P EST LOW 20 MIN: CPT | Performed by: PODIATRIST

## 2024-10-22 NOTE — PROGRESS NOTES
Chief Complaint   Patient presents with    Wound Check     Right 2nd toe.             Allergies   Allergen Reactions    Shellfish-Derived Products Anaphylaxis    Adhesive Tape      Paper tape is okay  Tegarderm is okay    Gluten Meal      Celiac    Reglan [Metoclopramide] Hives         Subjective: Thomas is a 58 year old male who presents to the clinic today for a follow up of right 2nd toe ulcer. He notes that the wound has been present for about a week. He went to  on 9/11 and was started on doxy, but has not noticed reduction of the redness. No pain to the digit.    Interval hx: No new LE complaints.     Objective  Hemoglobin A1C   Date Value Ref Range Status   06/07/2024 6.3 (H) 0.0 - 5.6 % Final     Comment:     Normal <5.7%   Prediabetes 5.7-6.4%    Diabetes 6.5% or higher     Note: Adopted from ADA consensus guidelines.   10/26/2023 6.2 (H) 0.0 - 5.6 % Final     Comment:     Normal <5.7%   Prediabetes 5.7-6.4%    Diabetes 6.5% or higher     Note: Adopted from ADA consensus guidelines.   06/26/2023 6.9 (H) 0.0 - 5.6 % Final     Comment:     Normal <5.7%   Prediabetes 5.7-6.4%    Diabetes 6.5% or higher     Note: Adopted from ADA consensus guidelines.   01/17/2021 5.7 (H) 0 - 5.6 % Final     Comment:     Normal <5.7% Prediabetes 5.7-6.4%  Diabetes 6.5% or higher - adopted from ADA   consensus guidelines.     06/22/2020 5.5 0 - 5.6 % Final     Comment:     Normal <5.7% Prediabetes 5.7-6.4%  Diabetes 6.5% or higher - adopted from ADA   consensus guidelines.     02/01/2019 5.4 0 - 5.6 % Final     Comment:     Normal <5.7% Prediabetes 5.7-6.4%  Diabetes 6.5% or higher - adopted from ADA   consensus guidelines.             A wound is noted at right  2nd digit measuring 1cm x  0.5cm x 0.1cm.    Celis Classification: 2    Wound base: Pink  Yellow/Granulation  Slough    Edges: intact    Drainage: scant/serous    Odor: no    Undermining: no    Bone Exposure: No    Clinical Signs of Infection: No.     After obtaining  patient consent, the wound was irrigated with copious amounts of saline.     Barriers to Healing: DM2.    Treatment Plan: Bandaid and silicone toe sleeve.     Pt Ability to Follow Plan: Good.     Assessment:   Encounter Diagnoses   Name Primary?    Ulcer of right second toe with fat layer exposed (H) Yes                 Plan:   - Pt seen and evaluated  - Dressing as above.   - Pt to return to clinic in 3 weeks.

## 2024-10-22 NOTE — LETTER
10/22/2024      Thomas Gonzales  [address] [place] [address]      Dear Colleague,    Thank you for referring your patient, Thomas Gonzales, to the [facility] [place]. Please see a copy of my visit note below.    Chief Complaint   Patient presents with     Wound Check     Right 2nd toe.             Allergies   Allergen Reactions     Shellfish-Derived Products Anaphylaxis     Adhesive Tape      Paper tape is okay  Tegarderm is okay     Gluten Meal      Celiac     Reglan [Metoclopramide] Hives         Subjective: Thomas is a 58 year old male who presents to the clinic today for a follow up of right 2nd toe ulcer. He notes that the wound has been present for about a week. He went to  on 9/11 and was started on doxy, but has not noticed reduction of the redness. No pain to the digit.    Interval hx: No new LE complaints.     Objective  Hemoglobin A1C   Date Value Ref Range Status   06/07/2024 6.3 (H) 0.0 - 5.6 % Final     Comment:     Normal <5.7%   Prediabetes 5.7-6.4%    Diabetes 6.5% or higher     Note: Adopted from ADA consensus guidelines.   10/26/2023 6.2 (H) 0.0 - 5.6 % Final     Comment:     Normal <5.7%   Prediabetes 5.7-6.4%    Diabetes 6.5% or higher     Note: Adopted from ADA consensus guidelines.   06/26/2023 6.9 (H) 0.0 - 5.6 % Final     Comment:     Normal <5.7%   Prediabetes 5.7-6.4%    Diabetes 6.5% or higher     Note: Adopted from ADA consensus guidelines.   01/17/2021 5.7 (H) 0 - 5.6 % Final     Comment:     Normal <5.7% Prediabetes 5.7-6.4%  Diabetes 6.5% or higher - adopted from ADA   consensus guidelines.     06/22/2020 5.5 0 - 5.6 % Final     Comment:     Normal <5.7% Prediabetes 5.7-6.4%  Diabetes 6.5% or higher - adopted from ADA   consensus guidelines.     02/01/2019 5.4 0 - 5.6 % Final     Comment:     Normal <5.7% Prediabetes 5.7-6.4%  Diabetes 6.5% or higher - adopted from ADA   consensus guidelines.             A wound is noted at right   2nd digit measuring 1cm x  0.5cm x 0.1cm.    Celis Classification: 2    Wound base: Pink  Yellow/Granulation  Slough    Edges: intact    Drainage: scant/serous    Odor: no    Undermining: no    Bone Exposure: No    Clinical Signs of Infection: No.     After obtaining patient consent, the wound was irrigated with copious amounts of saline.     Barriers to Healing: DM2.    Treatment Plan: Bandaid and silicone toe sleeve.     Pt Ability to Follow Plan: Good.     Assessment:   Encounter Diagnoses   Name Primary?     Ulcer of right second toe with fat layer exposed (H) Yes                 Plan:   - Pt seen and evaluated  - Dressing as above.   - Pt to return to clinic in 3 weeks.       Again, thank you for allowing me to participate in the care of your patient.        Sincerely,        Norm West DPM

## 2024-11-12 ENCOUNTER — OFFICE VISIT (OUTPATIENT)
Dept: ORTHOPEDICS | Facility: CLINIC | Age: 59
End: 2024-11-12
Payer: COMMERCIAL

## 2024-11-12 DIAGNOSIS — L97.512 ULCER OF RIGHT SECOND TOE WITH FAT LAYER EXPOSED (H): Primary | ICD-10-CM

## 2024-11-12 PROCEDURE — 99212 OFFICE O/P EST SF 10 MIN: CPT | Performed by: PODIATRIST

## 2024-11-12 NOTE — LETTER
11/12/2024      Thomas Gonzales  [address] [place] [address]      Dear Colleague,    Thank you for referring your patient, Thomas Gonzales, to the [facility] [place]. Please see a copy of my visit note below.    Chief Complaint   Patient presents with     Wound Check     Right toe              Allergies   Allergen Reactions     Shellfish-Derived Products Anaphylaxis     Adhesive Tape      Paper tape is okay  Tegarderm is okay     Gluten Meal      Celiac     Reglan [Metoclopramide] Hives         Subjective: Thomas is a 58 year old male who presents to the clinic today for a follow up of right 2nd toe ulcer. He notes that the wound has been present for about a week. He went to  on 9/11 and was started on doxy, but has not noticed reduction of the redness. No pain to the digit.    Interval hx: No new LE complaints.     Objective  Hemoglobin A1C   Date Value Ref Range Status   06/07/2024 6.3 (H) 0.0 - 5.6 % Final     Comment:     Normal <5.7%   Prediabetes 5.7-6.4%    Diabetes 6.5% or higher     Note: Adopted from ADA consensus guidelines.   10/26/2023 6.2 (H) 0.0 - 5.6 % Final     Comment:     Normal <5.7%   Prediabetes 5.7-6.4%    Diabetes 6.5% or higher     Note: Adopted from ADA consensus guidelines.   06/26/2023 6.9 (H) 0.0 - 5.6 % Final     Comment:     Normal <5.7%   Prediabetes 5.7-6.4%    Diabetes 6.5% or higher     Note: Adopted from ADA consensus guidelines.   01/17/2021 5.7 (H) 0 - 5.6 % Final     Comment:     Normal <5.7% Prediabetes 5.7-6.4%  Diabetes 6.5% or higher - adopted from ADA   consensus guidelines.     06/22/2020 5.5 0 - 5.6 % Final     Comment:     Normal <5.7% Prediabetes 5.7-6.4%  Diabetes 6.5% or higher - adopted from ADA   consensus guidelines.     02/01/2019 5.4 0 - 5.6 % Final     Comment:     Normal <5.7% Prediabetes 5.7-6.4%  Diabetes 6.5% or higher - adopted from ADA   consensus guidelines.           A wound is noted at right  2nd  digit has healed. No s/s of infection noted.     Assessment:   Encounter Diagnoses   Name Primary?     Ulcer of right second toe with fat layer exposed (H) Yes                     Plan:   - Pt seen and evaluated  - Cont with silicone sleeves.   - Pt to return to clinic PRN.       Again, thank you for allowing me to participate in the care of your patient.        Sincerely,        Norm West DPM

## 2024-11-12 NOTE — PROGRESS NOTES
Chief Complaint   Patient presents with    Wound Check     Right toe              Allergies   Allergen Reactions    Shellfish-Derived Products Anaphylaxis    Adhesive Tape      Paper tape is okay  Tegarderm is okay    Gluten Meal      Celiac    Reglan [Metoclopramide] Hives         Subjective: Thomas is a 58 year old male who presents to the clinic today for a follow up of right 2nd toe ulcer. He notes that the wound has been present for about a week. He went to  on 9/11 and was started on doxy, but has not noticed reduction of the redness. No pain to the digit.    Interval hx: No new LE complaints.     Objective  Hemoglobin A1C   Date Value Ref Range Status   06/07/2024 6.3 (H) 0.0 - 5.6 % Final     Comment:     Normal <5.7%   Prediabetes 5.7-6.4%    Diabetes 6.5% or higher     Note: Adopted from ADA consensus guidelines.   10/26/2023 6.2 (H) 0.0 - 5.6 % Final     Comment:     Normal <5.7%   Prediabetes 5.7-6.4%    Diabetes 6.5% or higher     Note: Adopted from ADA consensus guidelines.   06/26/2023 6.9 (H) 0.0 - 5.6 % Final     Comment:     Normal <5.7%   Prediabetes 5.7-6.4%    Diabetes 6.5% or higher     Note: Adopted from ADA consensus guidelines.   01/17/2021 5.7 (H) 0 - 5.6 % Final     Comment:     Normal <5.7% Prediabetes 5.7-6.4%  Diabetes 6.5% or higher - adopted from ADA   consensus guidelines.     06/22/2020 5.5 0 - 5.6 % Final     Comment:     Normal <5.7% Prediabetes 5.7-6.4%  Diabetes 6.5% or higher - adopted from ADA   consensus guidelines.     02/01/2019 5.4 0 - 5.6 % Final     Comment:     Normal <5.7% Prediabetes 5.7-6.4%  Diabetes 6.5% or higher - adopted from ADA   consensus guidelines.           A wound is noted at right  2nd digit has healed. No s/s of infection noted.     Assessment:   Encounter Diagnoses   Name Primary?    Ulcer of right second toe with fat layer exposed (H) Yes                     Plan:   - Pt seen and evaluated  - Cont with silicone sleeves.   - Pt to return to clinic  PRN.

## 2024-11-17 DIAGNOSIS — F33.0 MILD EPISODE OF RECURRENT MAJOR DEPRESSIVE DISORDER (H): ICD-10-CM

## 2024-11-18 RX ORDER — FLUOXETINE 10 MG/1
CAPSULE ORAL
Qty: 90 CAPSULE | Refills: 0 | Status: SHIPPED | OUTPATIENT
Start: 2024-11-18

## 2024-12-08 DIAGNOSIS — I25.10 CORONARY ARTERIOSCLEROSIS IN NATIVE ARTERY: ICD-10-CM

## 2024-12-09 RX ORDER — ASPIRIN 81 MG/1
81 TABLET, COATED ORAL DAILY
Qty: 90 TABLET | Refills: 1 | Status: SHIPPED | OUTPATIENT
Start: 2024-12-09

## 2024-12-22 ENCOUNTER — HEALTH MAINTENANCE LETTER (OUTPATIENT)
Age: 59
End: 2024-12-22

## 2024-12-23 NOTE — PHARMACY-ADMISSION MEDICATION HISTORY
Admission Medication History Completed by Pharmacy    See [facility] Admission Navigator for allergy information, preferred outpatient pharmacy, prior to admission medications and immunization status.     Medication History Sources:     The patient    Care Everywhere    Sure Scripts    Changes made to PTA medication list (reason):    Added: None    Deleted: None    Changed:   o Bismuth subsalicylate from 4 tabs by mouth to 4-5 tablets by mouth once daily  o Vitamin D from twice daily to once daily  o Added quantity to probiotic    Additional Information:    The patient is a reliable historian who knew his medications very well.    Prior to Admission medications    Medication Sig Last Dose Taking? Auth Provider   ASPIRIN ADULT LOW STRENGTH 81 MG EC tablet Take 1 tablet (81 mg) by mouth daily 1/16/2021 at AM Yes Anni Sage MD   atorvastatin (LIPITOR) 40 MG tablet Take 1 tablet (40 mg) by mouth daily 1/16/2021 at AM Yes Anni Sage MD   bismuth subsalicylate 262 MG TABS Take 4-5 tablets by mouth daily  1/16/2021 at AM Yes Reported, Patient   Cholecalciferol (VITAMIN D) 2000 UNITS CAPS Take 2,000 Units by mouth daily  1/16/2021 at AM Yes Reported, Patient   multivitamin, therapeutic with minerals (MULTI-VITAMIN) TABS Take 1 tablet by mouth daily 1/16/2021 at AM Yes Reported, Patient   Probiotic Product (PROBIOTIC DAILY) CAPS Take 1 capsule by mouth daily  1/16/2021 at AM Yes Reported, Patient   ACE NOT PRESCRIBED, INTENTIONAL, ACE Inhibitor not prescribed due to Symptomatic hypotension not due to excessive diuresis   Anni Sage MD   ACE/ARB NOT PRESCRIBED, INTENTIONAL, Please choose reason not prescribed, below   Anni Sage MD   order for DME Equipment being ordered: custom orthotic inserts for shoes   Anni Sage MD       Date completed: 01/17/21    Medication history completed by: KENYATTA Trinidad            
Detail Level: Zone
Sunscreen Recommendations: Discussed sunblock should be applied to exposed areas daily, and be reapplied every two hours while exposed . The sunblock should be broad spectrum SPF-50, UVA/UVB and contain Zinc and Titanium Dioxide(Blue Lizard & Sun Bum are some good OTC brands). Strongly recommend Elta MD products. Recommend sun protective clothing such as long sleeve UPF protective shirts, wide brim hats, neck covers and sunglasses as it has been proven to prevent further photo damage from the sun.
Detail Level: Detailed

## 2025-02-17 DIAGNOSIS — F33.0 MILD EPISODE OF RECURRENT MAJOR DEPRESSIVE DISORDER: ICD-10-CM

## 2025-02-17 RX ORDER — FLUOXETINE 10 MG/1
CAPSULE ORAL
Qty: 90 CAPSULE | Refills: 1 | Status: SHIPPED | OUTPATIENT
Start: 2025-02-17

## 2025-02-17 NOTE — TELEPHONE ENCOUNTER
Called pt. Patient is currently in [place] and will be back Thursday. Patient expressed understanding of needing appointment and will call when he is back.    Patient requesting forms to be filled out for parking. Is it ok to drop off at clinic for Dr. Sage to fill out?     Teresita Hicks MA on 2/17/2025 at 1:31 PM

## 2025-02-17 NOTE — TELEPHONE ENCOUNTER
Yes that's fine.   Anni schedules out months so just wanted to get an appointment scheduled now.   Jay López PA-C

## 2025-02-21 ENCOUNTER — TELEPHONE (OUTPATIENT)
Dept: PEDIATRICS | Facility: CLINIC | Age: 60
End: 2025-02-21
Payer: COMMERCIAL

## 2025-02-21 NOTE — TELEPHONE ENCOUNTER
Forms/Letter Request    Type of form/letter: Transportation ([employer])      Is Release of Information needed?: No    Do we have the form/letter: Yes:     Who is the form from? Patient    Where did/will the form come from? Patient or family brought in       When is form/letter needed by: asap    How would you like the form/letter returned:     Patient Notified form requests are processed in 5-7 business days:Yes    Could we send this information to you in [place] or would you prefer to receive a phone call?:   Patient would prefer a phone call   Okay to leave a detailed message?: Yes at Cell number on file:    Telephone Information:   Mobile [phone]

## 2025-02-24 NOTE — CONFIDENTIAL NOTE
Can we call patient to confirm diagnosis for his disability? Is it his foot ulcer? Or multiple sclerosis?    Also, he has marked that he cannot operate a motor vehicle - can we confirm this is correct? Thanks!    Anni Sage MD  Internal Medicine-Pediatrics

## 2025-02-25 NOTE — TELEPHONE ENCOUNTER
Called and spoke with patient to relay provider message below.     States the correct diagnosis is complications from multiple sclerosis (orthostatic hypotension, general malaise).    States he is able to operate a motor vehicle, has been driving, not having difficulties. States is unsure where/why this is noted.    Dr. Sage please review and complete for as appropriate.    Poppy Gupta RN

## 2025-04-18 ENCOUNTER — TRANSFERRED RECORDS (OUTPATIENT)
Dept: HEALTH INFORMATION MANAGEMENT | Facility: CLINIC | Age: 60
End: 2025-04-18
Payer: COMMERCIAL

## 2025-05-19 DIAGNOSIS — F33.0 MILD EPISODE OF RECURRENT MAJOR DEPRESSIVE DISORDER: ICD-10-CM

## 2025-06-09 DIAGNOSIS — I25.10 CORONARY ARTERIOSCLEROSIS IN NATIVE ARTERY: ICD-10-CM

## 2025-06-09 RX ORDER — ASPIRIN 81 MG/1
81 TABLET, COATED ORAL DAILY
Qty: 30 TABLET | Refills: 0 | Status: SHIPPED | OUTPATIENT
Start: 2025-06-09 | End: 2025-06-12

## 2025-06-09 RX ORDER — ATORVASTATIN CALCIUM 40 MG/1
40 TABLET, FILM COATED ORAL DAILY
Qty: 90 TABLET | Refills: 0 | Status: SHIPPED | OUTPATIENT
Start: 2025-06-09 | End: 2025-06-12

## 2025-06-10 SDOH — HEALTH STABILITY: PHYSICAL HEALTH: ON AVERAGE, HOW MANY DAYS PER WEEK DO YOU ENGAGE IN MODERATE TO STRENUOUS EXERCISE (LIKE A BRISK WALK)?: 2 DAYS

## 2025-06-10 SDOH — HEALTH STABILITY: PHYSICAL HEALTH: ON AVERAGE, HOW MANY MINUTES DO YOU ENGAGE IN EXERCISE AT THIS LEVEL?: 20 MIN

## 2025-06-10 ASSESSMENT — SOCIAL DETERMINANTS OF HEALTH (SDOH): HOW OFTEN DO YOU GET TOGETHER WITH FRIENDS OR RELATIVES?: TWICE A WEEK

## 2025-06-12 ENCOUNTER — OFFICE VISIT (OUTPATIENT)
Dept: PEDIATRICS | Facility: CLINIC | Age: 60
End: 2025-06-12
Payer: COMMERCIAL

## 2025-06-12 VITALS
SYSTOLIC BLOOD PRESSURE: 98 MMHG | HEART RATE: 61 BPM | RESPIRATION RATE: 16 BRPM | BODY MASS INDEX: 30.71 KG/M2 | DIASTOLIC BLOOD PRESSURE: 58 MMHG | OXYGEN SATURATION: 99 % | WEIGHT: 226.7 LBS | TEMPERATURE: 97.5 F | HEIGHT: 72 IN

## 2025-06-12 DIAGNOSIS — E78.2 MIXED HYPERLIPIDEMIA: ICD-10-CM

## 2025-06-12 DIAGNOSIS — Z00.00 ROUTINE GENERAL MEDICAL EXAMINATION AT A HEALTH CARE FACILITY: Primary | ICD-10-CM

## 2025-06-12 DIAGNOSIS — E11.21 TYPE 2 DIABETES MELLITUS WITH DIABETIC NEPHROPATHY, WITHOUT LONG-TERM CURRENT USE OF INSULIN (H): ICD-10-CM

## 2025-06-12 DIAGNOSIS — Z12.5 SCREENING FOR PROSTATE CANCER: ICD-10-CM

## 2025-06-12 DIAGNOSIS — G35 MS (MULTIPLE SCLEROSIS) (H): ICD-10-CM

## 2025-06-12 DIAGNOSIS — I25.10 CORONARY ARTERIOSCLEROSIS IN NATIVE ARTERY: ICD-10-CM

## 2025-06-12 DIAGNOSIS — F33.0 MILD EPISODE OF RECURRENT MAJOR DEPRESSIVE DISORDER: ICD-10-CM

## 2025-06-12 DIAGNOSIS — Z98.84 HISTORY OF GASTRIC BYPASS: ICD-10-CM

## 2025-06-12 DIAGNOSIS — K58.0 IRRITABLE BOWEL SYNDROME WITH DIARRHEA: ICD-10-CM

## 2025-06-12 DIAGNOSIS — N18.31 CHRONIC KIDNEY DISEASE, STAGE 3A (H): ICD-10-CM

## 2025-06-12 LAB
ALBUMIN SERPL BCG-MCNC: 4.1 G/DL (ref 3.5–5.2)
ALP SERPL-CCNC: 101 U/L (ref 40–150)
ALT SERPL W P-5'-P-CCNC: 58 U/L (ref 0–70)
ANION GAP SERPL CALCULATED.3IONS-SCNC: 8 MMOL/L (ref 7–15)
AST SERPL W P-5'-P-CCNC: 49 U/L (ref 0–45)
BILIRUB SERPL-MCNC: 0.3 MG/DL
BUN SERPL-MCNC: 51.6 MG/DL (ref 6–20)
CALCIUM SERPL-MCNC: 9.5 MG/DL (ref 8.8–10.4)
CHLORIDE SERPL-SCNC: 114 MMOL/L (ref 98–107)
CHOLEST SERPL-MCNC: 122 MG/DL
CREAT SERPL-MCNC: 1.81 MG/DL (ref 0.67–1.17)
CREAT UR-MCNC: 101 MG/DL
EGFRCR SERPLBLD CKD-EPI 2021: 43 ML/MIN/1.73M2
ERYTHROCYTE [DISTWIDTH] IN BLOOD BY AUTOMATED COUNT: 13.3 % (ref 10–15)
EST. AVERAGE GLUCOSE BLD GHB EST-MCNC: 105 MG/DL
FASTING STATUS PATIENT QL REPORTED: ABNORMAL
FASTING STATUS PATIENT QL REPORTED: ABNORMAL
FERRITIN SERPL-MCNC: 106 NG/ML (ref 31–409)
GLUCOSE SERPL-MCNC: 151 MG/DL (ref 70–99)
HBA1C MFR BLD: 5.3 % (ref 0–5.6)
HCO3 SERPL-SCNC: 21 MMOL/L (ref 22–29)
HCT VFR BLD AUTO: 40.5 % (ref 40–53)
HDLC SERPL-MCNC: 37 MG/DL
HGB BLD-MCNC: 13 G/DL (ref 13.3–17.7)
IRON BINDING CAPACITY (ROCHE): 309 UG/DL (ref 240–430)
IRON SATN MFR SERPL: 26 % (ref 15–46)
IRON SERPL-MCNC: 81 UG/DL (ref 61–157)
LDLC SERPL CALC-MCNC: 71 MG/DL
MAGNESIUM SERPL-MCNC: 1.9 MG/DL (ref 1.7–2.3)
MCH RBC QN AUTO: 28.7 PG (ref 26.5–33)
MCHC RBC AUTO-ENTMCNC: 32.1 G/DL (ref 31.5–36.5)
MCV RBC AUTO: 89 FL (ref 78–100)
MICROALBUMIN UR-MCNC: 75.4 MG/L
MICROALBUMIN/CREAT UR: 74.65 MG/G CR (ref 0–17)
NONHDLC SERPL-MCNC: 85 MG/DL
PLATELET # BLD AUTO: 142 10E3/UL (ref 150–450)
POTASSIUM SERPL-SCNC: 5.5 MMOL/L (ref 3.4–5.3)
PROT SERPL-MCNC: 6.7 G/DL (ref 6.4–8.3)
PSA SERPL DL<=0.01 NG/ML-MCNC: 1.1 NG/ML (ref 0–3.5)
RBC # BLD AUTO: 4.53 10E6/UL (ref 4.4–5.9)
SODIUM SERPL-SCNC: 143 MMOL/L (ref 135–145)
TRIGL SERPL-MCNC: 70 MG/DL
VIT B12 SERPL-MCNC: 921 PG/ML (ref 232–1245)
VIT D+METAB SERPL-MCNC: 19 NG/ML (ref 20–50)
WBC # BLD AUTO: 4.2 10E3/UL (ref 4–11)

## 2025-06-12 RX ORDER — ATORVASTATIN CALCIUM 40 MG/1
40 TABLET, FILM COATED ORAL DAILY
Qty: 90 TABLET | Refills: 3 | Status: SHIPPED | OUTPATIENT
Start: 2025-06-12

## 2025-06-12 RX ORDER — SODIUM BICARBONATE 650 MG/1
650 TABLET ORAL 3 TIMES DAILY
Qty: 270 TABLET | Refills: 3 | Status: SHIPPED | OUTPATIENT
Start: 2025-06-12

## 2025-06-12 RX ORDER — FLUOXETINE 10 MG/1
10 CAPSULE ORAL DAILY
Qty: 90 CAPSULE | Refills: 3 | Status: SHIPPED | OUTPATIENT
Start: 2025-06-12

## 2025-06-12 RX ORDER — ASPIRIN 81 MG/1
81 TABLET, COATED ORAL DAILY
Qty: 90 TABLET | Refills: 3 | Status: SHIPPED | OUTPATIENT
Start: 2025-06-12

## 2025-06-12 RX ORDER — LISINOPRIL 2.5 MG/1
2.5 TABLET ORAL DAILY
Qty: 90 TABLET | Refills: 3 | Status: SHIPPED | OUTPATIENT
Start: 2025-06-12

## 2025-06-12 ASSESSMENT — PATIENT HEALTH QUESTIONNAIRE - PHQ9
SUM OF ALL RESPONSES TO PHQ QUESTIONS 1-9: 2
SUM OF ALL RESPONSES TO PHQ QUESTIONS 1-9: 2
10. IF YOU CHECKED OFF ANY PROBLEMS, HOW DIFFICULT HAVE THESE PROBLEMS MADE IT FOR YOU TO DO YOUR WORK, TAKE CARE OF THINGS AT HOME, OR GET ALONG WITH OTHER PEOPLE: NOT DIFFICULT AT ALL

## 2025-06-12 ASSESSMENT — PAIN SCALES - GENERAL: PAINLEVEL_OUTOF10: NO PAIN (0)

## 2025-06-12 NOTE — PROGRESS NOTES
Preventive Care Visit  [facility] VAL Serrano MD, Internal Medicine - Pediatrics  Jun 12, 2025      Assessment & Plan     Routine general medical examination at a health care facility  Counseling as below. Vaccines up to date.     Chronic kidney disease, stage 3a (H)  This has been relatively stable in the past few years, presumed due to recurrent acute injury In setting of multiple severe illnesses previously (also with hx of DM, although this is typically well controlled). Tolerating low dose ACEI well without issues. Will consider starting low dose SGLT2i if renal function stable. Continue with bicarb. Due for labs.   - Albumin Random Urine Quantitative with Creat Ratio; Future  - lisinopril (ZESTRIL) 2.5 MG tablet; Take 1 tablet (2.5 mg) by mouth daily.  - sodium bicarbonate 650 MG tablet; Take 1 tablet (650 mg) by mouth 3 times daily.  - Albumin Random Urine Quantitative with Creat Ratio    Coronary arteriosclerosis in native artery  Doing well on statin, ACEI. Not on BB due to lower BPs.   - atorvastatin (LIPITOR) 40 MG tablet; Take 1 tablet (40 mg) by mouth daily.  - aspirin (ASPIRIN LOW DOSE) 81 MG EC tablet; Take 1 tablet (81 mg) by mouth daily.  - Lipid panel reflex to direct LDL Non-fasting  - Comprehensive metabolic panel (BMP + Alb, Alk Phos, ALT, AST, Total. Bili, TP)    Mild episode of recurrent major depressive disorder  Well controlled on current regimen.   - FLUoxetine (PROZAC) 10 MG capsule; Take 1 capsule (10 mg) by mouth daily. Take daily with 20mg for daily total of 30mg  - FLUoxetine (PROZAC) 20 MG capsule; Take 1 capsule (20 mg) by mouth daily. With 10mg for a daily total of 30mg.    Type 2 diabetes mellitus with diabetic nephropathy, without long-term current use of insulin (H)  Diet controlled. Has had issues with foot ulcers in the past, now with some blistering over R second toe due to tight shoes he recently wore, otherwise no issues. He is monitoring this  closely.   - HEMOGLOBIN A1C  - Comprehensive metabolic panel (BMP + Alb, Alk Phos, ALT, AST, Total. Bili, TP)    History of gastric bypass  Due for labs.   - Comprehensive metabolic panel (BMP + Alb, Alk Phos, ALT, AST, Total. Bili, TP)  - CBC with platelets  - Ferritin  - Magnesium  - Zinc  - Vitamin D Deficiency  - Vitamin B12  - Iron and iron binding capacity    Mixed hyperlipidemia  On atorvastatin.     Screening for prostate cancer  - PSA, screen    MS (multiple sclerosis) (H)  Stable, not on medications. Follows with neurology.     Irritable bowel syndrome with diarrhea  Stable, seems to be improved when he eats less.             BMI  Estimated body mass index is 30.75 kg/m  as calculated from the following:    Height as of this encounter: 1.829 m (6').    Weight as of this encounter: 102.8 kg (226 lb 11.2 oz).   Weight management plan: Discussed healthy diet and exercise guidelines            Bonnie Moseley is a 59 year old, presenting for the following:  Physical        6/12/2025     9:51 AM   Additional Questions   Roomed by Stephanie   Accompanied by none         6/12/2025     9:51 AM   Patient Reported Additional Medications   Patient reports taking the following new medications none          HPI     Overall has been doing quite well. Son just got  a few weeks ago - he wore new, tight shoes and had some blistering on his R 2nd toe.     No medication side effects, otherwise feeling well.           6/10/2025   General Health   How would you rate your overall physical health? (!) FAIR   Feel stress (tense, anxious, or unable to sleep) Not at all         6/10/2025   Nutrition   Three or more servings of calcium each day? (!) NO   Diet: Gluten-free/reduced   How many servings of fruit and vegetables per day? (!) 2-3   How many sweetened beverages each day? 0-1         6/10/2025   Exercise   Days per week of moderate/strenous exercise 2 days   Average minutes spent exercising at this level 20 min   (!)  EXERCISE CONCERN      6/10/2025   Social Factors   Frequency of gathering with friends or relatives Twice a week   Worry food won't last until get money to buy more No   Food not last or not have enough money for food? No   Do you have housing? (Housing is defined as stable permanent housing and does not include staying outside in a car, in a tent, in an abandoned building, in an overnight shelter, or couch-surfing.) No   Are you worried about losing your housing? No   Lack of transportation? No   Unable to get utilities (heat,electricity)? No   Want help with housing or utility concern? No   (!) HOUSING CONCERN PRESENT      6/12/2025   Fall Risk   Gait Speed Test (Document in seconds) 2.75   Gait Speed Test Interpretation Less than or equal to 5.00 seconds - PASS          6/10/2025   Dental   Dentist two times every year? Yes       Today's PHQ-9 Score:       6/12/2025     9:43 AM   PHQ-9 SCORE   PHQ-9 Total Score MyChart 2 (Minimal depression)   PHQ-9 Total Score 2        Patient-reported         6/10/2025   Substance Use   Alcohol more than 3/day or more than 7/wk No   Do you use any other substances recreationally? No     Social History     Tobacco Use    Smoking status: Never     Passive exposure: Never    Smokeless tobacco: Never   Vaping Use    Vaping status: Never Used   Substance Use Topics    Alcohol use: Yes     Comment: occ    Drug use: No           6/10/2025   STI Screening   New sexual partner(s) since last STI/HIV test? No   Last PSA:   Prostate Specific Antigen Screen   Date Value Ref Range Status   06/07/2024 0.77 0.00 - 3.50 ng/mL Final     ASCVD Risk   The ASCVD Risk score (Douglas MCCALL, et al., 2019) failed to calculate for the following reasons:    The valid total cholesterol range is 130 to 320 mg/dL           Reviewed and updated as needed this visit by Provider                          Review of Systems  Constitutional, neuro, ENT, endocrine, pulmonary, cardiac, gastrointestinal,  genitourinary, musculoskeletal, integument and psychiatric systems are negative, except as otherwise noted.     Objective    Exam  BP 98/58 (BP Location: Right arm, Patient Position: Sitting, Cuff Size: Adult Regular)   Pulse 61   Temp 97.5  F (36.4  C) (Temporal)   Resp 16   Ht 1.829 m (6')   Wt 102.8 kg (226 lb 11.2 oz)   SpO2 99%   BMI 30.75 kg/m     Estimated body mass index is 30.75 kg/m  as calculated from the following:    Height as of this encounter: 1.829 m (6').    Weight as of this encounter: 102.8 kg (226 lb 11.2 oz).    Physical Exam  GENERAL: alert and no distress  EYES: Eyes grossly normal to inspection, PERRL and conjunctivae and sclerae normal  HENT: ear canals and TM's normal, nose and mouth without ulcers or lesions  NECK: no adenopathy, no asymmetry, masses, or scars  RESP: lungs clear to auscultation - no rales, rhonchi or wheezes  CV: regular rate and rhythm, normal S1 S2, no S3 or S4, no murmur, click or rub, no peripheral edema  ABDOMEN: soft, nontender, no hepatosplenomegaly, no masses and bowel sounds normal  MS: no gross musculoskeletal defects noted, no edema  SKIN: no suspicious lesions or rashes  NEURO: Normal strength and tone, mentation intact and speech normal  PSYCH: mentation appears normal, affect normal/bright  Diabetic foot exam: reduced sensation at feet and R 2nd toe with skin breakdown and surrounding erythema but no drainage or warmth.   Gait and balance assessed per Gait Speed Test.  Result as above.        Signed Electronically by: Anni Serrano MD

## 2025-06-15 ENCOUNTER — RESULTS FOLLOW-UP (OUTPATIENT)
Dept: PEDIATRICS | Facility: CLINIC | Age: 60
End: 2025-06-15
Payer: COMMERCIAL

## 2025-06-15 DIAGNOSIS — E55.9 VITAMIN D DEFICIENCY: ICD-10-CM

## 2025-06-15 DIAGNOSIS — N18.32 CKD STAGE 3B, GFR 30-44 ML/MIN (H): Primary | ICD-10-CM

## 2025-06-15 DIAGNOSIS — E60 ZINC DEFICIENCY: ICD-10-CM

## 2025-06-17 ENCOUNTER — PATIENT OUTREACH (OUTPATIENT)
Dept: CARE COORDINATION | Facility: CLINIC | Age: 60
End: 2025-06-17
Payer: COMMERCIAL

## 2025-06-18 NOTE — TELEPHONE ENCOUNTER
[facility] at [phone].    Benita Arango on 6/18/2025 at 8:44 AM

## 2025-06-19 ENCOUNTER — PATIENT OUTREACH (OUTPATIENT)
Dept: CARE COORDINATION | Facility: CLINIC | Age: 60
End: 2025-06-19
Payer: COMMERCIAL

## 2025-07-03 ENCOUNTER — OFFICE VISIT (OUTPATIENT)
Dept: URGENT CARE | Facility: URGENT CARE | Age: 60
End: 2025-07-03
Payer: COMMERCIAL

## 2025-07-03 ENCOUNTER — ANCILLARY PROCEDURE (OUTPATIENT)
Dept: GENERAL RADIOLOGY | Facility: CLINIC | Age: 60
End: 2025-07-03
Payer: COMMERCIAL

## 2025-07-03 ENCOUNTER — RESULTS FOLLOW-UP (OUTPATIENT)
Dept: URGENT CARE | Facility: URGENT CARE | Age: 60
End: 2025-07-03

## 2025-07-03 VITALS
OXYGEN SATURATION: 100 % | RESPIRATION RATE: 21 BRPM | DIASTOLIC BLOOD PRESSURE: 70 MMHG | HEART RATE: 62 BPM | TEMPERATURE: 97.2 F | SYSTOLIC BLOOD PRESSURE: 130 MMHG | BODY MASS INDEX: 31.11 KG/M2 | WEIGHT: 229.4 LBS

## 2025-07-03 DIAGNOSIS — L97.512 SKIN ULCER OF TOE OF RIGHT FOOT WITH FAT LAYER EXPOSED (H): ICD-10-CM

## 2025-07-03 DIAGNOSIS — L97.512 SKIN ULCER OF TOE OF RIGHT FOOT WITH FAT LAYER EXPOSED (H): Primary | ICD-10-CM

## 2025-07-03 DIAGNOSIS — L03.115 CELLULITIS OF RIGHT FOOT: ICD-10-CM

## 2025-07-03 NOTE — PROGRESS NOTES
Urgent Care Clinic Visit    Chief Complaint   Patient presents with    Toe Pain     2nd toe on right foot has had several issues with in past. Normally sees orthopedics. Has blister from sons wedding for about a month. Loss of nail yesterday. Neuropathy present. Swollen ankle.                7/3/2025     6:44 PM   Additional Questions   Roomed by Constance OROSCO   Accompanied by Wife

## 2025-07-04 NOTE — PATIENT INSTRUCTIONS
Pradip,     We will prescribe an oral course of antibiotics for your toe infection. Your XR today did not suggest osteomyelitis. However, if your swelling and redness does not improve I recommend you present to the emergency department for expedited work up and possible need of IV antibiotics.     Please take Augmentin twice daily for 10 days.     Please follow up with your orthopedics doctor or podiatrist in the next week.     Thank you,     Dr Billingsley

## 2025-07-04 NOTE — PROGRESS NOTES
Assessment & Plan     Skin ulcer of toe of right foot with fat layer exposed (H)  Cellulitis of right foot  Patient with an extensive history of foot wounds presenting today for right second toe foot wound x 1 month.  This occurred after wearing tight shoes at a wedding.  He has seen other providers for this over the past month, has been using silvercel to the area daily but has not been on any oral or topical antibiotics.  Has progressively worsened over the last few days.  On physical exam, the second toe has an open shallow ulcer to the point that the toenail has fallen off.  The entirety of his right foot has moderate swelling and erythema but no warmth.  No discrete fluctuance to suggest an underlying abscess that could be drained.  The patient has been afebrile, vitally stable here in urgent care.  Based on the patient's extensive history and physical exam findings, an x-ray of the foot was obtained and called out non specific generalized demineralization.  Discussed with radiologist; there are no lytic or destructive lesions, but an XR cannot definitely rule out osteomyelitis.  Discussed this with patient.  Will treat as a cellulitis at this time but discussed strict return precautions for further evaluation if notable improvement is not noted in the coming 2-3 days despite oral antibiotics. Per chart review, patient has been prescribed doxycycline for foot wounds and cellulitis in the past which has not been adequate.  Will prescribe Augmentin for broader coverage.  Very strict return precautions discussed extensively with the patient and his partner. Area of redness and swelling was outlined for the patient in order to monitor potential worsening of symptoms.   - XR Foot Right G/E 3 Views  - Augmentin BID x 10 days   - Follow up with podiatry or orthopedics within the next week   - Strict return precautions provided, see AVS     Anni Billingsley MD   [facility]     Subjective     Pradip is a 59 year  old male who presents to clinic today for the following health issues:  Chief Complaint   Patient presents with    Toe Pain     2nd toe on right foot has had several issues with in past. Normally sees orthopedics. Has blister from sons wedding for about a month. Loss of nail yesterday. Neuropathy present. Swollen ankle.          7/3/2025     6:44 PM   Additional Questions   Roomed by Constance OROSCO   Accompanied by Wife     HPI  - PMH diabetes, diabetic neuropathy, and prior foot infections   - has followed with orthopedics and podiatry   - patient was at his sons wedding 6/1 at which time a blister developed on his second right toe   - this particular toe has had infections before   - since that time he has been seen by a few providers reportedly using just silvercell   - he has not had an antibiotic course for this infection yet   - despite compliance with recommendations above, has gotten progressively worse   - toe nail fell off yesterday   - entire foot is now minimally swollen   - no pain, but patient has bad neuropathy   - no fevers or chills   - otherwise feeling in his normal state of health   - per chart review, has required admission for IV antibiotics in the past   - will get XR to evaluate for osteo today     Review of Systems  ROS negative aside from those concerns noted in the HPI and A+P above.         Objective    /70   Pulse 62   Temp 97.2  F (36.2  C) (Oral)   Resp 21   Wt 104.1 kg (229 lb 6.4 oz)   SpO2 100%   BMI 31.11 kg/m    Physical Exam  Constitutional:       General: He is not in acute distress.     Appearance: Normal appearance. He is not ill-appearing, toxic-appearing or diaphoretic.      Comments: Wife present.    HENT:      Mouth/Throat:      Mouth: Mucous membranes are moist.      Pharynx: Oropharynx is clear.   Cardiovascular:      Rate and Rhythm: Normal rate.   Pulmonary:      Effort: No respiratory distress.   Musculoskeletal:         General: Swelling and deformity  present. Normal range of motion.      Comments: Right second toe swollen with open wound. Toe nail not present. Entire right foot with moderate pitting edema and erythema, will outline for patient. Pulses faint but appreciated.    Neurological:      General: No focal deficit present.      Mental Status: He is alert and oriented to person, place, and time.   Psychiatric:         Mood and Affect: Mood normal.         Behavior: Behavior normal.         Thought Content: Thought content normal.         Judgment: Judgment normal.

## 2025-07-07 ENCOUNTER — APPOINTMENT (OUTPATIENT)
Dept: GENERAL RADIOLOGY | Facility: CLINIC | Age: 60
DRG: 617 | End: 2025-07-07
Attending: PHYSICIAN ASSISTANT
Payer: COMMERCIAL

## 2025-07-07 ENCOUNTER — HOSPITAL ENCOUNTER (INPATIENT)
Facility: CLINIC | Age: 60
DRG: 617 | End: 2025-07-07
Attending: EMERGENCY MEDICINE | Admitting: STUDENT IN AN ORGANIZED HEALTH CARE EDUCATION/TRAINING PROGRAM
Payer: COMMERCIAL

## 2025-07-07 ENCOUNTER — APPOINTMENT (OUTPATIENT)
Dept: ULTRASOUND IMAGING | Facility: CLINIC | Age: 60
DRG: 617 | End: 2025-07-07
Attending: NURSE PRACTITIONER
Payer: COMMERCIAL

## 2025-07-07 DIAGNOSIS — M86.9 OSTEOMYELITIS OF SECOND TOE OF RIGHT FOOT (H): ICD-10-CM

## 2025-07-07 DIAGNOSIS — S98.131A AMPUTATION OF TOE OF RIGHT FOOT: ICD-10-CM

## 2025-07-07 DIAGNOSIS — R26.81 GAIT INSTABILITY: ICD-10-CM

## 2025-07-07 DIAGNOSIS — L08.9 INFECTION OF TOE: Primary | ICD-10-CM

## 2025-07-07 DIAGNOSIS — L03.119 CELLULITIS OF FOOT: ICD-10-CM

## 2025-07-07 DIAGNOSIS — L08.9 DIABETIC FOOT INFECTION (H): ICD-10-CM

## 2025-07-07 DIAGNOSIS — E11.628 DIABETIC FOOT INFECTION (H): ICD-10-CM

## 2025-07-07 PROBLEM — N18.31 CHRONIC KIDNEY DISEASE, STAGE 3A (H): Status: ACTIVE | Noted: 2022-12-15

## 2025-07-07 LAB
ALBUMIN SERPL BCG-MCNC: 3.8 G/DL (ref 3.5–5.2)
ALP SERPL-CCNC: 103 U/L (ref 40–150)
ALT SERPL W P-5'-P-CCNC: 34 U/L (ref 0–70)
ANION GAP SERPL CALCULATED.3IONS-SCNC: 10 MMOL/L (ref 7–15)
AST SERPL W P-5'-P-CCNC: 31 U/L (ref 0–45)
BASOPHILS # BLD AUTO: 0 10E3/UL (ref 0–0.2)
BASOPHILS NFR BLD AUTO: 1 %
BILIRUB SERPL-MCNC: 0.4 MG/DL
BUN SERPL-MCNC: 40 MG/DL (ref 6–20)
CALCIUM SERPL-MCNC: 9.1 MG/DL (ref 8.8–10.4)
CHLORIDE SERPL-SCNC: 108 MMOL/L (ref 98–107)
CREAT SERPL-MCNC: 1.79 MG/DL (ref 0.67–1.17)
CRP SERPL-MCNC: 6.83 MG/L
EGFRCR SERPLBLD CKD-EPI 2021: 43 ML/MIN/1.73M2
EOSINOPHIL # BLD AUTO: 0.2 10E3/UL (ref 0–0.7)
EOSINOPHIL NFR BLD AUTO: 4 %
ERYTHROCYTE [DISTWIDTH] IN BLOOD BY AUTOMATED COUNT: 12.4 % (ref 10–15)
ERYTHROCYTE [SEDIMENTATION RATE] IN BLOOD BY WESTERGREN METHOD: 23 MM/HR (ref 0–20)
GLUCOSE BLDC GLUCOMTR-MCNC: 133 MG/DL (ref 70–99)
GLUCOSE SERPL-MCNC: 133 MG/DL (ref 70–99)
HCO3 SERPL-SCNC: 21 MMOL/L (ref 22–29)
HCT VFR BLD AUTO: 37.1 % (ref 40–53)
HGB BLD-MCNC: 12.4 G/DL (ref 13.3–17.7)
IMM GRANULOCYTES # BLD: 0 10E3/UL
IMM GRANULOCYTES NFR BLD: 0 %
LACTATE SERPL-SCNC: 0.8 MMOL/L (ref 0.7–2)
LYMPHOCYTES # BLD AUTO: 1 10E3/UL (ref 0.8–5.3)
LYMPHOCYTES NFR BLD AUTO: 19 %
MCH RBC QN AUTO: 28.3 PG (ref 26.5–33)
MCHC RBC AUTO-ENTMCNC: 33.4 G/DL (ref 31.5–36.5)
MCV RBC AUTO: 85 FL (ref 78–100)
MONOCYTES # BLD AUTO: 0.3 10E3/UL (ref 0–1.3)
MONOCYTES NFR BLD AUTO: 6 %
NEUTROPHILS # BLD AUTO: 3.9 10E3/UL (ref 1.6–8.3)
NEUTROPHILS NFR BLD AUTO: 71 %
NRBC # BLD AUTO: 0 10E3/UL
NRBC BLD AUTO-RTO: 0 /100
NT-PROBNP SERPL-MCNC: 416 PG/ML (ref 0–177)
PLATELET # BLD AUTO: 178 10E3/UL (ref 150–450)
POTASSIUM SERPL-SCNC: 4.8 MMOL/L (ref 3.4–5.3)
PROT SERPL-MCNC: 6.7 G/DL (ref 6.4–8.3)
RBC # BLD AUTO: 4.38 10E6/UL (ref 4.4–5.9)
SODIUM SERPL-SCNC: 139 MMOL/L (ref 135–145)
WBC # BLD AUTO: 5.6 10E3/UL (ref 4–11)

## 2025-07-07 PROCEDURE — 99222 1ST HOSP IP/OBS MODERATE 55: CPT | Mod: FS | Performed by: STUDENT IN AN ORGANIZED HEALTH CARE EDUCATION/TRAINING PROGRAM

## 2025-07-07 PROCEDURE — 250N000013 HC RX MED GY IP 250 OP 250 PS 637: Performed by: NURSE PRACTITIONER

## 2025-07-07 PROCEDURE — 73630 X-RAY EXAM OF FOOT: CPT | Mod: 26 | Performed by: RADIOLOGY

## 2025-07-07 PROCEDURE — 83880 ASSAY OF NATRIURETIC PEPTIDE: CPT | Performed by: NURSE PRACTITIONER

## 2025-07-07 PROCEDURE — 99285 EMERGENCY DEPT VISIT HI MDM: CPT | Mod: FS | Performed by: EMERGENCY MEDICINE

## 2025-07-07 PROCEDURE — 36415 COLL VENOUS BLD VENIPUNCTURE: CPT | Performed by: PHYSICIAN ASSISTANT

## 2025-07-07 PROCEDURE — 250N000013 HC RX MED GY IP 250 OP 250 PS 637: Performed by: PHYSICIAN ASSISTANT

## 2025-07-07 PROCEDURE — 87205 SMEAR GRAM STAIN: CPT | Performed by: PHYSICIAN ASSISTANT

## 2025-07-07 PROCEDURE — 85004 AUTOMATED DIFF WBC COUNT: CPT | Performed by: PHYSICIAN ASSISTANT

## 2025-07-07 PROCEDURE — 83605 ASSAY OF LACTIC ACID: CPT | Performed by: PHYSICIAN ASSISTANT

## 2025-07-07 PROCEDURE — 80048 BASIC METABOLIC PNL TOTAL CA: CPT | Performed by: PHYSICIAN ASSISTANT

## 2025-07-07 PROCEDURE — 120N000002 HC R&B MED SURG/OB UMMC

## 2025-07-07 PROCEDURE — 93971 EXTREMITY STUDY: CPT | Mod: 26 | Performed by: RADIOLOGY

## 2025-07-07 PROCEDURE — 85652 RBC SED RATE AUTOMATED: CPT | Performed by: PHYSICIAN ASSISTANT

## 2025-07-07 PROCEDURE — 99207 PR APP CREDIT; MD BILLING SHARED VISIT: CPT | Performed by: NURSE PRACTITIONER

## 2025-07-07 PROCEDURE — 73630 X-RAY EXAM OF FOOT: CPT | Mod: RT

## 2025-07-07 PROCEDURE — 36415 COLL VENOUS BLD VENIPUNCTURE: CPT | Performed by: EMERGENCY MEDICINE

## 2025-07-07 PROCEDURE — 87040 BLOOD CULTURE FOR BACTERIA: CPT | Performed by: PHYSICIAN ASSISTANT

## 2025-07-07 PROCEDURE — 86140 C-REACTIVE PROTEIN: CPT | Performed by: PHYSICIAN ASSISTANT

## 2025-07-07 PROCEDURE — 99285 EMERGENCY DEPT VISIT HI MDM: CPT | Mod: 25 | Performed by: EMERGENCY MEDICINE

## 2025-07-07 PROCEDURE — 93971 EXTREMITY STUDY: CPT | Mod: RT

## 2025-07-07 PROCEDURE — 87040 BLOOD CULTURE FOR BACTERIA: CPT | Performed by: EMERGENCY MEDICINE

## 2025-07-07 RX ORDER — ONDANSETRON 4 MG/1
4 TABLET, ORALLY DISINTEGRATING ORAL EVERY 6 HOURS PRN
Status: DISCONTINUED | OUTPATIENT
Start: 2025-07-07 | End: 2025-07-11 | Stop reason: HOSPADM

## 2025-07-07 RX ORDER — PIPERACILLIN SODIUM, TAZOBACTAM SODIUM 4; .5 G/20ML; G/20ML
4.5 INJECTION, POWDER, LYOPHILIZED, FOR SOLUTION INTRAVENOUS ONCE
Status: DISCONTINUED | OUTPATIENT
Start: 2025-07-07 | End: 2025-07-07

## 2025-07-07 RX ORDER — ASPIRIN 81 MG/1
81 TABLET ORAL DAILY
Status: DISCONTINUED | OUTPATIENT
Start: 2025-07-08 | End: 2025-07-11 | Stop reason: HOSPADM

## 2025-07-07 RX ORDER — AMOXICILLIN 250 MG
2 CAPSULE ORAL 2 TIMES DAILY PRN
Status: DISCONTINUED | OUTPATIENT
Start: 2025-07-07 | End: 2025-07-11 | Stop reason: HOSPADM

## 2025-07-07 RX ORDER — NICOTINE POLACRILEX 4 MG
15-30 LOZENGE BUCCAL
Status: DISCONTINUED | OUTPATIENT
Start: 2025-07-07 | End: 2025-07-11 | Stop reason: HOSPADM

## 2025-07-07 RX ORDER — SODIUM BICARBONATE 650 MG/1
650 TABLET ORAL 3 TIMES DAILY
Status: DISCONTINUED | OUTPATIENT
Start: 2025-07-08 | End: 2025-07-11 | Stop reason: HOSPADM

## 2025-07-07 RX ORDER — CALCIUM CARBONATE 500 MG/1
1000 TABLET, CHEWABLE ORAL 4 TIMES DAILY PRN
Status: DISCONTINUED | OUTPATIENT
Start: 2025-07-07 | End: 2025-07-11 | Stop reason: HOSPADM

## 2025-07-07 RX ORDER — POLYETHYLENE GLYCOL 3350 17 G/17G
17 POWDER, FOR SOLUTION ORAL DAILY
Status: DISCONTINUED | OUTPATIENT
Start: 2025-07-08 | End: 2025-07-11 | Stop reason: HOSPADM

## 2025-07-07 RX ORDER — ONDANSETRON 2 MG/ML
4 INJECTION INTRAMUSCULAR; INTRAVENOUS EVERY 6 HOURS PRN
Status: DISCONTINUED | OUTPATIENT
Start: 2025-07-07 | End: 2025-07-11 | Stop reason: HOSPADM

## 2025-07-07 RX ORDER — GABAPENTIN 100 MG/1
100 CAPSULE ORAL 3 TIMES DAILY PRN
Status: DISCONTINUED | OUTPATIENT
Start: 2025-07-07 | End: 2025-07-11 | Stop reason: HOSPADM

## 2025-07-07 RX ORDER — AMOXICILLIN 250 MG
1 CAPSULE ORAL 2 TIMES DAILY PRN
Status: DISCONTINUED | OUTPATIENT
Start: 2025-07-07 | End: 2025-07-11 | Stop reason: HOSPADM

## 2025-07-07 RX ORDER — ATORVASTATIN CALCIUM 40 MG/1
40 TABLET, FILM COATED ORAL DAILY
Status: DISCONTINUED | OUTPATIENT
Start: 2025-07-08 | End: 2025-07-11 | Stop reason: HOSPADM

## 2025-07-07 RX ORDER — DEXTROSE MONOHYDRATE 25 G/50ML
25-50 INJECTION, SOLUTION INTRAVENOUS
Status: DISCONTINUED | OUTPATIENT
Start: 2025-07-07 | End: 2025-07-11 | Stop reason: HOSPADM

## 2025-07-07 RX ORDER — ACETAMINOPHEN 325 MG/1
650 TABLET ORAL EVERY 4 HOURS PRN
Status: DISCONTINUED | OUTPATIENT
Start: 2025-07-07 | End: 2025-07-10

## 2025-07-07 RX ORDER — LISINOPRIL 2.5 MG/1
2.5 TABLET ORAL DAILY
Status: DISCONTINUED | OUTPATIENT
Start: 2025-07-08 | End: 2025-07-11 | Stop reason: HOSPADM

## 2025-07-07 RX ADMIN — AMOXICILLIN AND CLAVULANATE POTASSIUM 1 TABLET: 875; 125 TABLET, COATED ORAL at 21:38

## 2025-07-07 RX ADMIN — ACETAMINOPHEN 650 MG: 325 TABLET ORAL at 23:26

## 2025-07-07 ASSESSMENT — COLUMBIA-SUICIDE SEVERITY RATING SCALE - C-SSRS
2. HAVE YOU ACTUALLY HAD ANY THOUGHTS OF KILLING YOURSELF IN THE PAST MONTH?: NO
1. IN THE PAST MONTH, HAVE YOU WISHED YOU WERE DEAD OR WISHED YOU COULD GO TO SLEEP AND NOT WAKE UP?: NO
6. HAVE YOU EVER DONE ANYTHING, STARTED TO DO ANYTHING, OR PREPARED TO DO ANYTHING TO END YOUR LIFE?: NO

## 2025-07-07 ASSESSMENT — ACTIVITIES OF DAILY LIVING (ADL)
ADLS_ACUITY_SCORE: 50
ADLS_ACUITY_SCORE: 27
ADLS_ACUITY_SCORE: 50

## 2025-07-07 NOTE — PROGRESS NOTES
This patient is a 59 year old male with DMII who presents with diabetic foot ulcer since 6/1 involving the R foot. No reported trauma. Patient reports good glycemic control with last A1C on 06/12 of 5.3.    Orthopedics was consulted by the ED for management of this patients R foot ulcer. Patient says he first noticed the ulcer initially on June 1st. States it initially looked like a blister but has progressed since then. Saw his PCP the middle of June and at that point it looked more like a scab and he believed the wound was improving. On 7/3, patient decided to go to urgent care due to the wound not improving and actually worsening by that point. There, he was put on Augmentin BID and told to follow up in the ED the following week if the wound was not improving. He presented today based on the urgent care guidance and his wife believing the wound was worsening. He had taken a total of 8 Augmentin doses at the time of arrival to the ED and has seen no improvement in the wound.    Patient is not septic upon speaking with him today, 0/4 SIRS criteria. On exam, he does have an ulcer on the dorsal surface of the second toe, near the DIP, Celis stage 2. There appeared to be a tunnel in the center of the ulcer and possible bone could be seen on exam. Patient denied having any sensation at the ulcer. He denies sensation in his RLE, ranging from the middle of his lower leg all the way down to his foot. XR in the emergency department did not show any signs of osteomyelitis or necrotizing fascitis.    After speaking with the ED, they have decided to admit the patient to Medicine for further management of this wound. We will have podiatry follow up with the patient tomorrow to give their recommendations on further management. Patient is stable and currently no showing any signs infection at the site of his ulcer. We will make patient NPO at midnight and evaluate again in the morning.

## 2025-07-07 NOTE — ED PROVIDER NOTES
ED Provider Note  [facility]      History     Chief Complaint   Patient presents with    Wound Check     HPI  Thomas Gonzales is a 59 year old male past medical history significant for type 2 diabetes, CAD, diabetic polyneuropathy, peripheral vascular disease, history of diabetic ulcers, IBS, CKD 3 who presents the emergency department with concerns for foot wound.    Per chart review patient was seen in the urgent care 7/3/2025 with concerns for right second toe foot wound x 1 month.  On his presentation the area was noted to have a shallow ulcer with right foot to moderate swelling and erythema, without warmth fluctuance or findings to suggest abscess.  X-ray was obtained of the foot which shows no obvious findings for osteo.  Patient was discharged with diagnosis of right foot cellulitis and placed on Augmentin with recommendations of following up closely with podiatry or orthopedics.    Patient presents noting that since being seen on Thursday he has had actual increasing redness and discharge from the affected toe.  He notes no associated systemic symptoms including fever chills body aches nausea or vomiting.  He has baseline decreased sensation in his digits at baseline.  He notes no missed doses of Augmentin.  No other concerns.    Physical Exam   BP: 121/76  Pulse: 68  Temp: 98.9  F (37.2  C)  Resp: 18  SpO2: 99 %  Physical Exam      GENERAL APPEARANCE: The patient is well developed, well appearing, and in no acute distress.  HEAD:  Normocephalic.  EENT: Voice normal.  NECK: Trachea is midline.  Uvula midline without exudates  LUNGS: Breath sounds are equal and clear bilaterally. No wheezes, rhonchi, or rales.  HEART: Regular rate and normal rhythm.   EXTREMITIES: Exam of the right second toe shows maceration noted at the distal aspect of the digit with superimposed erythema.  There is visible ulceration noted to the medial aspect of the distal pad of the second toe.  Nail is  missing.  There is purulence I am able to express from the proximal nail fold.  Patient has good cap refill less than 2 seconds of the second digit.  Wound sent for culture.  Remainder of right foot and left foot without open wounds.  PT pulse 2+ on the right.  NEUROLOGIC: No focal sensory or motor deficits are noted.  Patient has decree sensation to digits 1 through 5 of the right foot.  PSYCHIATRIC: The patient is awake, alert.  Appropriate mood and affect.  SKIN: Warm, dry, and well perfused.                      ED Course, Procedures, & Data       IV Antibiotics given and/or elevated Lactate of 0 and no sepsis note found - Delete this reminder and enter the sepsis note or '.edcms' before signing chart.>>>        Medications   piperacillin-tazobactam (ZOSYN) 4.5 g vial to attach to  mL bag (has no administration in time range)   pharmacy alert - intermittent dosing (has no administration in time range)   vancomycin (VANCOCIN) 1,500 mg in 0.9% NaCl 250 mL intermittent infusion (has no administration in time range)   vancomycin place clancy - receiving intermittent dosing (has no administration in time range)          Critical care was not performed.     Medical Decision Making  The patient's presentation was of high complexity (a chronic illness severe exacerbation, progression, or side effect of treatment).    The patient's evaluation involved:  ordering and/or review of 2 test(s) in this encounter (see separate area of note for details)  discussion of management or test interpretation with another health professional (triage hospitalist)    The patient's management necessitated high risk (a decision regarding hospitalization).    Assessment & Plan    This is a 59-year-old male with known history of diabetes and history of prior foot infections presenting with concerns for increasing redness and drainage in the setting of recent diagnosis of right foot cellulitis on 6 doses of Augmentin prior to arrival.   On presentation vital signs normal.  Patient afebrile without tachycardia.  Examination of the right second toe does show findings consistent with soft tissue skin infection.  There is purulent/bloody discharge noted from the proximal nail fold.  He has good cap refill.  Discussed with patient recommendations for infectious workup including inflammatory markers blood cultures.  As he has been on Augmentin now with no significant improvement of symptoms do feel admission is warranted.  Case briefly discussed with orthopedics who recommends against IV antibiotics, recommends obtaining repeat x-ray of the foot, and continuing the Augmentin with plans for n.p.o. at midnight and possible surgical intervention tomorrow.    Labs here reviewed CBC without leukocytosis or anemia.  Chemistry shows creatinine similar to prior, no clinically significant electrolyte disturbances.  CRP mildly elevated 6.83.  Sed rate 23.  Preliminary wound culture shows 1+ gram-positive cocci.  Blood cultures pending.  Foot x-ray shows no obvious findings for osteo-.  Patient will be admitted to medicine service, [facility] when bed available.  Case discussed with the hospitalist.    Patient seen and discussed with attending physician , who agrees with my plan of care.      I have reviewed the nursing notes. I have reviewed the findings, diagnosis, plan and need for follow up with the patient.    New Prescriptions    No medications on file       Final diagnoses:   Diabetic foot infection (H)   Cellulitis of foot       JOSEPH Tim  [facility] EMERGENCY DEPARTMENT  7/7/2025    ---------    --    ED Attending Physician Attestation    I Evans Andrea MD, cared for this patient with the Resident. I have performed a history and physical examination of the patient and discussed management with the resident. I reviewed the resident's documentation above and agree with the documented findings and plan of care.    Summary of  HPI, PE, ED Course     Assessment and plan: Soft tissue foot infection with open ulcer on second toe refractory to outpatient oral antibiotics.  No systemic symptoms but worsening soft tissue infection   - Labs, IV antibiotics with vancomycin and Zosyn for MRSA and healthcare acquired for resistant microbes   - Hospital admission      Evans Andrea MD  Emergency Medicine        AlisaBharti ferrell, JUSTIN  07/07/25 [id number]

## 2025-07-07 NOTE — ED TRIAGE NOTES
Pt c/o ulcer on second toe of right foot  Ulcer has been there for 2 weeks, has been trying creams and oral antibiotics at home with no improvement.

## 2025-07-08 ENCOUNTER — APPOINTMENT (OUTPATIENT)
Dept: CARDIOLOGY | Facility: CLINIC | Age: 60
DRG: 617 | End: 2025-07-08
Attending: STUDENT IN AN ORGANIZED HEALTH CARE EDUCATION/TRAINING PROGRAM
Payer: COMMERCIAL

## 2025-07-08 ENCOUNTER — ANESTHESIA EVENT (OUTPATIENT)
Dept: SURGERY | Facility: CLINIC | Age: 60
End: 2025-07-08
Payer: COMMERCIAL

## 2025-07-08 LAB
ERYTHROCYTE [DISTWIDTH] IN BLOOD BY AUTOMATED COUNT: 12.3 % (ref 10–15)
GLUCOSE BLDC GLUCOMTR-MCNC: 108 MG/DL (ref 70–99)
GLUCOSE BLDC GLUCOMTR-MCNC: 116 MG/DL (ref 70–99)
GLUCOSE BLDC GLUCOMTR-MCNC: 119 MG/DL (ref 70–99)
GLUCOSE BLDC GLUCOMTR-MCNC: 178 MG/DL (ref 70–99)
HCT VFR BLD AUTO: 35.7 % (ref 40–53)
HGB BLD-MCNC: 11.8 G/DL (ref 13.3–17.7)
HOLD SPECIMEN: NORMAL
LVEF ECHO: NORMAL
MCH RBC QN AUTO: 28.4 PG (ref 26.5–33)
MCHC RBC AUTO-ENTMCNC: 33.1 G/DL (ref 31.5–36.5)
MCV RBC AUTO: 86 FL (ref 78–100)
PLATELET # BLD AUTO: 175 10E3/UL (ref 150–450)
RBC # BLD AUTO: 4.15 10E6/UL (ref 4.4–5.9)
WBC # BLD AUTO: 5 10E3/UL (ref 4–11)

## 2025-07-08 PROCEDURE — 93306 TTE W/DOPPLER COMPLETE: CPT | Mod: 26 | Performed by: STUDENT IN AN ORGANIZED HEALTH CARE EDUCATION/TRAINING PROGRAM

## 2025-07-08 PROCEDURE — G0463 HOSPITAL OUTPT CLINIC VISIT: HCPCS | Mod: 25

## 2025-07-08 PROCEDURE — 250N000013 HC RX MED GY IP 250 OP 250 PS 637: Performed by: NURSE PRACTITIONER

## 2025-07-08 PROCEDURE — 99232 SBSQ HOSP IP/OBS MODERATE 35: CPT | Performed by: STUDENT IN AN ORGANIZED HEALTH CARE EDUCATION/TRAINING PROGRAM

## 2025-07-08 PROCEDURE — 97602 WOUND(S) CARE NON-SELECTIVE: CPT

## 2025-07-08 PROCEDURE — 93306 TTE W/DOPPLER COMPLETE: CPT

## 2025-07-08 PROCEDURE — 99223 1ST HOSP IP/OBS HIGH 75: CPT | Mod: 57 | Performed by: ASSISTANT, PODIATRIC

## 2025-07-08 PROCEDURE — 36415 COLL VENOUS BLD VENIPUNCTURE: CPT | Performed by: NURSE PRACTITIONER

## 2025-07-08 PROCEDURE — 120N000002 HC R&B MED SURG/OB UMMC

## 2025-07-08 PROCEDURE — 85027 COMPLETE CBC AUTOMATED: CPT | Performed by: NURSE PRACTITIONER

## 2025-07-08 PROCEDURE — 250N000013 HC RX MED GY IP 250 OP 250 PS 637: Performed by: PHYSICIAN ASSISTANT

## 2025-07-08 PROCEDURE — 250N000013 HC RX MED GY IP 250 OP 250 PS 637: Performed by: STUDENT IN AN ORGANIZED HEALTH CARE EDUCATION/TRAINING PROGRAM

## 2025-07-08 RX ADMIN — SODIUM BICARBONATE 650 MG TABLET 650 MG: at 08:41

## 2025-07-08 RX ADMIN — SODIUM BICARBONATE 650 MG TABLET 650 MG: at 19:48

## 2025-07-08 RX ADMIN — AMOXICILLIN AND CLAVULANATE POTASSIUM 1 TABLET: 875; 125 TABLET, COATED ORAL at 08:41

## 2025-07-08 RX ADMIN — ACETAMINOPHEN 650 MG: 325 TABLET ORAL at 08:41

## 2025-07-08 RX ADMIN — ACETAMINOPHEN 650 MG: 325 TABLET ORAL at 18:01

## 2025-07-08 RX ADMIN — SODIUM BICARBONATE 650 MG TABLET 650 MG: at 14:58

## 2025-07-08 RX ADMIN — FLUOXETINE HYDROCHLORIDE 30 MG: 10 CAPSULE ORAL at 10:03

## 2025-07-08 RX ADMIN — AMOXICILLIN AND CLAVULANATE POTASSIUM 1 TABLET: 875; 125 TABLET, COATED ORAL at 19:48

## 2025-07-08 RX ADMIN — LISINOPRIL 2.5 MG: 2.5 TABLET ORAL at 10:04

## 2025-07-08 RX ADMIN — ATORVASTATIN CALCIUM 40 MG: 40 TABLET, FILM COATED ORAL at 08:41

## 2025-07-08 RX ADMIN — ACETAMINOPHEN 650 MG: 325 TABLET ORAL at 22:26

## 2025-07-08 ASSESSMENT — ACTIVITIES OF DAILY LIVING (ADL)
ADLS_ACUITY_SCORE: 32
ADLS_ACUITY_SCORE: 33
ADLS_ACUITY_SCORE: 32
ADLS_ACUITY_SCORE: 33
ADLS_ACUITY_SCORE: 33
ADLS_ACUITY_SCORE: 32
ADLS_ACUITY_SCORE: 33
ADLS_ACUITY_SCORE: 32
ADLS_ACUITY_SCORE: 33
ADLS_ACUITY_SCORE: 32
ADLS_ACUITY_SCORE: 32
ADLS_ACUITY_SCORE: 33
ADLS_ACUITY_SCORE: 27
ADLS_ACUITY_SCORE: 32
ADLS_ACUITY_SCORE: 32
ADLS_ACUITY_SCORE: 33
ADLS_ACUITY_SCORE: 32
ADLS_ACUITY_SCORE: 32
ADLS_ACUITY_SCORE: 33
ADLS_ACUITY_SCORE: 33

## 2025-07-08 NOTE — PLAN OF CARE
Goal Outcome Evaluation:      Plan of Care Reviewed With: patient    Overall Patient Progress: no change     Outcome Evaluation: Pt is pleasant, calm, cooperative, able to make his needs known, has call light within reach, and uses it appropriately.    VS: /70 (BP Location: Left arm)   Pulse 75   Temp 99.1  F (37.3  C) (Oral)   Resp 18   Ht 1.829 m (6')   Wt 105.1 kg (231 lb 9.6 oz)   SpO2 99%   BMI 31.41 kg/m     O2: Room Air   Output: Continent x 2   Last BM: 7/8/25   Activity: Independent - podiatrist believes crutches or scooter will be used after amputation for short time   Up for meals? Good appetite; encourage to sit upright for meals   Skin: Abrasion 2nd to right foot w/ redness; otherwise, intact   Pain: Intermittent pain; Tylenol PRN   CMS: A&O x 4; Stable gait; Baseline numbness BUE/BLE & tinging BLE   Dressing: Tegaderm; Betadine 2nd toe right foot w/ gauze & paper tape   Diet: Regular; Thin; Pills intact - NPO except clear liquids after midnight   LDA: PIV 20G left lateral forearm - SL   Plan: Procedure w/ Podiatry 7/9 for 2nd toe amputation; Pain management; & Continue POC   Additional Info:

## 2025-07-08 NOTE — CONSULTS
[facility]  WOC Nurse Inpatient Assessment     Consulted for: Right 2nd toe    Summary: Wound present for 4-5 weeks, noted after wearing tight shoes at a wedding.     7/8: Patient assessed by Dr Andrea Hammond and plans to perform an amputation of the toe tomorrow 7/9/2025.     WOC nurse follow-up plan: signing off    Patient History (according to provider note(s):      Per Mis Aguilar PA-C with Medicine on 7/7/2025: Thomas Gonzales is a 59 year old male admitted on 7/7/2025. He has medical history of diet-controlled type 2 diabetes, diabetic foot ulcers, HLD, CAD, multiple sclerosis, malignant melanoma (excised 2016), celiac disease. He presented to ED for evaluation of right second toe infection which is failing outpatient management. He is admitted to the internal medicine team for management.     Assessment:      Areas visualized during today's visit: Right foot    Foot not assessed today. Patient to have amputation 7/9/2025.        Treatment Plan:     Right 2nd toe wound: Per Podiatry    Orders: Written    RECOMMEND PRIMARY TEAM ORDER: Podiatry consult  Education provided: plan of care  Discussed plan of care with: Patient, Nurse, and Physician  Notify WOC if wound(s) deteriorate.  Nursing to notify the Provider(s) and re-consult the WOC Nurse if new skin concern.    DATA:     Current support surface: Standard  Standard gel mattress (Isoflex)  Containment of urine/stool: Continent of bladder and Continent of bowel  BMI: Body mass index is 31.41 kg/m .   Active diet order: Orders Placed This Encounter      NPO for Procedure/Surgery per Anesthesia Guidelines Except for: Meds; Clear liquids before procedure/surgery: ADULT (Age GREATER than or Equal to 18 years) - Clear liquids 2 hours before procedure/surgery     Output: No intake/output data recorded.     Labs:   Recent Labs   Lab 07/08/25  0738 07/07/25  1555   ALBUMIN  --  3.8   HGB 11.8* 12.4*   WBC 5.0  5.6     Pressure injury risk assessment:   Sensory Perception: 4-->no impairment  Moisture: 4-->rarely moist  Activity: 4-->walks frequently  Mobility: 4-->no limitation  Nutrition: 3-->adequate  Friction and Shear: 3-->no apparent problem  Peng Score: 22    Meagan Mathis RN CWOCN  Pager no longer is use, please contact through [employer] group: [facility]  Dept. Office Number: [phone]

## 2025-07-08 NOTE — CONSULTS
Podiatry Consult Note    Date: July 8, 2025      ASSESSMENT/PLAN:  Patient seen an evaluated today at bedside, finding and plan discussed with patient today.     X-ray findings are clear for any obvious erosions but clinically is very obvious that this infection goes down to bone, within the wound area.  Is discolored, yellow, green fluid draining from the proximal interphalangeal joint with bone exposed and necrotic tissue locally concerning for deep infection and osteomyelitis.  Given the amount of tissue loss, wound area I did recommend patient consider amputation of the toe for better source control and to prevent reoccurrence due to hammering of digit.    I was able to use the bedside Doppler device to Doppler his pedal pulses on the right. He does have a palpable dorsalis pedis pulse as well as dopplerable flow that is multiphasic to the dorsalis pedis and distally to the 1st and 2nd interspace including the digital arteries at the base of the right second toe promising that there is blood flow and healing potential for an amputation.    Patient is agreeable to this and would like to undergo amputation for more definitive source control as opposed to debridement or antibiotics alone.    Will plan to complete amputation of toe tomorrow pending or availability.    - Please have patient n.p.o. at midnight tonight possible procedure tomorrow  - Hold DVT prophylaxis until postop day 1  - Please have early morning lab draw for data prior to procedure, take glycemic control overnight, sugars under 200 prior to OR  - Hemoglobin greater than 7.0 prior to procedure      Surgery: Amputation right second toe, will add him onto the OR for tomorrow TBD and pending OR availability  Activity/WB: Weightbearing as tolerated today, after procedure will need to stay off of his feet for the first 24 hours then can reassess, may be able to discharge with short cam boot and light duty activity  DME: Pending operation  Antibiotics:  per team/ID  Labs: Recommend trending inflammation markers  Imaging: Reviewed x-rays  Diet: Please have the patient  n.p.o. at midnight tonight  Dressing: Podiatry will complete  Dispo: pending    OBJECTIVE:    IMAGING:  EXAM: XR FOOT RIGHT G/E 3 VIEWS  LOCATION: [facility]  DATE: 7/7/2025     INDICATION: Second toe diabetic foot wound  COMPARISON: 07/03/2025                                                                      IMPRESSION: No discrete osseous erosive or destructive change to suggest osteomyelitis radiographically. Chronic bone loss at the tuft of the first distal phalanx. Mild scattered degenerative arthritis. Arterial calcifications.    /43 (BP Location: Left arm)   Pulse 70   Temp 98.5  F (36.9  C) (Oral)   Resp 18   Ht 1.829 m (6')   Wt 105.1 kg (231 lb 9.6 oz)   SpO2 98%   BMI 31.41 kg/m      Lab Results   Component Value Date    WBC 5.0 07/08/2025    WBC 4.2 02/23/2021     Lab Results   Component Value Date    RBC 4.15 07/08/2025    RBC 4.37 02/23/2021     Lab Results   Component Value Date    HGB 11.8 07/08/2025    HGB 12.2 02/23/2021     Lab Results   Component Value Date    HCT 35.7 07/08/2025    HCT 37.8 02/23/2021     Lab Results   Component Value Date    MCV 86 07/08/2025    MCV 87 02/23/2021     Lab Results   Component Value Date    MCH 28.4 07/08/2025    MCH 27.9 02/23/2021     Lab Results   Component Value Date    MCHC 33.1 07/08/2025    MCHC 32.3 02/23/2021     Lab Results   Component Value Date    RDW 12.3 07/08/2025    RDW 13.6 02/23/2021     Lab Results   Component Value Date     07/08/2025     02/23/2021           Sed Rate   Date Value Ref Range Status   01/17/2021 16 0 - 20 mm/h Final     Erythrocyte Sedimentation Rate   Date Value Ref Range Status   07/07/2025 23 (H) 0 - 20 mm/hr Final     CRP Inflammation   Date Value Ref Range Status   07/07/2025 6.83 (H) <5.00 mg/L Final       No intake or output data in  the 24 hours ending 07/08/25 1259      PHYSICAL EXAM:    General: Patient is in NAD and is AAOx4, communicates appropriately    Derm:  Skin overall is smooth and supple, he does have hair loss to the lower extremity but there is still some pedal hair noted at the base of the toes specifically the 2nd and 3rd toe on the right side.  He has some dry skin and some skin discolorations diffusely to the lower extremity bilateral.  He has a full-thickness ulceration down to level of bone with necrotic bone to the dorsal aspect of the right second toe at the level of the proximal interphalangeal joint, there is green and discolored fluid as well as purulent fluid from within the area, there is local erythema and some slight swelling.                  Vascular:  DP pulse is palpable bilateral  PT pulse is difficult to palpate but dopplerable  Cap refill is less than 2 seconds to toes  Pedal hair is present but sparse    To his right foot his distal dorsalis pedis as well as his entire metatarsal artery in the first interspace is multiphasic, his second intermetatarsal space artery is also multiphasic, and I did get multiphasic signals/arterial flow distally at the base of the second toe and the expected digital artery area.      MSK:  MMT is 5 out of 5 bilateral foot and ankle, no palpable pedal pain    Neuro: Gross sensation is diminished via light touch to pedal nerve branches b/l        HPI:  Patient is a very pleasant 59-year-old male who was admitted second toe.  He has history of well-controlled type 2 diabetes that is controlled with foot ulcers sclerosis diseases well-controlled.  Patient notes that he has been dealing with a wound area on and off over the years to the right second toe, recently has opened up, gotten worse, red, swollen/yellow purulent fluid within the wound.  He denies any fevers or chills recently, and otherwise states he is doing well.  He has been dressing the area simply keeping it covered.   Denies any trauma to the area.      Past Medical History:   Diagnosis Date    CAD (coronary artery disease)     S/p stenting of left circumflex    Cancer (H)     melanoma    Cervical spondylosis with myelopathy 9/15/2023    Depressive disorder 2006    Diabetes mellitus (H)     Multiple sclerosis (H)     Peripheral neuropathy      Social Connections: Unknown (6/10/2025)    Social Connection and Isolation Panel [NHANES]     Frequency of Communication with Friends and Family: Not on file     Frequency of Social Gatherings with Friends and Family: Twice a week     Attends [employer] Services: Not on file     Active Member of Clubs or Organizations: Not on file     Attends Club or Organization Meetings: Not on file     Marital Status: Not on file        Allergies   Allergen Reactions    Shellfish-Derived Products Anaphylaxis    Adhesive Tape      Paper tape is okay  Tegarderm is okay    Gluten Meal      Celiac    Reglan [Metoclopramide] Hives     Past Surgical History:   Procedure Laterality Date    ABDOMEN SURGERY  1/2016, 5/2016    APPENDECTOMY  01/08/2016    BIOPSY  2/2016    To have addl skin excised at [facility]    CATARACT IOL, RT/LT      CHOLECYSTECTOMY  01/08/2016    COLONOSCOPY N/A 09/01/2021    Procedure: COLONOSCOPY, WITH POLYPECTOMY AND BIOPSY;  Surgeon: Kamran Rainey DO;  [place] [facility] [place]    ESOPHAGOSCOPY, GASTROSCOPY, DUODENOSCOPY (EGD), COMBINED N/A 09/01/2021    Procedure: ESOPHAGOGASTRODUODENOSCOPY, WITH BIOPSY;  Surgeon: Kamran Rainey DO;  [place] [facility] [place]    IR LUMBAR PUNCTURE  3/21/2016    penile implant      LEON EN Y BOWEL  01/08/2016    for small bowel ischemia     Family History   Problem Relation Age of Onset    Arrhythmia Mother         bradycardia- pacemaker    Mental Illness Mother     Obesity Mother     Coronary Artery Disease Father     Diabetes Father     Obesity Father

## 2025-07-08 NOTE — PLAN OF CARE
8MS Admission Note    Reason for admission: Infection of toe  Primary team notified of pt arrival.  Admitted from: [facility]  Via: Ambulance  Accompanied by: EMS  Belongings: Placed in closet; valuables sent home with family  Admission Required Doc Completed: Yes  Mobility Devices Utilized by Patient Provided (i.e. walker, wheelchair, etc.): NA  Teaching: Orientation to unit and call light- call light within reach, use of console, meal times, when to call for the RN, and enforced importance of safety.  IV Access: R/PIV  Telemetry:No  Ht./Wt.: Completed  Code Status verified on armband: Yes  2 RN Skin Assessment Completed with: Angie DUMONT, RN  Suction/Ambu bag Yes/Flowmeter at bedside:No    Pt status:   /69   Pulse 68   Temp 98.4  F (36.9  C) (Oral)   Resp 16   SpO2 98%    Goal Outcome Evaluation:    Patient having moderate pain, Has been NPO since 0000. Covered/cleansed wound on toe d/t leaking. Requested tylenol for pain management. Has history of DMll which patient reports is diet controlled, no insulin given/needed.

## 2025-07-08 NOTE — MEDICATION SCRIBE - ADMISSION MEDICATION HISTORY
Medication Scribe Admission Medication History    Admission medication history is complete. The information provided in this note is only as accurate as the sources available at the time of the update.    Information Source(s): Patient via in-person    Pertinent Information:   Patient is prescribed  amoxicillin-clavulanate (AUGMENTIN) 875-125 MG tablet Take 1 tablet by mouth 2 times daily for 10 days., patient started the medication last Thursday, and did take 1 tablet this morning.  Medication dispensed 07/03/2025, 20 units for 10 days. 7 tablets taken 13 left to complete therapy.    Changes made to PTA medication list:  Added: None  Deleted  triamcinolone (KENALOG) 0.1 % external ointment     Apply topically 2 times daily Do not use more than 2 weeks per month :  bismuth subsalicylate 262 MG TABS     Take 4-5 tablets by mouth daily  Patient reported not taking the above medications   Changed: None    Allergies reviewed with patient and updates made in EHR: yes    Medication History Completed By: Amara Davidson 7/7/2025 9:18 PM    PTA Med List   Medication Sig Last Dose/Taking    acetaminophen (TYLENOL) 325 MG tablet Take 2 tablets (650 mg) by mouth every 6 hours as needed for mild pain (and adjunct with moderate or severe pain or per patient request) Unknown    amoxicillin-clavulanate (AUGMENTIN) 875-125 MG tablet Take 1 tablet by mouth 2 times daily for 10 days. 7/7/2025 Morning    aspirin (ASPIRIN LOW DOSE) 81 MG EC tablet Take 1 tablet (81 mg) by mouth daily. 7/7/2025 Morning    atorvastatin (LIPITOR) 40 MG tablet Take 1 tablet (40 mg) by mouth daily. 7/7/2025 Morning    Cholecalciferol (VITAMIN D) 2000 UNITS CAPS Take 2,000 Units by mouth daily 7/7/2025 Morning    FLUoxetine (PROZAC) 10 MG capsule Take 1 capsule (10 mg) by mouth daily. Take daily with 20mg for daily total of 30mg 7/7/2025 Morning    FLUoxetine (PROZAC) 20 MG capsule Take 1 capsule (20 mg) by mouth daily. With 10mg for a daily total of 30mg.  7/7/2025 Morning    lisinopril (ZESTRIL) 2.5 MG tablet Take 1 tablet (2.5 mg) by mouth daily. 7/7/2025 Morning    multivitamin w/minerals (THERA-VIT-M) tablet Take 1 tablet by mouth daily 7/7/2025 Morning    Probiotic Product (PROBIOTIC DAILY) CAPS Take 1 capsule by mouth daily  7/7/2025 Morning    sodium bicarbonate 650 MG tablet Take 1 tablet (650 mg) by mouth 3 times daily. 7/7/2025 Morning

## 2025-07-08 NOTE — PROGRESS NOTES
[facility]    Medicine Progress Note - Hospitalist Service, GOLD TEAM 22    Date of Admission:  7/7/2025    Assessment & Plan   Thomas Gonzales is a 59 year old male admitted on 7/7/2025. He has medical history of diet-controlled type 2 diabetes, diabetic foot ulcers, HLD, CAD, multiple sclerosis, malignant melanoma (excised 2016), celiac disease. He presented to ED for evaluation of right second toe infection which is failing outpatient management. He is admitted to the internal medicine team for management. Podiatry consulted with recommendation for amputation. Tentative plan for surgery on 7/8 and initiation of antibiotics pending intraoperative cultures.        Plan today:   - Follow up podiatry consult -> recommendation for amputation of the affected toe; tentatively scheduled 7/9   - NPO at midnight    - Holding IV antibiotics in the immediate given clinical stability - low threshold if he decompensates.  -Blood sugars remain stable.   -Will plan for EKG and TTE given mildly elevated NT proBNP and plan for OR    #SST infection of 2nd toe of right foot  Ongoing symptoms of toe infection for 4-5 weeks prior to presentation after wearing tight shoes at a wedding. XR in ED without fracture. Interventions prior to hospital presentation included wound cleansing and Augmentin. Patient has history of requiring IV antibiotics for foot infection.      - admission for further evaluation, consultation with podiatry and/or orthopedic teams  -multimodal pain management  -additional imaging pending specialist recommendations, anticipate MRI may be required  -no IV antibiotics prescribed on admission due to lack of systemic symptoms and desire to identify causative organism prior to further treatment if possible.   - PTA Augmentin in the immediate given this was continued on admission per H&P  -follow results of wound culture, blood cultures  -WOCN consulted  - Recommendation  for amputation of the affected toe per Podiatry with tentative plan for OR on 7/9.   - Holding IV antibiotics in the immediate -> plan for antibiotic plan pending IO culture     Preoperative Risk Assessment - No known history of insulin-dependent diabetes, CHF, Ischemic Heart Disease, CVA/TIA, CKD (Cr >2), high risk surgery.  Using the ESCALONA perioperative cardiac risk index, patient has a 0.3% risk of perioperative cardiac complications. RCRI of 1.0 % by history. However, noted mild BNP elevation without clinical symptoms to support heart failure - obtained for unilateral leg swelling. Will plan for EKG and TTE out of abundance of caution and his history of PCI.   - preoperative EKG and TTE pending        # diet-controlled type 2 diabetes with diabetic neuropathy and retinopathy    -Home regimen: diet-managed    -In-hospital regimen:  -Basal Insulin:  none  -Insulin correction sliding scale: medium aspart ss  -Carb coverage: none on admission        Lab Results   Component Value Date     A1C 5.3 06/12/2025     A1C 5.7 01/17/2021      # bilateral LE edema, acute on chronic  Right > left. RLE edema, worsened while sitting in waiting room and without compression stockings that patient normally wears. Possibly worsened edema secondary to infection vs other etiology    -consider HF, ordered BNP. If elevated, would pursue inpatient vs outpatient follow-up    -PCDs ordered    -RLE ultrasound negative for clot     # multiple sclerosis  # autonomic dysfunction secondary to MS  Patient saw Dr. Crenshaw with [facility] neurology 2023. No medications.    -monitor symptoms     # CKD III  Creatinine 1.79 on admission. This appears to be near his recent baseline.    -renally dose medications    -avoid nephrotoxic medications as able    -monitor BMP    -continue sodium bicarb 650mg tabs three times daily    -continue PTA lisinopril 2.5mg daily for renal protective properties (not prescribed for HTN) - holding AM of 7/9     # CAD s/p PCI  (2015)  # HLD    -hold PTA ASA on admission given possible procedural intervention    -continue PTA statin     # MDD    -continue PTA fluoxetine          Diet: Regular Diet Adult Thin Liquids (level 0)  NPO for Procedure/Surgery per Anesthesia Guidelines Except for: Meds; Clear liquids before procedure/surgery: ADULT (Age GREATER than or Equal to 18 years) - Clear liquids 2 hours before procedure/surgery    DVT Prophylaxis: Pneumatic Compression Devices  Martínez Catheter: Not present  Lines: None     Cardiac Monitoring: None  Code Status: Full Code      Clinically Significant Risk Factors Present on Admission          # Hyperchloremia: Highest Cl = 108 mmol/L in last 2 days, will monitor as appropriate            # Drug Induced Platelet Defect: home medication list includes an antiplatelet medication   # Hypertension: Home medication list includes antihypertensive(s)           # Obesity: Estimated body mass index is 31.41 kg/m  as calculated from the following:    Height as of this encounter: 1.829 m (6').    Weight as of this encounter: 105.1 kg (231 lb 9.6 oz).              Social Drivers of Health    Housing Stability: Low Risk  (7/7/2025)    Housing Stability     Do you have housing? : Yes     Are you worried about losing your housing?: No   Recent Concern: Housing Stability - High Risk (6/10/2025)    Housing Stability     Do you have housing? : No     Are you worried about losing your housing?: No   Physical Activity: Insufficiently Active (6/10/2025)    Exercise Vital Sign     Days of Exercise per Week: 2 days     Minutes of Exercise per Session: 20 min   Social Connections: Unknown (6/10/2025)    Social Connection and Isolation Panel [NHANES]     Frequency of Social Gatherings with Friends and Family: Twice a week          Disposition Plan     Medically Ready for Discharge: Anticipated in 2-4 Days             Maru Byers DO  Hospitalist Service, GOLD TEAM 22  [facility]  [facility]  Securely message with [employer] (more info)  Text page via [facility] Paging/Directory   See signed in provider for up to date coverage information  ______________________________________________________________________    Interval History   No acute events overnight. He feels well this morning. Minimal pain in the edematous areas of the R toes and foot, 2nd toe itself is not painful at rest. He denies fevers, chills, nausea, emesis, abdominal pain or other concerns. He is able to bear weight on the R foot.       Physical Exam   Vital Signs: Temp: 98.4  F (36.9  C) Temp src: Oral BP: 135/69 Pulse: 68   Resp: 16 SpO2: 98 % O2 Device: None (Room air)    Weight: 231 lbs 9.6 oz    General: well developed, awake, alert, no acute distress  HEENT: sclera clear, MMM  NECK: Supple, with no masses, no rigidity   CV: RRR, no m/r/g. Extremities are warm and well perfused.   LUNGS: CTAB, no w/r/c.  ABD: Soft, NT/ND  SKIN: see media tab for R 2nd toe wound   MSK: R foot and ankle with 1+ edema  NEURO: Alert and oriented. No focal deficits.      Medical Decision Making           Data     I have personally reviewed the following data over the past 24 hrs:    5.0  \   11.8 (L)   / 175     139 108 (H) 40.0 (H) /  178 (H)   4.8 21 (L) 1.79 (H) \     ALT: 34 AST: 31 AP: 103 TBILI: 0.4   ALB: 3.8 TOT PROTEIN: 6.7 LIPASE: N/A     Trop: N/A BNP: 416 (H)     Procal: N/A CRP: 6.83 (H) Lactic Acid: 0.8         Imaging results reviewed over the past 24 hrs:   Recent Results (from the past 24 hours)   Foot  XR, G/E 3 views, right    Narrative    EXAM: XR FOOT RIGHT G/E 3 VIEWS  LOCATION: [facility]  DATE: 7/7/2025    INDICATION: Second toe diabetic foot wound  COMPARISON: 07/03/2025      Impression    IMPRESSION: No discrete osseous erosive or destructive change to suggest osteomyelitis radiographically. Chronic bone loss at the tuft of the first distal phalanx. Mild scattered degenerative  arthritis. Arterial calcifications.   US Lower Extremity Venous Duplex Right    Narrative    EXAM: US LOWER EXTREMITY VENOUS DUPLEX RIGHT  LOCATION: [facility]  DATE: 7/7/2025    INDICATION: Acute on chronic right lower extremity edema.  COMPARISON: 11/21/2015.  TECHNIQUE: Venous Duplex ultrasound of the right lower extremity with and without compression, augmentation and duplex. Color flow and spectral Doppler with waveform analysis performed.    FINDINGS: Exam includes the common femoral, femoral, popliteal, and contralateral common femoral veins as well as segmentally visualized deep calf veins and greater saphenous vein.     RIGHT: No deep vein thrombosis. No superficial thrombophlebitis.       Impression    IMPRESSION:  No deep venous thrombosis in the visualized right lower extremity.

## 2025-07-09 ENCOUNTER — ANESTHESIA (OUTPATIENT)
Dept: SURGERY | Facility: CLINIC | Age: 60
End: 2025-07-09
Payer: COMMERCIAL

## 2025-07-09 LAB
ANION GAP SERPL CALCULATED.3IONS-SCNC: 8 MMOL/L (ref 7–15)
ATRIAL RATE - MUSE: 68 BPM
BACTERIA SPEC CULT: NORMAL
BACTERIA WND CULT: ABNORMAL
BUN SERPL-MCNC: 32.9 MG/DL (ref 6–20)
CALCIUM SERPL-MCNC: 8.9 MG/DL (ref 8.8–10.4)
CHLORIDE SERPL-SCNC: 108 MMOL/L (ref 98–107)
CREAT SERPL-MCNC: 1.59 MG/DL (ref 0.67–1.17)
CRP SERPL-MCNC: <3 MG/L
DIASTOLIC BLOOD PRESSURE - MUSE: NORMAL MMHG
EGFRCR SERPLBLD CKD-EPI 2021: 50 ML/MIN/1.73M2
ERYTHROCYTE [DISTWIDTH] IN BLOOD BY AUTOMATED COUNT: 12.2 % (ref 10–15)
GLUCOSE BLDC GLUCOMTR-MCNC: 118 MG/DL (ref 70–99)
GLUCOSE BLDC GLUCOMTR-MCNC: 119 MG/DL (ref 70–99)
GLUCOSE BLDC GLUCOMTR-MCNC: 121 MG/DL (ref 70–99)
GLUCOSE BLDC GLUCOMTR-MCNC: 123 MG/DL (ref 70–99)
GLUCOSE BLDC GLUCOMTR-MCNC: 172 MG/DL (ref 70–99)
GLUCOSE SERPL-MCNC: 123 MG/DL (ref 70–99)
GRAM STAIN RESULT: ABNORMAL
GRAM STAIN RESULT: ABNORMAL
GRAM STAIN RESULT: NORMAL
GRAM STAIN RESULT: NORMAL
HCO3 SERPL-SCNC: 23 MMOL/L (ref 22–29)
HCT VFR BLD AUTO: 34.2 % (ref 40–53)
HGB BLD-MCNC: 11.5 G/DL (ref 13.3–17.7)
INTERPRETATION ECG - MUSE: NORMAL
MCH RBC QN AUTO: 28.5 PG (ref 26.5–33)
MCHC RBC AUTO-ENTMCNC: 33.6 G/DL (ref 31.5–36.5)
MCV RBC AUTO: 85 FL (ref 78–100)
P AXIS - MUSE: 25 DEGREES
PLATELET # BLD AUTO: 151 10E3/UL (ref 150–450)
POTASSIUM SERPL-SCNC: 5.1 MMOL/L (ref 3.4–5.3)
PR INTERVAL - MUSE: 202 MS
QRS DURATION - MUSE: 102 MS
QT - MUSE: 370 MS
QTC - MUSE: 393 MS
R AXIS - MUSE: -5 DEGREES
RBC # BLD AUTO: 4.03 10E6/UL (ref 4.4–5.9)
SODIUM SERPL-SCNC: 139 MMOL/L (ref 135–145)
SYSTOLIC BLOOD PRESSURE - MUSE: NORMAL MMHG
T AXIS - MUSE: 41 DEGREES
VENTRICULAR RATE- MUSE: 68 BPM
WBC # BLD AUTO: 4.2 10E3/UL (ref 4–11)

## 2025-07-09 PROCEDURE — 87102 FUNGUS ISOLATION CULTURE: CPT | Performed by: ASSISTANT, PODIATRIC

## 2025-07-09 PROCEDURE — 88311 DECALCIFY TISSUE: CPT | Mod: 26 | Performed by: PATHOLOGY

## 2025-07-09 PROCEDURE — 250N000011 HC RX IP 250 OP 636: Performed by: ASSISTANT, PODIATRIC

## 2025-07-09 PROCEDURE — 250N000009 HC RX 250: Performed by: NURSE ANESTHETIST, CERTIFIED REGISTERED

## 2025-07-09 PROCEDURE — 87075 CULTR BACTERIA EXCEPT BLOOD: CPT | Performed by: ASSISTANT, PODIATRIC

## 2025-07-09 PROCEDURE — 258N000003 HC RX IP 258 OP 636: Performed by: NURSE ANESTHETIST, CERTIFIED REGISTERED

## 2025-07-09 PROCEDURE — 250N000012 HC RX MED GY IP 250 OP 636 PS 637: Performed by: NURSE PRACTITIONER

## 2025-07-09 PROCEDURE — 250N000011 HC RX IP 250 OP 636: Performed by: NURSE ANESTHETIST, CERTIFIED REGISTERED

## 2025-07-09 PROCEDURE — 28820 AMPUTATION OF TOE: CPT | Mod: T6 | Performed by: ASSISTANT, PODIATRIC

## 2025-07-09 PROCEDURE — 710N000010 HC RECOVERY PHASE 1, LEVEL 2, PER MIN: Performed by: ASSISTANT, PODIATRIC

## 2025-07-09 PROCEDURE — 36415 COLL VENOUS BLD VENIPUNCTURE: CPT | Performed by: STUDENT IN AN ORGANIZED HEALTH CARE EDUCATION/TRAINING PROGRAM

## 2025-07-09 PROCEDURE — 87205 SMEAR GRAM STAIN: CPT | Performed by: ASSISTANT, PODIATRIC

## 2025-07-09 PROCEDURE — 88311 DECALCIFY TISSUE: CPT | Mod: TC | Performed by: ASSISTANT, PODIATRIC

## 2025-07-09 PROCEDURE — 120N000002 HC R&B MED SURG/OB UMMC

## 2025-07-09 PROCEDURE — 370N000017 HC ANESTHESIA TECHNICAL FEE, PER MIN: Performed by: ASSISTANT, PODIATRIC

## 2025-07-09 PROCEDURE — 0Y6R0Z0 DETACHMENT AT RIGHT 2ND TOE, COMPLETE, OPEN APPROACH: ICD-10-PCS | Performed by: ASSISTANT, PODIATRIC

## 2025-07-09 PROCEDURE — 80048 BASIC METABOLIC PNL TOTAL CA: CPT | Performed by: STUDENT IN AN ORGANIZED HEALTH CARE EDUCATION/TRAINING PROGRAM

## 2025-07-09 PROCEDURE — 250N000013 HC RX MED GY IP 250 OP 250 PS 637: Performed by: NURSE PRACTITIONER

## 2025-07-09 PROCEDURE — 250N000013 HC RX MED GY IP 250 OP 250 PS 637: Performed by: STUDENT IN AN ORGANIZED HEALTH CARE EDUCATION/TRAINING PROGRAM

## 2025-07-09 PROCEDURE — 250N000009 HC RX 250: Performed by: ASSISTANT, PODIATRIC

## 2025-07-09 PROCEDURE — 272N000001 HC OR GENERAL SUPPLY STERILE: Performed by: ASSISTANT, PODIATRIC

## 2025-07-09 PROCEDURE — 99223 1ST HOSP IP/OBS HIGH 75: CPT | Mod: FS | Performed by: STUDENT IN AN ORGANIZED HEALTH CARE EDUCATION/TRAINING PROGRAM

## 2025-07-09 PROCEDURE — 99232 SBSQ HOSP IP/OBS MODERATE 35: CPT | Performed by: STUDENT IN AN ORGANIZED HEALTH CARE EDUCATION/TRAINING PROGRAM

## 2025-07-09 PROCEDURE — 250N000011 HC RX IP 250 OP 636: Performed by: STUDENT IN AN ORGANIZED HEALTH CARE EDUCATION/TRAINING PROGRAM

## 2025-07-09 PROCEDURE — 85014 HEMATOCRIT: CPT | Performed by: STUDENT IN AN ORGANIZED HEALTH CARE EDUCATION/TRAINING PROGRAM

## 2025-07-09 PROCEDURE — 999N000141 HC STATISTIC PRE-PROCEDURE NURSING ASSESSMENT: Performed by: ASSISTANT, PODIATRIC

## 2025-07-09 PROCEDURE — 88305 TISSUE EXAM BY PATHOLOGIST: CPT | Mod: 26 | Performed by: PATHOLOGY

## 2025-07-09 PROCEDURE — 87070 CULTURE OTHR SPECIMN AEROBIC: CPT | Performed by: ASSISTANT, PODIATRIC

## 2025-07-09 PROCEDURE — 86140 C-REACTIVE PROTEIN: CPT | Performed by: STUDENT IN AN ORGANIZED HEALTH CARE EDUCATION/TRAINING PROGRAM

## 2025-07-09 PROCEDURE — 360N000075 HC SURGERY LEVEL 2, PER MIN: Performed by: ASSISTANT, PODIATRIC

## 2025-07-09 RX ORDER — FENTANYL CITRATE 50 UG/ML
INJECTION, SOLUTION INTRAMUSCULAR; INTRAVENOUS PRN
Status: DISCONTINUED | OUTPATIENT
Start: 2025-07-09 | End: 2025-07-09

## 2025-07-09 RX ORDER — ONDANSETRON 4 MG/1
4 TABLET, ORALLY DISINTEGRATING ORAL EVERY 30 MIN PRN
Status: DISCONTINUED | OUTPATIENT
Start: 2025-07-09 | End: 2025-07-09 | Stop reason: HOSPADM

## 2025-07-09 RX ORDER — CEFAZOLIN SODIUM/WATER 2 G/20 ML
SYRINGE (ML) INTRAVENOUS PRN
Status: DISCONTINUED | OUTPATIENT
Start: 2025-07-09 | End: 2025-07-09

## 2025-07-09 RX ORDER — HYDROMORPHONE HYDROCHLORIDE 1 MG/ML
0.4 INJECTION, SOLUTION INTRAMUSCULAR; INTRAVENOUS; SUBCUTANEOUS EVERY 5 MIN PRN
Status: DISCONTINUED | OUTPATIENT
Start: 2025-07-09 | End: 2025-07-09 | Stop reason: HOSPADM

## 2025-07-09 RX ORDER — SODIUM CHLORIDE, SODIUM LACTATE, POTASSIUM CHLORIDE, CALCIUM CHLORIDE 600; 310; 30; 20 MG/100ML; MG/100ML; MG/100ML; MG/100ML
INJECTION, SOLUTION INTRAVENOUS CONTINUOUS PRN
Status: DISCONTINUED | OUTPATIENT
Start: 2025-07-09 | End: 2025-07-09

## 2025-07-09 RX ORDER — LIDOCAINE HYDROCHLORIDE 20 MG/ML
INJECTION, SOLUTION INFILTRATION; PERINEURAL PRN
Status: DISCONTINUED | OUTPATIENT
Start: 2025-07-09 | End: 2025-07-09

## 2025-07-09 RX ORDER — PROPOFOL 10 MG/ML
INJECTION, EMULSION INTRAVENOUS CONTINUOUS PRN
Status: DISCONTINUED | OUTPATIENT
Start: 2025-07-09 | End: 2025-07-09

## 2025-07-09 RX ORDER — ONDANSETRON 2 MG/ML
4 INJECTION INTRAMUSCULAR; INTRAVENOUS EVERY 30 MIN PRN
Status: DISCONTINUED | OUTPATIENT
Start: 2025-07-09 | End: 2025-07-09 | Stop reason: HOSPADM

## 2025-07-09 RX ORDER — NALOXONE HYDROCHLORIDE 0.4 MG/ML
0.4 INJECTION, SOLUTION INTRAMUSCULAR; INTRAVENOUS; SUBCUTANEOUS
Status: DISCONTINUED | OUTPATIENT
Start: 2025-07-09 | End: 2025-07-11 | Stop reason: HOSPADM

## 2025-07-09 RX ORDER — FENTANYL CITRATE 50 UG/ML
25 INJECTION, SOLUTION INTRAMUSCULAR; INTRAVENOUS EVERY 5 MIN PRN
Status: DISCONTINUED | OUTPATIENT
Start: 2025-07-09 | End: 2025-07-09 | Stop reason: HOSPADM

## 2025-07-09 RX ORDER — ENOXAPARIN SODIUM 100 MG/ML
40 INJECTION SUBCUTANEOUS EVERY 24 HOURS
Status: DISCONTINUED | OUTPATIENT
Start: 2025-07-09 | End: 2025-07-11 | Stop reason: HOSPADM

## 2025-07-09 RX ORDER — DEXAMETHASONE SODIUM PHOSPHATE 4 MG/ML
4 INJECTION, SOLUTION INTRA-ARTICULAR; INTRALESIONAL; INTRAMUSCULAR; INTRAVENOUS; SOFT TISSUE
Status: DISCONTINUED | OUTPATIENT
Start: 2025-07-09 | End: 2025-07-09 | Stop reason: HOSPADM

## 2025-07-09 RX ORDER — ONDANSETRON 2 MG/ML
INJECTION INTRAMUSCULAR; INTRAVENOUS PRN
Status: DISCONTINUED | OUTPATIENT
Start: 2025-07-09 | End: 2025-07-09

## 2025-07-09 RX ORDER — PROPOFOL 10 MG/ML
INJECTION, EMULSION INTRAVENOUS PRN
Status: DISCONTINUED | OUTPATIENT
Start: 2025-07-09 | End: 2025-07-09

## 2025-07-09 RX ORDER — NALOXONE HYDROCHLORIDE 0.4 MG/ML
0.1 INJECTION, SOLUTION INTRAMUSCULAR; INTRAVENOUS; SUBCUTANEOUS
Status: DISCONTINUED | OUTPATIENT
Start: 2025-07-09 | End: 2025-07-09 | Stop reason: HOSPADM

## 2025-07-09 RX ORDER — FENTANYL CITRATE 50 UG/ML
50 INJECTION, SOLUTION INTRAMUSCULAR; INTRAVENOUS EVERY 5 MIN PRN
Status: DISCONTINUED | OUTPATIENT
Start: 2025-07-09 | End: 2025-07-09 | Stop reason: HOSPADM

## 2025-07-09 RX ORDER — SODIUM CHLORIDE, SODIUM LACTATE, POTASSIUM CHLORIDE, CALCIUM CHLORIDE 600; 310; 30; 20 MG/100ML; MG/100ML; MG/100ML; MG/100ML
INJECTION, SOLUTION INTRAVENOUS CONTINUOUS
Status: DISCONTINUED | OUTPATIENT
Start: 2025-07-09 | End: 2025-07-09 | Stop reason: HOSPADM

## 2025-07-09 RX ORDER — HYDROMORPHONE HYDROCHLORIDE 1 MG/ML
0.2 INJECTION, SOLUTION INTRAMUSCULAR; INTRAVENOUS; SUBCUTANEOUS EVERY 5 MIN PRN
Status: DISCONTINUED | OUTPATIENT
Start: 2025-07-09 | End: 2025-07-09 | Stop reason: HOSPADM

## 2025-07-09 RX ORDER — NALOXONE HYDROCHLORIDE 0.4 MG/ML
0.2 INJECTION, SOLUTION INTRAMUSCULAR; INTRAVENOUS; SUBCUTANEOUS
Status: DISCONTINUED | OUTPATIENT
Start: 2025-07-09 | End: 2025-07-11 | Stop reason: HOSPADM

## 2025-07-09 RX ADMIN — FENTANYL CITRATE 25 MCG: 50 INJECTION INTRAMUSCULAR; INTRAVENOUS at 10:11

## 2025-07-09 RX ADMIN — ATORVASTATIN CALCIUM 40 MG: 40 TABLET, FILM COATED ORAL at 08:11

## 2025-07-09 RX ADMIN — SODIUM BICARBONATE 650 MG TABLET 650 MG: at 13:50

## 2025-07-09 RX ADMIN — FLUOXETINE HYDROCHLORIDE 30 MG: 10 CAPSULE ORAL at 08:10

## 2025-07-09 RX ADMIN — PHENYLEPHRINE HYDROCHLORIDE 50 MCG: 10 INJECTION INTRAVENOUS at 10:21

## 2025-07-09 RX ADMIN — ENOXAPARIN SODIUM 40 MG: 40 INJECTION SUBCUTANEOUS at 22:07

## 2025-07-09 RX ADMIN — SODIUM BICARBONATE 650 MG TABLET 650 MG: at 08:10

## 2025-07-09 RX ADMIN — FENTANYL CITRATE 25 MCG: 50 INJECTION INTRAMUSCULAR; INTRAVENOUS at 10:06

## 2025-07-09 RX ADMIN — PROPOFOL 100 MCG/KG/MIN: 10 INJECTION, EMULSION INTRAVENOUS at 10:06

## 2025-07-09 RX ADMIN — Medication 2 G: at 09:58

## 2025-07-09 RX ADMIN — INSULIN ASPART 1 UNITS: 100 INJECTION, SOLUTION INTRAVENOUS; SUBCUTANEOUS at 18:36

## 2025-07-09 RX ADMIN — PHENYLEPHRINE HYDROCHLORIDE 50 MCG: 10 INJECTION INTRAVENOUS at 10:35

## 2025-07-09 RX ADMIN — SODIUM CHLORIDE, SODIUM LACTATE, POTASSIUM CHLORIDE, AND CALCIUM CHLORIDE: .6; .31; .03; .02 INJECTION, SOLUTION INTRAVENOUS at 09:58

## 2025-07-09 RX ADMIN — SODIUM BICARBONATE 650 MG TABLET 650 MG: at 20:45

## 2025-07-09 RX ADMIN — LIDOCAINE HYDROCHLORIDE 40 MG: 20 INJECTION, SOLUTION INFILTRATION; PERINEURAL at 10:06

## 2025-07-09 RX ADMIN — MIDAZOLAM 2 MG: 1 INJECTION INTRAMUSCULAR; INTRAVENOUS at 09:58

## 2025-07-09 RX ADMIN — HYDROMORPHONE HYDROCHLORIDE 1 MG: 2 TABLET ORAL at 22:08

## 2025-07-09 RX ADMIN — ACETAMINOPHEN 650 MG: 325 TABLET ORAL at 04:56

## 2025-07-09 RX ADMIN — PROPOFOL 50 MG: 10 INJECTION, EMULSION INTRAVENOUS at 10:06

## 2025-07-09 RX ADMIN — ONDANSETRON 4 MG: 2 INJECTION INTRAMUSCULAR; INTRAVENOUS at 10:29

## 2025-07-09 RX ADMIN — AMOXICILLIN AND CLAVULANATE POTASSIUM 1 TABLET: 875; 125 TABLET, COATED ORAL at 08:10

## 2025-07-09 RX ADMIN — ACETAMINOPHEN 650 MG: 325 TABLET ORAL at 16:49

## 2025-07-09 RX ADMIN — AMOXICILLIN AND CLAVULANATE POTASSIUM 1 TABLET: 875; 125 TABLET, COATED ORAL at 20:45

## 2025-07-09 ASSESSMENT — ACTIVITIES OF DAILY LIVING (ADL)
ADLS_ACUITY_SCORE: 34
ADLS_ACUITY_SCORE: 38
ADLS_ACUITY_SCORE: 34
ADLS_ACUITY_SCORE: 38
ADLS_ACUITY_SCORE: 38
ADLS_ACUITY_SCORE: 34
ADLS_ACUITY_SCORE: 34
ADLS_ACUITY_SCORE: 38
ADLS_ACUITY_SCORE: 34
ADLS_ACUITY_SCORE: 38
ADLS_ACUITY_SCORE: 34
ADLS_ACUITY_SCORE: 34
ADLS_ACUITY_SCORE: 38

## 2025-07-09 NOTE — PLAN OF CARE
Goal Outcome Evaluation:      Plan of Care Reviewed With: patient    Overall Patient Progress: no changeOverall Patient Progress: no change    A&Ox4. CMS intact. Denies chills, nausea,vomiting, SOB, and chest pain.  Up ind ambulating. voiding adequate amounts. Dressing to R/toe C/D/I  Pain: 4-5/10- managed with tylenol.    Shift events: NPO @0000,CHG wipes done.  Continue with POC.    BP 96/58 (BP Location: Left arm)   Pulse 76   Temp 99.6  F (37.6  C) (Oral)   Resp 18   Ht 1.829 m (6')   Wt 105.1 kg (231 lb 9.6 oz)   SpO2 100%   BMI 31.41 kg/m

## 2025-07-09 NOTE — CONSULTS
[facility] [place] GENERAL INFECTIOUS DISEASES CONSULTATION     Patient:  Thomas Gonzales   Date of birth 1965, Medical record number [id number]  Date of Visit:  07/09/2025  Date of Admission: 7/7/2025  Consult Requester:Maru Byers DO          Assessment and Recommendations:   ID Problem List  Right 2nd toe infected diabetic foot ulcer, moderate with cellulitis and exposed bone c/f osteomyelitis  S/p amputation 7/9/25  Type 2 DM (A1C = 5.3 in June 2025)  S/p Castro-en-Y bypass  History of LLE cellulitis 11/2022 s/p doxycycline x10 days  History of R great toe osteomyelitis (2021, Cx = MSSA x2, pansusceptible Pseudomonas aeruginosa, Acinetobacter) s/p ciprofloxacin + Bactrim x5 weeks, 1 week flagyl    RECOMMENDATION:  Continue PO Augmentin 875-125 mg BID for now  Anticipate short post-operative antibiotic course (~2-5 days) for any residual SSTI given ultimate source control of osteomyelitis with toe amputation  Follow OR cultures and pathology    ASSESSMENT:  Thomas Gonzales is a 59 year old male with PMHx significant for T2DM c/b neuropathy and diabetic foot ulcers, Castro-en-Y gastric bypass, CAD with PCI (2015), CKD stage III, multiple sclerosis, hx malignant melanoma, and celiac disease who was admitted 7/7/25 for worsening right 2nd toe wound with drainage + cellulitis despite PO Augmentin, found with exposed bone c/w underlying osteomyelitis.    Afebrile, HDS, without signs of sepsis. Wound swab with 1+ normal augusto. Podiatry consulted and he underwent amputation of 2nd toe on 7/9/25 to metatarsal phalangeal joint for source control. Pending OR cultures and histopathology. Given source control obtained with proximal amputation, he will not need a prolonged course of antibiotics post-operatively. Continue PO Augmentin for now, will adjust pending culture data as indicated.    Thank you for this consult. ID will continue to follow.   Patient was discussed with Dr. Wong. Recommendations discussed  with primary team.    Kaycee Che PA-C  Infectious Diseases  Contact via [employer] [place] or [email or web address] Paging/Directory    60 MINUTES SPENT BY ME on the date of service doing chart review, history, exam, documentation & further activities per the note.           History of Present Illness:     Thomas Gonzales is a 59 year old male with past medical history significant for T2DM, diabetic foot ulcers, Castro-en-Y gastric bypass, CAD with PCI (2015), CKD stage III, multiple sclerosis, hx malignant melanoma, and celiac disease who was admitted 7/7/25 for worsening right 2nd toe infection. ID consulted for antibiotic management.     Wound opened and present on dorsal R 2nd toe for 4-5 weeks, due to wearing tight shoes at a wedding. This has been on and off wound over the last few years, worsening and more infected looking around June 22-23rd with cellulitis around ~7/2. Wound cares and PO Augmentin without improvement. Has had green/yellow fluid draining from proximal interphalangeal joint with exposed bone and necrotic tissue c/f deep infection and associated cellulitis. 7/7 wound culture with 1+ GPC, culture with normal augusto. Bcx NGTD x2. Denies fever, chills, or trauma to toe. Afebrile, HDS, no leukocytosis and CRP = 6.83, ESR 23. Was continued on his prior to admission Augmentin. Xray without discrete osteomyelitis, though chronic bone lose at tuft of first distal phalanx. US RLE without DVT. Podiatry consulted and patient underwent amputation of 2nd toe on 7/9/25 - pending OR cultures and histopathology.    He denies any recent water exposure to toe. No pets. No antibiotic allergies.          Review of Systems:   CONSTITUTIONAL:  No fevers, chills or night sweats.   EYES: negative for icterus, redness, or purulent drainage.   ENT:  negative for nasal congestion, rhinorrhea, or sore throat.  RESPIRATORY:  negative for cough with sputum and dyspnea.  CARDIOVASCULAR:  negative for chest pain or palpitations.  GASTROINTESTINAL:   negative for nausea, vomiting, diarrhea and constipation.  GENITOURINARY:  negative for dysuria, frequency, or urgency.   HEME:  No easy bruising or bleeding.  INTEGUMENT:  negative for rash and pruritus.  NEURO:  Negative for headache, vision changes, or numbness/tingling in extremities.         Past Medical History:     Past Medical History:   Diagnosis Date    CAD (coronary artery disease)     S/p stenting of left circumflex    Cancer (H)     melanoma    Cervical spondylosis with myelopathy 9/15/2023    Depressive disorder 2006    Diabetes mellitus (H)     Multiple sclerosis (H)     Peripheral neuropathy             Past Surgical History:     Past Surgical History:   Procedure Laterality Date    ABDOMEN SURGERY  1/2016, 5/2016    APPENDECTOMY  01/08/2016    BIOPSY  2/2016    To have addl skin excised at [facility]    CATARACT IOL, RT/LT      CHOLECYSTECTOMY  01/08/2016    COLONOSCOPY N/A 09/01/2021    Procedure: COLONOSCOPY, WITH POLYPECTOMY AND BIOPSY;  Surgeon: Kamran Rainey DO;  [place] [facility] [place]    ESOPHAGOSCOPY, GASTROSCOPY, DUODENOSCOPY (EGD), COMBINED N/A 09/01/2021    Procedure: ESOPHAGOGASTRODUODENOSCOPY, WITH BIOPSY;  Surgeon: Kamran Rainey DO;  [place] [facility] [place]    [place] LUMBAR PUNCTURE  3/21/2016    penile implant      LEON EN Y BOWEL  01/08/2016    for small bowel ischemia            Family History:     Family History   Problem Relation Age of Onset    Arrhythmia Mother         bradycardia- pacemaker    Mental Illness Mother     Obesity Mother     Coronary Artery Disease Father     Diabetes Father     Obesity Father             Social History:     Social History     Tobacco Use    Smoking status: Never     Passive exposure: Never    Smokeless tobacco: Never   Substance Use Topics    Alcohol use: Yes     Comment: occ     History   Sexual Activity    Sexual activity: Yes    Partners: Female    Birth control/ protection: None            Current Medications:     Current Facility-Administered Medications    Medication Dose Route Frequency Provider Last Rate Last Admin    [Auto Hold] amoxicillin-clavulanate (AUGMENTIN) 875-125 MG per tablet 1 tablet  1 tablet Oral Q12H [facility] (08/20) Maru Byers DO   1 tablet at 07/09/25 0810    [Held by provider] aspirin EC tablet 81 mg  81 mg Oral Daily Mis Aguilar APRN CNP        [Auto Hold] atorvastatin (LIPITOR) tablet 40 mg  40 mg Oral Daily Mis Aguilar APRN CNP   40 mg at 07/09/25 0811    [Auto Hold] FLUoxetine (PROzac) capsule 30 mg  30 mg Oral Daily Mis Aguilar APRN CNP   30 mg at 07/09/25 0810    [Auto Hold] insulin aspart (NovoLOG) injection (RAPID ACTING)  1-7 Units Subcutaneous TID AC Mis Aguilar APRN CNP        [Auto Hold] insulin aspart (NovoLOG) injection (RAPID ACTING)  1-5 Units Subcutaneous At Bedtime Mis Aguilar APRN CNP        [Held by provider] lisinopril (ZESTRIL) tablet 2.5 mg  2.5 mg Oral Daily Mis Aguilar APRN CNP   2.5 mg at 07/08/25 1004    [Auto Hold] polyethylene glycol (MIRALAX) Packet 17 g  17 g Oral Daily Mis Aguilar APRN CNP        [Auto Hold] sodium bicarbonate tablet 650 mg  650 mg Oral TID Mis Aguilar APRN CNP   650 mg at 07/09/25 0810    [Auto Hold] sodium chloride (PF) 0.9% PF flush 3 mL  3 mL Intracatheter Q8H [facility] Mis Aguilar APRN CNP   3 mL at 07/09/25 0556            Allergies:     Allergies   Allergen Reactions    Shellfish-Derived Products Anaphylaxis    Adhesive Tape      Paper tape is okay  Tegarderm is okay    Gluten Meal      Celiac    Reglan [Metoclopramide] Hives   No antibiotic allergies         Physical Exam:   Vitals were reviewed  Patient Vitals for the past 24 hrs:   BP Temp Temp src Pulse Resp SpO2   07/09/25 0808 131/67 98.4  F (36.9  C) Oral 80 17 100 %   07/09/25 0404 96/58 99.6  F (37.6  C) Oral 76 18 100 %   07/08/25 1947 122/65 99.1  F (37.3  C) -- -- -- 100 %   07/08/25 1547 138/70 99.1  F (37.3  C)  Oral 75 18 99 %       Physical Examination:  Constitutional: Pleasant adult male seen sitting up in bed, in NAD. Alert and interactive.   HEENT: NCAT, anicteric sclerae, conjunctiva clear. Moist mucous membranes  Respiratory: Non-labored breathing, good air exchange. No cough noted.   Cardiovascular: Regular rate and rhythm.  GI: Abdomen is soft, non-distended.   Skin: Warm and dry. No new rashes or lesions on exposed surfaces.  Musculoskeletal: Extremities grossly normal. No tenderness. Trace edema present. R foot wrapped postoperatively - see media tab for photos of R toe now s/p amputation  Neurologic: A &O x3, speech normal, answering questions appropriately. Moves all extremities spontaneously. Grossly non-focal.  Neuropsychiatric: Mentation and affect normal/appropriate.  VAD: PIV is c/d/i with no erythema, drainage, or tenderness.         Laboratory Data:     Inflammatory Markers    Recent Labs   Lab Test 07/07/25  1555 12/03/22  0601 12/02/22  1647 02/23/21  1534 01/20/21  0550 01/19/21  0729 01/18/21  0546 01/17/21  0553 01/16/21  1544   SED 23* 9 9  --   --   --   --  16 14   CRP  --   --   --  <2.9 12.0* 15.0* 26.0* 51.0* 67.0*       Hematology Studies    Recent Labs   Lab Test 07/09/25  0816 07/08/25  0738 07/07/25  1555 06/12/25  1051 06/26/23  1623 12/03/22  0601 12/02/22  1647 11/16/22  0657 11/15/22  1811 11/13/22  1110   WBC 4.2 5.0 5.6 4.2 4.8 2.9* 4.9   < > 5.9 4.1   ANEU  --   --  3.9  --  3.0 1.9 3.1  --  4.2 2.6   AEOS  --   --  0.2  --  0.2 0.1 0.3  --  0.2 0.2   HGB 11.5* 11.8* 12.4* 13.0* 12.3* 10.9* 12.8*   < > 13.9 12.9*   MCV 85 86 85 89 84 86 85   < > 85 84    175 178 142* 160 103* 144*   < > 157 148*    < > = values in this interval not displayed.       Metabolic Studies     Recent Labs   Lab Test 07/07/25  1555 06/12/25  1051 06/07/24  0905 05/31/24  0717 10/26/23  0816    143 139 141 143   POTASSIUM 4.8 5.5* 5.5* 5.1 5.5*   CHLORIDE 108* 114* 112* 112* 115*   CO2 21* 21*  20* 19* 18*   BUN 40.0* 51.6* 40.3* 45.3* 34.8*   CR 1.79* 1.81* 1.54* 1.55* 1.62*   GFRESTIMATED 43* 43* 52* 52* 50*       Hepatic Studies    Recent Labs   Lab Test 07/07/25  1555 06/12/25  1051 06/07/24  0905 07/19/23  1538 12/03/22  0601 12/02/22  1647   BILITOTAL 0.4 0.3 0.4 0.5 0.4 0.3   ALKPHOS 103 101 114 136* 103 125   ALBUMIN 3.8 4.1 4.0 4.2 3.2* 4.2   AST 31 49* 61* 50* 116* 64*   ALT 34 58 89* 76* 82* 72*       Microbiology:  Culture   Date Value Ref Range Status   07/07/2025 No growth after 1 day  Preliminary   07/07/2025 No growth after 1 day  Preliminary   07/07/2025 Culture in progress  Preliminary   06/26/2023 >100,000 CFU/mL Klebsiella pneumoniae (A)  Final   12/02/2022 No Growth  Final   12/02/2022 No Growth  Final   12/02/2022 No Growth  Final   11/15/2022 No Growth  Final   11/15/2022 No Growth  Final   10/21/2022 >100,000 CFU/mL Proteus mirabilis (A)  Final   10/21/2022 <10,000 CFU/mL Urogenital augusto  Final       Urine Studies    Recent Labs   Lab Test 06/26/23  1551 10/21/22  0729   LEUKEST Moderate* Moderate*   WBCU * >100*       Vancomycin Levels    Recent Labs   Lab Test 01/18/21  1714   VANCOMYCIN 17.9       Hepatitis B Testing No lab results found.  Hepatitis C Testing     Hepatitis C Antibody   Date Value Ref Range Status   09/23/2021 Nonreactive Nonreactive Final     IMAGING  Echo Complete  Result Date: 7/8/2025  Interpretation Summary Global and regional left ventricular function is normal with an EF of 55-60%. Global right ventricular function is normal. No significant valvular abnormalities present. No pericardial effusion is present.     US Lower Extremity Venous Duplex Right  Result Date: 7/7/2025  IMPRESSION: No deep venous thrombosis in the visualized right lower extremity.     Foot  XR, G/E 3 views, right  Result Date: 7/7/2025  IMPRESSION: No discrete osseous erosive or destructive change to suggest osteomyelitis radiographically. Chronic bone loss at the tuft of the  first distal phalanx. Mild scattered degenerative arthritis. Arterial calcifications.    XR Foot Right G/E 3 Views  Result Date: 7/3/2025  IMPRESSION: Generalized demineralization. No erosive change or periostitis. No fracture. Normal joint alignment. Moderate degenerative changes throughout the midfoot and toes. Soft tissue swelling throughout the foot. Arterial calcifications throughout the digital arteries. Moderate-sized plantar calcaneal enthesophyte.

## 2025-07-09 NOTE — PLAN OF CARE
Goal Outcome Evaluation:      Plan of Care Reviewed With: patient    Overall Patient Progress: improving    Outcome Evaluation: Pt's VSS on RA since return from PACU. Denies CP, SOB, N/V.    O2: On RA   Output: Continent of B/B, uses urinal at bedside   Last BM: 7/8/25   Activity: NWB on R foot   Skin: X R foot   Pain: Tylenol x1 for headache   CMS: Intact, A/O x4, able to make needs known   Dressing: R foot surgical dressing CDI   Diet: Regular   LDA: L PIV saline locked   Equipment: IV pole   Plan: Monitoring, pain mgt, continue with POC   Additional Info: Pt is calm and cooperative with cares. Wife was at bedside attentive to patient.

## 2025-07-09 NOTE — PROGRESS NOTES
Podiatry Pre-Op Brief H&P/Note    Surgery Planned: Right second toe amputation    Clinical Update:  Patient is a very pleasant 59-year-old man who was admitted for concern of cellulitis and wound infection to the right second toe.  He has a long history of on and off issues with this toe including open wounds.  Recently the area has been worsening, becoming more soupy, discolored, green, yellow with some drainage and redness.    On my exam there is bone exposed in the proximal interphalangeal joint is exposed and purulent.  I discussed that given these findings given the amount of tissue lost on the dorsal aspect of the toe that we should consider amputation but I did offer conservative care which includes local wound care and antibiotic or local debridement of the wound.  I did raise my concerns that local debridement may not be enough to rid the infection or may destroy too much tissue would be very difficult to heal especially in a toe on the dorsal surface.  Patient agrees and would like to pursue amputation at this is a more definite source control as well as more definitive option for chronic ulceration due to deformity of the toe.    He has otherwise been medically stable and cleared for simple procedure under sedation and local, with no guarantees made, risks discussed with the patient prior to procedure and during consenting including delayed healing, further infection, bleeding, blood clot, complications with anesthesia (which have been specifically discussed with patient by the anesthesiology team)      Pre-op Diagnosis: Cellulitis right second toe, diabetic foot infection right second toe, osteomyelitis right second toe    Planned Procedure&Time: Amputation right second toe    Indication:    Necrotic wound and cellulitis of the right second toe with bone exposed, bone discoloration and purulent fluid in proximal interphalangeal joint region, and from bone to the region warranting need for the bare  minimum debridement and decision made for amputation of the toe for better source control    Labs/Studies: Reviewed    Official CRX: chart    EKG: chart    NPO: after midnight, IVF fluids after midnight    Pre-op Abx: Ancef    Anesthesia: MAC with local    Consent: in chart    Acute Concerns: None at this time      Andrea Hammond DPM  Podiatry Service - [facility]  Pager: [phone]

## 2025-07-09 NOTE — PROGRESS NOTES
Podiatry Post-Op Note:    Patient underwent a right third toe amputation today.  The area is closed completely and there is a bulky dry sterile dressing.  This dressing is to remain clean, dry, intact.  Podiatry will change 24 to 48 hours after operation.  If you notice any strikethrough in the dressing can reinforce with ABD and a light layer of Kerlix and tape.  If you have to do this more than 2 or 3 times please page the on-call orthopedic team overnight.    For now please have the patient nonweightbearing on the right foot for the first 24 hours to let the area rest, we will get him a orthopedic device that he can use to do some light ambulation towards the end of the stay and discharge.  Will discuss with him tomorrow he is okay to use heel for transfers if this is safe    - No further surgical intervention on my part  - He will follow-up outpatient with podiatry provider, he already has an appointment in 2 to 3 weeks with them  -He is okay to continue any anticoagulation starting postoperative day 1  -Can advance diet today as patient tolerated    Podiatry will continue to follow, I will see him tomorrow on rounds

## 2025-07-09 NOTE — ANESTHESIA PREPROCEDURE EVALUATION
Anesthesia Pre-Procedure Evaluation    Patient: Thomas Gonzales   MRN: [id number] : 1965          Procedure : Procedure(s):  AMPUTATION SECOND TOE         Past Medical History:   Diagnosis Date    CAD (coronary artery disease)     S/p stenting of left circumflex    Cancer (H)     melanoma    Cervical spondylosis with myelopathy 9/15/2023    Depressive disorder     Diabetes mellitus (H)     Multiple sclerosis (H)     Peripheral neuropathy       Past Surgical History:   Procedure Laterality Date    ABDOMEN SURGERY  2016, 2016    APPENDECTOMY  2016    BIOPSY  2016    To have addl skin excised at [facility]    CATARACT IOL, RT/LT      CHOLECYSTECTOMY  2016    COLONOSCOPY N/A 2021    Procedure: COLONOSCOPY, WITH POLYPECTOMY AND BIOPSY;  Surgeon: Kamran Rainey DO;  Location: [facility] OR    ESOPHAGOSCOPY, GASTROSCOPY, DUODENOSCOPY (EGD), COMBINED N/A 2021    Procedure: ESOPHAGOGASTRODUODENOSCOPY, WITH BIOPSY;  Surgeon: Kamran Rainey DO;  [place] [facility] [place]    IR LUMBAR PUNCTURE  3/21/2016    penile implant      LEON EN Y BOWEL  2016    for small bowel ischemia      Allergies   Allergen Reactions    Shellfish-Derived Products Anaphylaxis    Adhesive Tape      Paper tape is okay  Tegarderm is okay    Gluten Meal      Celiac    Reglan [Metoclopramide] Hives      Social History     Tobacco Use    Smoking status: Never     Passive exposure: Never    Smokeless tobacco: Never   Substance Use Topics    Alcohol use: Yes     Comment: occ      Wt Readings from Last 1 Encounters:   25 105.1 kg (231 lb 9.6 oz)        Anesthesia Evaluation   Pt has had prior anesthetic.         ROS/MED HX  ENT/Pulmonary:       Neurologic: Comment: # multiple sclerosis  # autonomic dysfunction secondary to MS      (+)                   Multiple Sclerosis,             Cardiovascular: Comment: # CAD s/p PCI ()  # HLD  # LE Edema  # Elevated BNP      (+)  - -  CAD -  - -                                  Previous cardiac testing   Echo: Date: 7/8/2025 Results:  Interpretation Summary  Global and regional left ventricular function is normal with an EF of 55-60%.  Global right ventricular function is normal.  No significant valvular abnormalities present.  No pericardial effusion is present.  _______________________________    Stress Test:  Date: Results:    ECG Reviewed:  Date: Results:    Cath:  Date: Results:      METS/Exercise Tolerance: >4 METS    Hematologic:       Musculoskeletal:       GI/Hepatic: Comment: Celiac disease, colitis, diarrhea, bloating    (+)             liver disease,       Renal/Genitourinary:     (+) renal disease, type: CRI,            Endo: Comment: # diet-controlled type 2 diabetes with diabetic neuropathy and retinopathy    (+)  type II DM,                    Psychiatric/Substance Use:       Infectious Disease: Comment:  cellulitis and wound infection to the right second toe      Malignancy:   (+) Malignancy, History of Skin.    Other:              Physical Exam  Airway  Mallampati: II  TM distance: >3 FB  Neck ROM: full  Upper bite lip test: unable to assess  Mouth opening: >= 4 cm    Cardiovascular - normal exam Comments: # CAD s/p PCI (2015)  # HLD  # LE Edema  # Elevated BNP    Dental   (+) Minor Abnormalities - some fillings, tiny chips      Pulmonary - normal exam      Neurological - normal exam  He appears awake, alert and oriented x3.    Other Findings       OUTSIDE LABS:  CBC:   Lab Results   Component Value Date    WBC 5.0 07/08/2025    WBC 5.6 07/07/2025    HGB 11.8 (L) 07/08/2025    HGB 12.4 (L) 07/07/2025    HCT 35.7 (L) 07/08/2025    HCT 37.1 (L) 07/07/2025     07/08/2025     07/07/2025     BMP:   Lab Results   Component Value Date     07/07/2025     06/12/2025    POTASSIUM 4.8 07/07/2025    POTASSIUM 5.5 (H) 06/12/2025    CHLORIDE 108 (H) 07/07/2025    CHLORIDE 114 (H) 06/12/2025    CO2 21 (L) 07/07/2025    CO2 21 (L) 06/12/2025    BUN 40.0 (H)  07/07/2025    BUN 51.6 (H) 06/12/2025    CR 1.79 (H) 07/07/2025    CR 1.81 (H) 06/12/2025     (H) 07/09/2025     (H) 07/09/2025     COAGS:   Lab Results   Component Value Date    INR 1.18 (H) 01/16/2021     POC:   Lab Results   Component Value Date     (H) 01/19/2021     HEPATIC:   Lab Results   Component Value Date    ALBUMIN 3.8 07/07/2025    PROTTOTAL 6.7 07/07/2025    ALT 34 07/07/2025    AST 31 07/07/2025    ALKPHOS 103 07/07/2025    BILITOTAL 0.4 07/07/2025     OTHER:   Lab Results   Component Value Date    LACT 0.8 07/07/2025    A1C 5.3 06/12/2025    DIONICIO 9.1 07/07/2025    PHOS 3.7 06/22/2020    MAG 1.9 06/12/2025    TSH 2.48 06/26/2023    CRP <2.9 02/23/2021    SED 23 (H) 07/07/2025       Anesthesia Plan    ASA Status:  3      NPO Status: NPO Appropriate   Anesthesia Type: MAC.  Airway: natural airway.  Maintenance: TIVA.   Techniques and Equipment:       - Monitoring Plan: standard ASA monitoring, processed EEG monitor     Consents    Anesthesia Plan(s) and associated risks, benefits, and realistic alternatives discussed. Questions answered and patient/representative(s) expressed understanding.     - Discussed: anesthesiologist     - Discussed with:  Patient        - Pt is DNR/DNI Status: no DNR     Blood Consent:      - Discussed with: patient.     Postoperative Care         Comments:                   Katarzyna Negro MD    I have reviewed the pertinent notes and labs in the chart from the past 30 days and (re)examined the patient.  Any updates or changes from those notes are reflected in this note.    Clinically Significant Risk Factors          # Hyperchloremia: Highest Cl = 108 mmol/L in last 2 days, will monitor as appropriate                         # Obesity: Estimated body mass index is 31.41 kg/m  as calculated from the following:    Height as of this encounter: 1.829 m (6').    Weight as of this encounter: 105.1 kg (231 lb 9.6 oz)., PRESENT ON ADMISSION

## 2025-07-09 NOTE — ANESTHESIA POSTPROCEDURE EVALUATION
Patient: Thomas Gonzales    Procedure: Procedure(s):  AMPUTATION SECOND TOE       Anesthesia Type:  MAC    Note:  Disposition: Outpatient   Postop Pain Control: Uneventful            Sign Out: Well controlled pain   PONV: No   Neuro/Psych: Uneventful            Sign Out: Acceptable/Baseline neuro status   Airway/Respiratory: Uneventful            Sign Out: Acceptable/Baseline resp. status   CV/Hemodynamics: Uneventful            Sign Out: Acceptable CV status; No obvious hypovolemia; No obvious fluid overload   Other NRE:    DID A NON-ROUTINE EVENT OCCUR?        Last vitals:  Vitals Value Taken Time   /67 07/09/25 10:58   Temp     Pulse 62 07/09/25 10:58   Resp 14 07/09/25 10:58   SpO2 98 % 07/09/25 10:58   Vitals shown include unfiled device data.    Electronically Signed By: Kalia Carter MD  July 9, 2025  1:14 PM

## 2025-07-09 NOTE — OP NOTE
Operative Note - Podiatry    Date of Operation: July 9, 2025      Pre-Op Diagnosis: Diabetic foot infection right foot, osteomyelitis right second toe, cellulitis right second toe    Post-Op Diagnosis: Same as above    Procedure: Amputation right second toe at level of metatarsal phalangeal joint    Surgeon: Andrea Hammond DPM    Assistants: None    Anesthesia: MAC with local of a 1:1 mixture of 1% lidocaine plain and 0.25% marcaine plain given about right second ray    Estimated Blood Loss: 10 mL    Urine Output: N/A    IVF: Crystalloid    Specimens:  ID Type Source Tests Collected by Time Destination   1 : right second toe Amputation, Disarticulation Toe, Right SURGICAL PATHOLOGY EXAM Andrea Hammond DPM 7/9/2025 10:20 AM    A : right second toe amputation margin Tissue Foot, Right ANAEROBIC BACTERIAL CULTURE ROUTINE, GRAM STAIN, FUNGAL OR YEAST CULTURE ROUTINE, AEROBIC BACTERIAL CULTURE ROUTINE Andrea Hammond DPM 7/9/2025 10:27 AM            Findings:    Similar findings as seen in photo and described in my previous note, there is a necrotic wound to the right dorsal second toe at the level of the PIPJ, there is obvious bone exposed, the joint is exposed and septic at this time with purulence, slight malodor and significant tissue loss and necrosis on the dorsal side of the toe and distally.  At the amputation margin I did not appreciate any signs or symptoms of infection, the skin edges at the amputation margin were not cellulitic or inflamed.    Drains: None    Implants: None    Complications: None    Concerns: None concerns before or after procedure    Indication for Procedure:  Osteomyelitis of the right second toe with necrosis and significant soft tissue loss on the dorsal side, decision made between myself and patient to proceed with more aggressive source control which would be amputation and given cellulitis creeping proximal to the bed of the phalanx we made decision to amputate at the level of the  metatarsal phalangeal joint for better closure and healthier skin.      Description of Procedure:  Patient was met in the preoperative area where consent was obtained from the patient for procedure as noted above  I discussed and reviewed the risks and benefits with the patient of the proposed procedure and made no guarantees to the patient on the final outcome and result of the procedure.  I discussed risks and benefits including postop complications which can include infection, bleeding, delayed healing, blood clot, pain there is also always risk of anesthetic complications as discussed with the anesthesiology team.  Multiple options including conservative treatment as well as debridement and local wound care with antibiotics given to patient today, patient would like to proceed with toe amputation at this time.      Patient was then brought into the operative room and placed on the operative bed in a supine position and made comfortable, after induction of anesthetic by the anesthesiology team local block was completed about the right second ray using 1% lidocaine plain and 0.25% bupivacaine plain in a 50-50 mixture.  A total of 10 mL of the solution was given  Patient's right foot was then prepped and draped in typical aseptic fashion.    Timeout was completed by all members of the operative team noting the correct laterality, procedure, specimen plan.  No concerns were raised by the operative team so plan to proceed with procedure as noted above.    Attention was directed to the right foot specifically the right second toe where a racquet incision was planned with the long arm over the dorsal portion of the metatarsal phalangeal joint and proximally, and the racquet ring around the base of the toe.  A 15 blade was used to make an incision and was deepened down through the subcutaneous tissue circumferentially around the toe and on the long arm of the racquet.  Because the combination of sharp, blunt and Bovie  dissection to isolate the second metatarsal phalangeal joint on the right side and free soft tissue attachment to the right second toe base.  Care was taken to avoid any unnecessary neurovascular structures in the area.  Once adequate dissection was completed we then disarticulated the right second toe at the level of the metatarsal phalangeal joint and the stalk was passed off the field to be sent in formalin to pathology, the entire toe was passed off, the base of the proximal phalanx will be the margin.    I then investigated the amputation site, there was good skin edge bleeding, no necrosis, no purulence, no signs or symptoms of infection, appears that the skin edges will close well without any adjustments.  We then flushed the area with copious amounts of sterile saline, isolate any remaining capsular tendinous structures and remove them.  I then used a fresh rongeur to obtain a deep soft tissue culture from the area to be a soft tissue microbiology margin.  We then flushed with copious amounts of saline again.  Good skin edge bleeding was noted and oozing as expected.  There was no vascular injury.    Deep closure was completed using 3-0 Vicryl, subcuticular closure was completed using 3-0 Vicryl and 3-0 nylon was used for superficial closure using horizontal mattress stitch type.    Counts were correct with the procedure, Xeroform dressing was applied over the sutures and a bulky dry sterile dressing was applied.    Patient tolerated anesthesia and procedure well with vital signs and vascular status intact to the right foot including the amputation site with good capillary refill.            Andrea Hammond DPM  Podiatry Service - [facility]  Pager: [phone]  Deirdre: preferred

## 2025-07-09 NOTE — ANESTHESIA CARE TRANSFER NOTE
Patient: Thomas Gonzales    Procedure: Procedure(s):  AMPUTATION SECOND TOE       Diagnosis: Diabetic foot infection (H) [E11.628, L08.9]  Infection of toe [L08.9]  Osteomyelitis of second toe of right foot (H) [M86.9]  Diagnosis Additional Information: No value filed.    Anesthesia Type:   MAC     Note:    Oropharynx: oropharynx clear of all foreign objects  Level of Consciousness: awake  Oxygen Supplementation: room air    Independent Airway: airway patency satisfactory and stable  Dentition: dentition unchanged  Vital Signs Stable: post-procedure vital signs reviewed and stable  Report to RN Given: handoff report given  Patient transferred to: PACU    Handoff Report: Identifed the Patient, Identified the Reponsible Provider, Reviewed the pertinent medical history, Discussed the surgical course, Reviewed Intra-OP anesthesia mangement and issues during anesthesia, Set expectations for post-procedure period and Allowed opportunity for questions and acknowledgement of understanding      Vitals:  Vitals Value Taken Time   /67 07/09/25 10:58   Temp 36.3    Pulse 62 07/09/25 10:58   Resp 14 07/09/25 10:58   SpO2 98 % 07/09/25 10:58   Vitals shown include unfiled device data.    Electronically Signed By: DAVE Bright CRNA  July 9, 2025  11:02 AM

## 2025-07-09 NOTE — PROGRESS NOTES
[facility]    Medicine Progress Note - Hospitalist Service, GOLD TEAM 22    Date of Admission:  7/7/2025    Assessment & Plan   Thomas Gonzales is a 59 year old male admitted on 7/7/2025. He has medical history of diet-controlled type 2 diabetes, diabetic foot ulcers, HLD, CAD, multiple sclerosis, malignant melanoma (excised 2016), celiac disease. He presented to ED for evaluation of right second toe infection which is failing outpatient management. He is admitted to the internal medicine team for management. Podiatry consulted with recommendation for amputation. Tentative plan for surgery on 7/8 and initiation of antibiotics pending intraoperative cultures.      Plan today:   - ID consulted for assistance with antibiotic management now that he is s/p amputation with presumed source control.   - PT/OT given his non-weight bearing on the RLE in the immediate    #SST infection of 2nd toe of right foot  Ongoing symptoms of toe infection for 4-5 weeks prior to presentation after wearing tight shoes at a wedding. XR in ED without fracture. Interventions prior to hospital presentation included wound cleansing and Augmentin. Patient has history of requiring IV antibiotics for foot infection.      - admission for further evaluation, consultation with podiatry and/or orthopedic teams  -multimodal pain management  -additional imaging pending specialist recommendations, anticipate MRI may be required  -no IV antibiotics prescribed on admission due to lack of systemic symptoms and desire to identify causative organism prior to further treatment if possible.   - PTA Augmentin in the immediate given this was continued on admission per H&P  -follow results of wound culture, blood cultures  -WOCN consulted  - Recommendation for amputation of the affected toe per Podiatry with tentative plan for OR on 7/9.   - Holding IV antibiotics in the immediate -> plan for antibiotic plan  pending IO culture   - ID consulted, appreciate recommendations     # diet-controlled type 2 diabetes with diabetic neuropathy and retinopathy    -Home regimen: diet-managed    -In-hospital regimen:  -Basal Insulin:  none  -Insulin correction sliding scale: medium aspart ss  -Carb coverage: none on admission        Lab Results   Component Value Date     A1C 5.3 06/12/2025     A1C 5.7 01/17/2021      # bilateral LE edema, acute on chronic  Right > left. RLE edema, worsened while sitting in waiting room and without compression stockings that patient normally wears. Possibly worsened edema secondary to infection vs other etiology    -BNP mildly elevated, TTE with intact LVEF     -PCDs ordered    -RLE ultrasound negative for clot     # multiple sclerosis  # autonomic dysfunction secondary to MS  Patient saw Dr. Crenshaw with [facility] neurology 2023. No medications.    -monitor symptoms     # CKD III  Creatinine 1.79 on admission. This appears to be near his recent baseline.    -renally dose medications    -avoid nephrotoxic medications as able    -monitor BMP    -continue sodium bicarb 650mg tabs three times daily    -continue PTA lisinopril 2.5mg daily for renal protective properties (not prescribed for HTN) - will resume 7/10     # CAD s/p PCI (2015)  # HLD    -hold PTA ASA on admission given possible procedural intervention -> unheld as of 7/10    -continue PTA statin     # MDD    -continue PTA fluoxetine          Diet: NPO for Procedure/Surgery per Anesthesia Guidelines Except for: Meds; Clear liquids before procedure/surgery: ADULT (Age GREATER than or Equal to 18 years) - Clear liquids 2 hours before procedure/surgery    DVT Prophylaxis: Enoxaparin (Lovenox) SQ  Martínez Catheter: Not present  Lines: None     Cardiac Monitoring: None  Code Status: Full Code      Clinically Significant Risk Factors          # Hyperchloremia: Highest Cl = 108 mmol/L in last 2 days, will monitor as appropriate                         #  Obesity: Estimated body mass index is 31.41 kg/m  as calculated from the following:    Height as of this encounter: 1.829 m (6').    Weight as of this encounter: 105.1 kg (231 lb 9.6 oz)., PRESENT ON ADMISSION            Social Drivers of Health    Housing Stability: Low Risk  (7/7/2025)    Housing Stability     Do you have housing? : Yes     Are you worried about losing your housing?: No   Recent Concern: Housing Stability - High Risk (6/10/2025)    Housing Stability     Do you have housing? : No     Are you worried about losing your housing?: No   Physical Activity: Insufficiently Active (6/10/2025)    Exercise Vital Sign     Days of Exercise per Week: 2 days     Minutes of Exercise per Session: 20 min   Social Connections: Unknown (6/10/2025)    Social Connection and Isolation Panel [NHANES]     Frequency of Social Gatherings with Friends and Family: Twice a week          Disposition Plan     Medically Ready for Discharge: Anticipated in 2-4 Days             Maru Byers DO  Hospitalist Service, GOLD TEAM 22  [facility]  Securely message with [employer] (more info)  Text page via [facility] Paging/Directory   See signed in provider for up to date coverage information  ______________________________________________________________________    Interval History   No acute events overnight. Back from the OR this afternoon and feels well. He denies pain. Requests a diet and denies other concerns.     Physical Exam   Vital Signs: Temp: 99.6  F (37.6  C) Temp src: Oral BP: 131/67 Pulse: 76   Resp: 18 SpO2: 100 % O2 Device: None (Room air)    Weight: 231 lbs 9.6 oz    General: well developed, awake, alert, no acute distress  HEENT: sclera clear, MMM  CV: Extremities are warm and well perfused.   LUNGS: No increased WOB  SKIN: Warm, well perfused.   MSK: R foot with dressing in place, CDI.   NEURO: Alert and oriented. No focal deficits.      Medical Decision Making         Data      I have personally reviewed the following data over the past 24 hrs:    4.2  \   11.5 (L)   / 151     139 108 (H) 32.9 (H) /  123 (H)   5.1 23 1.59 (H) \     Procal: N/A CRP: <3.00 Lactic Acid: N/A         Imaging results reviewed over the past 24 hrs:   Recent Results (from the past 24 hours)   Echo Complete   Result Value    LVEF  55-60%    Narrative    [id number]  [id number]  [id number]  [id number]^MARCELINO^CECILIA^CHEYANNE     [facility],[place]  Echocardiography Laboratory  [address] [place] [address]     Name: ZACHARY SAN  MRN: [id number]  : 1965  Study Date: 2025 02:25 PM  Age: 59 yrs  Gender: Male  Patient [facility]  Reason For Study: Edema  Ordering Physician: CECILIA BENSON  Performed By: Rob Gauthier     BSA: 2.3 m2  Height: 72 in  Weight: 231 lb  BP: 131/43 mmHg  ______________________________________________________________________________  Procedure  Echocardiogram with two-dimensional, color and spectral Doppler.  ______________________________________________________________________________  Interpretation Summary  Global and regional left ventricular function is normal with an EF of 55-60%.  Global right ventricular function is normal.  No significant valvular abnormalities present.  No pericardial effusion is present.  ______________________________________________________________________________  Left Ventricle  Left ventricular size is normal. Global and regional left ventricular function  is normal with an EF of 55-60%. Relative wall thickness is increased  consistent with concentric remodeling. Left ventricular diastolic function is  normal. No regional wall motion abnormalities are seen.     Right Ventricle  The right ventricle is normal size. Global right ventricular function is  normal.     Atria  Both atria appear normal.     Mitral Valve  Trace mitral insufficiency is present.     Aortic Valve  Aortic valve is normal in  structure and function.     Tricuspid Valve  Trace tricuspid insufficiency is present. The right ventricular systolic  pressure is 21mmHg above the right atrial pressure. Pulmonary artery systolic  pressure is normal.     Pulmonic Valve  The valve leaflets are not well visualized. On Doppler interrogation, there is  no significant stenosis or regurgitation.     Vessels  Aortic root is 3.9cm. The thoracic aorta is normal. The inferior vena cava  cannot be assessed.     Pericardium  No pericardial effusion is present.     Miscellaneous  No significant valvular abnormalities present.     Compared to Previous Study  There is no prior study for direct comparison.  ______________________________________________________________________________  MMode/2D Measurements & Calculations  IVSd: 1.2 cm  LVIDd: 4.4 cm  LVIDs: 3.1 cm  LVPWd: 1.1 cm  FS: 29.1 %  LV mass(C)d: 176.9 grams  LV mass(C)dI: 78.1 grams/m2  Ao root diam: 3.9 cm  asc Aorta Diam: 3.2 cm  LVOT diam: 2.5 cm  LVOT area: 4.9 cm2  Ao root diam index Ht(cm/m): 2.1  Ao root diam index BSA (cm/m2): 1.7  Asc Ao diam index BSA (cm/m2): 1.4  Asc Ao diam index Ht(cm/m): 1.7  LA Volume (BP): 66.8 ml     LA Volume Index (BP): 29.6 ml/m2  RWT: 0.49  TAPSE: 1.8 cm     Doppler Measurements & Calculations  MV E max james: 80.6 cm/sec  MV A max james: 52.1 cm/sec  MV E/A: 1.5  MV dec slope: 412.0 cm/sec2  MV dec time: 0.20 sec  Ao V2 max: 107.0 cm/sec  Ao max P.6 mmHg  Ao V2 mean: 80.7 cm/sec  Ao mean PG: 3.0 mmHg  Ao V2 VTI: 27.5 cm  HALEIGH(I,D): 3.3 cm2  HALEIGH(V,D): 3.7 cm2  LV V1 max P.6 mmHg  LV V1 max: 79.9 cm/sec  LV V1 VTI: 18.6 cm  SV(LVOT): 91.3 ml  SI(LVOT): 40.3 ml/m2     PA acc time: 0.10 sec  TR max james: 226.0 cm/sec  TR max P.4 mmHg  AV James Ratio (DI): 0.75  HALEIGH Index (cm2/m2): 1.5  E/E' av.9  Lateral E/e': 7.5  Medial E/e': 10.3  RV S James: 10.9 cm/sec     ______________________________________________________________________________  Report approved by:  Gem Chavez Dr on 07/08/2025 03:59 PM

## 2025-07-10 ENCOUNTER — APPOINTMENT (OUTPATIENT)
Dept: PHYSICAL THERAPY | Facility: CLINIC | Age: 60
DRG: 617 | End: 2025-07-10
Attending: STUDENT IN AN ORGANIZED HEALTH CARE EDUCATION/TRAINING PROGRAM
Payer: COMMERCIAL

## 2025-07-10 VITALS
TEMPERATURE: 98.6 F | OXYGEN SATURATION: 100 % | HEIGHT: 72 IN | HEART RATE: 63 BPM | DIASTOLIC BLOOD PRESSURE: 63 MMHG | BODY MASS INDEX: 31.37 KG/M2 | RESPIRATION RATE: 18 BRPM | SYSTOLIC BLOOD PRESSURE: 148 MMHG | WEIGHT: 231.6 LBS

## 2025-07-10 LAB
ANION GAP SERPL CALCULATED.3IONS-SCNC: 8 MMOL/L (ref 7–15)
BACTERIA SPEC CULT: NORMAL
BACTERIA SPEC CULT: NORMAL
BACTERIA TISS BX CULT: NORMAL
BUN SERPL-MCNC: 29.1 MG/DL (ref 6–20)
CALCIUM SERPL-MCNC: 8.6 MG/DL (ref 8.8–10.4)
CHLORIDE SERPL-SCNC: 107 MMOL/L (ref 98–107)
CREAT SERPL-MCNC: 1.58 MG/DL (ref 0.67–1.17)
EGFRCR SERPLBLD CKD-EPI 2021: 50 ML/MIN/1.73M2
ERYTHROCYTE [DISTWIDTH] IN BLOOD BY AUTOMATED COUNT: 11.9 % (ref 10–15)
GLUCOSE BLDC GLUCOMTR-MCNC: 105 MG/DL (ref 70–99)
GLUCOSE BLDC GLUCOMTR-MCNC: 109 MG/DL (ref 70–99)
GLUCOSE BLDC GLUCOMTR-MCNC: 128 MG/DL (ref 70–99)
GLUCOSE BLDC GLUCOMTR-MCNC: 132 MG/DL (ref 70–99)
GLUCOSE BLDC GLUCOMTR-MCNC: 198 MG/DL (ref 70–99)
GLUCOSE SERPL-MCNC: 128 MG/DL (ref 70–99)
HCO3 SERPL-SCNC: 24 MMOL/L (ref 22–29)
HCT VFR BLD AUTO: 34.8 % (ref 40–53)
HGB BLD-MCNC: 11.4 G/DL (ref 13.3–17.7)
MCH RBC QN AUTO: 28.6 PG (ref 26.5–33)
MCHC RBC AUTO-ENTMCNC: 32.8 G/DL (ref 31.5–36.5)
MCV RBC AUTO: 87 FL (ref 78–100)
PLATELET # BLD AUTO: 165 10E3/UL (ref 150–450)
POTASSIUM SERPL-SCNC: 4.9 MMOL/L (ref 3.4–5.3)
RBC # BLD AUTO: 3.98 10E6/UL (ref 4.4–5.9)
SODIUM SERPL-SCNC: 139 MMOL/L (ref 135–145)
WBC # BLD AUTO: 4.6 10E3/UL (ref 4–11)

## 2025-07-10 PROCEDURE — 97116 GAIT TRAINING THERAPY: CPT | Mod: GP

## 2025-07-10 PROCEDURE — 250N000013 HC RX MED GY IP 250 OP 250 PS 637: Performed by: STUDENT IN AN ORGANIZED HEALTH CARE EDUCATION/TRAINING PROGRAM

## 2025-07-10 PROCEDURE — 250N000011 HC RX IP 250 OP 636: Performed by: STUDENT IN AN ORGANIZED HEALTH CARE EDUCATION/TRAINING PROGRAM

## 2025-07-10 PROCEDURE — 99231 SBSQ HOSP IP/OBS SF/LOW 25: CPT | Performed by: ASSISTANT, PODIATRIC

## 2025-07-10 PROCEDURE — L4360 PNEUMAT WALKING BOOT PRE CST: HCPCS

## 2025-07-10 PROCEDURE — 250N000013 HC RX MED GY IP 250 OP 250 PS 637: Performed by: NURSE PRACTITIONER

## 2025-07-10 PROCEDURE — 85027 COMPLETE CBC AUTOMATED: CPT | Performed by: STUDENT IN AN ORGANIZED HEALTH CARE EDUCATION/TRAINING PROGRAM

## 2025-07-10 PROCEDURE — 80048 BASIC METABOLIC PNL TOTAL CA: CPT | Performed by: STUDENT IN AN ORGANIZED HEALTH CARE EDUCATION/TRAINING PROGRAM

## 2025-07-10 PROCEDURE — 120N000002 HC R&B MED SURG/OB UMMC

## 2025-07-10 PROCEDURE — 97161 PT EVAL LOW COMPLEX 20 MIN: CPT | Mod: GP

## 2025-07-10 PROCEDURE — 36415 COLL VENOUS BLD VENIPUNCTURE: CPT | Performed by: STUDENT IN AN ORGANIZED HEALTH CARE EDUCATION/TRAINING PROGRAM

## 2025-07-10 PROCEDURE — 99232 SBSQ HOSP IP/OBS MODERATE 35: CPT | Performed by: STUDENT IN AN ORGANIZED HEALTH CARE EDUCATION/TRAINING PROGRAM

## 2025-07-10 RX ORDER — ACETAMINOPHEN 325 MG/1
975 TABLET ORAL 3 TIMES DAILY
Status: DISCONTINUED | OUTPATIENT
Start: 2025-07-10 | End: 2025-07-11 | Stop reason: HOSPADM

## 2025-07-10 RX ADMIN — HYDROMORPHONE HYDROCHLORIDE 1 MG: 2 TABLET ORAL at 04:44

## 2025-07-10 RX ADMIN — AMOXICILLIN AND CLAVULANATE POTASSIUM 1 TABLET: 875; 125 TABLET, COATED ORAL at 20:51

## 2025-07-10 RX ADMIN — FLUOXETINE HYDROCHLORIDE 30 MG: 10 CAPSULE ORAL at 09:16

## 2025-07-10 RX ADMIN — ATORVASTATIN CALCIUM 40 MG: 40 TABLET, FILM COATED ORAL at 09:16

## 2025-07-10 RX ADMIN — HYDROMORPHONE HYDROCHLORIDE 1 MG: 2 TABLET ORAL at 01:24

## 2025-07-10 RX ADMIN — ACETAMINOPHEN 975 MG: 325 TABLET ORAL at 13:55

## 2025-07-10 RX ADMIN — SODIUM BICARBONATE 650 MG TABLET 650 MG: at 13:55

## 2025-07-10 RX ADMIN — ASPIRIN 81 MG: 81 TABLET, DELAYED RELEASE ORAL at 09:16

## 2025-07-10 RX ADMIN — HYDROMORPHONE HYDROCHLORIDE 1 MG: 2 TABLET ORAL at 20:50

## 2025-07-10 RX ADMIN — SODIUM BICARBONATE 650 MG TABLET 650 MG: at 20:51

## 2025-07-10 RX ADMIN — SODIUM BICARBONATE 650 MG TABLET 650 MG: at 09:16

## 2025-07-10 RX ADMIN — ENOXAPARIN SODIUM 40 MG: 40 INJECTION SUBCUTANEOUS at 21:10

## 2025-07-10 RX ADMIN — LISINOPRIL 2.5 MG: 2.5 TABLET ORAL at 09:16

## 2025-07-10 RX ADMIN — AMOXICILLIN AND CLAVULANATE POTASSIUM 1 TABLET: 875; 125 TABLET, COATED ORAL at 09:16

## 2025-07-10 RX ADMIN — ACETAMINOPHEN 975 MG: 325 TABLET ORAL at 20:51

## 2025-07-10 RX ADMIN — HYDROMORPHONE HYDROCHLORIDE 1 MG: 2 TABLET ORAL at 09:23

## 2025-07-10 ASSESSMENT — ACTIVITIES OF DAILY LIVING (ADL)
ADLS_ACUITY_SCORE: 37
ADLS_ACUITY_SCORE: 34
ADLS_ACUITY_SCORE: 37

## 2025-07-10 NOTE — PLAN OF CARE
Physical Therapy Discharge Summary    Reason for therapy discharge:    All goals and outcomes met, no further needs identified.    Progress towards therapy goal(s). See goals on Care Plan in [facility] electronic health record for goal details.  Goals met    Therapy recommendation(s):    No further therapy is recommended.

## 2025-07-10 NOTE — PROGRESS NOTES
S.  Patient was seen today at  for an evaluation for a cam walker for their right foot/ankle as ordered by Andrea Cespedes,BRAXTON  O/G. help stabilize foot and ankle.  A.  Patient was fit with a large short pneumatic Cam Walker for the right leg.  Cam walker was assembled by removing the soft liner from the foot plate and uprights, the patients foot and leg was positioned into the soft liner with the heel firmly sealed.  The liner was closed tightly and secured with the Velcro closure. The heel and foot were positioned in the rocker bottom foot plate and the uprights were contoured to fall at mid-line of the lower leg and secured to the Velcro.  The foot plate Velcro straps were secured, proximal straps first, then the distal strap.  The lower leg straps were attached starting distal and working proximal. The ankle joints were set at 90 degrees of dorsi/plantar flexion. Donning and doffing of the Cam Walker was explained.  P.   Patient was advised to contact this office with any problems or questions.  Cristobal ALFRED,FAINA.

## 2025-07-10 NOTE — PROGRESS NOTES
"   07/10/25 1130   Appointment Info   Signing Clinician's Name / Credentials (PT) Bonita Maza DPT   Rehab Comments (PT) per podiatry MD contacted this AM- OK for WBAT today in CAM, limited activity to ADLs and basic activity only, no long walks or exercise (formal orders not yet, verbal from Dr. Andrea Hammond who said would place orders later today, OK'd PT under those restrictions. Pt had CAM in room upon arrival).   Living Environment   People in Home spouse   Current Living Arrangements house   Home Accessibility stairs to enter home;stairs within home   Number of Stairs, Main Entrance 8   Stair Railings, Main Entrance railings safe and in good condition   Number of Stairs, Within Home, Primary ten   Stair Railings, Within Home, Primary railings safe and in good condition   Transportation Anticipated family or friend will provide   Self-Care   Usual Activity Tolerance good   Current Activity Tolerance good   Equipment Currently Used at Home none;walker, rolling;cane, straight  (does not use at baseline)   Fall history within last six months no   Activity/Exercise/Self-Care Comment IND with all cares and mobility at baseline   General Information   Onset of Illness/Injury or Date of Surgery 07/07/25   Referring Physician Dr. Maru Byers   Pertinent History of Current Problem (include personal factors and/or comorbidities that impact the POC) per chart review, \"Thomas Gonzales is a 59 year old male admitted on 7/7/2025. He has medical history of diet-controlled type 2 diabetes, diabetic foot ulcers, HLD, CAD, multiple sclerosis, malignant melanoma (excised 2016), celiac disease. He presented to ED for evaluation of right second toe infection which is failing outpatient management. He is admitted to the internal medicine team for management. Podiatry consulted with recommendation for amputation. Tentative plan for surgery on 7/8 and initiation of antibiotics pending intraoperative cultures. \"   Existing " Precautions/Restrictions fall;brace worn when out of bed   Weight-Bearing Status - RLE weight-bearing as tolerated  (with CAM)   Cognition   Affect/Mental Status (Cognition) WNL   Orientation Status (Cognition) oriented x 4   Follows Commands (Cognition) WNL   Pain Assessment   Patient Currently in Pain Yes, see Vital Sign flowsheet   Range of Motion (ROM)   Range of Motion ROM is WNL   Strength (Manual Muscle Testing)   Strength (Manual Muscle Testing) strength is WNL   Bed Mobility   Comment, (Bed Mobility) IND   Transfers   Comment, (Transfers) IND   Gait/Stairs (Locomotion)   Comment, (Gait/Stairs) pt ambulates short distance in room with CAM, no AD, supervision. Mild LOB given heavy CAM and using bed/wall for support but no overt LOB.   Balance   Balance Comments impacted my CAM boot only   Clinical Impression   Criteria for Skilled Therapeutic Intervention Yes, treatment indicated   PT Diagnosis (PT) impaired functional mobility   Influenced by the following impairments CAM boot, pain   Functional limitations due to impairments gait   Clinical Presentation (PT Evaluation Complexity) stable   Clinical Presentation Rationale PMH, clinician impression   Clinical Decision Making (Complexity) low complexity   Planned Therapy Interventions (PT) gait training;risk factor education   Risk & Benefits of therapy have been explained evaluation/treatment results reviewed;care plan/treatment goals reviewed;risks/benefits reviewed;current/potential barriers reviewed;participants voiced agreement with care plan;participants included;patient   PT Total Evaluation Time   PT Eval, Low Complexity Minutes (91667) 5   Physical Therapy Goals   PT Frequency One time eval and treatment only   PT Predicted Duration/Target Date for Goal Attainment 07/10/25   PT Goals Gait;PT Goal 1   PT: Gait Independent;100 feet;Within precautions;Goal Met   PT: Goal 1 pt will verbalize understanding for activity restrictions to promote healing upon  discharge  (goal met)   Interventions   Interventions Quick Adds Gait Training   Gait Training   Gait Training Minutes (96269) 9   Symptoms Noted During/After Treatment (Gait Training) fatigue   Treatment Detail/Skilled Intervention after gait in room manan discussed possible benefit from walker or SEC. Pt hoping to avoid these as wants to be able to access home office downstairs and doesn't want to drag DME with him but does have at home if he feels he needs. Pt initially ambulates with significant sway 2/2 CAM, educated on having shoe on at all times on L LE. Pt donned and improved stability ~75%. Progressed to hallway ambulation x125' supervision using light wall/rail support > IND. Pt educating about stairs step to pattern technique and rail use throughout. Did not formally pracctice as pt does not foresee concern with this after verbal education and PT in agreement. Reviewed CAM/WBAT and activity restrictions which pt with good understanding for.   Distance in Feet 125   PT Discharge Planning   PT Plan DC from IP PT   PT Discharge Recommendation (DC Rec) home with assist   PT Rationale for DC Rec pt mobilzing well under WBAT with CAM, limited activity to ADLs/household distances, IND with mobility, has spouse at home to support as needed.   PT Brief overview of current status IND, WBAT with CAM boot   PT Total Distance Amb During Session (feet) 150   Physical Therapy Time and Intention   Timed Code Treatment Minutes 9   Total Session Time (sum of timed and untimed services) 14

## 2025-07-10 NOTE — PROGRESS NOTES
Podiatry Progress Note    Date: July 10, 2025      ASSESSMENT/PLAN:  Patient is postop day 1 from right second toe amputation.  He is doing well, he did have some pain overnight being controlled with Dilaudid, better this morning    Recommend continue elevating    Dressing to remain clean, dry, intact.  He can ambulate for light ADLs in his short cam boot but I did talk with him about no high-impact activities or long distance walking for the next couple weeks until sutures are out    No further operative plans from podiatry, he has a follow-up outpatient with podiatry already at the [facility] on 7/30/2025, this will be almost exactly 3 weeks from procedure, this will be perfect for postop follow-up in the meantime he can complete dressing changes as below, this was reviewed with patient today at bedside who feels that him and his wife can easily do this dressing change twice per week.    Dressing RIGHT foot:  To be changed twice per week or sooner if soiled/wet  1.) takedown dressing, can clean around sutures with wound spray and dry the area  2.)  Place Adaptic strip over sutures to protect  3.)  Layer with bulky layer of sterile gauze and hotdog shape as we discussed  4.)  Secure with the Kerlix wrap make sure to secure around the ankle  5.)  Can then secure with Ace wrap make sure to secure around the ankle, if this is bothering the skin can discontinue and only use Kerlix and tape    Please wear your short boot at all times when up and walking around the house, you can use this for basic activities around the house to maintain independence but please do not engage in any long distance walking or high impact activities as we discussed    Please follow-up with Dr. West at your scheduled visit on the 30th      OBJECTIVE:    /65 (BP Location: Left arm)   Pulse 64   Temp 99.6  F (37.6  C) (Oral)   Resp 17   Ht 1.829 m (6')   Wt 105.1 kg (231 lb 9.6 oz)   SpO2 95%   BMI 31.41 kg/m       Lab Results  "  Component Value Date    WBC 4.6 07/10/2025    WBC 4.2 02/23/2021     Lab Results   Component Value Date    RBC 3.98 07/10/2025    RBC 4.37 02/23/2021     Lab Results   Component Value Date    HGB 11.4 07/10/2025    HGB 12.2 02/23/2021     Lab Results   Component Value Date    HCT 34.8 07/10/2025    HCT 37.8 02/23/2021     No components found for: \"MCT\"  Lab Results   Component Value Date    MCV 87 07/10/2025    MCV 87 02/23/2021     Lab Results   Component Value Date    MCH 28.6 07/10/2025    MCH 27.9 02/23/2021     Lab Results   Component Value Date    MCHC 32.8 07/10/2025    MCHC 32.3 02/23/2021     Lab Results   Component Value Date    RDW 11.9 07/10/2025    RDW 13.6 02/23/2021     Lab Results   Component Value Date     07/10/2025     02/23/2021       PHYSICAL EXAM:    Derm:      The surgical site to the right foot is stable at this time with all sutures intact, no significant erythema or edema, area is soft, some slight tenderness, no signs of hematoma or abscess.    Vascular:  DP pulse is palpable bilateral  PT pulse is difficult to palpate but dopplerable  Cap refill is less than 2 seconds to toes  Pedal hair is present but sparse     To his right foot his distal dorsalis pedis as well as his entire metatarsal artery in the first interspace is multiphasic, his second intermetatarsal space artery is also multiphasic, and I did get multiphasic signals/arterial flow distally at the base of the second toe and the expected digital artery area.        MSK:  MMT is 5 out of 5 bilateral foot and ankle, no palpable pedal pain     Neuro: Gross sensation is diminished via light touch to pedal nerve branches b/l              Andrea Hammond DPM  Pager: [phone]          "

## 2025-07-10 NOTE — PROGRESS NOTES
[facility]    Medicine Progress Note - Hospitalist Service, GOLD TEAM 22    Date of Admission:  7/7/2025    Assessment & Plan   Thomas Gonzales is a 59 year old male admitted on 7/7/2025. He has medical history of diet-controlled type 2 diabetes, diabetic foot ulcers, HLD, CAD, multiple sclerosis, malignant melanoma (excised 2016), celiac disease. He presented to ED for evaluation of right second toe infection which is failing outpatient management. He is admitted to the internal medicine team for management. Podiatry consulted with recommendation for amputation. Tentative plan for surgery on 7/8 and initiation of antibiotics pending intraoperative cultures.      Plan today:   - Continue Augmentin per ID recommendation   - Podiatry following, appreciate recommendations on appropriateness for discharge   - Pain management: scheduled tylenol with prn oral dilaudid available    #SST infection of 2nd toe of right foot  Ongoing symptoms of toe infection for 4-5 weeks prior to presentation after wearing tight shoes at a wedding. XR in ED without fracture. Interventions prior to hospital presentation included wound cleansing and Augmentin. Patient has history of requiring IV antibiotics for foot infection.      - admission for further evaluation, consultation with podiatry and/or orthopedic teams  -multimodal pain management   - scheduled tylenol 975mg PO TID, dilaudid 11mg Q3H prn for breakthrough    - Daily miralax while getting dilaudid  -additional imaging pending specialist recommendations, anticipate MRI may be required  -no IV antibiotics given clinical stability pending OR  - Recommendation for amputation of the affected toe per Podiatry s/p amputation in OR on 7/9.   - Holding IV antibiotics in the immediate -> plan for antibiotic plan pending IO culture   - ID consulted, appreciate recommendations    - Continue Augmentin 875-125mg PO BID for now, anticipate short  course post operatively    - Follow up OR cultures and pathology     # diet-controlled type 2 diabetes with diabetic neuropathy and retinopathy    -Home regimen: diet-managed    -In-hospital regimen:  -Basal Insulin:  none  -Insulin correction sliding scale: medium aspart ss  -Carb coverage: none on admission        Lab Results   Component Value Date     A1C 5.3 06/12/2025     A1C 5.7 01/17/2021      # bilateral LE edema, acute on chronic  Right > left. RLE edema, worsened while sitting in waiting room and without compression stockings that patient normally wears. Possibly worsened edema secondary to infection vs other etiology    -BNP mildly elevated, TTE with intact LVEF     -PCDs ordered    -RLE ultrasound negative for clot     # multiple sclerosis  # autonomic dysfunction secondary to MS  Patient saw Dr. Crenshaw with [facility] neurology 2023. No medications.    -monitor symptoms     # CKD III  Creatinine 1.79 on admission. This appears to be near his recent baseline.    -renally dose medications    -avoid nephrotoxic medications as able    -monitor BMP    -continue sodium bicarb 650mg tabs three times daily    -continue PTA lisinopril 2.5mg daily for renal protective properties (not prescribed for HTN)      # CAD s/p PCI (2015)  # HLD    - PTA ASA     -continue PTA statin     # MDD    -continue PTA fluoxetine          Diet: Regular Diet Adult    DVT Prophylaxis: Enoxaparin (Lovenox) SQ  Martínez Catheter: Not present  Lines: None     Cardiac Monitoring: None  Code Status: Full Code      Clinically Significant Risk Factors          # Hyperchloremia: Highest Cl = 108 mmol/L in last 2 days, will monitor as appropriate                         # Obesity: Estimated body mass index is 31.41 kg/m  as calculated from the following:    Height as of this encounter: 1.829 m (6').    Weight as of this encounter: 105.1 kg (231 lb 9.6 oz)., PRESENT ON ADMISSION            Social Dot VN of Health    Housing Stability: Low Risk   "(7/7/2025)    Housing Stability     Do you have housing? : Yes     Are you worried about losing your housing?: No   Recent Concern: Housing Stability - High Risk (6/10/2025)    Housing Stability     Do you have housing? : No     Are you worried about losing your housing?: No   Physical Activity: Insufficiently Active (6/10/2025)    Exercise Vital Sign     Days of Exercise per Week: 2 days     Minutes of Exercise per Session: 20 min   Social Connections: Unknown (6/10/2025)    Social Connection and Isolation Panel [NHANES]     Frequency of Social Gatherings with Friends and Family: Twice a week          Disposition Plan     Medically Ready for Discharge: Anticipated Tomorrow             Maru Byers,   Hospitalist Service, GOLD TEAM [address] [place] [address]  Securely message with [employer] (more info)  Text page via [employer] Paging/Directory   See signed in provider for up to date coverage information  ______________________________________________________________________    Interval History   No acute events overnight. Noted breakthrough severe and throbbing pain of the toe overnight, improved with dilaudid. Otherwise denies fevers, chills, nausea, emesis, dyspnea, abdominal pain, urinary symptoms or other concerns. Has not had a bowel movement since procedure, feels things \"gurgling\" this morning.     Physical Exam   Vital Signs: Temp: 98.1  F (36.7  C) Temp src: Oral BP: 115/61 Pulse: 68   Resp: 17 SpO2: 97 % O2 Device: None (Room air)    Weight: 231 lbs 9.6 oz    General: well developed, awake, alert, no acute distress  HEENT: sclera clear, MMM  CV: Extremities are warm and well perfused.   LUNGS: No increased WOB.   ABD: Soft, NT/ND  SKIN: Warm, well perfused.   MSK: R foot is dressed and wrapped. No clubbing, cyanosis, or edema.  NEURO: Alert and appropriate. No focal deficits.      Medical Decision Making           Data     I have personally reviewed the following data " over the past 24 hrs:    4.6  \   11.4 (L)   / 165     139 107 29.1 (H) /  109 (H)   4.9 24 1.58 (H) \       Imaging results reviewed over the past 24 hrs:   No results found for this or any previous visit (from the past 24 hours).

## 2025-07-10 NOTE — PLAN OF CARE
Goal Outcome Evaluation:      Plan of Care Reviewed With: patient    Overall Patient Progress: improving    Outcome Evaluation: Pt's VSS, Pain managed with scheduled tylenol and PRN diluadid.    O2: On RA   Output: Continent of B/B, uses urinal at bedside   Last BM: 7/10/25   Activity: NWB on R foot, uses cam boot   Skin: X R foot   Pain: Tylenol, & PO Dilaudid xs this shift   CMS: Intact, A/O x4, able to make needs known   Dressing: R foot surgical dressing CDI    Diet: Regular   LDA: L PIV saline locked   Equipment: IV pole   Plan: Monitoring, pain mgt, continue with POC   Additional Info: Pt is calm and cooperative with cares.

## 2025-07-10 NOTE — PROGRESS NOTES
[facility] GENERAL INFECTIOUS DISEASE PROGRESS NOTE     Patient:  Thomas Gonzales   Date of birth 1965, Medical record number [id number]  Date of Visit:  07/10/2025  Date of Admission: 7/7/2025  Consult Requester:Maru Byers DO          Assessment and Plan:   ID Problem List  Right 2nd toe infected diabetic foot ulcer, moderate with cellulitis and exposed bone c/f osteomyelitis  S/p amputation 7/9/25  Type 2 DM (A1C = 5.3 in June 2025)  S/p Castro-en-Y bypass  History of LLE cellulitis 11/2022 s/p doxycycline x10 days  History of R great toe osteomyelitis (2021, Cx = MSSA x2, pansusceptible Pseudomonas aeruginosa, Acinetobacter) s/p ciprofloxacin + Bactrim x5 weeks, 1 week flagyl    RECOMMENDATION:  OR cultures remain NGTD. Pending pathology  Continue PO Augmentin 875-125 mg BID through 7/13  Short post-operative antibiotic course (5 days) to cover any residual SSTI given ultimate source control of osteomyelitis with toe amputation  No ID follow up needed     ASSESSMENT:  Thomas Gonzales is a 59 year old male with PMHx significant for T2DM c/b neuropathy and diabetic foot ulcers, Castro-en-Y gastric bypass, CAD with PCI (2015), CKD stage III, multiple sclerosis, hx malignant melanoma, and celiac disease who was admitted 7/7/25 for worsening right 2nd toe wound with drainage + cellulitis despite PO Augmentin, found with exposed bone c/w underlying osteomyelitis.    Afebrile, HDS, without signs of sepsis. Wound swab with 1+ normal augusto. Podiatry consulted and he underwent amputation of 2nd toe on 7/9/25 to metatarsal phalangeal joint for source control. Pending OR cultures (NGTD) and histopathology. Given source control obtained with proximal amputation, he will not need a prolonged course of antibiotics post-operatively. Continue PO Augmentin for now, will adjust pending culture data if indicated. Plan for 5 days post-operatively.     Thank you for this consult. ID will continue to follow.  Recommendations discussed with primary team.    Kaycee Che PA-C  Infectious Diseases  Contact via [employer] or [employer] Paging/Directory    35 MINUTES SPENT BY ME on the date of service doing chart review, history, exam, documentation & further activities per the note.           Interim History and Events:     NAEON. OR cultures remain NGTD. Tolerating Augmentin. Foot dressing changed today, well appearing. Afebrile, VSS. Labs wnl.         ROS:   -Focused 5 point ROS completed, pertinent positives and negatives listed above.      Physical Examination:  Temp: 98.1  F (36.7  C) Temp src: Oral BP: 115/61 Pulse: 68   Resp: 17 SpO2: 97 % O2 Device: None (Room air)      Vitals:    07/08/25 0100   Weight: 105.1 kg (231 lb 9.6 oz)     Constitutional: Pleasant adult male seen sitting up in bed, in NAD. Alert and interactive.   HEENT: NCAT, anicteric sclerae, conjunctiva clear. Moist mucous membranes  Respiratory: Non-labored breathing, good air exchange. No cough noted.   Cardiovascular: Regular rate and rhythm.  GI: Abdomen is non-distended.   Skin: Warm and dry. No new rashes or lesions on exposed surfaces.  Musculoskeletal: Extremities grossly normal. No tenderness. Trace edema present. R foot wrapped postoperatively - see media tab for photos of R toe now s/p amputation  Neurologic: A &O x3, speech normal, answering questions appropriately. Moves all extremities spontaneously. Grossly non-focal.  Neuropsychiatric: Mentation and affect normal/appropriate.  VAD: PIV is c/d/i with no erythema, drainage, or tenderness.    Medications:  Current Facility-Administered Medications   Medication Dose Route Frequency Provider Last Rate Last Admin    acetaminophen (TYLENOL) tablet 975 mg  975 mg Oral TID Maru Byers DO        amoxicillin-clavulanate (AUGMENTIN) 875-125 MG per tablet 1 tablet  1 tablet Oral Q12H [facility] (08/20) Maru Byers DO   1 tablet at 07/10/25 0916    aspirin EC tablet 81 mg  81 mg Oral Daily Modesta  "Maru EDWARD DO   81 mg at 07/10/25 0916    atorvastatin (LIPITOR) tablet 40 mg  40 mg Oral Daily Mis Aguilar APRN CNP   40 mg at 07/10/25 0916    enoxaparin ANTICOAGULANT (LOVENOX) injection 40 mg  40 mg Subcutaneous Q24H Maru Byers DO   40 mg at 07/09/25 2207    FLUoxetine (PROzac) capsule 30 mg  30 mg Oral Daily Mis Aguilar APRN CNP   30 mg at 07/10/25 0916    insulin aspart (NovoLOG) injection (RAPID ACTING)  1-7 Units Subcutaneous TID AC Mis Aguilar APRN CNP   1 Units at 07/09/25 1836    insulin aspart (NovoLOG) injection (RAPID ACTING)  1-5 Units Subcutaneous At Bedtime Mis Aguilar APRN CNP        lisinopril (ZESTRIL) tablet 2.5 mg  2.5 mg Oral Daily Maru Byers DO   2.5 mg at 07/10/25 0916    polyethylene glycol (MIRALAX) Packet 17 g  17 g Oral Daily Mis Aguilar APRN CNP        sodium bicarbonate tablet 650 mg  650 mg Oral TID Mis Aguilar APRN CNP   650 mg at 07/10/25 0916    sodium chloride (PF) 0.9% PF flush 3 mL  3 mL Intracatheter Q8H [facility] Mis Aguilar APRN CNP   3 mL at 07/09/25 2209       Infusions/Drips:  Current Facility-Administered Medications   Medication Dose Route Frequency Provider Last Rate Last Admin       Laboratory Data:   No results found for: \"ACD4\"    Inflammatory Markers    Recent Labs   Lab Test 07/07/25  1555 12/03/22  0601 12/02/22  1647 02/23/21  1534 01/20/21  0550 01/19/21  0729 01/18/21  0546 01/17/21  0553 01/16/21  1544   SED 23* 9 9  --   --   --   --  16 14   CRP  --   --   --  <2.9 12.0* 15.0* 26.0* 51.0* 67.0*       Metabolic Studies       Recent Labs   Lab Test 07/10/25  1216 07/10/25  0737 07/10/25  0728 07/10/25  0205 07/09/25  2155 07/09/25  1746 07/09/25  1218 07/09/25  0816 07/07/25  2325 07/07/25  1555 06/12/25  1051 06/07/24  0905 05/31/24  0717 01/17/21  0553 01/16/21  1544 06/22/20  1106 05/01/18  0819 07/03/17  1126   NA  --  139  --   --   --   --   --  139  -- "  139 143 139 141   < > 142 142   < >  --    POTASSIUM  --  4.9  --   --   --   --   --  5.1  --  4.8 5.5* 5.5* 5.1   < > 4.0 4.9   < >  --    CHLORIDE  --  107  --   --   --   --   --  108*  --  108* 114* 112* 112*   < > 116* 111*   < >  --    CO2  --  24  --   --   --   --   --  23  --  21* 21* 20* 19*   < > 21 28   < >  --    ANIONGAP  --  8  --   --   --   --   --  8  --  10 8 7 10   < > 6 3   < >  --    BUN  --  29.1*  --   --   --   --   --  32.9*  --  40.0* 51.6* 40.3* 45.3*   < > 28 22   < >  --    CR  --  1.58*  --   --   --   --   --  1.59*  --  1.79* 1.81* 1.54* 1.55*   < > 1.10 1.00   < >  --    GFRESTIMATED  --  50*  --   --   --   --   --  50*  --  43* 43* 52* 52*   < > 76 85   < >  --    * 128* 128* 132* 118* 172*   < > 123*   < > 133* 151* 145* 127*   < > 121* 129*   < >  --    A1C  --   --   --   --   --   --   --   --   --   --  5.3 6.3*  --    < >  --  5.5   < >  --    DIONICIO  --  8.6*  --   --   --   --   --  8.9  --  9.1 9.5 9.0 8.9   < > 8.6 8.9   < >  --    PHOS  --   --   --   --   --   --   --   --   --   --   --   --   --   --   --  3.7  --  2.5   MAG  --   --   --   --   --   --   --   --   --   --  1.9  --   --   --   --  1.9  --  1.9   LACT  --   --   --   --   --   --   --   --   --  0.8  --   --   --   --  0.8  --   --   --     < > = values in this interval not displayed.       Hepatic Studies    Recent Labs   Lab Test 07/07/25  1555 06/12/25  1051 06/07/24  0905 07/19/23  1538 12/03/22  0601 12/02/22  1647   BILITOTAL 0.4 0.3 0.4 0.5 0.4 0.3   ALKPHOS 103 101 114 136* 103 125   ALBUMIN 3.8 4.1 4.0 4.2 3.2* 4.2   AST 31 49* 61* 50* 116* 64*   ALT 34 58 89* 76* 82* 72*       Pancreatitis testing    Recent Labs   Lab Test 06/12/25  1051 06/07/24  0905 07/19/23  1538 10/21/22  0729 09/23/21  1114 06/22/20  1106   TRIG 70 54 109 97 37 48       Hematology Studies      Recent Labs   Lab Test 07/10/25  0737 07/09/25  0816 07/08/25  0738 07/07/25  1555 06/12/25  1051 06/26/23  1623  12/03/22  0601 12/02/22  1647 11/16/22  0657 11/15/22  1811 11/13/22  1110   WBC 4.6 4.2 5.0 5.6 4.2 4.8 2.9* 4.9   < > 5.9 4.1   ANEU  --   --   --  3.9  --  3.0 1.9 3.1  --  4.2 2.6   ALYM  --   --   --  1.0  --  1.3 0.5* 1.1  --  1.1 1.1   ROSENDO  --   --   --  0.3  --  0.2 0.3 0.4  --  0.4 0.2   AEOS  --   --   --  0.2  --  0.2 0.1 0.3  --  0.2 0.2   HGB 11.4* 11.5* 11.8* 12.4* 13.0* 12.3* 10.9* 12.8*   < > 13.9 12.9*   HCT 34.8* 34.2* 35.7* 37.1* 40.5 37.2* 32.7* 37.9*   < > 41.7 38.1*    151 175 178 142* 160 103* 144*   < > 157 148*    < > = values in this interval not displayed.     Urine Studies     Recent Labs   Lab Test 06/26/23  1551 10/21/22  0729   URINEPH 5.5 7.0   NITRITE Negative Positive*   LEUKEST Moderate* Moderate*   WBCU * >100*       Vancomycin Levels     Recent Labs   Lab Test 01/18/21  1714   VANCOMYCIN 17.9     Microbiology:  Culture   Date Value Ref Range Status   07/09/2025 No anaerobic organisms isolated after 1 day  Preliminary   07/09/2025 No growth after 1 day  Preliminary   07/09/2025 No growth, less than 1 day  Preliminary   07/07/2025 No growth after 2 days  Preliminary   07/07/2025 No growth after 2 days  Preliminary   07/07/2025 1+ Normal augusto  Final   06/26/2023 >100,000 CFU/mL Klebsiella pneumoniae (A)  Final   12/02/2022 No Growth  Final   12/02/2022 No Growth  Final   12/02/2022 No Growth  Final   11/15/2022 No Growth  Final   11/15/2022 No Growth  Final   10/21/2022 >100,000 CFU/mL Proteus mirabilis (A)  Final   10/21/2022 <10,000 CFU/mL Urogenital augusto  Final     GS Culture   Date Value Ref Range Status   07/09/2025 See corresponding culture for results  Final       Last check of C difficile  C Difficile Toxin B by PCR   Date Value Ref Range Status   12/03/2022 Negative Negative Final     Comment:     A negative result does not exclude actual disease due to C. difficile and may be due to improper collection, handling and storage of the specimen or the number of  organisms in the specimen is below the detection limit of the assay.       Imaging:  Echo Complete  Result Date: 7/8/2025  Interpretation Summary Global and regional left ventricular function is normal with an EF of 55-60%. Global right ventricular function is normal. No significant valvular abnormalities present. No pericardial effusion is present.      US Lower Extremity Venous Duplex Right  Result Date: 7/7/2025  IMPRESSION: No deep venous thrombosis in the visualized right lower extremity.      Foot  XR, G/E 3 views, right  Result Date: 7/7/2025  IMPRESSION: No discrete osseous erosive or destructive change to suggest osteomyelitis radiographically. Chronic bone loss at the tuft of the first distal phalanx. Mild scattered degenerative arthritis. Arterial calcifications.     XR Foot Right G/E 3 Views  Result Date: 7/3/2025  IMPRESSION: Generalized demineralization. No erosive change or periostitis. No fracture. Normal joint alignment. Moderate degenerative changes throughout the midfoot and toes. Soft tissue swelling throughout the foot. Arterial calcifications throughout the digital arteries. Moderate-sized plantar calcaneal enthesophyte.

## 2025-07-10 NOTE — PLAN OF CARE
[id number]    Goal Outcome Evaluation:      Plan of Care Reviewed With: patient    Overall Patient Progress: no change    Patient alert and oriented, stable on room air. Up SBA/independently- Non weight bearing to right lower extremity. Right foot pain managed with prn PO dilaudid. BG checks. Regular diet, thin liquids, pills whole, denies nausea/vomiting. RLE dressing C/D/I- no visible drainage. Able to make needs known. Call light within reach. Continue with current plan of care.

## 2025-07-10 NOTE — DISCHARGE INSTRUCTIONS
Dressing RIGHT foot:  To be changed twice per week or sooner if soiled/wet  1.) takedown dressing, can clean around sutures with wound spray and dry the area  2.)  Place Adaptic strip over sutures to protect  3.)  Layer with bulky layer of sterile gauze and hotdog shape as we discussed  4.)  Secure with the Kerlix wrap make sure to secure around the ankle  5.)  Can then secure with Ace wrap make sure to secure around the ankle, if this is bothering the skin can discontinue and only use Kerlix and tape    Please wear your short boot at all times when up and walking around the house, you can use this for basic activities around the house to maintain independence but please do not engage in any long distance walking or high impact activities as we discussed    Please follow-up with Dr. West at your scheduled visit on the 30th

## 2025-07-11 VITALS
HEART RATE: 68 BPM | HEIGHT: 72 IN | BODY MASS INDEX: 31.37 KG/M2 | SYSTOLIC BLOOD PRESSURE: 112 MMHG | DIASTOLIC BLOOD PRESSURE: 64 MMHG | TEMPERATURE: 98.2 F | OXYGEN SATURATION: 95 % | RESPIRATION RATE: 16 BRPM | WEIGHT: 231.6 LBS

## 2025-07-11 LAB
GLUCOSE BLDC GLUCOMTR-MCNC: 148 MG/DL (ref 70–99)
GLUCOSE BLDC GLUCOMTR-MCNC: 164 MG/DL (ref 70–99)
GLUCOSE BLDC GLUCOMTR-MCNC: 195 MG/DL (ref 70–99)
PATH REPORT.COMMENTS IMP SPEC: NORMAL
PATH REPORT.COMMENTS IMP SPEC: NORMAL
PATH REPORT.FINAL DX SPEC: NORMAL
PATH REPORT.GROSS SPEC: NORMAL
PATH REPORT.MICROSCOPIC SPEC OTHER STN: NORMAL
PATH REPORT.RELEVANT HX SPEC: NORMAL
PHOTO IMAGE: NORMAL

## 2025-07-11 PROCEDURE — 250N000013 HC RX MED GY IP 250 OP 250 PS 637: Performed by: NURSE PRACTITIONER

## 2025-07-11 PROCEDURE — 99232 SBSQ HOSP IP/OBS MODERATE 35: CPT | Performed by: STUDENT IN AN ORGANIZED HEALTH CARE EDUCATION/TRAINING PROGRAM

## 2025-07-11 PROCEDURE — 250N000013 HC RX MED GY IP 250 OP 250 PS 637: Performed by: STUDENT IN AN ORGANIZED HEALTH CARE EDUCATION/TRAINING PROGRAM

## 2025-07-11 PROCEDURE — 99239 HOSP IP/OBS DSCHRG MGMT >30: CPT | Performed by: STUDENT IN AN ORGANIZED HEALTH CARE EDUCATION/TRAINING PROGRAM

## 2025-07-11 RX ORDER — GABAPENTIN 100 MG/1
100 CAPSULE ORAL 3 TIMES DAILY PRN
Qty: 20 CAPSULE | Refills: 0 | Status: SHIPPED | OUTPATIENT
Start: 2025-07-11

## 2025-07-11 RX ORDER — HYDROMORPHONE HYDROCHLORIDE 2 MG/1
1 TABLET ORAL
Qty: 10 TABLET | Refills: 0 | Status: SHIPPED | OUTPATIENT
Start: 2025-07-11

## 2025-07-11 RX ADMIN — AMOXICILLIN AND CLAVULANATE POTASSIUM 1 TABLET: 875; 125 TABLET, COATED ORAL at 08:39

## 2025-07-11 RX ADMIN — ACETAMINOPHEN 975 MG: 325 TABLET ORAL at 13:15

## 2025-07-11 RX ADMIN — ATORVASTATIN CALCIUM 40 MG: 40 TABLET, FILM COATED ORAL at 08:38

## 2025-07-11 RX ADMIN — SODIUM BICARBONATE 650 MG TABLET 650 MG: at 08:37

## 2025-07-11 RX ADMIN — ASPIRIN 81 MG: 81 TABLET, DELAYED RELEASE ORAL at 08:38

## 2025-07-11 RX ADMIN — ACETAMINOPHEN 975 MG: 325 TABLET ORAL at 08:38

## 2025-07-11 RX ADMIN — FLUOXETINE HYDROCHLORIDE 30 MG: 10 CAPSULE ORAL at 08:38

## 2025-07-11 RX ADMIN — SODIUM BICARBONATE 650 MG TABLET 650 MG: at 13:15

## 2025-07-11 RX ADMIN — HYDROMORPHONE HYDROCHLORIDE 1 MG: 2 TABLET ORAL at 01:12

## 2025-07-11 RX ADMIN — LISINOPRIL 2.5 MG: 2.5 TABLET ORAL at 08:38

## 2025-07-11 RX ADMIN — HYDROMORPHONE HYDROCHLORIDE 1 MG: 2 TABLET ORAL at 13:15

## 2025-07-11 RX ADMIN — HYDROMORPHONE HYDROCHLORIDE 1 MG: 2 TABLET ORAL at 08:49

## 2025-07-11 ASSESSMENT — ACTIVITIES OF DAILY LIVING (ADL)
ADLS_ACUITY_SCORE: 36
ADLS_ACUITY_SCORE: 37
ADLS_ACUITY_SCORE: 36
ADLS_ACUITY_SCORE: 37
ADLS_ACUITY_SCORE: 37
ADLS_ACUITY_SCORE: 36
ADLS_ACUITY_SCORE: 37
ADLS_ACUITY_SCORE: 36
ADLS_ACUITY_SCORE: 37
ADLS_ACUITY_SCORE: 36
ADLS_ACUITY_SCORE: 37

## 2025-07-11 NOTE — PLAN OF CARE
Goal Outcome Evaluation:    VS: /64   Pulse 68   Temp 98.2  F (36.8  C) (Oral)   Resp 16   Ht 1.829 m (6')   Wt 105.1 kg (231 lb 9.6 oz)   SpO2 95%   BMI 31.41 kg/m      O2: Room air, denies sob   Neuro: A/Ox4, CMS intact, numbness presented to [facility] per baseline   Bowel/Bladder: Continent of bowel/bladder   LBM: Yesterday 7/10   Diet: Regular diet   Skin: Surgical incision to right second toe, dressing CDI   Pain: Pain to right 2nd toe managed with prn oral dilaudid/scheduled tylenol   Activity: Up ad tamia in room   Dressings: Dressing to right 2nd toe CDI, cam boot on   LDA: none   Equipment: Personal belongings   Plan: Pain management   Additional Info:      DISCHARGE SUMMARY    Pt discharging to: Home  Transportation: Wife  AVS given and discussed: Pt was given AVS and pt states understanding of content. Pt has no further questions.   Medications given: Yes, discussed. No further questions.   Belongings returned: Yes, ensured all belongings packed and sent with pt. No items in security.   Comments: Pt left at 1400          Garrison Joann Castle, RN on 7/11/2025

## 2025-07-11 NOTE — DISCHARGE SUMMARY
[facility]  Hospitalist Discharge Summary      Date of Admission:  7/7/2025  Date of Discharge:  7/11/2025  Discharging Provider: Maru Byers DO  Discharge Service: Hospitalist Service, GOLD TEAM 22    Clinically Significant Risk Factors     # Obesity: Estimated body mass index is 31.41 kg/m  as calculated from the following:    Height as of this encounter: 1.829 m (6').    Weight as of this encounter: 105.1 kg (231 lb 9.6 oz).       Follow-ups Needed After Discharge   Follow-up Appointments       ADULT [facility] Specialty Follow-up and recommended labs and tests      Follow up: Follow up with Podiatry as scheduled on 7/30/25.     Appointments on [place] and/or [facility] (with [facility] or [facility] provider or service). Call [phone] if you haven't heard regarding these appointments within 7 days of discharge.        Hospital Follow-up with Existing Primary Care Provider (PCP)          Schedule Primary Care visit within: 30 Days           - Podiatry follow up as scheduled on 7/30/25    Unresulted Labs Ordered in the Past 30 Days of this Admission       Date and Time Order Name Status Description    7/9/2025 10:28 AM Tissue Aerobic Bacterial Culture Routine Preliminary     7/9/2025 10:28 AM Fungal or Yeast Culture Routine Preliminary     7/9/2025 10:28 AM Anaerobic Bacterial Culture Routine Preliminary     7/9/2025 10:21 AM Surgical Pathology Exam In process     7/7/2025  4:15 PM Blood Culture Peripheral blood (BC) Arm, Left Preliminary     7/7/2025  2:03 PM Blood Culture Peripheral blood (BC) Arm, Right Preliminary         These results will be followed up by podiatry.     Discharge Disposition   Discharged to home  Condition at discharge: Stable    Hospital Course   Thomas Gonzales is a 59 year old male admitted on 7/7/2025. He has medical history of diet-controlled type 2 diabetes, diabetic foot ulcers, HLD, CAD, multiple sclerosis, malignant melanoma  (excised 2016), celiac disease. He presented to ED for evaluation of right second toe infection which is failing outpatient management. He was admitted to the internal medicine team for management. Podiatry consulted with recommendation for amputation and he underwent amputation of the right second toe on 7/9 without complication. ID consulted and he will complete short course of Augmentin given source control post operatively. Intraoperative cultures remain negative preliminarily. He will follow up with Podiatry as scheduled on 7/30/25. CAM boot and wound care instructions provided on discharge as well as limited supply of gabapentin and dilaudid prn pain.      #SST infection of 2nd toe of right foot  #S/p amputation of the right 2nd toe on 7/9  Ongoing symptoms of toe infection for 4-5 weeks prior to presentation after wearing tight shoes at a wedding. XR in ED without fracture. Interventions prior to hospital presentation included wound cleansing and Augmentin. Patient has history of requiring IV antibiotics for foot infection. Podiatry consulted and he underwent amputation of the affected toe on 7/9. ID consulted for antibiotic recommendation.  - Continue Augmentin 875-125mg PO BID with plan for 5 day course (end 7/13)  - Gabapentin 100mg PO TID, provided 20 tablets   - Encouraged tylenol prn  - Dilaudid 1mg PO Q6h PRN, provided 10 tablets on discharge  - Podiatry follow up as scheduled on 7/30  - CAM boot provided   - Wound are instructions per podiatry    # diet-controlled type 2 diabetes with diabetic neuropathy and retinopathy  - Resume diet-managed on discharge with PCP follow up. No significant insulin requirements this admission.     # Bilateral LE edema, acute on chronic, improved  Right > left. RLE edema, worsened while sitting in waiting room and without compression stockings that patient normally wears. Possibly worsened edema secondary to infection vs other etiology.     -BNP mildly elevated-> TTE  with intact LVEF     -RLE ultrasound negative for clot  - elevate, compression stockings as able     # multiple sclerosis  # autonomic dysfunction secondary to MS  Patient saw Dr. Crenshaw with [facility] neurology 2023. No medications.  - PCP/neuro follow up      # CKD III  Creatinine 1.79 on admission. This appears to be near his recent baseline.    -continue sodium bicarb 650mg tabs three times daily    -continue PTA lisinopril 2.5mg daily for renal protective properties (not prescribed for HTN)      # CAD s/p PCI (2015)  # HLD    - PTA ASA     -continue PTA statin     # MDD    -continue PTA fluoxetine    Consultations This Hospital Stay   PHARMACY TO DOSE VANCO  PODIATRY IP CONSULT  WOUND OSTOMY CONTINENCE NURSE  IP CONSULT  PODIATRY IP CONSULT  INFECTIOUS DISEASE [facility] ADULT IP CONSULT  PHYSICAL THERAPY ADULT IP CONSULT    Code Status   Full Code    Time Spent on this Encounter   IMaru DO, personally saw the patient today and spent greater than 30 minutes discharging this patient.       Maru Byers DO  [facility] [address]  [facility] [address]  Phone: [phone]  Fax: [phone]  ______________________________________________________________________    Physical Exam   Vital Signs: Temp: 98.2  F (36.8  C) Temp src: Oral BP: 112/64 Pulse: 68   Resp: 16 SpO2: 95 % O2 Device: None (Room air)    Weight: 231 lbs 9.6 oz    General: well developed, awake, alert, no acute distress  HEENT: sclera clear, MMM  CV: RRR, no m/r/g. Extremities are warm and well perfused.   LUNGS: CTAB, no w/r/c.  ABD: Soft, NT/ND  SKIN: Warm, well perfused.  MSK: R foot with dressing and CAM boot in place. LLE without edema.   NEURO: Alert and oriented. No focal deficits.       Primary Care Physician   Anni Serrano    Discharge Orders      Reason for your hospital stay    You were admitted with concern for infection of the right second toe. You were evaluated by podiatry who recommended amputation  of the affected toe. You had the toe removed without incident and it is felt this has provided good source control for the infection. The infectious disease team was consulted to assist with antibiotic choice and duration and they recommend you complete a short, 5 day, course of Augmentin.     Activity    Your activity upon discharge: activity as tolerated     Please wear your short boot at all times when up and walking around the house, you can use this for basic activities around the house to maintain independence but please do not engage in any long distance walking or high impact activities as we discussed     ADULT [facility] Specialty Follow-up and recommended labs and tests    Follow up: Follow up with Podiatry as scheduled on 7/30/25.     Appointments on [place] and/or [facility] (with [facility] or [facility] provider or service). Call [phone] if you haven't heard regarding these appointments within 7 days of discharge.     Wound care and dressings    Instructions to care for your wound at home:  Dressing RIGHT foot:  To be changed twice per week or sooner if soiled/wet  1.) takedown dressing, can clean around sutures with wound spray and dry the area  2.)  Place Adaptic strip over sutures to protect  3.)  Layer with bulky layer of sterile gauze and hotdog shape as we discussed  4.)  Secure with the Kerlix wrap make sure to secure around the ankle  5.)  Can then secure with Ace wrap make sure to secure around the ankle, if this is bothering the skin can discontinue and only use Kerlix and tape     Diet    Follow this diet upon discharge: Current Diet:Orders Placed This Encounter      Regular Diet Adult     Hospital Follow-up with Existing Primary Care Provider (PCP)            Significant Results and Procedures   Most Recent 3 CBC's:  Recent Labs   Lab Test 07/10/25  0737 07/09/25  0816 07/08/25  0738   WBC 4.6 4.2 5.0   HGB 11.4* 11.5* 11.8*   MCV 87 85 86    151 175     Most Recent 3 BMP's:  Recent  Labs   Lab Test 07/11/25  1152 07/11/25  0823 07/11/25  0114 07/10/25  1216 07/10/25  0737 07/09/25  1218 07/09/25  0816 07/07/25  2325 07/07/25  1555   NA  --   --   --   --  139  --  139  --  139   POTASSIUM  --   --   --   --  4.9  --  5.1  --  4.8   CHLORIDE  --   --   --   --  107  --  108*  --  108*   CO2  --   --   --   --  24  --  23  --  21*   BUN  --   --   --   --  29.1*  --  32.9*  --  40.0*   CR  --   --   --   --  1.58*  --  1.59*  --  1.79*   ANIONGAP  --   --   --   --  8  --  8  --  10   DIONICIO  --   --   --   --  8.6*  --  8.9  --  9.1   * 195* 164*   < > 128*   < > 123*   < > 133*    < > = values in this interval not displayed.   ,   Results for orders placed or performed during the hospital encounter of 07/07/25   Foot  XR, G/E 3 views, right    Narrative    EXAM: XR FOOT RIGHT G/E 3 VIEWS  LOCATION: [facility]  DATE: 7/7/2025    INDICATION: Second toe diabetic foot wound  COMPARISON: 07/03/2025      Impression    IMPRESSION: No discrete osseous erosive or destructive change to suggest osteomyelitis radiographically. Chronic bone loss at the tuft of the first distal phalanx. Mild scattered degenerative arthritis. Arterial calcifications.   US Lower Extremity Venous Duplex Right    Narrative    EXAM: US LOWER EXTREMITY VENOUS DUPLEX RIGHT  LOCATION: [facility]  DATE: 7/7/2025    INDICATION: Acute on chronic right lower extremity edema.  COMPARISON: 11/21/2015.  TECHNIQUE: Venous Duplex ultrasound of the right lower extremity with and without compression, augmentation and duplex. Color flow and spectral Doppler with waveform analysis performed.    FINDINGS: Exam includes the common femoral, femoral, popliteal, and contralateral common femoral veins as well as segmentally visualized deep calf veins and greater saphenous vein.     RIGHT: No deep vein thrombosis. No superficial thrombophlebitis.        Impression    IMPRESSION:  No deep venous thrombosis in the visualized right lower extremity.     Echo Complete     Value    LVEF  55-60%    [facility]    [id number]  [id number]  [id number]  [id number]^MARCELINO^CECILIA^CHEYANNE     [facility]  Echocardiography Laboratory  [address] [place] [address]     Name: ZACHARY SAN  MRN: [id number]  : 1965  Study Date: 2025 02:25 PM  Age: 59 yrs  Gender: Male  Patient Location: [facility]  Reason For Study: Edema  Ordering Physician: CECILIA BENSON  Performed By: Rob Gauthier     BSA: 2.3 m2  Height: 72 in  Weight: 231 lb  BP: 131/43 mmHg  ______________________________________________________________________________  Procedure  Echocardiogram with two-dimensional, color and spectral Doppler.  ______________________________________________________________________________  Interpretation Summary  Global and regional left ventricular function is normal with an EF of 55-60%.  Global right ventricular function is normal.  No significant valvular abnormalities present.  No pericardial effusion is present.  ______________________________________________________________________________  Left Ventricle  Left ventricular size is normal. Global and regional left ventricular function  is normal with an EF of 55-60%. Relative wall thickness is increased  consistent with concentric remodeling. Left ventricular diastolic function is  normal. No regional wall motion abnormalities are seen.     Right Ventricle  The right ventricle is normal size. Global right ventricular function is  normal.     Atria  Both atria appear normal.     Mitral Valve  Trace mitral insufficiency is present.     Aortic Valve  Aortic valve is normal in structure and function.     Tricuspid Valve  Trace tricuspid insufficiency is present. The right ventricular systolic  pressure is 21mmHg above the right atrial pressure. Pulmonary artery systolic  pressure is  normal.     Pulmonic Valve  The valve leaflets are not well visualized. On Doppler interrogation, there is  no significant stenosis or regurgitation.     Vessels  Aortic root is 3.9cm. The thoracic aorta is normal. The inferior vena cava  cannot be assessed.     Pericardium  No pericardial effusion is present.     Miscellaneous  No significant valvular abnormalities present.     Compared to Previous Study  There is no prior study for direct comparison.  ______________________________________________________________________________  MMode/2D Measurements & Calculations  IVSd: 1.2 cm  LVIDd: 4.4 cm  LVIDs: 3.1 cm  LVPWd: 1.1 cm  FS: 29.1 %  LV mass(C)d: 176.9 grams  LV mass(C)dI: 78.1 grams/m2  Ao root diam: 3.9 cm  asc Aorta Diam: 3.2 cm  LVOT diam: 2.5 cm  LVOT area: 4.9 cm2  Ao root diam index Ht(cm/m): 2.1  Ao root diam index BSA (cm/m2): 1.7  Asc Ao diam index BSA (cm/m2): 1.4  Asc Ao diam index Ht(cm/m): 1.7  LA Volume (BP): 66.8 ml     LA Volume Index (BP): 29.6 ml/m2  RWT: 0.49  TAPSE: 1.8 cm     Doppler Measurements & Calculations  MV E max james: 80.6 cm/sec  MV A max james: 52.1 cm/sec  MV E/A: 1.5  MV dec slope: 412.0 cm/sec2  MV dec time: 0.20 sec  Ao V2 max: 107.0 cm/sec  Ao max P.6 mmHg  Ao V2 mean: 80.7 cm/sec  Ao mean PG: 3.0 mmHg  Ao V2 VTI: 27.5 cm  HALEIGH(I,D): 3.3 cm2  HALEIGH(V,D): 3.7 cm2  LV V1 max P.6 mmHg  LV V1 max: 79.9 cm/sec  LV V1 VTI: 18.6 cm  SV(LVOT): 91.3 ml  SI(LVOT): 40.3 ml/m2     PA acc time: 0.10 sec  TR max james: 226.0 cm/sec  TR max P.4 mmHg  AV James Ratio (DI): 0.75  HALEIGH Index (cm2/m2): 1.5  E/E' av.9  Lateral E/e': 7.5  Medial E/e': 10.3  RV S James: 10.9 cm/sec     ______________________________________________________________________________  Report approved by: Gem Chavez Dr on 2025 03:59 PM             Discharge Medications      Review of your medicines        START taking        Dose / Directions   gabapentin 100 MG capsule  Commonly known as:  NEURONTIN  Used for: Amputation of toe of right foot      Dose: 100 mg  Take 1 capsule (100 mg) by mouth 3 times daily as needed for neuropathic pain.  Quantity: 20 capsule  Refills: 0     HYDROmorphone 2 MG tablet  Commonly known as: DILAUDID  Used for: Amputation of toe of right foot      Dose: 1 mg  Take 0.5 tablets (1 mg) by mouth every 3 hours as needed for severe pain.  Quantity: 10 tablet  Refills: 0            CHANGE how you take these medications        Dose / Directions   amoxicillin-clavulanate 875-125 MG tablet  Commonly known as: AUGMENTIN  Indication: Infection of the Skin and/or Soft Tissue  This may have changed: when to take this  Used for: Amputation of toe of right foot      Dose: 1 tablet  Take 1 tablet by mouth every 12 hours.  Quantity: 4 tablet  Refills: 0            CONTINUE these medicines which have NOT CHANGED        Dose / Directions   acetaminophen 325 MG tablet  Commonly known as: TYLENOL  Used for: Cellulitis of left lower extremity      Dose: 650 mg  Take 2 tablets (650 mg) by mouth every 6 hours as needed for mild pain (and adjunct with moderate or severe pain or per patient request)  Refills: 0     aspirin 81 MG EC tablet  Commonly known as: Aspirin Low Dose  Used for: Coronary arteriosclerosis in native artery      Dose: 81 mg  Take 1 tablet (81 mg) by mouth daily.  Quantity: 90 tablet  Refills: 3     atorvastatin 40 MG tablet  Commonly known as: LIPITOR  Used for: Coronary arteriosclerosis in native artery      Dose: 40 mg  Take 1 tablet (40 mg) by mouth daily.  Quantity: 90 tablet  Refills: 3     * FLUoxetine 10 MG capsule  Commonly known as: PROzac  Used for: Mild episode of recurrent major depressive disorder      Dose: 10 mg  Take 1 capsule (10 mg) by mouth daily. Take daily with 20mg for daily total of 30mg  Quantity: 90 capsule  Refills: 3     * FLUoxetine 20 MG capsule  Commonly known as: PROzac  Used for: Mild episode of recurrent major depressive disorder      Dose: 20  mg  Take 1 capsule (20 mg) by mouth daily. With 10mg for a daily total of 30mg.  Quantity: 90 capsule  Refills: 3     lisinopril 2.5 MG tablet  Commonly known as: ZESTRIL  Used for: Chronic kidney disease, stage 3a (H)      Dose: 2.5 mg  Take 1 tablet (2.5 mg) by mouth daily.  Quantity: 90 tablet  Refills: 3     multivitamin w/minerals tablet      Dose: 1 tablet  Take 1 tablet by mouth daily  Refills: 0     order for DME  Used for: Diabetic polyneuropathy associated with type 2 diabetes mellitus (H)      Equipment being ordered: custom orthotic inserts for shoes  Quantity: 1 each  Refills: 1     Probiotic Daily Caps      Dose: 1 capsule  Take 1 capsule by mouth daily  Refills: 0     sodium bicarbonate 650 MG tablet  Used for: Chronic kidney disease, stage 3a (H)      Dose: 650 mg  Take 1 tablet (650 mg) by mouth 3 times daily.  Quantity: 270 tablet  Refills: 3     vitamin D 50 MCG (2000 UT) Caps      Dose: 2,000 Units  Take 2,000 Units by mouth daily  Refills: 0           * This list has 2 medication(s) that are the same as other medications prescribed for you. Read the directions carefully, and ask your doctor or other care provider to review them with you.                   Where to get your medicines        These medications were sent to [facility] [place], [place] - [address]  [address] [address]      Phone: [phone]   amoxicillin-clavulanate 875-125 MG tablet  gabapentin 100 MG capsule  HYDROmorphone 2 MG tablet       Allergies   Allergies   Allergen Reactions    Shellfish-Derived Products Anaphylaxis    Adhesive Tape      Paper tape is okay  Tegarderm is okay    Gluten Meal      Celiac    Reglan [Metoclopramide] Hives

## 2025-07-11 NOTE — PLAN OF CARE
[id number]    Goal Outcome Evaluation:      Plan of Care Reviewed With: patient    Overall Patient Progress: no change    Patient alert and oriented, stable on room air. Up independently in room, pt to wear cam boot when ambulating. Regular diet, thin liquids, pills whole, denies nausea/vomiting. BG checks. Left PIV, saline locked. Pt resting comfortably throughout shift. Able to make needs known. Call light within reach. Continue with current plan of care.

## 2025-07-11 NOTE — PROGRESS NOTES
[facility] GENERAL INFECTIOUS DISEASE PROGRESS NOTE     Patient:  Thomas Gonzales   Date of birth 1965, Medical record number [id number]  Date of Visit:  07/11/2025  Date of Admission: 7/7/2025  Consult Requester:Maru Byers,           Assessment and Plan:   ID Problem List  Right 2nd toe infected diabetic foot ulcer, moderate with cellulitis and exposed bone c/f osteomyelitis  S/p toe amputation 7/9/25  Type 2 DM (A1C = 5.3 in June 2025)  S/p Castro-en-Y bypass  History of LLE cellulitis 11/2022 s/p doxycycline x10 days  History of R great toe osteomyelitis (2021, Cx = MSSA x2, pansusceptible Pseudomonas aeruginosa, Acinetobacter) s/p ciprofloxacin + Bactrim x5 weeks, 1 week flagyl    RECOMMENDATION:  Continue PO Augmentin 875-125 mg BID through 7/13 to complete short post-operative antibiotic course (5 days) to cover any residual SSTI  No ID follow up needed     ASSESSMENT:  Thomas Gonzales is a 59 year old male with PMHx significant for T2DM c/b neuropathy and diabetic foot ulcers, Castro-en-Y gastric bypass, CAD with PCI (2015), CKD stage III, multiple sclerosis, hx malignant melanoma, and celiac disease who was admitted 7/7/25 for worsening right 2nd toe wound with drainage + cellulitis despite PO Augmentin, found with exposed bone c/w underlying osteomyelitis.    Afebrile, HDS, without signs of sepsis. Wound swab with 1+ normal augusto. Podiatry consulted and he underwent amputation of 2nd toe on 7/9/25 to metatarsal phalangeal joint for source control. OR cultures NGTD. Given source control of osteomyelitis obtained with proximal amputation, recommend short course PO Augmentin x5 days post-operatively to cover any residual SSTI.     Thank you for this consult. ID will sign off. Recommendations discussed with primary team.    Kaycee Che PA-C  Infectious Diseases  Contact via [employer] or [employer] Paging/Directory    35 MINUTES SPENT BY ME on the date of service doing chart review, history,  exam, documentation & further activities per the note.           Interim History and Events:     NAEON. OR cultures remain NGTD. Tolerating Augmentin. Good spirits, eager to get home. Afebrile, VSS. Labs wnl.         ROS:   -Focused 5 point ROS completed, pertinent positives and negatives listed above.      Physical Examination:  Temp: 98.2  F (36.8  C) Temp src: Oral BP: 112/64 Pulse: 68   Resp: 16 SpO2: 95 % O2 Device: None (Room air)      Vitals:    07/08/25 0100   Weight: 105.1 kg (231 lb 9.6 oz)     Constitutional: Pleasant adult male seen sitting up in chair, in NAD. Alert and interactive.   HEENT: NCAT, anicteric sclerae, conjunctiva clear. Moist mucous membranes  Respiratory: Non-labored breathing, good air exchange. No cough noted.   Cardiovascular: Regular rate and rhythm.  GI: Abdomen is non-distended.   Skin: Warm and dry. No new rashes or lesions on exposed surfaces.  Musculoskeletal: Extremities grossly normal. No tenderness. Trace edema present. R foot in boot - see media tab for photos of R toe now s/p amputation. No erythema noted.  Neurologic: A &O x3, speech normal, answering questions appropriately. Moves all extremities spontaneously. Grossly non-focal.  Neuropsychiatric: Mentation and affect normal/appropriate.  VAD: PIV is c/d/i with no erythema, drainage, or tenderness.    Medications:  Current Facility-Administered Medications   Medication Dose Route Frequency Provider Last Rate Last Admin    acetaminophen (TYLENOL) tablet 975 mg  975 mg Oral TID Maru Byers DO   975 mg at 07/11/25 0838    amoxicillin-clavulanate (AUGMENTIN) 875-125 MG per tablet 1 tablet  1 tablet Oral Q12H [facility] (08/20) Maru Byers E, DO   1 tablet at 07/11/25 0839    aspirin EC tablet 81 mg  81 mg Oral Daily Maru Byers, DO   81 mg at 07/11/25 0838    atorvastatin (LIPITOR) tablet 40 mg  40 mg Oral Daily Mis Aguilar APRN CNP   40 mg at 07/11/25 0838    enoxaparin ANTICOAGULANT (LOVENOX)  "injection 40 mg  40 mg Subcutaneous Q24H Maru Byers DO   40 mg at 07/10/25 2110    FLUoxetine (PROzac) capsule 30 mg  30 mg Oral Daily Mis Aguilar APRN CNP   30 mg at 07/11/25 0838    insulin aspart (NovoLOG) injection (RAPID ACTING)  1-7 Units Subcutaneous TID AC Mis Aguilar APRN CNP   1 Units at 07/09/25 1836    insulin aspart (NovoLOG) injection (RAPID ACTING)  1-5 Units Subcutaneous At Bedtime Mis Aguilar APRN CNP        lisinopril (ZESTRIL) tablet 2.5 mg  2.5 mg Oral Daily Maru Byers DO   2.5 mg at 07/11/25 0838    polyethylene glycol (MIRALAX) Packet 17 g  17 g Oral Daily Mis Aguilar APRN CNP        sodium bicarbonate tablet 650 mg  650 mg Oral TID Mis Aguilar APRN CNP   650 mg at 07/11/25 0837    sodium chloride (PF) 0.9% PF flush 3 mL  3 mL Intracatheter Q8H [facility] Mis Aguilar APRN CNP   3 mL at 07/10/25 2113       Infusions/Drips:  Current Facility-Administered Medications   Medication Dose Route Frequency Provider Last Rate Last Admin       Laboratory Data:   No results found for: \"ACD4\"    Inflammatory Markers    Recent Labs   Lab Test 07/07/25  1555 12/03/22  0601 12/02/22  1647 02/23/21  1534 01/20/21  0550 01/19/21  0729 01/18/21  0546 01/17/21  0553 01/16/21  1544   SED 23* 9 9  --   --   --   --  16 14   CRP  --   --   --  <2.9 12.0* 15.0* 26.0* 51.0* 67.0*       Metabolic Studies       Recent Labs   Lab Test 07/11/25  0823 07/11/25  0114 07/10/25  2112 07/10/25  1808 07/10/25  1216 07/10/25  0737 07/09/25  1218 07/09/25  0816 07/07/25  2325 07/07/25  1555 06/12/25  1051 06/07/24  0905 05/31/24  0717 01/17/21  0553 01/16/21  1544 06/22/20  1106 05/01/18  0819 07/03/17  1126   NA  --   --   --   --   --  139  --  139  --  139 143 139 141   < > 142 142   < >  --    POTASSIUM  --   --   --   --   --  4.9  --  5.1  --  4.8 5.5* 5.5* 5.1   < > 4.0 4.9   < >  --    CHLORIDE  --   --   --   --   --  107  --  108*  " --  108* 114* 112* 112*   < > 116* 111*   < >  --    CO2  --   --   --   --   --  24  --  23  --  21* 21* 20* 19*   < > 21 28   < >  --    ANIONGAP  --   --   --   --   --  8  --  8  --  10 8 7 10   < > 6 3   < >  --    BUN  --   --   --   --   --  29.1*  --  32.9*  --  40.0* 51.6* 40.3* 45.3*   < > 28 22   < >  --    CR  --   --   --   --   --  1.58*  --  1.59*  --  1.79* 1.81* 1.54* 1.55*   < > 1.10 1.00   < >  --    GFRESTIMATED  --   --   --   --   --  50*  --  50*  --  43* 43* 52* 52*   < > 76 85   < >  --    * 164* 198* 105* 109* 128*   < > 123*   < > 133* 151* 145* 127*   < > 121* 129*   < >  --    A1C  --   --   --   --   --   --   --   --   --   --  5.3 6.3*  --    < >  --  5.5   < >  --    DIONICIO  --   --   --   --   --  8.6*  --  8.9  --  9.1 9.5 9.0 8.9   < > 8.6 8.9   < >  --    PHOS  --   --   --   --   --   --   --   --   --   --   --   --   --   --   --  3.7  --  2.5   MAG  --   --   --   --   --   --   --   --   --   --  1.9  --   --   --   --  1.9  --  1.9   LACT  --   --   --   --   --   --   --   --   --  0.8  --   --   --   --  0.8  --   --   --     < > = values in this interval not displayed.       Hepatic Studies    Recent Labs   Lab Test 07/07/25  1555 06/12/25  1051 06/07/24  0905 07/19/23  1538 12/03/22  0601 12/02/22  1647   BILITOTAL 0.4 0.3 0.4 0.5 0.4 0.3   ALKPHOS 103 101 114 136* 103 125   ALBUMIN 3.8 4.1 4.0 4.2 3.2* 4.2   AST 31 49* 61* 50* 116* 64*   ALT 34 58 89* 76* 82* 72*       Pancreatitis testing    Recent Labs   Lab Test 06/12/25  1051 06/07/24  0905 07/19/23  1538 10/21/22  0729 09/23/21  1114 06/22/20  1106   TRIG 70 54 109 97 37 48       Hematology Studies      Recent Labs   Lab Test 07/10/25  0737 07/09/25  0816 07/08/25  0738 07/07/25  1555 06/12/25  1051 06/26/23  1623 12/03/22  0601 12/02/22  1647 11/16/22  0657 11/15/22  1811 11/13/22  1110   WBC 4.6 4.2 5.0 5.6 4.2 4.8 2.9* 4.9   < > 5.9 4.1   ANEU  --   --   --  3.9  --  3.0 1.9 3.1  --  4.2 2.6   ALYM  --   --    --  1.0  --  1.3 0.5* 1.1  --  1.1 1.1   ROSENDO  --   --   --  0.3  --  0.2 0.3 0.4  --  0.4 0.2   AEOS  --   --   --  0.2  --  0.2 0.1 0.3  --  0.2 0.2   HGB 11.4* 11.5* 11.8* 12.4* 13.0* 12.3* 10.9* 12.8*   < > 13.9 12.9*   HCT 34.8* 34.2* 35.7* 37.1* 40.5 37.2* 32.7* 37.9*   < > 41.7 38.1*    151 175 178 142* 160 103* 144*   < > 157 148*    < > = values in this interval not displayed.     Urine Studies     Recent Labs   Lab Test 06/26/23  1551 10/21/22  0729   URINEPH 5.5 7.0   NITRITE Negative Positive*   LEUKEST Moderate* Moderate*   WBCU * >100*       Vancomycin Levels     Recent Labs   Lab Test 01/18/21  1714   VANCOMYCIN 17.9     Microbiology:  Culture   Date Value Ref Range Status   07/09/2025 No anaerobic organisms isolated after 1 day  Preliminary   07/09/2025 No growth after 1 day  Preliminary   07/09/2025 No growth, less than 1 day  Preliminary   07/07/2025 No growth after 3 days  Preliminary   07/07/2025 No growth after 3 days  Preliminary   07/07/2025 1+ Normal augusto  Final   06/26/2023 >100,000 CFU/mL Klebsiella pneumoniae (A)  Final   12/02/2022 No Growth  Final   12/02/2022 No Growth  Final   12/02/2022 No Growth  Final   11/15/2022 No Growth  Final   11/15/2022 No Growth  Final   10/21/2022 >100,000 CFU/mL Proteus mirabilis (A)  Final   10/21/2022 <10,000 CFU/mL Urogenital augusto  Final     GS Culture   Date Value Ref Range Status   07/09/2025 See corresponding culture for results  Final       Last check of C difficile  C Difficile Toxin B by PCR   Date Value Ref Range Status   12/03/2022 Negative Negative Final     Comment:     A negative result does not exclude actual disease due to C. difficile and may be due to improper collection, handling and storage of the specimen or the number of organisms in the specimen is below the detection limit of the assay.       Imaging:  Echo Complete  Result Date: 7/8/2025  Interpretation Summary Global and regional left ventricular function is  normal with an EF of 55-60%. Global right ventricular function is normal. No significant valvular abnormalities present. No pericardial effusion is present.      US Lower Extremity Venous Duplex Right  Result Date: 7/7/2025  IMPRESSION: No deep venous thrombosis in the visualized right lower extremity.      Foot  XR, G/E 3 views, right  Result Date: 7/7/2025  IMPRESSION: No discrete osseous erosive or destructive change to suggest osteomyelitis radiographically. Chronic bone loss at the tuft of the first distal phalanx. Mild scattered degenerative arthritis. Arterial calcifications.     XR Foot Right G/E 3 Views  Result Date: 7/3/2025  IMPRESSION: Generalized demineralization. No erosive change or periostitis. No fracture. Normal joint alignment. Moderate degenerative changes throughout the midfoot and toes. Soft tissue swelling throughout the foot. Arterial calcifications throughout the digital arteries. Moderate-sized plantar calcaneal enthesophyte.

## 2025-07-11 NOTE — PROGRESS NOTES
Brief podiatry note      I stopped by patient's room today, dressing was not taken down, he is doing well and getting ready for discharge.  He has his short cam boot.  Reviewed instructions again that he can use this for light ambulation and ADLs but I would like him to stay off the foot is much as possible.  He notes that he will seek out a knee scooter so that he can get around.  If he is safe and comfortable with this I am okay with this as well.      He has outpatient follow-up with podiatry on the 30th already scheduled, no need for further intervention, from a podiatry standpoint he is cleared for discharge, wound care instructions were reviewed with patient and noted in my previous progress note from yesterday.    Please let me know if there are any questions

## 2025-07-12 LAB
BACTERIA SPEC CULT: NO GROWTH
BACTERIA SPEC CULT: NO GROWTH
BACTERIA TISS BX CULT: ABNORMAL

## 2025-07-14 ENCOUNTER — MYC MEDICAL ADVICE (OUTPATIENT)
Dept: PEDIATRICS | Facility: CLINIC | Age: 60
End: 2025-07-14
Payer: COMMERCIAL

## 2025-07-14 ENCOUNTER — TELEPHONE (OUTPATIENT)
Dept: PEDIATRICS | Facility: CLINIC | Age: 60
End: 2025-07-14
Payer: COMMERCIAL

## 2025-07-14 NOTE — TELEPHONE ENCOUNTER
Sent pt mcm stating to start an e-visit if they want Dr Sage to fill out or have the hospitalist fill this form out.

## 2025-07-14 NOTE — TELEPHONE ENCOUNTER
Usually would go to hospitalist but patient can send Evisit and I should be able to complete. Thanks!    Anni Sage MD  Internal Medicine-Pediatrics

## 2025-07-14 NOTE — TELEPHONE ENCOUNTER
Transitions of Care Outreach  Chief Complaint   Patient presents with    Hospital F/U       Most Recent Admission Date: 7/7/2025   Most Recent Admission Diagnosis: Cellulitis of foot - L03.119  Diabetic foot infection (H) - E11.628, L08.9     Most Recent Discharge Date: 7/11/2025   Most Recent Discharge Diagnosis: Diabetic foot infection (H) - E11.628, L08.9  Cellulitis of foot - L03.119  Infection of toe - L08.9  Osteomyelitis of second toe of right foot (H) - M86.9  Amputation of toe of right foot - S98.131A  Gait instability - R26.81     Transitions of Care Assessment         Follow up Plan      Follow up: Follow up with Podiatry as scheduled on 7/30/25.  Follow up with PCP within 30 days.     START taking:  gabapentin (NEURONTIN)  HYDROmorphone (DILAUDID)  CHANGE how you take:  amoxicillin-clavulanate (AUGMENTIN)      Future Appointments   Date Time Provider Department Center   7/30/2025  7:40 AM Norm West DPM [facility]   7/31/2025  1:00 PM Anni Sage MD [facility]       Outpatient Plan as outlined on AVS reviewed with patient.    For any urgent concerns, please contact our 24 hour nurse triage line: [phone] ([phone])       Charito Bush RN

## 2025-07-14 NOTE — TELEPHONE ENCOUNTER
"  Transitions of Care Outreach  Chief Complaint   Patient presents with    Hospital F/U       Most Recent Admission Date: 7/7/2025   Most Recent Admission Diagnosis: Cellulitis of foot - L03.119  Diabetic foot infection (H) - E11.628, L08.9     Most Recent Discharge Date: 7/11/2025   Most Recent Discharge Diagnosis: Diabetic foot infection (H) - E11.628, L08.9  Cellulitis of foot - L03.119  Infection of toe - L08.9  Osteomyelitis of second toe of right foot (H) - M86.9  Amputation of toe of right foot - S98.131A  Gait instability - R26.81     Transitions of Care Assessment    Discharge Assessment  How are you doing now that you are home?: \"doing ok\"  How are your symptoms? (Red Flag symptoms escalate to triage hotline per guidelines): Improved  Do you know how to contact your clinic care team if you have future questions or changes to your health status? : Yes  Does the patient have their discharge instructions? : Yes  Does the patient have questions regarding their discharge instructions? : No  Were you started on any new medications or were there changes to any of your previous medications? : Yes (START taking:  gabapentin (NEURONTIN)  HYDROmorphone (DILAUDID)  CHANGE how you take:  amoxicillin-clavulanate (AUGMENTIN))  Does the patient have all of their medications?: Yes  Do you have questions regarding any of your medications? : No    Follow up Plan     Discharge Follow-Up  Discharge follow up appointment scheduled in alignment with recommended follow up timeframe or Transitions of Risk Category? (Low = within 30 days; Moderate= within 14 days; High= within 7 days): Yes  Discharge Follow Up Appointment Date: 07/31/25  Discharge Follow Up Appointment Scheduled with?: Primary Care Provider    Future Appointments   Date Time Provider Department Center   7/30/2025  7:40 AM Norm West DPM [facility]   7/31/2025  1:00 PM Anni Sage MD [facility]       Outpatient Plan as outlined on AVS reviewed with " patient.    For any urgent concerns, please contact our 24 hour nurse triage line: [phone])       Benita Gaines RN

## 2025-07-16 LAB — BACTERIA TISS BX CULT: NORMAL

## 2025-07-17 ENCOUNTER — E-VISIT (OUTPATIENT)
Dept: PEDIATRICS | Facility: CLINIC | Age: 60
End: 2025-07-17
Payer: COMMERCIAL

## 2025-07-17 DIAGNOSIS — L08.9 DIABETIC FOOT INFECTION (H): Primary | ICD-10-CM

## 2025-07-17 DIAGNOSIS — E11.628 DIABETIC FOOT INFECTION (H): Primary | ICD-10-CM

## 2025-07-17 LAB — BACTERIA TISS BX CULT: NORMAL

## 2025-07-17 PROCEDURE — 99421 OL DIG E/M SVC 5-10 MIN: CPT | Performed by: INTERNAL MEDICINE

## 2025-07-17 NOTE — TELEPHONE ENCOUNTER
Provider E-Visit time total (minutes): 5 minutes       This document is complete and the patient is ready for discharge.

## 2025-07-24 LAB — BACTERIA TISS BX CULT: NORMAL

## 2025-07-30 ENCOUNTER — OFFICE VISIT (OUTPATIENT)
Dept: ORTHOPEDICS | Facility: CLINIC | Age: 60
End: 2025-07-30
Payer: COMMERCIAL

## 2025-07-30 DIAGNOSIS — E11.49 TYPE II OR UNSPECIFIED TYPE DIABETES MELLITUS WITH NEUROLOGICAL MANIFESTATIONS, NOT STATED AS UNCONTROLLED(250.60) (H): Primary | ICD-10-CM

## 2025-07-30 DIAGNOSIS — Z89.421: ICD-10-CM

## 2025-07-30 DIAGNOSIS — M21.42 PES PLANUS OF BOTH FEET: ICD-10-CM

## 2025-07-30 DIAGNOSIS — M21.41 PES PLANUS OF BOTH FEET: ICD-10-CM

## 2025-07-30 PROCEDURE — 99213 OFFICE O/P EST LOW 20 MIN: CPT | Performed by: PODIATRIST

## 2025-07-30 NOTE — LETTER
7/30/2025      Thomas Gonzales  [address] [place] [address]      Dear Colleague,    Thank you for referring your patient, Thomas Gonzales, to the [facility] [place]. Please see a copy of my visit note below.    Chief Complaint   Patient presents with     RECHECK     Recheck toes              Allergies   Allergen Reactions     Shellfish-Derived Products Anaphylaxis     Adhesive Tape      Paper tape is okay  Tegarderm is okay     Gluten Meal      Celiac     Reglan [Metoclopramide] Hives         Subjective: Thomas is a 59 year old male who presents to the clinic today status post right second digit amputation.  My he was last seen by me in November of last year.  Since then, he noted that he had an ulceration come back on the right second toe.  He went to the hospital at the beginning of the month and had an amputation of the second digit on 9 July.  He notes that since then, the area has been healing well.  He has been changing the dressing.    Review of systems: No nausea, vomiting, diarrhea, fever, chills, night sweats, shortness of breath, chest pain.    Objective  Hemoglobin A1C   Date Value Ref Range Status   06/12/2025 5.3 0.0 - 5.6 % Final     Comment:     Normal <5.7%   Prediabetes 5.7-6.4%    Diabetes 6.5% or higher     Note: Adopted from ADA consensus guidelines.   06/07/2024 6.3 (H) 0.0 - 5.6 % Final     Comment:     Normal <5.7%   Prediabetes 5.7-6.4%    Diabetes 6.5% or higher     Note: Adopted from ADA consensus guidelines.   10/26/2023 6.2 (H) 0.0 - 5.6 % Final     Comment:     Normal <5.7%   Prediabetes 5.7-6.4%    Diabetes 6.5% or higher     Note: Adopted from ADA consensus guidelines.   01/17/2021 5.7 (H) 0 - 5.6 % Final     Comment:     Normal <5.7% Prediabetes 5.7-6.4%  Diabetes 6.5% or higher - adopted from ADA   consensus guidelines.     06/22/2020 5.5 0 - 5.6 % Final     Comment:     Normal <5.7% Prediabetes 5.7-6.4%  Diabetes 6.5% or higher -  adopted from ADA   consensus guidelines.     02/01/2019 5.4 0 - 5.6 % Final     Comment:     Normal <5.7% Prediabetes 5.7-6.4%  Diabetes 6.5% or higher - adopted from ADA   consensus guidelines.       DP and PT pulses are 2/4.  Capillary fill time is 1 second.  Positive pedal hair.  Gross sensation is diminished.  Equinus is noted bilaterally.  Amputation noted of the right second digit.  Stump site incision is well coapted with no dehiscence.  No drainage.  No signs or symptoms of infection noted.      Assessment:   Encounter Diagnoses   Name Primary?     Type II or unspecified type diabetes mellitus with neurological manifestations, not stated as uncontrolled(250.60) (H) Yes     History of amputation of right second toe      Pes planus of both feet        Plan:   - Pt seen and evaluated  - Sutures were removed.  He should cover the area with a Primapore for a few days.  Other than that, he can shower normally.  -Order for diabetic shoes.  - Pt to return to clinic PRN.       Again, thank you for allowing me to participate in the care of your patient.        Sincerely,        Norm West DPM    Electronically signed

## 2025-07-30 NOTE — PROGRESS NOTES
Chief Complaint   Patient presents with    RECHECK     Recheck toes              Allergies   Allergen Reactions    Shellfish-Derived Products Anaphylaxis    Adhesive Tape      Paper tape is okay  Tegarderm is okay    Gluten Meal      Celiac    Reglan [Metoclopramide] Hives         Subjective: Thomas is a 59 year old male who presents to the clinic today status post right second digit amputation.  My he was last seen by me in November of last year.  Since then, he noted that he had an ulceration come back on the right second toe.  He went to the hospital at the beginning of the month and had an amputation of the second digit on 9 July.  He notes that since then, the area has been healing well.  He has been changing the dressing.    Review of systems: No nausea, vomiting, diarrhea, fever, chills, night sweats, shortness of breath, chest pain.    Objective  Hemoglobin A1C   Date Value Ref Range Status   06/12/2025 5.3 0.0 - 5.6 % Final     Comment:     Normal <5.7%   Prediabetes 5.7-6.4%    Diabetes 6.5% or higher     Note: Adopted from ADA consensus guidelines.   06/07/2024 6.3 (H) 0.0 - 5.6 % Final     Comment:     Normal <5.7%   Prediabetes 5.7-6.4%    Diabetes 6.5% or higher     Note: Adopted from ADA consensus guidelines.   10/26/2023 6.2 (H) 0.0 - 5.6 % Final     Comment:     Normal <5.7%   Prediabetes 5.7-6.4%    Diabetes 6.5% or higher     Note: Adopted from ADA consensus guidelines.   01/17/2021 5.7 (H) 0 - 5.6 % Final     Comment:     Normal <5.7% Prediabetes 5.7-6.4%  Diabetes 6.5% or higher - adopted from ADA   consensus guidelines.     06/22/2020 5.5 0 - 5.6 % Final     Comment:     Normal <5.7% Prediabetes 5.7-6.4%  Diabetes 6.5% or higher - adopted from ADA   consensus guidelines.     02/01/2019 5.4 0 - 5.6 % Final     Comment:     Normal <5.7% Prediabetes 5.7-6.4%  Diabetes 6.5% or higher - adopted from ADA   consensus guidelines.       DP and PT pulses are 2/4.  Capillary fill time is 1 second.   Positive pedal hair.  Gross sensation is diminished.  Equinus is noted bilaterally.  Amputation noted of the right second digit.  Stump site incision is well coapted with no dehiscence.  No drainage.  No signs or symptoms of infection noted.      Assessment:   Encounter Diagnoses   Name Primary?    Type II or unspecified type diabetes mellitus with neurological manifestations, not stated as uncontrolled(250.60) (H) Yes    History of amputation of right second toe     Pes planus of both feet        Plan:   - Pt seen and evaluated  - Sutures were removed.  He should cover the area with a Primapore for a few days.  Other than that, he can shower normally.  -Order for diabetic shoes.  - Pt to return to clinic PRN.

## 2025-07-31 ENCOUNTER — OFFICE VISIT (OUTPATIENT)
Dept: PEDIATRICS | Facility: CLINIC | Age: 60
End: 2025-07-31
Attending: STUDENT IN AN ORGANIZED HEALTH CARE EDUCATION/TRAINING PROGRAM
Payer: COMMERCIAL

## 2025-07-31 VITALS
TEMPERATURE: 97.1 F | OXYGEN SATURATION: 100 % | SYSTOLIC BLOOD PRESSURE: 123 MMHG | BODY MASS INDEX: 30 KG/M2 | WEIGHT: 226.4 LBS | HEART RATE: 64 BPM | HEIGHT: 73 IN | RESPIRATION RATE: 18 BRPM | DIASTOLIC BLOOD PRESSURE: 77 MMHG

## 2025-07-31 DIAGNOSIS — E11.628 DIABETIC FOOT INFECTION (H): Primary | ICD-10-CM

## 2025-07-31 DIAGNOSIS — N18.32 CKD STAGE 3B, GFR 30-44 ML/MIN (H): ICD-10-CM

## 2025-07-31 DIAGNOSIS — L08.9 DIABETIC FOOT INFECTION (H): Primary | ICD-10-CM

## 2025-07-31 LAB — BACTERIA TISS BX CULT: NORMAL

## 2025-07-31 NOTE — PROGRESS NOTES
Assessment & Plan     Diabetic foot infection (H)  Overall doing quite well, off antibiotics and no pain. S/p amputation and sutures remove yesterday, appears to be healing well. Discussed that with any future infections/skin breakdown he will reach out right away to start antibiotics.     CKD stage 3b, GFR 30-44 ml/min (H)  Has nephrology appointment scheduled for next Monday.    MED REC REQUIRED  Post Medication Reconciliation Status: discharge medications reconciled and changed, per note/orders      Bonnie Moseley is a 59 year old, presenting for the following health issues:  Hospital F/U        7/31/2025    12:58 PM   Additional Questions   Roomed by miss   Accompanied by self         7/31/2025    12:58 PM   Patient Reported Additional Medications   Patient reports taking the following new medications no     HPI          Hospital Follow-up Visit:    Hospital/Nursing Home/IP Rehab Facility: [facility]  Most Recent Admission Date: 7/7/2025   Most Recent Admission Diagnosis: Cellulitis of foot - L03.119  Diabetic foot infection (H) - E11.628, L08.9     Most Recent Discharge Date: 7/11/2025   Most Recent Discharge Diagnosis: Diabetic foot infection (H) - E11.628, L08.9  Cellulitis of foot - L03.119  Infection of toe - L08.9  Osteomyelitis of second toe of right foot (H) - M86.9  Amputation of toe of right foot - S98.131A  Gait instability - R26.81   Do you have any other stressors you would like to discuss with your provider? No    Problems taking medications regularly:  None  Medication changes since discharge: None  Problems adhering to non-medication therapy:  None    Summary of hospitalization:  [facility] discharge summary reviewed  Diagnostic Tests/Treatments reviewed.  Follow up needed: none  Other Healthcare Providers Involved in Patient s Care:         podiatry  Update since discharge: improved.         Plan of care communicated with  "patient          Pradip comes in for follow-up for recent hospitalization for diabetic foot wound requiring amputation of his right second toe. He is now doing well, off antibiotics and not having pain. Sutures removed yesterday by his podiatrist.           Objective    /77 (BP Location: Right arm, Patient Position: Sitting, Cuff Size: Adult Regular)   Pulse 64   Temp 97.1  F (36.2  C) (Temporal)   Resp 18   Ht 1.854 m (6' 1\")   Wt 102.7 kg (226 lb 6.4 oz)   SpO2 100%   BMI 29.87 kg/m    Body mass index is 29.87 kg/m .  Physical Exam   GENERAL: alert and no distress  MS: R foot with second toe s/p amputation with incision healing well.     The longitudinal plan of care for the diagnosis(es)/condition(s) as documented were addressed during this visit. Due to the added complexity in care, I will continue to support Pradip in the subsequent management and with ongoing continuity of care.        Signed Electronically by: Anni Serrano MD    "

## 2025-08-04 ENCOUNTER — TRANSFERRED RECORDS (OUTPATIENT)
Dept: HEALTH INFORMATION MANAGEMENT | Facility: CLINIC | Age: 60
End: 2025-08-04
Payer: COMMERCIAL

## 2025-08-06 LAB — BACTERIA TISS BX CULT: NO GROWTH

## 2025-08-15 ENCOUNTER — HOSPITAL ENCOUNTER (OUTPATIENT)
Dept: ULTRASOUND IMAGING | Facility: HOSPITAL | Age: 60
Discharge: HOME | End: 2025-08-15
Attending: INTERNAL MEDICINE | Admitting: INTERNAL MEDICINE
Payer: COMMERCIAL

## 2025-08-15 DIAGNOSIS — N18.31 CHRONIC KIDNEY DISEASE, STAGE 3A (H): ICD-10-CM

## 2025-08-15 PROCEDURE — 76770 US EXAM ABDO BACK WALL COMP: CPT

## 2025-08-19 DIAGNOSIS — N18.31 STAGE 3A CHRONIC KIDNEY DISEASE (H): Primary | ICD-10-CM

## (undated) DEVICE — SUTURE VICRYL+ 2-0 CT-2 27" UND VCP269H

## (undated) DEVICE — TRAY PREP DRY SKIN SCRUB 067

## (undated) DEVICE — SYR 10ML FINGER CONTROL W/O NDL 309695

## (undated) DEVICE — COVER MAYO STAND STERILE-Z 5582

## (undated) DEVICE — SYR 30ML SLIP TIP W/O NDL 302833

## (undated) DEVICE — LINEN ORTHO PACK 5446

## (undated) DEVICE — BLADE KNIFE SURG 10 371110

## (undated) DEVICE — ENDO FORCEP BX CAPTURA LG SPIKE 2.4MMX230CM G53641

## (undated) DEVICE — KIT ENDO TURNOVER/PROCEDURE CARRY-ON 101822

## (undated) DEVICE — GLOVE PROTEXIS BLUE W/NEU-THERA 7.5  2D73EB75

## (undated) DEVICE — DRSG XEROFORM 1X8"

## (undated) DEVICE — SOL WATER IRRIG 500ML BOTTLE 2F7113

## (undated) DEVICE — DRSG TELFA 3X8" 1238

## (undated) DEVICE — ENDO BITE BLOCK ADULT OMNI-BLOC

## (undated) DEVICE — ESU GROUND PAD ADULT W/CORD E7507

## (undated) DEVICE — SOLUTION IRRIGATION 0.9% NACL 1000ML BOTTLE R5200-01

## (undated) DEVICE — BLADE KNIFE SURG 15 371115

## (undated) DEVICE — SUCTION MANIFOLD NEPTUNE 2 SYS 1 PORT 702-025-000

## (undated) DEVICE — TUBING SUCTION 12"X1/4" N612

## (undated) DEVICE — SPECIMEN CONTAINER 3OZ W/FORMALIN 59901

## (undated) DEVICE — GLOVE PROTEXIS MICRO 7.5 LT BLUE 2D73PM75

## (undated) DEVICE — GLOVE EXAM NITRILE LG PF LATEX FREE 5064

## (undated) DEVICE — GOWN IMPERVIOUS 2XL BLUE

## (undated) DEVICE — SPECIMEN CONTAINER 5OZ STERILE 2600SA

## (undated) DEVICE — SUCTION CATH AIRLIFE TRI-FLO W/CONTROL PORT 14FR  T60C

## (undated) DEVICE — STRAP POSITIONING 60X31" BODY KNEE KBS 01

## (undated) DEVICE — PACK LOWER EXTREMITY RIVERSIDE SOP32LEFSX

## (undated) DEVICE — DRAPE MAYO STAND 23X54 8337

## (undated) DEVICE — SU ETHILON 3-0 PS-1 18" 1663H

## (undated) RX ORDER — REGADENOSON 0.08 MG/ML
INJECTION, SOLUTION INTRAVENOUS
Status: DISPENSED
Start: 2023-07-25

## (undated) RX ORDER — PROPOFOL 10 MG/ML
INJECTION, EMULSION INTRAVENOUS
Status: DISPENSED
Start: 2025-07-09

## (undated) RX ORDER — FENTANYL CITRATE 50 UG/ML
INJECTION, SOLUTION INTRAMUSCULAR; INTRAVENOUS
Status: DISPENSED
Start: 2025-07-09